# Patient Record
Sex: MALE | Race: WHITE | NOT HISPANIC OR LATINO | Employment: OTHER | URBAN - METROPOLITAN AREA
[De-identification: names, ages, dates, MRNs, and addresses within clinical notes are randomized per-mention and may not be internally consistent; named-entity substitution may affect disease eponyms.]

---

## 2017-01-04 ENCOUNTER — OFFICE VISIT (OUTPATIENT)
Dept: FAMILY MEDICINE | Facility: CLINIC | Age: 82
End: 2017-01-04
Payer: COMMERCIAL

## 2017-01-04 VITALS
TEMPERATURE: 97.8 F | WEIGHT: 184 LBS | HEART RATE: 76 BPM | DIASTOLIC BLOOD PRESSURE: 70 MMHG | RESPIRATION RATE: 20 BRPM | OXYGEN SATURATION: 97 % | SYSTOLIC BLOOD PRESSURE: 130 MMHG | HEIGHT: 73 IN | BODY MASS INDEX: 24.39 KG/M2

## 2017-01-04 DIAGNOSIS — R10.9 LEFT FLANK PAIN, CHRONIC: ICD-10-CM

## 2017-01-04 DIAGNOSIS — Z80.0 FAMILY HISTORY OF COLON CANCER: ICD-10-CM

## 2017-01-04 DIAGNOSIS — I71.40 ABDOMINAL AORTIC ANEURYSM (AAA) WITHOUT RUPTURE (H): ICD-10-CM

## 2017-01-04 DIAGNOSIS — C61 MALIGNANT NEOPLASM OF PROSTATE (H): ICD-10-CM

## 2017-01-04 DIAGNOSIS — G89.29 LEFT FLANK PAIN, CHRONIC: ICD-10-CM

## 2017-01-04 DIAGNOSIS — R19.4 CHANGE IN BOWEL HABITS: ICD-10-CM

## 2017-01-04 DIAGNOSIS — Z86.0100 HISTORY OF COLONIC POLYPS: Primary | ICD-10-CM

## 2017-01-04 DIAGNOSIS — I10 ESSENTIAL HYPERTENSION: ICD-10-CM

## 2017-01-04 PROBLEM — E78.5 HYPERLIPIDEMIA: Status: ACTIVE | Noted: 2017-01-04

## 2017-01-04 PROBLEM — I49.9 CARDIAC ARRHYTHMIA: Status: ACTIVE | Noted: 2017-01-04

## 2017-01-04 PROCEDURE — 99214 OFFICE O/P EST MOD 30 MIN: CPT | Performed by: INTERNAL MEDICINE

## 2017-01-04 RX ORDER — HYDROCHLOROTHIAZIDE 25 MG/1
12.5 TABLET ORAL
COMMUNITY
Start: 2016-11-09 | End: 2017-01-04

## 2017-01-04 RX ORDER — HYDROCHLOROTHIAZIDE 25 MG/1
12.5 TABLET ORAL DAILY
Qty: 45 TABLET | Refills: 3 | Status: SHIPPED | OUTPATIENT
Start: 2017-01-04 | End: 2018-02-26

## 2017-01-04 NOTE — NURSING NOTE
"Chief Complaint   Patient presents with     Gastrointestinal Problem     Musculoskeletal Problem       Initial /70 mmHg  Pulse 76  Temp(Src) 97.8  F (36.6  C)  Resp 20  Ht 6' 1\" (1.854 m)  Wt 184 lb (83.462 kg)  BMI 24.28 kg/m2  SpO2 97% Estimated body mass index is 24.28 kg/(m^2) as calculated from the following:    Height as of this encounter: 6' 1\" (1.854 m).    Weight as of this encounter: 184 lb (83.462 kg).  BP completed using cuff size: dwight Russo LPN  "

## 2017-01-04 NOTE — MR AVS SNAPSHOT
After Visit Summary   1/4/2017    Ayaan Herrera    MRN: [id number]           Patient Information     Date Of Birth          10/31/1932        Visit Information        Provider Department      1/4/2017 11:30 AM [facility]        Today's Diagnoses     History of colonic polyps    -  1     Change in bowel habits         Family history of colon cancer         Malignant neoplasm of prostate (H)         Essential hypertension         Abdominal aortic aneurysm (AAA) without rupture (H)           Care Instructions    We are planning a colonoscopy.                      Try cutting back on some high fiber foods, such as nuts.               Keep taking the hydrochlorothiazide.           Follow-ups after your visit        Additional Services     GASTROENTEROLOGY ADULT REFERRAL +/- PROCEDURE       Your provider has referred you to Gastroenterology Services.    English    Procedure/Referral: PROCEDURE ONLY - COLONOSCOPY - Reason for procedure: Family History of Colon Cancer, History of Adenomas; multiple,recurrent, and Lower GI Symptoms  N: [facility] (for [place] and [facility], please use the selections above) - [place] [phone]   [email or web address]      Please be aware that coverage of these services is subject to the terms and limitations of your health insurance plan.  Call member services at your health plan with any benefit or coverage questions.  Any procedures must be performed at a [facility] facility OR coordinated by your clinic's referral office.    Please bring the following with you to your appointment:    (1) Any X-Rays, CTs or MRIs which have been performed.  Contact the facility where they were done to arrange for  prior to your scheduled appointment.    (2) List of current medications   (3) This referral request   (4) Any documents/labs given to you for this referral                  Your next 10  "appointments already scheduled     Mar 10, 2017  9:15 AM   VISUAL FIELD with [facility] EYE VISUAL FIELD   Eye Clinic ([facility])    [address]  [address]  [address] [address]  [facility] [address]   [phone]            Mar 10, 2017  9:45 AM   RETURN GLAUCOMA with Lissa Berry MD   Eye Clinic ([facility])    [address]  [address]  [address] [address]  [facility] [facility] [address]   [phone]              Who to contact     If you have questions or need follow up information about today's clinic visit or your schedule please contact [facility] directly at [phone].  Normal or non-critical lab and imaging results will be communicated to you by MyChart, letter or phone within 4 business days after the clinic has received the results. If you do not hear from us within 7 days, please contact the clinic through [place] or phone. If you have a critical or abnormal lab result, we will notify you by phone as soon as possible.  Submit refill requests through [employer] or call your pharmacy and they will forward the refill request to us. Please allow 3 business days for your refill to be completed.          Additional Information About Your Visit        [employer] Information     [employer] gives you secure access to your electronic health record. If you see a primary care provider, you can also send messages to your care team and make appointments. If you have questions, please call your primary care clinic.  If you do not have a primary care provider, please call [phone] and they will assist you.        Care EveryWhere ID     This is your Care EveryWhere ID. This could be used by other organizations to access your [place] medical records  [id number]        Your Vitals Were     Pulse Temperature Respirations Height BMI (Body Mass Index) Pulse Oximetry    76 97.8  F (36.6  C) 20 6' 1\" (1.854 m) 24.28 kg/m2 97%       Blood Pressure from Last 3 " Encounters:   01/04/17 130/70   09/30/12 142/72   07/25/08 132/70    Weight from Last 3 Encounters:   01/04/17 184 lb (83.462 kg)   09/30/12 187 lb (84.823 kg)   07/25/08 187 lb (84.823 kg)              We Performed the Following     GASTROENTEROLOGY ADULT REFERRAL +/- PROCEDURE          Today's Medication Changes          These changes are accurate as of: 1/4/17 12:07 PM.  If you have any questions, ask your nurse or doctor.               These medicines have changed or have updated prescriptions.        Dose/Directions    hydrochlorothiazide 25 MG tablet   Commonly known as:  HYDRODIURIL   This may have changed:  when to take this   Used for:  Essential hypertension   Changed by:  Dean Rider MD        Dose:  12.5 mg   Take 0.5 tablets (12.5 mg) by mouth daily   Quantity:  45 tablet   Refills:  3            Where to get your medicines      These medications were sent to [facility] [address] [place] [address]     Phone:  [phone]    - hydrochlorothiazide 25 MG tablet             Primary Care Provider    Physician No Ref-Primary       No address on file        Thank you!     Thank you for choosing [facility]  for your care. Our goal is always to provide you with excellent care. Hearing back from our patients is one way we can continue to improve our services. Please take a few minutes to complete the written survey that you may receive in the mail after your visit with us. Thank you!             Your Updated Medication List - Protect others around you: Learn how to safely use, store and throw away your medicines at www.disposemymeds.org.          This list is accurate as of: 1/4/17 12:07 PM.  Always use your most recent med list.                   Brand Name Dispense Instructions for use    brimonidine 0.15 % ophthalmic solution    ALPHAGAN P    1 Bottle    Place 1 drop into both eyes 2 times daily        COSOPT 2-0.5 % ophthalmic solution   Generic drug:  dorzolamide-timolol      None Entered       hydrochlorothiazide 25 MG tablet    HYDRODIURIL    45 tablet    Take 0.5 tablets (12.5 mg) by mouth daily       latanoprost 0.005 % ophthalmic solution    XALATAN    3 Bottle    Place 1 drop into both eyes At Bedtime       ZADITOR 0.025 % Soln ophthalmic solution   Generic drug:  ketotifen      Place 1 drop into both eyes as needed

## 2017-01-04 NOTE — PATIENT INSTRUCTIONS
We are planning a colonoscopy.                      Try cutting back on some high fiber foods, such as nuts.               Keep taking the hydrochlorothiazide.

## 2017-01-04 NOTE — PROGRESS NOTES
SUBJECTIVE:                                                    Ayaan Herrera is a 84 year old male who presents to clinic today for the following health issues:      Discuss issues with Prostate Cancer Level 3- 12/2014. Left lower side pain intermittently since 2015. Colonoscopy where?-due 5/2017. Previous dates for Colonoscopies--6/13/2000, 9/29/2003, 10/20/2008, 1/27/2013, and 5/18/2014 all at [facility].       Information above obtained by the nurse.                     There is hardly any info in EPIC.                     Friend of Nidia Ellisondeisy; recently he has been going to an [facility] clinic.                                                                                 Hx of frequent colonoscopies; last one in 6/14; several adenomas.           fam hx of colon cancer.           Ongoing abd bloating; even had a CT abd which was negative.                            No hematochezia.          A follow-up colonoscopy in 3 years had been recommended. He does not want to wait until this summer, which would be 3 years.                                                 Also wanted to d/w me prostate cancer hx.              he's had hormonal therapy and external beam radiation therapy. He sees urology regularly. His PSA has been very low.                                                       Also, L flank pain since 2014.   CT abd 1/16 was neg for any etiology of this pain.                            This pain resolves with activity.       Physical therapy has been recommended, and he is due to start that soon.                               [facility]           He brings in lab results from his August 2016 exam.           Potassium 4.1, kidney and liver function normal, glucose 95, lipids were good, hemoglobin 12.8, CBC otherwise normal, PSA undetectable, TSH normal. These reports will be scanned.       Taking HCTZ, 12.5 mg per day since 2012 approx. blood pressure was elevated at that time. He wonders if  "he should continue this.    Problem list and histories reviewed & adjusted, as indicated.          ROS:  CONSTITUTIONAL:NEGATIVE for fever, chills, change in weight  RESP:NEGATIVE for significant cough or SOB  CV: NEGATIVE for chest pain, palpitations or peripheral edema    OBJECTIVE:                                                    /70 mmHg  Pulse 76  Temp(Src) 97.8  F (36.6  C)  Resp 20  Ht 6' 1\" (1.854 m)  Wt 184 lb (83.462 kg)  BMI 24.28 kg/m2  SpO2 97%  Body mass index is 24.28 kg/(m^2).  GENERAL APPEARANCE: healthy, alert and no distress  RESP: lungs clear to auscultation - no rales, rhonchi or wheezes  CV: regular rates and rhythm, normal S1 S2, no S3 or S4 and no murmur, click or rub  ABDOMEN: soft, nontender, without hepatosplenomegaly or masses and mild tenderness to palpation along the posterolateral lower costal margin, and this reproduces his pain.    Diagnostic test results:  none      ASSESSMENT/PLAN:                                                        ICD-10-CM    1. History of colonic polyps Z86.010 GASTROENTEROLOGY ADULT REFERRAL +/- PROCEDURE   2. Change in bowel habits R19.4 GASTROENTEROLOGY ADULT REFERRAL +/- PROCEDURE   3. Essential hypertension I10 hydrochlorothiazide (HYDRODIURIL) 25 MG tablet   4. Left flank pain, chronic R10.9     G89.29    5. Abdominal aortic aneurysm (AAA) without rupture (H) I71.4     3.2 cm on CT in 1/16; stable   6. Malignant neoplasm of prostate (H) C61    7. Family history of colon cancer Z80.0 GASTROENTEROLOGY ADULT REFERRAL +/- PROCEDURE       We reviewed multiple issues today, and obtain lots of history from him.       He has left flank pain seems to be a chronic musculoskeletal issues, and more of a nuisance. He will go and with the physical therapy which was already planned.                 Follow-up as needed   Patient Instructions   We are planning a colonoscopy.                      Try cutting back on some high fiber foods, such as nuts.    "            Keep taking the hydrochlorothiazide.           Dean Rider MD  [facility]

## 2017-01-06 ENCOUNTER — TELEPHONE (OUTPATIENT)
Dept: FAMILY MEDICINE | Facility: CLINIC | Age: 82
End: 2017-01-06

## 2017-01-06 DIAGNOSIS — R10.9 LEFT FLANK PAIN, CHRONIC: Primary | ICD-10-CM

## 2017-01-06 DIAGNOSIS — G89.29 LEFT FLANK PAIN, CHRONIC: Primary | ICD-10-CM

## 2017-01-06 NOTE — TELEPHONE ENCOUNTER
Is he covered there [facility] health insurance?      If so, I have generated a referral. Please take care of this.

## 2017-01-06 NOTE — TELEPHONE ENCOUNTER
Reason for Call: Request for an order or referral:    Order or referral being requested: Referral/order for Arben Cooper for   Physical therapy for flank pain.    Date needed: as soon as possible    Has the patient been seen by the PCP for this problem? YES    Additional comments: please fax referral/order to [phone]    Phone number Patient can be reached at:  Home number on file [phone] (home)    Best Time:  any    Can we leave a detailed message on this number?  YES    Call taken on 1/6/2017 at 10:10 AM by PRISCILLA RAMOS

## 2017-01-07 ENCOUNTER — MYC MEDICAL ADVICE (OUTPATIENT)
Dept: FAMILY MEDICINE | Facility: CLINIC | Age: 82
End: 2017-01-07

## 2017-01-07 DIAGNOSIS — R10.9 FLANK PAIN: Primary | ICD-10-CM

## 2017-01-09 NOTE — TELEPHONE ENCOUNTER
[facility]. Faxed referral to number listed below and called the patient to let him know.

## 2017-01-09 NOTE — TELEPHONE ENCOUNTER
Please advice on order request below for  Arben Murali  Phone #[phone] fax  [phone].  Order pended.

## 2017-01-20 ENCOUNTER — TRANSFERRED RECORDS (OUTPATIENT)
Dept: HEALTH INFORMATION MANAGEMENT | Facility: CLINIC | Age: 82
End: 2017-01-20

## 2017-02-01 ENCOUNTER — OFFICE VISIT (OUTPATIENT)
Dept: FAMILY MEDICINE | Facility: CLINIC | Age: 82
End: 2017-02-01
Payer: COMMERCIAL

## 2017-02-01 VITALS
HEIGHT: 73 IN | SYSTOLIC BLOOD PRESSURE: 130 MMHG | TEMPERATURE: 97.5 F | WEIGHT: 186 LBS | HEART RATE: 68 BPM | BODY MASS INDEX: 24.65 KG/M2 | DIASTOLIC BLOOD PRESSURE: 80 MMHG | OXYGEN SATURATION: 97 % | RESPIRATION RATE: 20 BRPM

## 2017-02-01 DIAGNOSIS — R19.4 CHANGE IN BOWEL HABITS: Primary | ICD-10-CM

## 2017-02-01 PROBLEM — Z29.9 PREVENTIVE MEASURE: Status: ACTIVE | Noted: 2017-02-01

## 2017-02-01 PROCEDURE — 99213 OFFICE O/P EST LOW 20 MIN: CPT | Performed by: INTERNAL MEDICINE

## 2017-02-01 NOTE — PROGRESS NOTES
"  SUBJECTIVE:                                                    Ayaan Herrera is a 84 year old male who presents to clinic today for the following health issues:      Discuss GI issues, with gassiness and bloating. Been going on since about 11/2014. Also discuss recent Colonoscopy had done 1/23/2017.    Still some bloating, and discomfort.   Never a problem at night, or when he is active.                        Feels better after a bowel movement.               Problem list and histories reviewed & adjusted, as indicated.      Current Outpatient Prescriptions   Medication Sig Dispense Refill     hydrochlorothiazide (HYDRODIURIL) 25 MG tablet Take 0.5 tablets (12.5 mg) by mouth daily 45 tablet 3     latanoprost (XALATAN) 0.005 % ophthalmic solution Place 1 drop into both eyes At Bedtime 3 Bottle 4     brimonidine (ALPHAGAN P) 0.15 % ophthalmic solution Place 1 drop into both eyes 2 times daily 1 Bottle 11     ketotifen (ZADITOR) 0.025 % SOLN Place 1 drop into both eyes as needed       COSOPT 2-0.5 % OP SOLN None Entered       Allergies   Allergen Reactions     Augmentin GI Disturbance     BP Readings from Last 3 Encounters:   02/01/17 130/80   01/04/17 130/70   09/30/12 142/72    Wt Readings from Last 3 Encounters:   02/01/17 186 lb (84.369 kg)   01/04/17 184 lb (83.462 kg)   09/30/12 187 lb (84.823 kg)                    ROS:  CONSTITUTIONAL:NEGATIVE for fever, chills, change in weight  GI: NEGATIVE for hematochezia and melena    OBJECTIVE:                                                    /80 mmHg  Pulse 68  Temp(Src) 97.5  F (36.4  C)  Resp 20  Ht 6' 1\" (1.854 m)  Wt 186 lb (84.369 kg)  BMI 24.55 kg/m2  SpO2 97%  Body mass index is 24.55 kg/(m^2).  GENERAL APPEARANCE: healthy, alert and no distress  ABDOMEN: soft, nontender, without hepatosplenomegaly or masses    Diagnostic test results:  See colonoscopy report       ASSESSMENT/PLAN:                                                        " ICD-10-CM    1. Change in bowel habits R19.4        We discussed that some of his sx could be due to age related slowing of peristalsis, and less thorough evacuation with bowel movements.    Patient Instructions   You could try some miralax and/or senna on a regular basis.                    Pay close attention to which foods seem to be causing more bloating,etc.         Dean Rider MD  [facility]

## 2017-02-01 NOTE — MR AVS SNAPSHOT
After Visit Summary   2/1/2017    Ayaan Herrera    MRN: [id number]           Patient Information     Date Of Birth          10/31/1932        Visit Information        Provider Department      2/1/2017 11:30 AM Dean Rider MD [facility]        Today's Diagnoses     Change in bowel habits    -  1       Care Instructions    You could try some miralax and/or senna on a regular basis.                    Pay close attention to which foods seem to be causing more bloating,etc.         Follow-ups after your visit        Your next 10 appointments already scheduled     Mar 10, 2017  9:15 AM   VISUAL FIELD with [facility] EYE VISUAL FIELD   Eye Clinic ([facility])    [address]  [address]  [address] [address]  [place] [facility] [address] [phone]            Mar 10, 2017  9:45 AM   RETURN GLAUCOMA with Lissa Berry MD   Eye Clinic ([facility])    [address]  [address]   [address] [address] [place] [address]   [phone]              Who to contact     If you have questions or need follow up information about today's clinic visit or your schedule please contact [facility] directly at [phone].  Normal or non-critical lab and imaging results will be communicated to you by [employer], letter or phone within 4 business days after the clinic has received the results. If you do not hear from us within 7 days, please contact the clinic through [employer] or phone. If you have a critical or abnormal lab result, we will notify you by phone as soon as possible.  Submit refill requests through [employer] or call your pharmacy and they will forward the refill request to us. Please allow 3 business days for your refill to be completed.          Additional Information About Your Visit        [employer] Information     [employer] gives you secure access to your electronic health record. If you see a primary care  "provider, you can also send messages to your care team and make appointments. If you have questions, please call your primary care clinic.  If you do not have a primary care provider, please call [phone] and they will assist you.        Care EveryWhere ID     This is your Care EveryWhere ID. This could be used by other organizations to access your [place] medical records  [id number]        Your Vitals Were     Pulse Temperature Respirations Height BMI (Body Mass Index) Pulse Oximetry    68 97.5  F (36.4  C) 20 6' 1\" (1.854 m) 24.55 kg/m2 97%       Blood Pressure from Last 3 Encounters:   02/01/17 130/80   01/04/17 130/70   09/30/12 142/72    Weight from Last 3 Encounters:   02/01/17 186 lb (84.369 kg)   01/04/17 184 lb (83.462 kg)   09/30/12 187 lb (84.823 kg)              Today, you had the following     No orders found for display       Primary Care Provider Office Phone # Fax #    Dean Rider [phone]       [facility] [address] [facility] [address]        Thank you!     Thank you for choosing [facility]  for your care. Our goal is always to provide you with excellent care. Hearing back from our patients is one way we can continue to improve our services. Please take a few minutes to complete the written survey that you may receive in the mail after your visit with us. Thank you!             Your Updated Medication List - Protect others around you: Learn how to safely use, store and throw away your medicines at www.disposemymeds.org.          This list is accurate as of: 2/1/17 12:04 PM.  Always use your most recent med list.                   Brand Name Dispense Instructions for use    brimonidine 0.15 % ophthalmic solution    ALPHAGAN P    1 Bottle    Place 1 drop into both eyes 2 times daily       COSOPT 2-0.5 % ophthalmic solution   Generic drug:  dorzolamide-timolol      None Entered       hydrochlorothiazide 25 MG " tablet    HYDRODIURIL    45 tablet    Take 0.5 tablets (12.5 mg) by mouth daily       latanoprost 0.005 % ophthalmic solution    XALATAN    3 Bottle    Place 1 drop into both eyes At Bedtime       ZADITOR 0.025 % Soln ophthalmic solution   Generic drug:  ketotifen      Place 1 drop into both eyes as needed

## 2017-02-01 NOTE — PATIENT INSTRUCTIONS
You could try some miralax and/or senna on a regular basis.                    Pay close attention to which foods seem to be causing more bloating,etc.

## 2017-02-01 NOTE — NURSING NOTE
"Chief Complaint   Patient presents with     Gastrointestinal Problem       Initial /80 mmHg  Pulse 68  Temp(Src) 97.5  F (36.4  C)  Resp 20  Ht 6' 1\" (1.854 m)  Wt 186 lb (84.369 kg)  BMI 24.55 kg/m2  SpO2 97% Estimated body mass index is 24.55 kg/(m^2) as calculated from the following:    Height as of this encounter: 6' 1\" (1.854 m).    Weight as of this encounter: 186 lb (84.369 kg).  BP completed using cuff size: dwight Russo LPN  "

## 2017-03-10 ENCOUNTER — APPOINTMENT (OUTPATIENT)
Dept: OPHTHALMOLOGY | Facility: CLINIC | Age: 82
End: 2017-03-10
Attending: OPHTHALMOLOGY
Payer: COMMERCIAL

## 2017-03-10 DIAGNOSIS — H40.1113 PRIMARY OPEN ANGLE GLAUCOMA OF RIGHT EYE, SEVERE STAGE: Primary | ICD-10-CM

## 2017-03-10 DIAGNOSIS — H40.1122 PRIMARY OPEN ANGLE GLAUCOMA OF LEFT EYE, MODERATE STAGE: ICD-10-CM

## 2017-03-10 PROCEDURE — 92083 EXTENDED VISUAL FIELD XM: CPT | Mod: ZF | Performed by: OPHTHALMOLOGY

## 2017-03-10 PROCEDURE — 92133 CPTRZD OPH DX IMG PST SGM ON: CPT | Mod: ZF | Performed by: OPHTHALMOLOGY

## 2017-03-10 PROCEDURE — 99213 OFFICE O/P EST LOW 20 MIN: CPT | Mod: ZF

## 2017-03-10 ASSESSMENT — VISUAL ACUITY
OS_CC: 20/25
METHOD: SNELLEN - LINEAR
OD_CC: 20/20

## 2017-03-10 ASSESSMENT — SLIT LAMP EXAM - LIDS
COMMENTS: NORMAL
COMMENTS: NORMAL

## 2017-03-10 ASSESSMENT — TONOMETRY
OD_IOP_MMHG: 13
OS_IOP_MMHG: 15
IOP_METHOD: APPLANATION

## 2017-03-10 ASSESSMENT — CONF VISUAL FIELD
OS_INFERIOR_TEMPORAL_RESTRICTION: 3
OD_NORMAL: 1

## 2017-03-10 ASSESSMENT — CUP TO DISC RATIO
OS_RATIO: 0.8
OD_RATIO: 0.7

## 2017-03-10 NOTE — PROGRESS NOTES
CC: Followup for Primary open angle glaucoma (POAG)-moderate. Previous problems with itching left eye     HPI:   - Prostate cancer, currently undergoing treatment  - Feels that vision has been stable over the past year, no issues with drops.  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    Ophthalmic procedural/surgical history:  - trabeculectomy LE (1996) and revised (1997)  - SLT RE (10/14/04 & 2/17/05)  - cataract IOL RE (1985) LE (1996)  - punctal closure, plugs BE (2000)    Current ophthalmic medications-gets all medications from [facility]:  - brimonidine 1 drop both eyes BID  - Latanoprost 1 drop QHS both eyes   - dorzolamide 1 drop Both eyes BID  - artificial tears at bedtime both eyes     Results of studies/procedures today:  24-2 Octopus with poor reliability both eyes (also a problem in the past) so will do LVC in future  OCT stable both eyes     Assessment and Plan:  Primary open angle glaucoma (POAG) adv right eye and mod left eye   May need further surgery but OCT stable  Change to LVC for reliability     RTC 6 months with LVC both eyes and continue present management     Attending Physician Attestation:  Complete documentation of historical and exam elements from today's encounter can be found in the full encounter summary report (not reduplicated in this progress note). I personally obtained the chief complaint(s) and history of present illness. I confirmed and edited asnecessary the review of systems, past medical/surgical history, family history, social history, and examination findings as documented by others; and I examined the patient myself. I personally reviewed the relevant tests, images, and reports as documented above. I formulated and edited as necessary the assessment and plan and discussed the findings and management plan with the patient and family.  - Lissa Berry MD 11:04 AM 3/10/2017

## 2017-03-10 NOTE — NURSING NOTE
Chief Complaints and History of Present Illnesses   Patient presents with     Follow Up For     Glaucoma     HPI    Affected eye(s):  Both   Symptoms:     No blurred vision   No decreased vision   Itching         Do you have eye pain now?:  No      Comments:  Pt complains of the LE being sore or irritated for the last three weeks. Today is a bit better.  Sridevi CONN 9:20 AM March 10, 2017

## 2017-03-10 NOTE — MR AVS SNAPSHOT
After Visit Summary   3/10/2017    Ayaan Herrera    MRN: [id number]           Patient Information     Date Of Birth          10/31/1932        Visit Information        Provider Department      3/10/2017 9:45 AM Lissa Berry MD Eye Clinic        Today's Diagnoses     Primary open angle glaucoma of right eye, severe stage    -  1    Primary open angle glaucoma of left eye, moderate stage           Follow-ups after your visit        Follow-up notes from your care team     Return in about 6 months (around 9/10/2017) for visual field LVC.      Your next 10 appointments already scheduled     Sep 11, 2017  1:15 PM CDT   VISUAL FIELD with [facility] EYE VISUAL FIELD   Eye Clinic ([facility])    [address]  [address]   [address] [address]  [facility] [address] [phone]            Sep 11, 2017  1:45 PM CDT   RETURN GLAUCOMA with Lissa Berry MD   Eye Clinic ([facility])    [address]  [address]   [address] [address]  [facility] [address] [phone]              Who to contact     Please call your clinic at [phone] to:    Ask questions about your health    Make or cancel appointments    Discuss your medicines    Learn about your test results    Speak to your doctor   If you have compliments or concerns about an experience at your clinic, or if you wish to file a complaint, please contact [facility] Patient Relations at [phone] or email us at [email or web address]         Additional Information About Your Visit        MyChart Information     [employer] gives you secure access to your electronic health record. If you see a primary care provider, you can also send messages to your care team and make appointments. If you have questions, please call your primary care clinic.  If you do not have a primary care provider, please call [phone] and they will assist you.      [employer] is an electronic gateway  that provides easy, online access to your medical records. With [employer], you can request a clinic appointment, read your test results, renew a prescription or communicate with your care team.     To access your existing account, please contact your [facility] or call [phone] for assistance.        Care EveryWhere ID     This is your Care EveryWhere ID. This could be used by other organizations to access your [place] medical records  [id number]         Blood Pressure from Last 3 Encounters:   02/01/17 130/80   01/04/17 130/70   09/30/12 142/72    Weight from Last 3 Encounters:   02/01/17 84.4 kg (186 lb)   01/04/17 83.5 kg (184 lb)   09/30/12 84.8 kg (187 lb)              We Performed the Following     OCT Optic Nerve RNFL Spectralis OU (both eyes)     OVF 24-2 Dynamic OU        Primary Care Provider Office Phone # Fax #    Dean Rider [phone]       [facility] [address] [facility] [address]        Thank you!     Thank you for choosing EYE CLINIC  for your care. Our goal is always to provide you with excellent care. Hearing back from our patients is one way we can continue to improve our services. Please take a few minutes to complete the written survey that you may receive in the mail after your visit with us. Thank you!             Your Updated Medication List - Protect others around you: Learn how to safely use, store and throw away your medicines at www.disposemymeds.org.          This list is accurate as of: 3/10/17 11:19 AM.  Always use your most recent med list.                   Brand Name Dispense Instructions for use    brimonidine 0.15 % ophthalmic solution    ALPHAGAN P    1 Bottle    Place 1 drop into both eyes 2 times daily       COSOPT 2-0.5 % ophthalmic solution   Generic drug:  dorzolamide-timolol      None Entered       hydrochlorothiazide 25 MG tablet    HYDRODIURIL    45 tablet    Take 0.5 tablets (12.5 mg)  by mouth daily       latanoprost 0.005 % ophthalmic solution    XALATAN    3 Bottle    Place 1 drop into both eyes At Bedtime       ZADITOR 0.025 % Soln ophthalmic solution   Generic drug:  ketotifen      Place 1 drop into both eyes as needed

## 2017-05-09 ENCOUNTER — TRANSFERRED RECORDS (OUTPATIENT)
Dept: HEALTH INFORMATION MANAGEMENT | Facility: CLINIC | Age: 82
End: 2017-05-09

## 2017-08-07 ENCOUNTER — TRANSFERRED RECORDS (OUTPATIENT)
Dept: HEALTH INFORMATION MANAGEMENT | Facility: CLINIC | Age: 82
End: 2017-08-07

## 2017-08-07 LAB
ALT SERPL-CCNC: 24 U/L (ref 13–61)
AST SERPL-CCNC: 26 U/L (ref 15–37)
CHOLEST SERPL-MCNC: 145 MG/DL (ref 0–200)
CREAT SERPL-MCNC: 0.8 MG/DL (ref 0.7–1.2)
GLUCOSE SERPL-MCNC: 93 MG/DL (ref 74–106)
POTASSIUM SERPL-SCNC: 4.2 MMOL/L (ref 3.5–5)
TRIGL SERPL-MCNC: 72 MG/DL (ref 0–150)
TSH SERPL-ACNC: 1.3 UIU/ML (ref 0.3–5)

## 2017-09-11 ENCOUNTER — OFFICE VISIT (OUTPATIENT)
Dept: OPHTHALMOLOGY | Facility: CLINIC | Age: 82
End: 2017-09-11
Attending: OPHTHALMOLOGY
Payer: COMMERCIAL

## 2017-09-11 DIAGNOSIS — H40.1133 PRIMARY OPEN ANGLE GLAUCOMA OF BOTH EYES, SEVERE STAGE: Primary | ICD-10-CM

## 2017-09-11 PROCEDURE — 99213 OFFICE O/P EST LOW 20 MIN: CPT | Mod: ZF

## 2017-09-11 PROCEDURE — 92083 EXTENDED VISUAL FIELD XM: CPT | Mod: ZF | Performed by: OPHTHALMOLOGY

## 2017-09-11 ASSESSMENT — TONOMETRY
OD_IOP_MMHG: 16
IOP_METHOD: APPLANATION
OS_IOP_MMHG: 17

## 2017-09-11 ASSESSMENT — VISUAL ACUITY
OS_PH_CC: 20/20-2
OD_SC: 20/20
METHOD: SNELLEN - LINEAR
OS_SC: 20/30

## 2017-09-11 ASSESSMENT — SLIT LAMP EXAM - LIDS
COMMENTS: NORMAL
COMMENTS: NORMAL

## 2017-09-11 ASSESSMENT — REFRACTION_WEARINGRX
OD_SPHERE: -4.50
SPECS_TYPE: PAL
OD_CYLINDER: +1.50
OD_AXIS: 026
OS_ADD: +2.75
OS_SPHERE: -1.50
OS_CYLINDER: +0.25
OS_AXIS: 147
OD_ADD: +2.75

## 2017-09-11 ASSESSMENT — CONF VISUAL FIELD
OS_SUPERIOR_TEMPORAL_RESTRICTION: 3
OS_INFERIOR_TEMPORAL_RESTRICTION: 3
OD_INFERIOR_TEMPORAL_RESTRICTION: 3

## 2017-09-11 ASSESSMENT — CUP TO DISC RATIO
OD_RATIO: 0.7
OS_RATIO: 0.8

## 2017-09-11 NOTE — NURSING NOTE
Chief Complaints and History of Present Illnesses   Patient presents with     Follow Up For     Primary open angle glaucoma of right eye, severe stage     HPI    Last Eye Exam:  3/10/17   Affected eye(s):  Right   Symptoms:        Unknown duration    Frequency:  Constant       Do you have eye pain now?:  No      Comments:  He is here today for a follow up of Primary open angle glaucoma of right eye, severe stage  He says no change to his vision since last visit.  He denies flashes or floaters.    Herman Vasquez COT 1:51 PM September 11, 2017

## 2017-09-11 NOTE — MR AVS SNAPSHOT
After Visit Summary   9/11/2017    Ayaan Herrera    MRN: [id number]           Patient Information     Date Of Birth          10/31/1932        Visit Information        Provider Department      9/11/2017 1:45 PM Lissa Berry MD Eye Clinic        Today's Diagnoses     Primary open angle glaucoma of both eyes, severe stage    -  1       Follow-ups after your visit        Follow-up notes from your care team     Return in about 6 months (around 3/11/2018) for OCT rnfl.      Your next 10 appointments already scheduled     Mar 14, 2018 12:45 PM CDT   RETURN GLAUCOMA with Lissa Berry MD   Eye Clinic ([facility])    [address]  [address]  [facility] [id number] [phone]              Who to contact     Please call your clinic at [phone] to:    Ask questions about your health    Make or cancel appointments    Discuss your medicines    Learn about your test results    Speak to your doctor   If you have compliments or concerns about an experience at your clinic, or if you wish to file a complaint, please contact [facility] Patient Relations at [phone] or email us at [email or web address]         Additional Information About Your Visit        MyChart Information     Lum[employer] gives you secure access to your electronic health record. If you see a primary care provider, you can also send messages to your care team and make appointments. If you have questions, please call your primary care clinic.  If you do not have a primary care provider, please call [phone] and they will assist you.      Heetch is an electronic gateway that provides easy, online access to your medical records. With [employer], you can request a clinic appointment, read your test results, renew a prescription or communicate with your care team.     To access your existing account, please contact your [facility]  Clinic or call [phone] for assistance.        Care EveryWhere ID     This is your Care EveryWhere ID. This could be used by other organizations to access your [place] medical records  [id number]         Blood Pressure from Last 3 Encounters:   02/01/17 130/80   01/04/17 130/70   09/30/12 142/72    Weight from Last 3 Encounters:   02/01/17 84.4 kg (186 lb)   01/04/17 83.5 kg (184 lb)   09/30/12 84.8 kg (187 lb)              We Performed the Following     Low Vision Central OU        Primary Care Provider Office Phone # Fax #    Dean Rider [phone] [address]  [facility] [address]        Equal Access to Services     VA REED : Hadii ronal rosales hadasho Soomaali, waaxda luqadaha, qaybta kaalmada adeegyada, waxcyn patino hayherminia rodrigues . So [facility] [phone].    ATENCIÓN: Si habla español, tiene a kwok disposición servicios gratuitos de asistencia lingüística. Llame al [phone].    We comply with applicable federal civil rights laws and [place] laws. We do not discriminate on the basis of race, color, national origin, age, disability sex, sexual orientation or gender identity.            Thank you!     Thank you for choosing EYE CLINIC  for your care. Our goal is always to provide you with excellent care. Hearing back from our patients is one way we can continue to improve our services. Please take a few minutes to complete the written survey that you may receive in the mail after your visit with us. Thank you!             Your Updated Medication List - Protect others around you: Learn how to safely use, store and throw away your medicines at www.disposemymeds.org.          This list is accurate as of: 9/11/17  2:59 PM.  Always use your most recent med list.                   Brand Name Dispense Instructions for use Diagnosis    brimonidine 0.15 % ophthalmic solution    ALPHAGAN P    1 Bottle    Place 1 drop into both eyes 2 times daily    Acute maxillary  sinusitis       COSOPT 2-0.5 % ophthalmic solution   Generic drug:  dorzolamide-timolol      Apply 1 Dose to eye 2 times daily Both eyes.    Acute maxillary sinusitis       hydrochlorothiazide 25 MG tablet    HYDRODIURIL    45 tablet    Take 0.5 tablets (12.5 mg) by mouth daily    Essential hypertension       latanoprost 0.005 % ophthalmic solution    XALATAN    3 Bottle    Place 1 drop into both eyes At Bedtime    Prim open angle glaucoma       ZADITOR 0.025 % Soln ophthalmic solution   Generic drug:  ketotifen      Place 1 drop into both eyes as needed

## 2017-09-20 ENCOUNTER — OFFICE VISIT (OUTPATIENT)
Dept: FAMILY MEDICINE | Facility: CLINIC | Age: 82
End: 2017-09-20
Payer: COMMERCIAL

## 2017-09-20 VITALS
TEMPERATURE: 97.5 F | HEART RATE: 76 BPM | BODY MASS INDEX: 23.19 KG/M2 | DIASTOLIC BLOOD PRESSURE: 72 MMHG | WEIGHT: 175 LBS | SYSTOLIC BLOOD PRESSURE: 130 MMHG | OXYGEN SATURATION: 98 % | HEIGHT: 73 IN | RESPIRATION RATE: 20 BRPM

## 2017-09-20 DIAGNOSIS — Z23 NEED FOR PROPHYLACTIC VACCINATION AND INOCULATION AGAINST INFLUENZA: ICD-10-CM

## 2017-09-20 DIAGNOSIS — I48.20 CHRONIC ATRIAL FIBRILLATION (H): Primary | ICD-10-CM

## 2017-09-20 PROCEDURE — 90662 IIV NO PRSV INCREASED AG IM: CPT | Performed by: INTERNAL MEDICINE

## 2017-09-20 PROCEDURE — G0008 ADMIN INFLUENZA VIRUS VAC: HCPCS | Performed by: INTERNAL MEDICINE

## 2017-09-20 PROCEDURE — 99214 OFFICE O/P EST MOD 30 MIN: CPT | Mod: 25 | Performed by: INTERNAL MEDICINE

## 2017-09-20 NOTE — MR AVS SNAPSHOT
After Visit Summary   9/20/2017    Ayaan Herrera    MRN: [id number]           Patient Information     Date Of Birth          10/31/1932        Visit Information        Provider Department      9/20/2017 3:30 PM Dean Rider MD [facility]        Today's Diagnoses     Chronic atrial fibrillation (H)    -  1    Need for prophylactic vaccination and inoculation against influenza          Care Instructions              Here is a copy of your recent pacemaker analysis.                        To decrease your stroke, I recommend that you get started on anticoagulation.                   You are planning to discuss this with Dr Smith at the [facility], and you are going to [facility] out the [facility],with your [facility] doctor.    Reason For Evaluation: Routine- following atrial threshold.    MEASUREMENTS  Atrial Sensing - P wave: 0.7-1.0 mV  Atrial Capture: Maintained: 1.75 V @ 0.4 Ms, previous: 2.0 V @ 1.0 ms Loss: 1.75 V @ 1 ms (unipolar) - stable (thresholds elevated and relatively stable since 2009)  Atrial Lead Impedance: 373 ohms (unipolar) - Stable    Ventricular Sensing - R wave: None measured today @ VVI 40. Auto measurements reports 16.0-22.4 mV  Ventricular Capture: RV Maintained: 0.75 V @ 0.4 ms Loss: 0.5 V @ 0.4 ms   Ventricular Lead Impedance: 548 ohms    Underlying Rhythm: Atrial fibrillation with no R waves > 40 bpm today, occasional PVC    DIAGNOSTIC DATA  Atrial paced: 91.2% Ventricular paced: 32.3 % (100 % when in A-fib)   Mode switch: Atrial fibrillation noted continuously since 7/15. Ventricular rate well controlled with very rare beats > 100 bpm. Patient not anticoagulated. Denies any symptoms.    Other: No ventricular high rates noted (detects >150 bpm x 10 beats)     Sensor response: Appropriate for patient's lifestyle, primarily 60-80 bpm. Patient denies limitations.  Battery status: Satisfactory Voltage: 2.78 V Estimated  Battery Longevity: 3.5 years     FINAL PARAMETERS  Mode: AAIR<=>DDDR Lower rate: 60 bpm Upper rate: 130/130 bpm  AV Delay: 150/120 ms Mode Switch: 150 bpm Rate Response: Med/low 3/3  Atrial - Amplitude: 3.5 V Pulse width: 1 ms Sensitivity: 0.25 mV Refractory: auto 250 ms Polarity: Bipolar - Sensed / Unipolar - Paced  Ventricular - Amplitude: Adaptive 2 V Pulse width: 0.4 ms Sensitivity: 5.6 mV Refractory: 230 ms Polarity: Bipolar    Changes made: Device temporarily reprogrammed, iterative adjustments made during interrogation/testing. No permanent changes made.  Conclusions: Normal pacemaker function. New/persistent atrial fibrillation noted since 7/15. Patient not anticoagulated and without symptoms. He will call his PCP, Dr. Rider, office today to arrange for an appointment to discuss anticoagulation. Appointment made with Dr. Smith on 9/25/2017 to discuss rhythm and options for management.  Follow up: Dr. Smith on 9/25 to discuss new atrial arrhythmia. PCP, Dr. Rider, ASAP to discuss options for anticoagulation. This report faxed to PCP, Dean Rider MD.  Patient has declined remote monitoring.    Sridevi Walsh RN, CCDS  [facility]  Pacemaker/ICD Clinic  [phone]            Follow-ups after your visit        Your next 10 appointments already scheduled     Mar 14, 2018 12:45 PM CDT   RETURN GLAUCOMA with Lissa Berry MD   Eye Clinic ([facility])    [address]  [address]  [address] [address]  [facility] [address] [phone]              Who to contact     If you have questions or need follow up information about today's clinic visit or your schedule please contact [facility] directly at [phone].  Normal or non-critical lab and imaging results will be communicated to you by MyChart, letter or phone within 4 business days after the clinic has received the results. If you do not hear from us within 7 days, please  "contact the clinic through [employer] or phone. If you have a critical or abnormal lab result, we will notify you by phone as soon as possible.  Submit refill requests through [employer] or call your pharmacy and they will forward the refill request to us. Please allow 3 business days for your refill to be completed.          Additional Information About Your Visit        [email or web address] Information     [employer] gives you secure access to your electronic health record. If you see a primary care provider, you can also send messages to your care team and make appointments. If you have questions, please call your primary care clinic.  If you do not have a primary care provider, please call [phone] and they will assist you.        Care EveryWhere ID     This is your Care EveryWhere ID. This could be used by other organizations to access your [place] medical records  [id number]        Your Vitals Were     Pulse Temperature Respirations Height Pulse Oximetry BMI (Body Mass Index)    76 97.5  F (36.4  C) 20 6' 1\" (1.854 m) 98% 23.09 kg/m2       Blood Pressure from Last 3 Encounters:   09/20/17 130/72   02/01/17 130/80   01/04/17 130/70    Weight from Last 3 Encounters:   09/20/17 175 lb (79.4 kg)   02/01/17 186 lb (84.4 kg)   01/04/17 184 lb (83.5 kg)              We Performed the Following     ADMIN INFLUENZA (For MEDICARE Patients ONLY) []     FLU VACCINE, INCREASED ANTIGEN, PRESV FREE, AGE 65+ [94635]        Primary Care Provider Office Phone # Fax #    Dean Rider [phone] [address] [facility] [address]        Equal Access to Services     [facility] : Hadii ronal Rodríguez, marbella brown, anil jara . So [facility] [phone].    ATENCIÓN: Si habla español, tiene a kwok disposición servicios gratuitos de asistencia lingüística. Llame al [phone].    We comply with applicable federal civil rights laws and " [place] laws. We do not discriminate on the basis of race, color, national origin, age, disability sex, sexual orientation or gender identity.            Thank you!     Thank you for choosing [facility]  for your care. Our goal is always to provide you with excellent care. Hearing back from our patients is one way we can continue to improve our services. Please take a few minutes to complete the written survey that you may receive in the mail after your visit with us. Thank you!             Your Updated Medication List - Protect others around you: Learn how to safely use, store and throw away your medicines at www.disposemymeds.org.          This list is accurate as of: 9/20/17  4:24 PM.  Always use your most recent med list.                   Brand Name Dispense Instructions for use Diagnosis    brimonidine 0.15 % ophthalmic solution    ALPHAGAN P    1 Bottle    Place 1 drop into both eyes 2 times daily    Acute maxillary sinusitis       COSOPT 2-0.5 % ophthalmic solution   Generic drug:  dorzolamide-timolol      Apply 1 Dose to eye 2 times daily Both eyes.    Acute maxillary sinusitis       hydrochlorothiazide 25 MG tablet    HYDRODIURIL    45 tablet    Take 0.5 tablets (12.5 mg) by mouth daily    Essential hypertension       latanoprost 0.005 % ophthalmic solution    XALATAN    3 Bottle    Place 1 drop into both eyes At Bedtime    Prim open angle glaucoma       ZADITOR 0.025 % Soln ophthalmic solution   Generic drug:  ketotifen      Place 1 drop into both eyes as needed

## 2017-09-20 NOTE — PROGRESS NOTES
SUBJECTIVE:   Ayaan Herrera is a 84 year old male who presents to clinic today for the following health issues:      Discuss new diagnosis of A-fib and also has Pacemaker for 10 years.                   Pacemaker analysis showed that he has been in atrial fibrillation since 7/17.    He was advised to come here to discuss anticoagulation. He has an appointment with electrophysiologist next week.    [facility]                In fact, he had a recent [facility] there, and he brings in the lab results which are excellent including lipids, glucose, kidney function, liver function, thyroid function, CBC, and the PSA was 0.05.                   He doesn't believe that he's had any unusual risk of bleeding or falls  Problem list and histories reviewed & adjusted, as indicated.      Patient Active Problem List    Diagnosis Date Noted     Primary open angle glaucoma of right eye, severe stage 03/10/2017     Priority: Medium     History of colonic polyps 01/04/2017     Priority: Medium     Hyperlipidemia 01/04/2017     Priority: Medium     Cardiac arrhythmia 01/04/2017     Priority: Medium     Pacemaker 2007; sinus node dysfxn,bradycardia       Family history of colon cancer 01/04/2017     Priority: Medium     Change in bowel habits 01/04/2017     Priority: Medium     Essential hypertension 01/04/2017     Priority: Medium     Abdominal aortic aneurysm (AAA) without rupture (H) 01/04/2017     Priority: Medium     3.2 cm on CT in 1/16; stable       Left flank pain, chronic 01/04/2017     Priority: Medium     Malignant neoplasm of prostate (H) 04/14/2016     Priority: Medium     12/14 bx; Lupron Rx; XRT;      Urology = Dr Russell;  PSA = 0 recently;        Primary open angle glaucoma 03/04/2015     Priority: Medium     Problem list name updated by automated process. Provider to review       Malignant basal cell neoplasm of skin 06/13/2013     Priority: Medium     Preventive measure 02/01/2017      Priority: Low     Colonoscopy 1/17; a 4 mm adenoma         Past Surgical History:   Procedure Laterality Date     CATARACT IOL, RT/LT  1985/1996    RE/LE     GLAUCOMA SURGERY  1996    LE     GLAUCOMA SURGERY  1997    Bleb revision LE     LASER SURGERY OF EYE  10/14/04 & 2/17/05    SLT RE     PUNCTAL CLOSURE, PLUGS  2000    BE     TURP  2003     Social History     Social History     Marital status: Single     Spouse name: N/A     Number of children: N/A     Years of education: N/A     Occupational History     Not on file.     Social History Main Topics     Smoking status: Former Smoker     Quit date: 1/1/1980     Smokeless tobacco: Never Used     Alcohol use No     Drug use: No     Sexual activity: No     Other Topics Concern     Not on file     Social History Narrative    [employer] ; [employer].  Retired        Current Outpatient Prescriptions   Medication Sig Dispense Refill     hydrochlorothiazide (HYDRODIURIL) 25 MG tablet Take 0.5 tablets (12.5 mg) by mouth daily 45 tablet 3     latanoprost (XALATAN) 0.005 % ophthalmic solution Place 1 drop into both eyes At Bedtime 3 Bottle 4     brimonidine (ALPHAGAN P) 0.15 % ophthalmic solution Place 1 drop into both eyes 2 times daily 1 Bottle 11     ketotifen (ZADITOR) 0.025 % SOLN Place 1 drop into both eyes as needed       COSOPT 2-0.5 % OP SOLN Apply 1 Dose to eye 2 times daily Both eyes.       Allergies   Allergen Reactions     Augmentin GI Disturbance     BP Readings from Last 3 Encounters:   09/20/17 130/72   02/01/17 130/80   01/04/17 130/70    Wt Readings from Last 3 Encounters:   09/20/17 175 lb (79.4 kg)   02/01/17 186 lb (84.4 kg)   01/04/17 184 lb (83.5 kg)                      Reviewed and updated as needed this visit by clinical staff       Reviewed and updated as needed this visit by Provider         ROS:  CONSTITUTIONAL:NEGATIVE for fever, chills, change in weight  RESP:NEGATIVE for significant cough or SOB  CV: NEGATIVE for chest pain,  "palpitations or peripheral edema    OBJECTIVE:                                                    /72 (BP Location: Right arm, Patient Position: Chair, Cuff Size: Adult Regular)  Pulse 76  Temp 97.5  F (36.4  C)  Resp 20  Ht 6' 1\" (1.854 m)  Wt 175 lb (79.4 kg)  SpO2 98%  BMI 23.09 kg/m2  Body mass index is 23.09 kg/(m^2).  GENERAL APPEARANCE: alert and no distress  RESP: no rales or rhonchi  CV: normal S1 S2, no S3 or S4, no murmur, click or rub and irregular rhythm    Diagnostic test results:  none        ASSESSMENT/PLAN:                                                        ICD-10-CM    1. Chronic atrial fibrillation (H) I48.2      According to the pacemaker analysis from [facility], atrial fibrillation since July 2017.  Appointment with arrhythmia specialist on 9/25   2. Need for prophylactic vaccination and inoculation against influenza Z23 FLU VACCINE, INCREASED ANTIGEN, PRESV FREE, AGE 65+ [54354]     ADMIN INFLUENZA (For MEDICARE Patients ONLY) []       I gave him a copy of his recent pacemaker analysis.      [facility]   Patient Instructions             Here is a copy of your recent pacemaker analysis.                        To decrease your stroke, I recommend that you get started on anticoagulation.                   You are planning to discuss this with Dr Smith at the [facility], and you are going to find out the formulary options at the [facility],with your [facility] doctor.          Dean Rider MD  [facility]  Injectable Influenza Immunization Documentation    1.  Is the person to be vaccinated sick today?   No    2. Does the person to be vaccinated have an allergy to a component   of the vaccine?   No    3. Has the person to be vaccinated ever had a serious reaction   to influenza vaccine in the past?   No    4. Has the person to be vaccinated ever had Guillain-Barré syndrome?   No    Form " completed by Precious Russo LPN

## 2017-09-20 NOTE — NURSING NOTE
"Chief Complaint   Patient presents with     Consult       Initial /72 (BP Location: Right arm, Patient Position: Chair, Cuff Size: Adult Regular)  Pulse 76  Temp 97.5  F (36.4  C)  Resp 20  Ht 6' 1\" (1.854 m)  Wt 175 lb (79.4 kg)  SpO2 98%  BMI 23.09 kg/m2 Estimated body mass index is 23.09 kg/(m^2) as calculated from the following:    Height as of this encounter: 6' 1\" (1.854 m).    Weight as of this encounter: 175 lb (79.4 kg).  Medication Reconciliation: complete   Precious Russo LPN  "

## 2017-09-20 NOTE — PATIENT INSTRUCTIONS
Here is a copy of your recent pacemaker analysis.                        To decrease your stroke risk, I recommend that you get started on anticoagulation.                   You are planning to discuss this with Dr Smith at the [facility], and you are going to find out the formulary options at the [facility],with your [facility] doctor.

## 2017-09-25 ENCOUNTER — TELEPHONE (OUTPATIENT)
Dept: FAMILY MEDICINE | Facility: CLINIC | Age: 82
End: 2017-09-25

## 2017-09-25 ENCOUNTER — TRANSFERRED RECORDS (OUTPATIENT)
Dept: HEALTH INFORMATION MANAGEMENT | Facility: CLINIC | Age: 82
End: 2017-09-25

## 2017-09-25 NOTE — TELEPHONE ENCOUNTER
Nurse from an [facility] clinic called requesting labs from the last 6 months. Informed the only [facility].   [facility].

## 2017-11-17 ENCOUNTER — OFFICE VISIT (OUTPATIENT)
Dept: FAMILY MEDICINE | Facility: CLINIC | Age: 82
End: 2017-11-17
Payer: COMMERCIAL

## 2017-11-17 VITALS
TEMPERATURE: 98.1 F | SYSTOLIC BLOOD PRESSURE: 132 MMHG | HEART RATE: 65 BPM | DIASTOLIC BLOOD PRESSURE: 72 MMHG | OXYGEN SATURATION: 97 % | BODY MASS INDEX: 24.01 KG/M2 | RESPIRATION RATE: 16 BRPM | WEIGHT: 182 LBS

## 2017-11-17 DIAGNOSIS — I48.20 CHRONIC ATRIAL FIBRILLATION (H): ICD-10-CM

## 2017-11-17 DIAGNOSIS — J20.9 ACUTE BRONCHITIS, UNSPECIFIED ORGANISM: Primary | ICD-10-CM

## 2017-11-17 PROCEDURE — 99213 OFFICE O/P EST LOW 20 MIN: CPT | Performed by: FAMILY MEDICINE

## 2017-11-17 RX ORDER — WARFARIN SODIUM 5 MG/1
5 TABLET ORAL
COMMUNITY
Start: 2017-10-02 | End: 2019-10-03

## 2017-11-17 RX ORDER — AZITHROMYCIN 250 MG/1
TABLET, FILM COATED ORAL
Qty: 6 TABLET | Refills: 0 | Status: SHIPPED | OUTPATIENT
Start: 2017-11-17 | End: 2017-11-20 | Stop reason: SINTOL

## 2017-11-17 RX ORDER — TAMSULOSIN HYDROCHLORIDE 0.4 MG/1
0.4 CAPSULE ORAL DAILY
Refills: 3 | COMMUNITY
Start: 2017-11-09 | End: 2022-07-25

## 2017-11-17 NOTE — PROGRESS NOTES
SUBJECTIVE:   Ayaan Herrera is a 85 year old male who presents to clinic today for the following health issues:      RESPIRATORY SYMPTOMS      Duration: x 2 weeks    Description  sore throat, cough, wheezing and stomach ache    Severity: severe    Accompanying signs and symptoms: Phlegm (yellow)    History (predisposing factors):  none    Precipitating or alleviating factors: None    Therapies tried and outcome:  Cough drops        Abdominal Pain      Duration: ongoing    Description (location/character/radiation): Frequent gas.       Associated flank pain: None    Intensity:  moderate, severe    Accompanying signs and symptoms:        Fever/Chills: no        Gas/Bloating: YES       Nausea/vomitting: no        Diarrhea: no        Dysuria or Hematuria: no     History (previous similar pain/trauma/previous testing): YES    Precipitating or alleviating factors:       Pain worse with eating/BM/urination: No       Pain relieved by BM: YES    Therapies tried and outcome: None    LMP:  not applicable        Problem list and histories reviewed & adjusted, as indicated.  Additional history: as documented    Labs reviewed in EPIC    Reviewed and updated as needed this visit by clinical staffTobacco  Allergies  Meds  Med Hx  Surg Hx  Fam Hx  Soc Hx      Reviewed and updated as needed this visit by Provider         ROS:  CONSTITUTIONAL:NEGATIVE for fever, chills, change in weight  ENT/MOUTH: POSITIVE for nasal congestion, postnasal drainage and rhinorrhea-clear  RESP:POSITIVE for cough-productive    OBJECTIVE:                                                    /72 (BP Location: Right arm, Patient Position: Sitting, Cuff Size: Adult Large)  Pulse 65  Temp 98.1  F (36.7  C) (Tympanic)  Resp 16  Wt 182 lb (82.6 kg)  SpO2 97%  BMI 24.01 kg/m2  Body mass index is 24.01 kg/(m^2).  GENERAL APPEARANCE: healthy, alert and no distress  HENT: ear canals and TM's normal, nose and mouth without ulcers or lesions,  nasal mucosa edematous without rhinorrhea, oral mucous membranes moist and oropharynx clear  NECK: no adenopathy, no asymmetry, masses, or scars and thyroid normal to palpation  RESP: rhonchi R mid posterior and L mid posterior  CV: normal S1 S2, no S3 or S4, no murmur, click or rub and irregularly irregular rhythm    Diagnostic test results:  none        ASSESSMENT/PLAN:                                                        ICD-10-CM    1. Acute bronchitis, unspecified organism J20.9 azithromycin (ZITHROMAX) 250 MG tablet   2. Chronic atrial fibrillation (H) I48.2        Follow up with Provider - as needed if not improving   Short term course of Azithromycin should not significantly affect INR   Patient Instructions   Symptomatic treatment.  Will use saline gargles, tylenol and/or advil. Suck on  lozenges as needed. Push fluids. Salt water nasal spray as needed.  Use expectorant such as Mucinex or Robitussin for cough    Avoid dairy products for 2-3 weeks      Sandor Lange MD  [facility]

## 2017-11-17 NOTE — MR AVS SNAPSHOT
After Visit Summary   11/17/2017    Ayaan Herrera    MRN: [id number]           Patient Information     Date Of Birth          10/31/1932        Visit Information        Provider Department      11/17/2017 3:15 PM Sandor Lange MD [facility]        Today's Diagnoses     Acute bronchitis, unspecified organism    -  1      Care Instructions    Symptomatic treatment.  Will use saline gargles, tylenol and/or advil. Suck on  lozenges as needed. Push fluids. Salt water nasal spray as needed.  Use expectorant such as Mucinex or Robitussin for cough    Avoid dairy products for 2-3 weeks          Follow-ups after your visit        Follow-up notes from your care team     Return if symptoms worsen or fail to improve.      Your next 10 appointments already scheduled     Mar 14, 2018 12:45 PM CDT   RETURN GLAUCOMA with Lissa Berry MD   Eye Clinic ([facility])    [facility]  [address]  [address]  [place] [facility] [address] [phone]              Who to contact     If you have questions or need follow up information about today's clinic visit or your schedule please contact [facility] directly at [phone].  Normal or non-critical lab and imaging results will be communicated to you by MyChart, letter or phone within 4 business days after the clinic has received the results. If you do not hear from us within 7 days, please contact the clinic through MyChart or phone. If you have a critical or abnormal lab result, we will notify you by phone as soon as possible.  Submit refill requests through [employer] or call your pharmacy and they will forward the refill request to us. Please allow 3 business days for your refill to be completed.          Additional Information About Your Visit        MyChart Information     [employer] gives you secure access to your electronic health record. If you see a primary care provider,  you can also send messages to your care team and make appointments. If you have questions, please call your primary care clinic.  If you do not have a primary care provider, please call [phone] and they will assist you.        Care EveryWhere ID     This is your Care EveryWhere ID. This could be used by other organizations to access your [place] medical records  [id number]        Your Vitals Were     Pulse Temperature Respirations Pulse Oximetry BMI (Body Mass Index)       65 98.1  F (36.7  C) (Tympanic) 16 97% 24.01 kg/m2        Blood Pressure from Last 3 Encounters:   11/17/17 132/72   09/20/17 130/72   02/01/17 130/80    Weight from Last 3 Encounters:   11/17/17 182 lb (82.6 kg)   09/20/17 175 lb (79.4 kg)   02/01/17 186 lb (84.4 kg)              Today, you had the following     No orders found for display         Today's Medication Changes          These changes are accurate as of: 11/17/17  3:32 PM.  If you have any questions, ask your nurse or doctor.               Start taking these medicines.        Dose/Directions    azithromycin 250 MG tablet   Commonly known as:  ZITHROMAX   Used for:  Acute bronchitis, unspecified organism   Started by:  Sandor Lange MD        Two tablets first day, then one tablet daily for four days.   Quantity:  6 tablet   Refills:  0            Where to get your medicines      These medications were sent to [facility] [address] [place] [address]  [address]  [address] [facility] [address]     Phone:  [phone]     azithromycin 250 MG tablet                Primary Care Provider Office Phone # Fax #    Dean Rider [phone] [address] [facility] [address]        Equal Access to Services     [facility]: Martin talavera Sojules, waaxda luqadaha, qaybta kaalmada adeegyada, anil gaitan. So [facility] [phone].    ATENCIÓN: Si habla español, tiene a kwok disposición servicios gratuitos de  mjdionna lingüística. Dahiana al [phone].    We comply with applicable federal civil rights laws and [place] laws. We do not discriminate on the basis of race, color, national origin, age, disability, sex, sexual orientation, or gender identity.            Thank you!     Thank you for choosing [facility]  for your care. Our goal is always to provide you with excellent care. Hearing back from our patients is one way we can continue to improve our services. Please take a few minutes to complete the written survey that you may receive in the mail after your visit with us. Thank you!             Your Updated Medication List - Protect others around you: Learn how to safely use, store and throw away your medicines at www.disposemymeds.org.          This list is accurate as of: 11/17/17  3:32 PM.  Always use your most recent med list.                   Brand Name Dispense Instructions for use Diagnosis    azithromycin 250 MG tablet    ZITHROMAX    6 tablet    Two tablets first day, then one tablet daily for four days.    Acute bronchitis, unspecified organism       brimonidine 0.15 % ophthalmic solution    ALPHAGAN P    1 Bottle    Place 1 drop into both eyes 2 times daily    Acute maxillary sinusitis       COSOPT 2-0.5 % ophthalmic solution   Generic drug:  dorzolamide-timolol      Apply 1 Dose to eye 2 times daily Both eyes.    Acute maxillary sinusitis       hydrochlorothiazide 25 MG tablet    HYDRODIURIL    45 tablet    Take 0.5 tablets (12.5 mg) by mouth daily    Essential hypertension       latanoprost 0.005 % ophthalmic solution    XALATAN    3 Bottle    Place 1 drop into both eyes At Bedtime    Primary open-angle glaucoma(365.11)       tamsulosin 0.4 MG capsule    FLOMAX     TAKE ONE CAPSULE BY MOUTH ONCE A DAY        warfarin 5 MG tablet    COUMADIN     Take 5 mg by mouth        ZADITOR 0.025 % Soln ophthalmic solution   Generic drug:  ketotifen      Place 1 drop into both eyes  as needed

## 2017-11-17 NOTE — NURSING NOTE
"Chief Complaint   Patient presents with     URI     Abdominal Pain       Initial /72 (BP Location: Right arm, Patient Position: Sitting, Cuff Size: Adult Regular)  Pulse 65  Temp 98.1  F (36.7  C) (Tympanic)  Resp 16  Wt 182 lb (82.6 kg)  SpO2 97%  BMI 24.01 kg/m2 Estimated body mass index is 24.01 kg/(m^2) as calculated from the following:    Height as of 9/20/17: 6' 1\" (1.854 m).    Weight as of this encounter: 182 lb (82.6 kg).  Medication Reconciliation: complete     Princess DEAN Ortega CMA      "

## 2017-11-17 NOTE — PATIENT INSTRUCTIONS
Symptomatic treatment.  Will use saline gargles, tylenol and/or advil. Suck on  lozenges as needed. Push fluids. Salt water nasal spray as needed.  Use expectorant such as Mucinex or Robitussin for cough    Avoid dairy products for 2-3 weeks

## 2017-11-20 ENCOUNTER — RADIANT APPOINTMENT (OUTPATIENT)
Dept: GENERAL RADIOLOGY | Facility: CLINIC | Age: 82
End: 2017-11-20
Attending: INTERNAL MEDICINE
Payer: COMMERCIAL

## 2017-11-20 ENCOUNTER — OFFICE VISIT (OUTPATIENT)
Dept: FAMILY MEDICINE | Facility: CLINIC | Age: 82
End: 2017-11-20
Payer: COMMERCIAL

## 2017-11-20 VITALS
SYSTOLIC BLOOD PRESSURE: 126 MMHG | OXYGEN SATURATION: 97 % | HEIGHT: 73 IN | RESPIRATION RATE: 20 BRPM | BODY MASS INDEX: 24.12 KG/M2 | DIASTOLIC BLOOD PRESSURE: 70 MMHG | TEMPERATURE: 98.8 F | HEART RATE: 83 BPM | WEIGHT: 182 LBS

## 2017-11-20 DIAGNOSIS — J06.9 UPPER RESPIRATORY TRACT INFECTION, UNSPECIFIED TYPE: ICD-10-CM

## 2017-11-20 DIAGNOSIS — R14.1 FLATULENCE, ERUCTATION AND GAS PAIN: ICD-10-CM

## 2017-11-20 DIAGNOSIS — R14.3 FLATULENCE, ERUCTATION AND GAS PAIN: ICD-10-CM

## 2017-11-20 DIAGNOSIS — R14.2 FLATULENCE, ERUCTATION AND GAS PAIN: ICD-10-CM

## 2017-11-20 DIAGNOSIS — J22 LRTI (LOWER RESPIRATORY TRACT INFECTION): ICD-10-CM

## 2017-11-20 DIAGNOSIS — J22 LRTI (LOWER RESPIRATORY TRACT INFECTION): Primary | ICD-10-CM

## 2017-11-20 PROCEDURE — 71020 XR CHEST 2 VW: CPT

## 2017-11-20 PROCEDURE — 99213 OFFICE O/P EST LOW 20 MIN: CPT | Performed by: INTERNAL MEDICINE

## 2017-11-20 RX ORDER — DOXYCYCLINE HYCLATE 100 MG
100 TABLET ORAL 2 TIMES DAILY
Qty: 14 TABLET | Refills: 0 | Status: SHIPPED | OUTPATIENT
Start: 2017-11-20 | End: 2017-11-28

## 2017-11-20 NOTE — NURSING NOTE
"Chief Complaint   Patient presents with     URI       Initial /70 (BP Location: Left arm, Patient Position: Chair, Cuff Size: Adult Large)  Pulse 83  Temp 98.8  F (37.1  C)  Resp 20  Ht 6' 1\" (1.854 m)  Wt 182 lb (82.6 kg)  SpO2 97%  BMI 24.01 kg/m2 Estimated body mass index is 24.01 kg/(m^2) as calculated from the following:    Height as of this encounter: 6' 1\" (1.854 m).    Weight as of this encounter: 182 lb (82.6 kg).  Medication Reconciliation: complete   Precious Russo LPN  "

## 2017-11-20 NOTE — MR AVS SNAPSHOT
After Visit Summary   11/20/2017    Ayaan Herrera    MRN: [id number]           Patient Information     Date Of Birth          10/31/1932        Visit Information        Provider Department      11/20/2017 11:15 AM Dean Rider MD [facility]        Today's Diagnoses     Upper respiratory tract infection, unspecified type    -  1    LRTI (lower respiratory tract infection)        Flatulence, eructation and gas pain          Care Instructions    Take the doxycyline twice per day for 7 days.                  Follow up next week.                        Add some Robitussin DM.                   Let the INR nurse know that you are taking antibiotics.                                              Also, plan a consult with [facility].           Follow-ups after your visit        Additional Services     GASTROENTEROLOGY ADULT REF CONSULT ONLY       Preferred Location: [facility] [phone]      PLEASE SEE THIS PATIENT FOR A CONSULT.         HE HAS ONGOING LOOSE BOWELS.           THIS HAS BEEN WORSE SINCE XRT FOR PROSTATE CANCER.              S/P COLONOSCOPY EARLIER THIS YEAR.                    Please be aware that coverage of these services is subject to the terms and limitations of your health insurance plan.  Call member services at your health plan with any benefit or coverage questions.  Any procedures must be performed at a [facility] facility OR coordinated by your clinic's referral office.    Please bring the following with you to your appointment:    (1) Any X-Rays, CTs or MRIs which have been performed.  Contact the facility where they were done to arrange for  prior to your scheduled appointment.    (2) List of current medications   (3) This referral request   (4) Any documents/labs given to you for this referral                  Your next 10 appointments already scheduled     Mar 14, 2018 12:45 PM CDT   RETURN GLAUCOMA with Lissa Berry MD   Eye Clinic ([facility]  "[facility])    [address]  [address]  [facility] [id number] [phone]              Who to contact     If you have questions or need follow up information about today's clinic visit or your schedule please contact [facility] directly at [phone].  Normal or non-critical lab and imaging results will be communicated to you by MyChart, letter or phone within 4 business days after the clinic has received the results. If you do not hear from us within 7 days, please contact the clinic through [employer] or phone. If you have a critical or abnormal lab result, we will notify you by phone as soon as possible.  Submit refill requests through [employer] or call your pharmacy and they will forward the refill request to us. Please allow 3 business days for your refill to be completed.          Additional Information About Your Visit        [employer] Information     [employer] gives you secure access to your electronic health record. If you see a primary care provider, you can also send messages to your care team and make appointments. If you have questions, please call your primary care clinic.  If you do not have a primary care provider, please call [phone] and they will assist you.        Care EveryWhere ID     This is your Care EveryWhere ID. This could be used by other organizations to access your [place] medical records  [id number]        Your Vitals Were     Pulse Temperature Respirations Height Pulse Oximetry BMI (Body Mass Index)    83 98.8  F (37.1  C) 20 6' 1\" (1.854 m) 97% 24.01 kg/m2       Blood Pressure from Last 3 Encounters:   11/20/17 126/70   11/17/17 132/72   09/20/17 130/72    Weight from Last 3 Encounters:   11/20/17 182 lb (82.6 kg)   11/17/17 182 lb (82.6 kg)   09/20/17 175 lb (79.4 kg)              We Performed the Following     GASTROENTEROLOGY ADULT REF CONSULT ONLY          Today's Medication Changes          These " changes are accurate as of: 11/20/17 11:53 AM.  If you have any questions, ask your nurse or doctor.               Start taking these medicines.        Dose/Directions    doxycycline 100 MG tablet   Commonly known as:  VIBRA-TABS   Used for:  Upper respiratory tract infection, unspecified type, LRTI (lower respiratory tract infection)   Started by:  Dean Rider MD        Dose:  100 mg   Take 1 tablet (100 mg) by mouth 2 times daily   Quantity:  14 tablet   Refills:  0         Stop taking these medicines if you haven't already. Please contact your care team if you have questions.     azithromycin 250 MG tablet   Commonly known as:  ZITHROMAX   Stopped by:  Dean Rider MD                Where to get your medicines      These medications were sent to [place] [address] [place] [address]   [address] [facility] [address]     Phone:  [phone]     doxycycline 100 MG tablet                Primary Care Provider Office Phone # Fax #    Dean Rider [phone] [address] [facility] [facility] [address]        Equal Access to Services     [facility]: Hadii aad ku hadasho Soomaali, waaxda luqadaha, qaybta kaalmada adeegyada, anil rodrigues . So [facility] [phone].    ATENCIÓN: Si habla español, tiene a kwok disposición servicios gratuitos de asistencia lingüística. Dahiana al [phone].    We comply with applicable federal civil rights laws and [place] laws. We do not discriminate on the basis of race, color, national origin, age, disability, sex, sexual orientation, or gender identity.            Thank you!     Thank you for choosing [facility]  for your care. Our goal is always to provide you with excellent care. Hearing back from our patients is one way we can continue to improve our services. Please take a few minutes to complete the written survey that you may receive in the mail after your visit  with us. Thank you!             Your Updated Medication List - Protect others around you: Learn how to safely use, store and throw away your medicines at www.disposemymeds.org.          This list is accurate as of: 11/20/17 11:53 AM.  Always use your most recent med list.                   Brand Name Dispense Instructions for use Diagnosis    brimonidine 0.15 % ophthalmic solution    ALPHAGAN P    1 Bottle    Place 1 drop into both eyes 2 times daily    Acute maxillary sinusitis       COSOPT 2-0.5 % ophthalmic solution   Generic drug:  dorzolamide-timolol      Apply 1 Dose to eye 2 times daily Both eyes.    Acute maxillary sinusitis       doxycycline 100 MG tablet    VIBRA-TABS    14 tablet    Take 1 tablet (100 mg) by mouth 2 times daily    Upper respiratory tract infection, unspecified type, LRTI (lower respiratory tract infection)       hydrochlorothiazide 25 MG tablet    HYDRODIURIL    45 tablet    Take 0.5 tablets (12.5 mg) by mouth daily    Essential hypertension       latanoprost 0.005 % ophthalmic solution    XALATAN    3 Bottle    Place 1 drop into both eyes At Bedtime    Primary open-angle glaucoma(365.11)       tamsulosin 0.4 MG capsule    FLOMAX     TAKE ONE CAPSULE BY MOUTH ONCE A DAY        warfarin 5 MG tablet    COUMADIN     Take 5 mg by mouth        ZADITOR 0.025 % Soln ophthalmic solution   Generic drug:  ketotifen      Place 1 drop into both eyes as needed

## 2017-11-20 NOTE — PATIENT INSTRUCTIONS
Take the doxycyline twice per day for 7 days.                  Follow up next week.                        Add some Robitussin DM.                   Let the INR nurse know that you are taking antibiotics.                                              Also, plan a consult with [facility].

## 2017-11-20 NOTE — PROGRESS NOTES
SUBJECTIVE:   Ayaan Herrera is a 85 year old male who presents to clinic today for the following health issues:      Medication Followup of URI-Was last seen 11/17/2017 with BARRERA.    Taking Medication as prescribed: no, cannot, severe diarrhea with Zithromax given 11/17/2017.    Side Effects:  yes    Medication Helping Symptoms:no         Ongoing cough; 2-3 wks.           Took the first 500  Mg dose of zithromax and had diarrhea so he stopped taking it.                                                                                                                                                                                                   Has ongoing loose bowels; duration several yrs.               This likely predated XRT for prostate cancer.                    Neg colonoscopy earlier this year.                            Problem list and histories reviewed & adjusted, as indicated.        Current Outpatient Prescriptions   Medication Sig Dispense Refill     warfarin (COUMADIN) 5 MG tablet Take 5 mg by mouth       tamsulosin (FLOMAX) 0.4 MG capsule TAKE ONE CAPSULE BY MOUTH ONCE A DAY  3     hydrochlorothiazide (HYDRODIURIL) 25 MG tablet Take 0.5 tablets (12.5 mg) by mouth daily 45 tablet 3     latanoprost (XALATAN) 0.005 % ophthalmic solution Place 1 drop into both eyes At Bedtime 3 Bottle 4     brimonidine (ALPHAGAN P) 0.15 % ophthalmic solution Place 1 drop into both eyes 2 times daily 1 Bottle 11     ketotifen (ZADITOR) 0.025 % SOLN Place 1 drop into both eyes as needed       COSOPT 2-0.5 % OP SOLN Apply 1 Dose to eye 2 times daily Both eyes.       Allergies   Allergen Reactions     Zithromax [Azithromycin] Diarrhea            Amoxicillin Diarrhea     Augmentin GI Disturbance     BP Readings from Last 3 Encounters:   11/20/17 126/70   11/17/17 132/72   09/20/17 130/72    Wt Readings from Last 3 Encounters:   11/20/17 182 lb (82.6 kg)   11/17/17 182 lb (82.6 kg)   09/20/17 175 lb (79.4 kg)           "            Reviewed and updated as needed this visit by clinical staff     Reviewed and updated as needed this visit by Provider         ROS:  CONSTITUTIONAL:NEGATIVE for fever, chills, change in weight  RESP:NEGATIVE for hemoptysis and wheezing  GI: NEGATIVE for hematochezia and melena    OBJECTIVE:                                                    /70 (BP Location: Left arm, Patient Position: Chair, Cuff Size: Adult Large)  Pulse 83  Temp 98.8  F (37.1  C)  Resp 20  Ht 6' 1\" (1.854 m)  Wt 182 lb (82.6 kg)  SpO2 97%  BMI 24.01 kg/m2  Body mass index is 24.01 kg/(m^2).  GENERAL APPEARANCE: alert and occas cough  RESP: rhonchi bibasilar  ABDOMEN: soft, nontender, without hepatosplenomegaly or masses    Diagnostic test results:  CXR -? Basilar infiltrate; no old CXR for comparison. Await Radiology report.                       ASSESSMENT/PLAN:                                                      ICD-10-CM    1. LRTI (lower respiratory tract infection) J22 XR Chest 2 Views     doxycycline (VIBRA-TABS) 100 MG tablet   2. Upper respiratory tract infection, unspecified type J06.9 XR Chest 2 Views     doxycycline (VIBRA-TABS) 100 MG tablet   3. Flatulence, eructation and gas pain R14.3 GASTROENTEROLOGY ADULT REF CONSULT ONLY    R14.1     R14.2        Rx as listed.   Patient Instructions   Take the doxycyline twice per day for 7 days.                  Follow up next week.                        Add some Robitussin DM.                   Let the INR nurse know that you are taking antibiotics.                                              Also, plan a consult with [facility].       Dean Rider MD      Recent Results (from the past 744 hour(s))   XR Chest 2 Views    Narrative    CHEST TWO VIEWS  11/20/2017 11:44 AM    HISTORY:  Upper respiratory tract infection, unspecified type. LRTI  (lower respiratory tract infection).    COMPARISON:  None.      Impression    IMPRESSION:  Left subclavian cardiac device. " Hyperinflation suggesting  emphysematous changes. Nonspecific increased interstitial markings  bilaterally, most consistent with mild chronic fibrosis. No focal  infiltrates. Tortuous calcified aorta. Osteopenia with mild vertebral  body height loss at several levels.     AMANDA HINES MD       [facility]

## 2017-11-26 PROBLEM — M85.88 OSTEOPENIA OF SPINE: Status: ACTIVE | Noted: 2017-11-26

## 2017-11-28 ENCOUNTER — OFFICE VISIT (OUTPATIENT)
Dept: FAMILY MEDICINE | Facility: CLINIC | Age: 82
End: 2017-11-28
Payer: COMMERCIAL

## 2017-11-28 VITALS
TEMPERATURE: 98.1 F | SYSTOLIC BLOOD PRESSURE: 124 MMHG | HEIGHT: 73 IN | DIASTOLIC BLOOD PRESSURE: 80 MMHG | HEART RATE: 86 BPM | WEIGHT: 182 LBS | BODY MASS INDEX: 24.12 KG/M2 | OXYGEN SATURATION: 97 % | RESPIRATION RATE: 20 BRPM

## 2017-11-28 DIAGNOSIS — J22 LRTI (LOWER RESPIRATORY TRACT INFECTION): ICD-10-CM

## 2017-11-28 DIAGNOSIS — J06.9 UPPER RESPIRATORY TRACT INFECTION, UNSPECIFIED TYPE: ICD-10-CM

## 2017-11-28 DIAGNOSIS — M81.0 SENILE OSTEOPOROSIS: Primary | ICD-10-CM

## 2017-11-28 DIAGNOSIS — M85.80 AGE-RELATED BONE LOSS: ICD-10-CM

## 2017-11-28 PROCEDURE — 99214 OFFICE O/P EST MOD 30 MIN: CPT | Performed by: INTERNAL MEDICINE

## 2017-11-28 RX ORDER — MULTIVIT-MIN/IRON/FOLIC ACID/K 18-600-40
1 CAPSULE ORAL DAILY
COMMUNITY
Start: 2017-11-28

## 2017-11-28 RX ORDER — DOXYCYCLINE HYCLATE 100 MG
100 TABLET ORAL 2 TIMES DAILY
Qty: 6 TABLET | Refills: 0 | Status: SHIPPED | OUTPATIENT
Start: 2017-11-28 | End: 2017-12-01

## 2017-11-28 NOTE — PROGRESS NOTES
SUBJECTIVE:   Ayaan Herrera is a 85 year old male who presents to clinic today for the following health issues:      Follow up from last office visit 11/20/2017-not much improvement    Onset of cough early November.                 S/p 7 days doxycycline; some slow improvement. Still a productive cough, with yellow phlegm.    INR did increase; now on hold.             No increase in diarrhea with antibiotics.                                     Radiology did mention bone loss ; d/w pt.         [facility] T[facility],vit D =29; he does take a vit D supplement.             His teeth are in good repair; no swallowing issues.                     Problem list and histories reviewed & adjusted, as indicated.      Current Outpatient Prescriptions   Medication Sig Dispense Refill     doxycycline (VIBRA-TABS) 100 MG tablet Take 1 tablet (100 mg) by mouth 2 times daily for 3 days 6 tablet 0     Vitamin D, Cholecalciferol, 1000 UNITS TABS        warfarin (COUMADIN) 5 MG tablet Take 5 mg by mouth       tamsulosin (FLOMAX) 0.4 MG capsule TAKE ONE CAPSULE BY MOUTH ONCE A DAY  3     hydrochlorothiazide (HYDRODIURIL) 25 MG tablet Take 0.5 tablets (12.5 mg) by mouth daily 45 tablet 3     latanoprost (XALATAN) 0.005 % ophthalmic solution Place 1 drop into both eyes At Bedtime 3 Bottle 4     brimonidine (ALPHAGAN P) 0.15 % ophthalmic solution Place 1 drop into both eyes 2 times daily 1 Bottle 11     ketotifen (ZADITOR) 0.025 % SOLN Place 1 drop into both eyes as needed       COSOPT 2-0.5 % OP SOLN Apply 1 Dose to eye 2 times daily Both eyes.       Allergies   Allergen Reactions     Zithromax [Azithromycin] Diarrhea            Amoxicillin Diarrhea     Augmentin GI Disturbance     BP Readings from Last 3 Encounters:   11/28/17 124/80   11/20/17 126/70   11/17/17 132/72    Wt Readings from Last 3 Encounters:   11/28/17 182 lb (82.6 kg)   11/20/17 182 lb (82.6 kg)   11/17/17 182 lb (82.6 kg)            Social History  "    Social History     Marital status: Single     Spouse name: N/A     Number of children: N/A     Years of education: N/A     Occupational History     Not on file.     Social History Main Topics     Smoking status: Former Smoker     Packs/day: 0.20     Years: 25.00     Start date: 11/28/1955     Quit date: 1/1/1980     Smokeless tobacco: Never Used     Alcohol use No     Drug use: No     Sexual activity: No     Other Topics Concern     Not on file     Social History Narrative    [employer] ; [employer].  Retired                Reviewed and updated as needed this visit by clinical staff       Reviewed and updated as needed this visit by Provider         ROS:  CONSTITUTIONAL:NEGATIVE for fever, chills, change in weight  RESP:NEGATIVE for hemoptysis and wheezing    OBJECTIVE:                                                    /80 (BP Location: Left arm, Patient Position: Chair, Cuff Size: Adult Large)  Pulse 86  Temp 98.1  F (36.7  C)  Resp 20  Ht 6' 1\" (1.854 m)  Wt 182 lb (82.6 kg)  SpO2 97%  BMI 24.01 kg/m2  Body mass index is 24.01 kg/(m^2).  GENERAL APPEARANCE: alert and no distress  RESP: no rales or rhonchi  CV: regular rates and rhythm  MS: mild kyphosis    Diagnostic test results:  CXR -   Recent Results (from the past 744 hour(s))   XR Chest 2 Views    Narrative    CHEST TWO VIEWS  11/20/2017 11:44 AM    HISTORY:  Upper respiratory tract infection, unspecified type. LRTI  (lower respiratory tract infection).    COMPARISON:  None.      Impression    IMPRESSION:  Left subclavian cardiac device. Hyperinflation suggesting  emphysematous changes. Nonspecific increased interstitial markings  bilaterally, most consistent with mild chronic fibrosis. No focal  infiltrates. Tortuous calcified aorta. Osteopenia with mild vertebral  body height loss at several levels.     AMANDA HINES MD            ASSESSMENT/PLAN:                                                        ICD-10-CM    1. Senile " osteoporosis M81.0 DX Hip/Pelvis/Spine        [facility] T[facility],vit D =29; he does take a vit D supplement.             His teeth are in good repair; no swallowing issues.         2. LRTI (lower respiratory tract infection) J22 doxycycline (VIBRA-TABS) 100 MG tablet   3. Age-related bone loss M85.80 DX Hip/Pelvis/Spine   4. Upper respiratory tract infection, unspecified type J06.9 doxycycline (VIBRA-TABS) 100 MG tablet                Slow improvement in resp status.                   3 more days of doxycycline.                       RTC if sx worsen.              DXA.       Consider bisphosphonate.         Follow up with Provider - as above.      Dean Rider MD  [facility]

## 2017-11-28 NOTE — NURSING NOTE
"Chief Complaint   Patient presents with     RECHECK       Initial /80 (BP Location: Left arm, Patient Position: Chair, Cuff Size: Adult Large)  Pulse 86  Temp 98.1  F (36.7  C)  Resp 20  Ht 6' 1\" (1.854 m)  Wt 182 lb (82.6 kg)  SpO2 97%  BMI 24.01 kg/m2 Estimated body mass index is 24.01 kg/(m^2) as calculated from the following:    Height as of this encounter: 6' 1\" (1.854 m).    Weight as of this encounter: 182 lb (82.6 kg).  Medication Reconciliation: complete   Precious Russo LPN  "

## 2017-11-28 NOTE — MR AVS SNAPSHOT
After Visit Summary   11/28/2017    Ayaan Herrera    MRN: [id number]           Patient Information     Date Of Birth          10/31/1932        Visit Information        Provider Department      11/28/2017 10:30 AM Dean Rider MD [facility]        Today's Diagnoses     Senile osteoporosis    -  1    LRTI (lower respiratory tract infection)        Age-related bone loss        Upper respiratory tract infection, unspecified type           Follow-ups after your visit        Your next 10 appointments already scheduled     Nov 29, 2017  9:00 AM CST   DX HIP/PELVIS/SPINE with BXDX1   [facility] ([facility]    [address] [address] [phone]           Please do not take any of the following 24 hours prior to the day of your exam: vitamins, calcium tablets, antacids.  If possible, please wear clothes without metal (snaps, zippers). A sweatsuit works well.            Mar 14, 2018 12:45 PM CDT   RETURN GLAUCOMA with Lissa Berry MD   Eye Clinic ([facility])    [facility]  [address]  [facility] [address] [phone]              Future tests that were ordered for you today     Open Future Orders        Priority Expected Expires Ordered    DX Hip/Pelvis/Spine Routine  11/28/2018 11/28/2017            Who to contact     If you have questions or need follow up information about today's clinic visit or your schedule please contact [facility] directly at [phone].  Normal or non-critical lab and imaging results will be communicated to you by MyChart, letter or phone within 4 business days after the clinic has received the results. If you do not hear from us within 7 days, please contact the clinic through MyChart or phone. If you have a critical or abnormal lab result, we will notify you by  "phone as soon as possible.  Submit refill requests through BringShare or call your pharmacy and they will forward the refill request to us. Please allow 3 business days for your refill to be completed.          Additional Information About Your Visit        Cognitive [employer] Information     BringShare gives you secure access to your electronic health record. If you see a primary care provider, you can also send messages to your care team and make appointments. If you have questions, please call your primary care clinic.  If you do not have a primary care provider, please call [phone] and they will assist you.        Care EveryWhere ID     This is your Care EveryWhere ID. This could be used by other organizations to access your [facility] medical records  [id number]        Your Vitals Were     Pulse Temperature Respirations Height Pulse Oximetry BMI (Body Mass Index)    86 98.1  F (36.7  C) 20 6' 1\" (1.854 m) 97% 24.01 kg/m2       Blood Pressure from Last 3 Encounters:   11/28/17 124/80   11/20/17 126/70   11/17/17 132/72    Weight from Last 3 Encounters:   11/28/17 182 lb (82.6 kg)   11/20/17 182 lb (82.6 kg)   11/17/17 182 lb (82.6 kg)                 Where to get your medicines      These medications were sent to [facility] [address] [address]  [address] [address]  [address] [address]     Phone:  [phone]     doxycycline 100 MG tablet          Primary Care Provider Office Phone # Fax #    Dean Rider [phone]       [address] [facility] [facility] [address]        Equal Access to Services     [facility] RE[facility] AH: Hadii ronal talavera Sojules, waaxda luqadaha, qaybta graemealanil patel. So [facility] [phone].    ATENCIÓN: Si habla español, tiene a kwok disposición servicios gratuitos de asistencia lingüística. Dahiana al [phone].    We comply with applicable federal civil rights laws and [place] laws. We do not discriminate on the basis " of race, color, national origin, age, disability, sex, sexual orientation, or gender identity.            Thank you!     Thank you for choosing [facility]  for your care. Our goal is always to provide you with excellent care. Hearing back from our patients is one way we can continue to improve our services. Please take a few minutes to complete the written survey that you may receive in the mail after your visit with us. Thank you!             Your Updated Medication List - Protect others around you: Learn how to safely use, store and throw away your medicines at www.disposemymeds.org.          This list is accurate as of: 11/28/17 11:06 AM.  Always use your most recent med list.                   Brand Name Dispense Instructions for use Diagnosis    brimonidine 0.15 % ophthalmic solution    ALPHAGAN P    1 Bottle    Place 1 drop into both eyes 2 times daily    Acute maxillary sinusitis       COSOPT 2-0.5 % ophthalmic solution   Generic drug:  dorzolamide-timolol      Apply 1 Dose to eye 2 times daily Both eyes.    Acute maxillary sinusitis       doxycycline 100 MG tablet    VIBRA-TABS    6 tablet    Take 1 tablet (100 mg) by mouth 2 times daily for 3 days    Upper respiratory tract infection, unspecified type, LRTI (lower respiratory tract infection)       hydrochlorothiazide 25 MG tablet    HYDRODIURIL    45 tablet    Take 0.5 tablets (12.5 mg) by mouth daily    Essential hypertension       latanoprost 0.005 % ophthalmic solution    XALATAN    3 Bottle    Place 1 drop into both eyes At Bedtime    Primary open-angle glaucoma(365.11)       tamsulosin 0.4 MG capsule    FLOMAX     TAKE ONE CAPSULE BY MOUTH ONCE A DAY        Vitamin D (Cholecalciferol) 1000 UNITS Tabs           warfarin 5 MG tablet    COUMADIN     Take 5 mg by mouth        ZADITOR 0.025 % Soln ophthalmic solution   Generic drug:  ketotifen      Place 1 drop into both eyes as needed

## 2017-11-29 ENCOUNTER — RADIANT APPOINTMENT (OUTPATIENT)
Dept: BONE DENSITY | Facility: CLINIC | Age: 82
End: 2017-11-29
Attending: INTERNAL MEDICINE
Payer: COMMERCIAL

## 2017-11-29 DIAGNOSIS — M85.80 AGE-RELATED BONE LOSS: ICD-10-CM

## 2017-11-29 DIAGNOSIS — M81.0 SENILE OSTEOPOROSIS: ICD-10-CM

## 2017-11-29 PROCEDURE — 77080 DXA BONE DENSITY AXIAL: CPT | Performed by: FAMILY MEDICINE

## 2017-12-12 ENCOUNTER — TRANSFERRED RECORDS (OUTPATIENT)
Dept: HEALTH INFORMATION MANAGEMENT | Facility: CLINIC | Age: 82
End: 2017-12-12

## 2017-12-19 ENCOUNTER — OFFICE VISIT (OUTPATIENT)
Dept: FAMILY MEDICINE | Facility: CLINIC | Age: 82
End: 2017-12-19
Payer: COMMERCIAL

## 2017-12-19 VITALS
WEIGHT: 186.5 LBS | DIASTOLIC BLOOD PRESSURE: 80 MMHG | OXYGEN SATURATION: 95 % | TEMPERATURE: 97 F | HEART RATE: 84 BPM | BODY MASS INDEX: 24.61 KG/M2 | SYSTOLIC BLOOD PRESSURE: 130 MMHG

## 2017-12-19 DIAGNOSIS — M81.0 SENILE OSTEOPOROSIS: Primary | ICD-10-CM

## 2017-12-19 PROCEDURE — 99213 OFFICE O/P EST LOW 20 MIN: CPT | Performed by: INTERNAL MEDICINE

## 2017-12-19 RX ORDER — ALENDRONATE SODIUM 70 MG/1
TABLET ORAL
Qty: 12 TABLET | Refills: 3 | Status: SHIPPED | OUTPATIENT
Start: 2017-12-19 | End: 2018-11-13

## 2017-12-19 NOTE — PROGRESS NOTES
SUBJECTIVE:   Ayaan Herrera is a 85 year old male who presents to clinic today for the following health issues:      Pt is here for lab results from last visit.   Ca++ and vit D intakes are adequate.        No esophageal or dental issues.          Renal fxn ok.    Dog passed away; grieving.                           Went to [facility] for a consult.               Metamucil was advised at first; consider adding Miralax.                     Respiratory sx are much better.                                                            Problem list and histories reviewed & adjusted, as indicated.  Additional history: as documented    Current Outpatient Prescriptions   Medication Sig Dispense Refill     Vitamin D, Cholecalciferol, 1000 UNITS TABS        warfarin (COUMADIN) 5 MG tablet Take 5 mg by mouth       tamsulosin (FLOMAX) 0.4 MG capsule TAKE ONE CAPSULE BY MOUTH ONCE A DAY  3     hydrochlorothiazide (HYDRODIURIL) 25 MG tablet Take 0.5 tablets (12.5 mg) by mouth daily 45 tablet 3     latanoprost (XALATAN) 0.005 % ophthalmic solution Place 1 drop into both eyes At Bedtime 3 Bottle 4     brimonidine (ALPHAGAN P) 0.15 % ophthalmic solution Place 1 drop into both eyes 2 times daily 1 Bottle 11     ketotifen (ZADITOR) 0.025 % SOLN Place 1 drop into both eyes as needed       COSOPT 2-0.5 % OP SOLN Apply 1 Dose to eye 2 times daily Both eyes.       Allergies   Allergen Reactions     Zithromax [Azithromycin] Diarrhea            Amoxicillin Diarrhea     Augmentin GI Disturbance     BP Readings from Last 3 Encounters:   12/19/17 130/80   11/28/17 124/80   11/20/17 126/70    Wt Readings from Last 3 Encounters:   12/19/17 186 lb 8 oz (84.6 kg)   11/28/17 182 lb (82.6 kg)   11/20/17 182 lb (82.6 kg)                      Reviewed and updated as needed this visit by clinical staff       Reviewed and updated as needed this visit by Provider         ROS:  CONSTITUTIONAL:NEGATIVE for fever, chills, change in weight  MUSCULOSKELETAL:  NEGATIVE for Hx fracture     OBJECTIVE:                                                    /80  Pulse 84  Temp 97  F (36.1  C) (Tympanic)  Wt 186 lb 8 oz (84.6 kg)  SpO2 95%  BMI 24.61 kg/m2  Body mass index is 24.61 kg/(m^2).  GENERAL APPEARANCE: alert and no distress  MS: mild kyphosis    Diagnostic test results:  Recent Results (from the past 744 hour(s))   XR Chest 2 Views    Narrative    CHEST TWO VIEWS  2017 11:44 AM    HISTORY:  Upper respiratory tract infection, unspecified type. LRTI  (lower respiratory tract infection).    COMPARISON:  None.      Impression    IMPRESSION:  Left subclavian cardiac device. Hyperinflation suggesting  emphysematous changes. Nonspecific increased interstitial markings  bilaterally, most consistent with mild chronic fibrosis. No focal  infiltrates. Tortuous calcified aorta. Osteopenia with mild vertebral  body height loss at several levels.     AMANDA HINES MD   DX Hip/Pelvis/Spine    Narrative    BONE DENSITOMETRY  [facility] [address]  [address] [place] [address]  2017      PATIENT: Ayaan Herrera  CHART: [id number]   :  10/31/1932  AGE:  85 year old  SEX:  male   REFERRING PHYSICIAN:  Dr Dean Rider        PROCEDURE:  Bone density scanning was performed using DXA technology of   the lumbar spine and hip.  Scanning was performed on a [employer] scanner.    Reporting is completed in the form of a T-score.  The T-score represents   the standard deviation from peak bone mass based on a young healthy adult.     REFERENCE T-SCORES:       Normal                -1.0 and greater                                 Osteopenia         Between -1.0 and -2.5                                             Osteoporosis     -2.5 and less                                       RISK FACTORS: Male > 70 years old    CURRENT TREATMENT:  Vitamin D     FINDINGS:                        Lumbar Spine (L1-L4) T-score:  0.4                         Left Femoral Neck T-score:  -2.6                        Right Femoral Neck T-score:  -2.6                               Lumbar (L1-L4) BMD: 1.131             Previous:   baseline                        Total Hip Mean BMD: 0.843             Previous:   baseline      IMPRESSION  Osteoporosis  Degenerative changes of the spine  Recommendations include ensuring adequate daily Calcium and Vitamin D   intake  Follow up scan can be considered in three years.      Current NOF guidelines recommend treatment for patients with the   following:  - Prior hip or vertebral fracture  - T-score -2.5 or below  - A 10 year risk of any major osteoporotic fracture >20% or 10 year risk   of hip fracture >3%, as calculated using the FRAX calculator   (www.shef.ac.uk/FRAX).      Based on these guidelines, treatment (in addition to calcium and vitamin   D) is recommended for this patient, after ruling out other causes of   osteoporosis/low bone density.  While this is meant as an aid to clinical   decision-making, clinical judgment must still be used.       AMANDA MCNALLY M.D.                ASSESSMENT/PLAN:                                                      ICD-10-CM    1. Senile osteoporosis M81.0        No apparent contradictions to bisphosphonate use.           Proper use of this med d/w pt at length.    Plan a 5 yr course.                   Continue Ca++ and vit D.      Follow up with Provider - prn     Dean Rider MD  [facility]

## 2017-12-19 NOTE — MR AVS SNAPSHOT
After Visit Summary   12/19/2017    Ayaan Herrera    MRN: [id number]           Patient Information     Date Of Birth          10/31/1932        Visit Information        Provider Department      12/19/2017 11:30 AM Dean Rider MD [facility]        Today's Diagnoses     Senile osteoporosis    -  1       Follow-ups after your visit        Your next 10 appointments already scheduled     Mar 14, 2018 12:45 PM CDT   RETURN GLAUCOMA with Lissa Berry MD   Eye Clinic ([facility])    [facility]  [address] [facility]  9th Fl Clin [address]  [facility] [id number] [phone]              Who to contact     If you have questions or need follow up information about today's clinic visit or your schedule please contact [facility] directly at [phone].  Normal or non-critical lab and imaging results will be communicated to you by MyChart, letter or phone within 4 business days after the clinic has received the results. If you do not hear from us within 7 days, please contact the clinic through MyChart or phone. If you have a critical or abnormal lab result, we will notify you by phone as soon as possible.  Submit refill requests through WebSideStory or call your pharmacy and they will forward the refill request to us. Please allow 3 business days for your refill to be completed.          Additional Information About Your Visit        MyChart Information     WebSideStory gives you secure access to your electronic health record. If you see a primary care provider, you can also send messages to your care team and make appointments. If you have questions, please call your primary care clinic.  If you do not have a primary care provider, please call [phone] and they will assist you.        Care EveryWhere ID     This is your Care EveryWhere ID. This could be used by other organizations to access your [facility]  records  [id number]        Your Vitals Were     Pulse Temperature Pulse Oximetry BMI (Body Mass Index)          84 97  F (36.1  C) (Tympanic) 95% 24.61 kg/m2         Blood Pressure from Last 3 Encounters:   12/19/17 130/80   11/28/17 124/80   11/20/17 126/70    Weight from Last 3 Encounters:   12/19/17 186 lb 8 oz (84.6 kg)   11/28/17 182 lb (82.6 kg)   11/20/17 182 lb (82.6 kg)              Today, you had the following     No orders found for display         Today's Medication Changes          These changes are accurate as of: 12/19/17 12:18 PM.  If you have any questions, ask your nurse or doctor.               Start taking these medicines.        Dose/Directions    alendronate 70 MG tablet   Commonly known as:  FOSAMAX   Used for:  Senile osteoporosis   Started by:  Dean Rider MD        Take 1 tablet (70 mg) by mouth with 8oz water every 7 days 30 minutes before breakfast and remain upright during this time.   Quantity:  12 tablet   Refills:  3            Where to get your medicines      These medications were sent to [facility] [address]   [address] [facility] [address]     Phone:  [phone]     alendronate 70 MG tablet                Primary Care Provider Office Phone # Fax #    Dean Rider [phone] [address] [facility] [address]        Equal Access to Services     [facility] HospitalYES[facility]A : Hadii ronal ku hadasho Soomaali, waaxda luqadaha, qaybta kaalmada adeegyada, waxay sukumar gaitan. So [facility] [phone].    ATENCIÓN: Si habla español, tiene a kwok disposición servicios gratuitos de asistencia lingüística. Llame al [phone].    We comply with applicable federal civil rights laws and [place] laws. We do not discriminate on the basis of race, color, national origin, age, disability, sex, sexual orientation, or gender identity.            Thank you!     Thank you for choosing [facility]  for your care. Our goal is always to provide you with excellent care. Hearing back from our patients is one way we can continue to improve our services. Please take a few minutes to complete the written survey that you may receive in the mail after your visit with us. Thank you!             Your Updated Medication List - Protect others around you: Learn how to safely use, store and throw away your medicines at www.disposemymeds.org.          This list is accurate as of: 12/19/17 12:18 PM.  Always use your most recent med list.                   Brand Name Dispense Instructions for use Diagnosis    alendronate 70 MG tablet    FOSAMAX    12 tablet    Take 1 tablet (70 mg) by mouth with 8oz water every 7 days 30 minutes before breakfast and remain upright during this time.    Senile osteoporosis       brimonidine 0.15 % ophthalmic solution    ALPHAGAN P    1 Bottle    Place 1 drop into both eyes 2 times daily    Acute maxillary sinusitis       COSOPT 2-0.5 % ophthalmic solution   Generic drug:  dorzolamide-timolol      Apply 1 Dose to eye 2 times daily Both eyes.    Acute maxillary sinusitis       hydrochlorothiazide 25 MG tablet    HYDRODIURIL    45 tablet    Take 0.5 tablets (12.5 mg) by mouth daily    Essential hypertension       latanoprost 0.005 % ophthalmic solution    XALATAN    3 Bottle    Place 1 drop into both eyes At Bedtime    Primary open-angle glaucoma(365.11)       tamsulosin 0.4 MG capsule    FLOMAX     TAKE ONE CAPSULE BY MOUTH ONCE A DAY        Vitamin D (Cholecalciferol) 1000 UNITS Tabs           warfarin 5 MG tablet    COUMADIN     Take 5 mg by mouth        ZADITOR 0.025 % Soln ophthalmic solution   Generic drug:  ketotifen      Place 1 drop into both eyes as needed

## 2018-02-09 ENCOUNTER — TRANSFERRED RECORDS (OUTPATIENT)
Dept: HEALTH INFORMATION MANAGEMENT | Facility: CLINIC | Age: 83
End: 2018-02-09

## 2018-02-26 DIAGNOSIS — I10 ESSENTIAL HYPERTENSION: ICD-10-CM

## 2018-02-27 RX ORDER — HYDROCHLOROTHIAZIDE 25 MG/1
TABLET ORAL
Qty: 45 TABLET | Refills: 2 | Status: SHIPPED | OUTPATIENT
Start: 2018-02-27 | End: 2018-11-19

## 2018-02-27 NOTE — TELEPHONE ENCOUNTER
"Requested Prescriptions   Pending Prescriptions Disp Refills     hydrochlorothiazide (HYDRODIURIL) 25 MG tablet [Pharmacy Med Name: HYDROCHLOROT 25MG   TAB]  Last Written Prescription Date:  1/4/2017  Last Fill Quantity: 45,  # refills: 3   Last office visit: 12/19/2017 with prescribing provider:  Dr. Rider   Future Office Visit:     45 tablet 3     Sig: TAKE ONE-HALF TABLET BY MOUTH ONCE DAILY    Diuretics (Including Combos) Protocol Failed    2/26/2018 10:08 AM       Failed - Normal serum sodium on file in past 12 months    No lab results found.          Passed - Blood pressure under 140/90 in past 12 months    BP Readings from Last 3 Encounters:   12/19/17 130/80   11/28/17 124/80   11/20/17 126/70                Passed - Recent or future visit with authorizing provider's specialty    Patient had office visit in the last year or has a visit in the next 30 days with authorizing provider.  See \"Patient Info\" tab in inbasket, or \"Choose Columns\" in Meds & Orders section of the refill encounter.            Passed - Patient is age 18 or older       Passed - Normal serum creatinine on file in past 12 months    Recent Labs   Lab Test 08/07/17   CR  0.8             Passed - Normal serum potassium on file in past 12 months    Recent Labs   Lab Test 08/07/17   POTASSIUM  4.2                      "

## 2018-03-14 ENCOUNTER — OFFICE VISIT (OUTPATIENT)
Dept: OPHTHALMOLOGY | Facility: CLINIC | Age: 83
End: 2018-03-14
Attending: OPHTHALMOLOGY
Payer: COMMERCIAL

## 2018-03-14 DIAGNOSIS — H40.1133 PRIMARY OPEN ANGLE GLAUCOMA OF BOTH EYES, SEVERE STAGE: Primary | ICD-10-CM

## 2018-03-14 PROCEDURE — 92133 CPTRZD OPH DX IMG PST SGM ON: CPT | Mod: ZF | Performed by: OPHTHALMOLOGY

## 2018-03-14 PROCEDURE — G0463 HOSPITAL OUTPT CLINIC VISIT: HCPCS | Mod: ZF

## 2018-03-14 ASSESSMENT — VISUAL ACUITY
OD_CC: 20/20
OS_CC: 20/20
OD_CC+: -1
CORRECTION_TYPE: GLASSES
OS_CC+: -2
METHOD: SNELLEN - LINEAR

## 2018-03-14 ASSESSMENT — SLIT LAMP EXAM - LIDS
COMMENTS: NORMAL
COMMENTS: NORMAL

## 2018-03-14 ASSESSMENT — TONOMETRY
OS_IOP_MMHG: 16
OD_IOP_MMHG: 16
IOP_METHOD: APPLANATION

## 2018-03-14 ASSESSMENT — CONF VISUAL FIELD
OD_INFERIOR_TEMPORAL_RESTRICTION: 3
OS_SUPERIOR_TEMPORAL_RESTRICTION: 3

## 2018-03-14 ASSESSMENT — REFRACTION_WEARINGRX
OD_ADD: +2.75
OD_CYLINDER: +1.50
OD_SPHERE: -4.50
OS_ADD: +2.75
OS_SPHERE: -1.50
OS_CYLINDER: +0.25
OS_AXIS: 147
OD_AXIS: 026
SPECS_TYPE: PAL

## 2018-03-14 ASSESSMENT — CUP TO DISC RATIO
OD_RATIO: 0.7
OS_RATIO: 0.8

## 2018-03-14 NOTE — PROGRESS NOTES
CC: Followup for Primary open angle glaucoma (POAG)-adv right eye and mod left eye. Some tearing    HPI:   - Prostate cancer, currently undergoing treatment  - Feels that vision has been stable over the past year, no issues with drops.  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    Ophthalmic procedural/surgical history:  - trabeculectomy LE (1996) and revised (1997)  - SLT RE (10/14/04 & 2/17/05)  - cataract IOL RE (1985) LE (1996)  - punctal closure, plugs BE (2000)    Current ophthalmic medications-gets all medications from [facility]:  - brimonidine 1 drop both eyes BID  - Latanoprost 1 drop QHS both eyes   - dorzolamide 1 drop Both eyes BID  - artificial tears at bedtime both eyes     Results of studies/procedures today:  OCT retinal nerve fiber layer 03/14/18-stable both eyes   right eye : moderate thinning inf/sup  left eye : severe thinning globally    Octopus visual field LVC both eyes 9/2017  Right eye with superior and inferior nasal step and central involvement  Left eye inferior arcuate    Assessment and Plan:  Primary open angle glaucoma (POAG) adv right eye and mod left eye   May need further surgery but OCT stable  Changed to [facility] for reliability     RTC 6 months with LVC and continue present management     Benigno Rojo MD  PGY3      Attending Physician Attestation:  Complete documentation of historical and exam elements from today's encounter can be found in the full encounter summary report (not reduplicated in this progress note). I personally obtained the chief complaint(s) and history of present illness. I confirmed and edited asnecessary the review of systems, past medical/surgical history, family history, social history, and examination findings as documented by others; and I examined the patient myself. I personally  reviewed the relevant tests, images, and reports as documented above. I formulated and edited as necessary the assessment and plan and discussed the findings and management plan with the patient and family.  - Lissa Berry MD 1:53 PM 3/14/2018

## 2018-03-14 NOTE — MR AVS SNAPSHOT
After Visit Summary   3/14/2018    Ayaan Herrera    MRN: [id number]           Patient Information     Date Of Birth          10/31/1932        Visit Information        Provider Department      3/14/2018 12:45 PM [facility]        Today's Diagnoses     Primary open angle glaucoma of both eyes, severe stage    -  1       Follow-ups after your visit        Follow-up notes from your care team     Return in about 6 months (around 9/14/2018) for Dynamic VF 24-2, visual field LVC.      Your next 10 appointments already scheduled     Sep 19, 2018 12:15 PM CDT   RETURN GLAUCOMA with Lissa Berry MD   Eye Clinic ([facility])    [facility] [address]  [address] Clin [address] [place] [id number] [phone]              Who to contact     Please call your clinic at [phone] to:    Ask questions about your health    Make or cancel appointments    Discuss your medicines    Learn about your test results    Speak to your doctor            Additional Information About Your Visit        MyChart Information     [employer]loop gives you secure access to your electronic health record. If you see a primary care provider, you can also send messages to your care team and make appointments. If you have questions, please call your primary care clinic.  If you do not have a primary care provider, please call [phone] and they will assist you.      [employer] is an electronic gateway that provides easy, online access to your medical records. With [employer]loop, you can request a clinic appointment, read your test results, renew a prescription or communicate with your care team.     To access your existing account, please contact your [facility] or call [phone] for assistance.        Care EveryWhere ID     This is your Care EveryWhere ID. This could be used by other organizations to access your [facility] medical records  [id number]          Blood Pressure from Last 3 Encounters:   12/19/17 130/80   11/28/17 124/80   11/20/17 126/70    Weight from Last 3 Encounters:   12/19/17 84.6 kg (186 lb 8 oz)   11/28/17 82.6 kg (182 lb)   11/20/17 82.6 kg (182 lb)              We Performed the Following     OCT Optic Nerve RNFL Spectralis OU (both eyes)        Primary Care Provider Office Phone # Fax #    Dean Rider [phone] [address] [facility] [address]        Equal Access to Services     [facility]: Hadii aad ku hadasho Soomaali, waaxda luqadaha, qaybta kaalmada adeegyada, anil rodrigues . So [facility] [phone].    ATENCIÓN: Si habla español, tiene a kwok disposición servicios gratuitos de asistencia lingüística. [facility] [phone].    We comply with applicable federal civil rights laws and [place] laws. We do not discriminate on the basis of race, color, national origin, age, disability, sex, sexual orientation, or gender identity.            Thank you!     Thank you for choosing [facility]  for your care. Our goal is always to provide you with excellent care. Hearing back from our patients is one way we can continue to improve our services. Please take a few minutes to complete the written survey that you may receive in the mail after your visit with us. Thank you!             Your Updated Medication List - Protect others around you: Learn how to safely use, store and throw away your medicines at www.disposemymeds.org.          This list is accurate as of 3/14/18  2:00 PM.  Always use your most recent med list.                   Brand Name Dispense Instructions for use Diagnosis    alendronate 70 MG tablet    FOSAMAX    12 tablet    Take 1 tablet (70 mg) by mouth with 8oz water every 7 days 30 minutes before breakfast and remain upright during this time.    Senile osteoporosis       brimonidine 0.15 % ophthalmic solution    ALPHAGAN P    1 Bottle    Place 1 drop into both eyes 2 times  daily    Acute maxillary sinusitis       COSOPT 2-0.5 % ophthalmic solution   Generic drug:  dorzolamide-timolol      Apply 1 Dose to eye 2 times daily Both eyes.    Acute maxillary sinusitis       hydrochlorothiazide 25 MG tablet    HYDRODIURIL    45 tablet    TAKE ONE-HALF TABLET BY MOUTH ONCE DAILY    Essential hypertension       latanoprost 0.005 % ophthalmic solution    XALATAN    3 Bottle    Place 1 drop into both eyes At Bedtime    Primary open-angle glaucoma(365.11)       tamsulosin 0.4 MG capsule    FLOMAX     TAKE ONE CAPSULE BY MOUTH ONCE A DAY        Vitamin D (Cholecalciferol) 1000 UNITS Tabs           warfarin 5 MG tablet    COUMADIN     Take 5 mg by mouth        ZADITOR 0.025 % Soln ophthalmic solution   Generic drug:  ketotifen      Place 1 drop into both eyes as needed

## 2018-03-14 NOTE — NURSING NOTE
Chief Complaints and History of Present Illnesses   Patient presents with     Follow Up For     Primary open angle glaucoma of both eyes     HPI    Affected eye(s):  Both   Symptoms:        Duration:  1 year   Frequency:  Constant       Do you have eye pain now?:  No      Comments:  Pt. States that he is doing well.  No change in VA BE.  No c/o comfort BE.  Sarah Whitley COT 12:39 PM March 14, 2018

## 2018-04-18 ENCOUNTER — TRANSFERRED RECORDS (OUTPATIENT)
Dept: HEALTH INFORMATION MANAGEMENT | Facility: CLINIC | Age: 83
End: 2018-04-18

## 2018-04-19 ENCOUNTER — TRANSFERRED RECORDS (OUTPATIENT)
Dept: HEALTH INFORMATION MANAGEMENT | Facility: CLINIC | Age: 83
End: 2018-04-19

## 2018-07-01 PROBLEM — M81.0 SENILE OSTEOPOROSIS: Status: ACTIVE | Noted: 2017-11-28

## 2018-07-03 ENCOUNTER — TRANSFERRED RECORDS (OUTPATIENT)
Dept: HEALTH INFORMATION MANAGEMENT | Facility: CLINIC | Age: 83
End: 2018-07-03

## 2018-07-03 ENCOUNTER — OFFICE VISIT (OUTPATIENT)
Dept: FAMILY MEDICINE | Facility: CLINIC | Age: 83
End: 2018-07-03
Payer: COMMERCIAL

## 2018-07-03 ENCOUNTER — RADIANT APPOINTMENT (OUTPATIENT)
Dept: GENERAL RADIOLOGY | Facility: CLINIC | Age: 83
End: 2018-07-03
Attending: INTERNAL MEDICINE
Payer: COMMERCIAL

## 2018-07-03 VITALS
WEIGHT: 179 LBS | HEART RATE: 83 BPM | TEMPERATURE: 97.8 F | DIASTOLIC BLOOD PRESSURE: 78 MMHG | HEIGHT: 73 IN | OXYGEN SATURATION: 98 % | RESPIRATION RATE: 20 BRPM | BODY MASS INDEX: 23.72 KG/M2 | SYSTOLIC BLOOD PRESSURE: 130 MMHG

## 2018-07-03 DIAGNOSIS — I48.20 CHRONIC ATRIAL FIBRILLATION (H): ICD-10-CM

## 2018-07-03 DIAGNOSIS — Z00.00 ROUTINE GENERAL MEDICAL EXAMINATION AT A HEALTH CARE FACILITY: Primary | ICD-10-CM

## 2018-07-03 DIAGNOSIS — Z79.01 LONG TERM CURRENT USE OF ANTICOAGULANT THERAPY: ICD-10-CM

## 2018-07-03 DIAGNOSIS — C61 MALIGNANT NEOPLASM OF PROSTATE (H): ICD-10-CM

## 2018-07-03 DIAGNOSIS — R05.9 COUGH: ICD-10-CM

## 2018-07-03 DIAGNOSIS — I10 ESSENTIAL HYPERTENSION: ICD-10-CM

## 2018-07-03 DIAGNOSIS — M81.0 SENILE OSTEOPOROSIS: ICD-10-CM

## 2018-07-03 DIAGNOSIS — Z87.891 PERSONAL HISTORY OF TOBACCO USE, PRESENTING HAZARDS TO HEALTH: ICD-10-CM

## 2018-07-03 DIAGNOSIS — I71.40 ABDOMINAL AORTIC ANEURYSM (AAA) WITHOUT RUPTURE (H): ICD-10-CM

## 2018-07-03 DIAGNOSIS — I34.0 MITRAL VALVE INSUFFICIENCY, UNSPECIFIED ETIOLOGY: ICD-10-CM

## 2018-07-03 LAB
ALBUMIN SERPL-MCNC: 4 G/DL (ref 3.4–5)
ALBUMIN UR-MCNC: NEGATIVE MG/DL
ALP SERPL-CCNC: 75 U/L (ref 40–150)
ALT SERPL W P-5'-P-CCNC: 33 U/L (ref 0–70)
ANION GAP SERPL CALCULATED.3IONS-SCNC: 11 MMOL/L (ref 3–14)
APPEARANCE UR: CLEAR
AST SERPL W P-5'-P-CCNC: 31 U/L (ref 0–45)
BILIRUB SERPL-MCNC: 0.7 MG/DL (ref 0.2–1.3)
BILIRUB UR QL STRIP: NEGATIVE
BUN SERPL-MCNC: 22 MG/DL (ref 7–30)
CALCIUM SERPL-MCNC: 9.3 MG/DL (ref 8.5–10.1)
CHLORIDE SERPL-SCNC: 105 MMOL/L (ref 94–109)
CO2 SERPL-SCNC: 22 MMOL/L (ref 20–32)
COLOR UR AUTO: YELLOW
CREAT SERPL-MCNC: 0.87 MG/DL (ref 0.66–1.25)
GFR SERPL CREATININE-BSD FRML MDRD: 83 ML/MIN/1.7M2
GLUCOSE SERPL-MCNC: 96 MG/DL (ref 70–99)
GLUCOSE UR STRIP-MCNC: NEGATIVE MG/DL
HGB UR QL STRIP: NEGATIVE
KETONES UR STRIP-MCNC: NEGATIVE MG/DL
LEUKOCYTE ESTERASE UR QL STRIP: NEGATIVE
NITRATE UR QL: NEGATIVE
PH UR STRIP: 6 PH (ref 5–7)
POTASSIUM SERPL-SCNC: 4 MMOL/L (ref 3.4–5.3)
PROT SERPL-MCNC: 7.5 G/DL (ref 6.8–8.8)
RBC #/AREA URNS AUTO: NORMAL /HPF
SODIUM SERPL-SCNC: 138 MMOL/L (ref 133–144)
SOURCE: NORMAL
SP GR UR STRIP: 1.01 (ref 1–1.03)
UROBILINOGEN UR STRIP-ACNC: 0.2 EU/DL (ref 0.2–1)
WBC #/AREA URNS AUTO: NORMAL /HPF

## 2018-07-03 PROCEDURE — 99397 PER PM REEVAL EST PAT 65+ YR: CPT | Performed by: INTERNAL MEDICINE

## 2018-07-03 PROCEDURE — 85025 COMPLETE CBC W/AUTO DIFF WBC: CPT | Performed by: INTERNAL MEDICINE

## 2018-07-03 PROCEDURE — 81001 URINALYSIS AUTO W/SCOPE: CPT | Performed by: INTERNAL MEDICINE

## 2018-07-03 PROCEDURE — 71046 X-RAY EXAM CHEST 2 VIEWS: CPT | Mod: FY

## 2018-07-03 PROCEDURE — 80053 COMPREHEN METABOLIC PANEL: CPT | Performed by: INTERNAL MEDICINE

## 2018-07-03 PROCEDURE — 36415 COLL VENOUS BLD VENIPUNCTURE: CPT | Performed by: INTERNAL MEDICINE

## 2018-07-03 ASSESSMENT — ACTIVITIES OF DAILY LIVING (ADL)
I_NEED_ASSISTANCE_FOR_THE_FOLLOWING_DAILY_ACTIVITIES:: NO ASSISTANCE IS NEEDED
CURRENT_FUNCTION: NO ASSISTANCE NEEDED

## 2018-07-03 NOTE — PROGRESS NOTES
"SUBJECTIVE:   Ayaan Herrera is a 85 year old male who presents for Preventive Visit.      Are you in the first 12 months of your Medicare coverage?  No    Physical   Annual:     Getting at least 3 servings of Calcium per day:  Yes    Bi-annual eye exam:  Yes    Dental care twice a year:  Yes    Sleep apnea or symptoms of sleep apnea:  None    Diet:  Regular (no restrictions)    Frequency of exercise:  6-7 days/week    Duration of exercise:  45-60 minutes    Taking medications regularly:  Yes    Medication side effects:  None    Additional concerns today:  No    Ability to successfully perform activities of daily living: no assistance needed    Home Safety:  Lack of grab bars in the bathroom    Hearing Impairment: difficult to understand a speaker at a public meeting or [employer] service        Fall risk:       COGNITIVE SCREEN  1) Repeat 3 items (Leader, Season, Table)    2) Clock draw: NORMAL  3) 3 item recall: Recalls 3 objects  Results: 3 items recalled: COGNITIVE IMPAIRMENT LESS LIKELY    Mini-CogTM Copyright S Anshu. Licensed by the author for use in [facility]; reprinted with permission (maria luisa@[facility]). All rights reserved.        Reviewed and updated as needed this visit by clinical staff  Tobacco  Allergies  Meds  Med Hx  Surg Hx  Fam Hx  Soc Hx        Reviewed and updated as needed this visit by Provider            Alcohol Use 7/3/2018   If you drink alcohol do you typically have greater than 3 drinks per day OR greater than 7 drinks per week? No            appt prompted by a persistent mild cough for 2 months; now has resolved for the past month.           The cough is \"90% better\".                                                                                        Wants to stop calcium supplements. Drinks milk TID.               Taking alendronate; tolerates.                    Wants chemistries, and urine checked.               Celiac labs and TSH nml in 1/18 with [facility].         " Bowels are better; takes a small amount of metamucil QD.                    Has actinic keratoses face; sees derm.                Re: prostate cancer; sees urology yearly.                   Has a PPM; sees Cardiology yearly.    Has mild to moderate MR on echo 4/18.                                                                                         Today's PHQ-2 Score:   PHQ-2 ( 1999 [employer]) 7/3/2018   Q1: Little interest or pleasure in doing things 0   Q2: Feeling down, depressed or hopeless 0   PHQ-2 Score 0   Q1: Little interest or pleasure in doing things Not at all   Q2: Feeling down, depressed or hopeless Not at all   PHQ-2 Score 0       Do you feel safe in your environment - Yes    Do you have a Health Care Directive?: Yes: Patient states has Advance Directive and will bring in a copy to clinic.    Current providers sharing in care for this patient include:   Patient Care Team:  Dean Rider MD as PCP - General (Internal Medicine)  Lissa Berry MD as MD (Ophthalmology)    The following health maintenance items are reviewed in Epic and correct as of today:  Health Maintenance   Topic Date Due     SPENCER QUESTIONNAIRE 1 YEAR  10/31/1950     PHQ-9 Q1YR  10/31/1950     ADVANCE DIRECTIVE PLANNING Q5 YRS  10/31/1987     FALL RISK ASSESSMENT  01/04/2018     INFLUENZA VACCINE (1) 09/01/2018     TETANUS IMMUNIZATION (SYSTEM ASSIGNED)  11/17/2021     PNEUMOCOCCAL  Completed     Patient Active Problem List    Diagnosis Date Noted     Senile osteoporosis 11/28/2017     Priority: High         [facility] T[facility],vit D =29; he does take a vit D supplement.             His teeth are in good repair; no swallowing issues.           In 12/17, T+0.4 spine (but DJD), -2.6 both femoral necks.     START ALENDRONATE       Chronic atrial fibrillation (H) 09/20/2017     Priority: High      According to the pacemaker analysis from [place] [facility] [place], atrial fibrillation since July 2017.   Appointment with arrhythmia specialist on 9/25.            See his consult; warfarin started.   INR monitored at [facility]       Essential hypertension 01/04/2017     Priority: High     Abdominal aortic aneurysm (AAA) without rupture (H) 01/04/2017     Priority: High     3.2 cm on CT in 1/16; stable       Age-related bone loss 11/28/2017     Priority: Medium     Osteopenia of spine 11/26/2017     Priority: Medium     Described on CXR 11/17       Primary open angle glaucoma of right eye, severe stage 03/10/2017     Priority: Medium     History of colonic polyps 01/04/2017     Priority: Medium     Hyperlipidemia 01/04/2017     Priority: Medium     145/61/70/72 in 8/17       Cardiac arrhythmia 01/04/2017     Priority: Medium     Pacemaker 2007; sinus node dysfxn,bradycardia       Family history of colon cancer 01/04/2017     Priority: Medium     Change in bowel habits 01/04/2017     Priority: Medium     Left flank pain, chronic 01/04/2017     Priority: Medium     Malignant neoplasm of prostate (H) 04/14/2016     Priority: Medium     12/14 bx; Lupron Rx; XRT;      Urology = Dr Russell;  PSA = 0 recently; 0.05 in 8/17 ; 0.07 in 4/18       Primary open angle glaucoma 03/04/2015     Priority: Medium     Problem list name updated by automated process. Provider to review       Malignant basal cell neoplasm of skin 06/13/2013     Priority: Medium     Preventive measure 02/01/2017     Priority: Low     Colonoscopy 1/17; a 4 mm adenoma  Past Medical History: From the cardiology note at [facility] on 9/25/17:  1. History of sinus node dysfunction -   A. Status post dual-chamber permanent pacemaker implantation on 2/26/2007.   B. Status post dual-chamber permanent pacemaker generator change on 8/12/2013 because of battery depletion of device generator in #1A above.   2. Persistent atrial fibrillation with controlled ventricular rate -   A. Atrial fibrillation beginning on 7/15/2017 based on routine surveillance pacemaker interrogation  9/13/2017.   B. Underlying rhythm atrial fibrillation with 100% of ventricular pacing at a rate of 69 beats per minute on 12-lead electrocardiogram on 9/25/2017.   C. Started on Coumadin following clinic visit on 9/25/2017.   3. Hypertension.  4. History of non-toxic multi-nodular goiter.   5. Remote history of benign prostatic hyperplasia -   A. Status post transurethral resection of the prostate in 12/2002, by report.   B. Followed by resolution of urinary obstruction symptoms.   6. History of prostate carcinoma -   A. Status post initiation of radiation therapy on 6/3/2015.   B. Completion of radiation therapy in 4/2016, by report.   7. Erectile dysfunction  8. History of traumatic subdural hematoma -   A. Blunt head injury during a fall on 2/28/2015, by report.   B. Mild small hypo-dense subdural hematoma at the left cerebral convexity with attenuation characteristics of late subacute to early chronic hemorrhage on CT scan of the head on 3/19/2015.   9. History of glaucoma -   A. Status post left eye surgery x2 in 11/1996, by report.   10. History of left tear duct repair.   11. History of bilateral inguinal hernia repair.   12. History of umbilical hernia repair.   13. History of bilateral cataract extraction.   14. History of nephrolithiasis in 2012.  15. History of basal cell carcinoma of the left neck -   A. Status post surgical excision in 7/2007, by report.   16. History of squamous cell carcinoma of the skin.   17. History of idiopathic facial flushing.   18. History of colon polyp.  19. History of Clostridium difficilis infection.   20. Remote history of tonsillectomy and adenoidectomy.          Past Surgical History:   Procedure Laterality Date     CATARACT IOL, RT/LT  1985/1996    RE/LE     GLAUCOMA SURGERY  1996    LE     GLAUCOMA SURGERY  1997    Bleb revision LE     LASER SURGERY OF EYE  10/14/04 & 2/17/05    SLT RE     PUNCTAL CLOSURE, PLUGS  2000    BE     TURP  2003     Social History     Social  History     Marital status: Single     Spouse name: N/A     Number of children: N/A     Years of education: N/A     Occupational History     Not on file.     Social History Main Topics     Smoking status: Former Smoker     Packs/day: 0.20     Years: 25.00     Start date: 11/28/1955     Quit date: 1/1/1980     Smokeless tobacco: Never Used     Alcohol use No     Drug use: No     Sexual activity: No     Other Topics Concern     Not on file     Social History Narrative    [employer] ; [place] [employer].  Retired      Immunization History   Administered Date(s) Administered     Influenza (High Dose) 3 valent vaccine 10/19/2016, 09/20/2017     Pneumo Conj 13-V (2010&after) 07/14/2015     Pneumococcal 23 valent 11/14/2014     TDAP Vaccine (Adacel) 11/17/2011       BP Readings from Last 3 Encounters:   07/03/18 130/78   12/19/17 130/80   11/28/17 124/80    Wt Readings from Last 3 Encounters:   07/03/18 179 lb (81.2 kg)   12/19/17 186 lb 8 oz (84.6 kg)   11/28/17 182 lb (82.6 kg)                  Current Outpatient Prescriptions   Medication Sig Dispense Refill     alendronate (FOSAMAX) 70 MG tablet Take 1 tablet (70 mg) by mouth with 8oz water every 7 days 30 minutes before breakfast and remain upright during this time. 12 tablet 3     brimonidine (ALPHAGAN P) 0.15 % ophthalmic solution Place 1 drop into both eyes 2 times daily 1 Bottle 11     COSOPT 2-0.5 % OP SOLN Apply 1 Dose to eye 2 times daily Both eyes.       hydrochlorothiazide (HYDRODIURIL) 25 MG tablet TAKE ONE-HALF TABLET BY MOUTH ONCE DAILY 45 tablet 2     ketotifen (ZADITOR) 0.025 % SOLN Place 1 drop into both eyes as needed       latanoprost (XALATAN) 0.005 % ophthalmic solution Place 1 drop into both eyes At Bedtime 3 Bottle 4     tamsulosin (FLOMAX) 0.4 MG capsule TAKE ONE CAPSULE BY MOUTH ONCE A DAY  3     Vitamin D, Cholecalciferol, 1000 UNITS TABS        warfarin (COUMADIN) 5 MG tablet Take 5 mg by mouth       Allergies   Allergen Reactions      "Zithromax [Azithromycin] Diarrhea            Amoxicillin Diarrhea     Augmentin GI Disturbance           Review of Systems see above plus:  CONSTITUTIONAL: NEGATIVE for fever, chills, change in weight  EYES: NEGATIVE for vision changes or irritation  ENT/MOUTH: NEGATIVE for ear, mouth and throat problems  RESP: NEGATIVE for wheezing or productive cough  CV: NEGATIVE for chest pain, palpitations or peripheral edema  GI: NEGATIVE for melena or hematochezia  : NEGATIVE for frequency, dysuria, or hematuria  MUSCULOSKELETAL: NEGATIVE for significant arthralgias or myalgia  NEURO: NEGATIVE for weakness, dizziness or paresthesias  ENDOCRINE: NEGATIVE for temperature intolerance, skin/hair changes  HEME: NEGATIVE for bleeding problems  PSYCHIATRIC: NEGATIVE for changes in mood or affect    OBJECTIVE:   /78 (BP Location: Right arm, Patient Position: Chair, Cuff Size: Adult Regular)  Pulse 83  Temp 97.8  F (36.6  C)  Resp 20  Ht 6' 1\" (1.854 m)  Wt 179 lb (81.2 kg)  SpO2 98%  BMI 23.62 kg/m2 Estimated body mass index is 24.61 kg/(m^2) as calculated from the following:    Height as of 11/28/17: 6' 1\" (1.854 m).    Weight as of 12/19/17: 186 lb 8 oz (84.6 kg).  Physical Exam  GENERAL: alert and no distress  EYES: Eyes grossly normal to inspection  HENT: nose and mouth without ulcers or lesions, oropharynx clear and oral mucous membranes moist  NECK: no adenopathy, no asymmetry, masses, or scars and thyroid normal to palpation  RESP: lungs clear to auscultation - no rales, rhonchi or wheezes  CV: regular rates and rhythm, peripheral pulses strong, no peripheral edema and MR murmur  ABDOMEN: soft, nontender, without hepatosplenomegaly or masses   (male): normal male genitalia without lesions or urethral discharge, no hernia  MS: spine exam shows mild kyphosis  SKIN: keratoses - actinic,face  NEURO: Normal strength and tone, mentation intact and speech normal  PSYCH: mentation appears normal, affect " normal/bright  LYMPH: no cervical, supraclavicular, axillary, or inguinal adenopathy    pending    ASSESSMENT / PLAN:   Ayaan was seen today for physical.    Diagnoses and all orders for this visit:    Routine general medical examination at a health care facility    Senile osteoporosis  -     Comprehensive metabolic panel (BMP + Alb, Alk Phos, ALT, AST, Total. Bili, TP)  -     CBC with platelets and differential    Chronic atrial fibrillation (H)  -     RADIOLOGY REFERRAL  -     Comprehensive metabolic panel (BMP + Alb, Alk Phos, ALT, AST, Total. Bili, TP)  -     CBC with platelets and differential    Abdominal aortic aneurysm (AAA) without rupture (H)    Personal history of tobacco use, presenting hazards to health  -     XR Chest 2 Views; Future    Malignant neoplasm of prostate (H)  -     UA with Microscopic reflex to Culture    Cough  -     XR Chest 2 Views; Future    Essential hypertension    Mitral valve insufficiency, unspecified etiology    Long term current use of anticoagulant therapy    Other orders  -     PAF COMPLETED       Summary and implications:  Multiple issues reviewed.       BP controlled.    Has             Needs f/u AAA.                      Check CXR and labs.                    Patient Instructions          I will let you know your lab results.          Call to set up the ultrasound of your abdominal aorta.                               Consider getting the shingles vaccine (Shingrix) at your [facility]       He will follow up with Dermatology,Cardiology,and Urology       Continue alendronate.                 End of Life Planning:  Patient currently has an advanced directive: Yes.  Practitioner is supportive of decision.    COUNSELING:  Reviewed preventive health counseling, as reflected in patient instructions       Regular exercise       Healthy diet/nutrition    BP Readings from Last 1 Encounters:   12/19/17 130/80     Estimated body mass index is 24.61 kg/(m^2) as  "calculated from the following:    Height as of 11/28/17: 6' 1\" (1.854 m).    Weight as of 12/19/17: 186 lb 8 oz (84.6 kg).           reports that he quit smoking about 38 years ago. He started smoking about 62 years ago. He has a 5.00 pack-year smoking history. He has never used smokeless tobacco.      Appropriate preventive services were discussed with this patient, including applicable screening as appropriate for cardiovascular disease, diabetes, osteopenia/osteoporosis, and glaucoma.  As appropriate for age/gender, discussed screening for colorectal cancer, prostate cancer, breast cancer, and cervical cancer. Checklist reviewing preventive services available has been given to the patient.    Reviewed patients plan of care and provided an AVS. The Basic Care Plan (routine screening as documented in Health Maintenance) for Ayaan meets the Care Plan requirement. This Care Plan has been established and reviewed with the Patient.    Counseling Resources:  ATP IV Guidelines  Pooled Cohorts Equation Calculator  Breast Cancer Risk Calculator  FRAX Risk Assessment  ICSI Preventive Guidelines  Dietary Guidelines for Americans, 2010  [employer]'s MyPlate  ASA Prophylaxis  Lung CA Screening    Dean Rider MD  [facility]  Answers for HPI/ROS submitted by the patient on 7/3/2018   PHQ-2 Score: 0      Results for orders placed or performed in visit on 07/03/18   UA with Microscopic reflex to Culture   Result Value Ref Range    Color Urine Yellow     Appearance Urine Clear     Glucose Urine Negative NEG^Negative mg/dL    Bilirubin Urine Negative NEG^Negative    Ketones Urine Negative NEG^Negative mg/dL    Specific Gravity Urine 1.015 1.003 - 1.035    pH Urine 6.0 5.0 - 7.0 pH    Protein Albumin Urine Negative NEG^Negative mg/dL    Urobilinogen Urine 0.2 0.2 - 1.0 EU/dL    Nitrite Urine Negative NEG^Negative    Blood Urine Negative NEG^Negative    Leukocyte Esterase Urine Negative NEG^Negative    " Source Midstream Urine     WBC Urine 0 - 5 OTO5^0 - 5 /HPF    RBC Urine O - 2 OTO2^O - 2 /HPF   Comprehensive metabolic panel (BMP + Alb, Alk Phos, ALT, AST, Total. Bili, TP)   Result Value Ref Range    Sodium 138 133 - 144 mmol/L    Potassium 4.0 3.4 - 5.3 mmol/L    Chloride 105 94 - 109 mmol/L    Carbon Dioxide 22 20 - 32 mmol/L    Anion Gap 11 3 - 14 mmol/L    Glucose 96 70 - 99 mg/dL    Urea Nitrogen 22 7 - 30 mg/dL    Creatinine 0.87 0.66 - 1.25 mg/dL    GFR Estimate 83 >60 mL/min/1.7m2    GFR Estimate If Black >90 >60 mL/min/1.7m2    Calcium 9.3 8.5 - 10.1 mg/dL    Bilirubin Total 0.7 0.2 - 1.3 mg/dL    Albumin 4.0 3.4 - 5.0 g/dL    Protein Total 7.5 6.8 - 8.8 g/dL    Alkaline Phosphatase 75 40 - 150 U/L    ALT 33 0 - 70 U/L    AST 31 0 - 45 U/L   CBC with platelets and differential   Result Value Ref Range    WBC 5.2 4.0 - 11.0 10e9/L    RBC Count 4.69 4.4 - 5.9 10e12/L    Hemoglobin 13.9 13.3 - 17.7 g/dL    Hematocrit 40.5 40.0 - 53.0 %    MCV 86 78 - 100 fl    MCH 29.6 26.5 - 33.0 pg    MCHC 34.3 31.5 - 36.5 g/dL    RDW 14.3 10.0 - 15.0 %    Platelet Count 147 (L) 150 - 450 10e9/L    Diff Method Automated Method     % Neutrophils 62.1 %    % Lymphocytes 17.1 %    % Monocytes 15.4 %    % Eosinophils 4.8 %    % Basophils 0.6 %    Absolute Neutrophil 3.2 1.6 - 8.3 10e9/L    Absolute Lymphocytes 0.9 0.8 - 5.3 10e9/L    Absolute Monocytes 0.8 0.0 - 1.3 10e9/L    Absolute Eosinophils 0.3 0.0 - 0.7 10e9/L    Absolute Basophils 0.0 0.0 - 0.2 10e9/L     Letter sent.                    Your lab results are normal,including the liver,kidney,glucose,bone marrow,and the urinalysis.      Keep taking the same medications.        Addendum: Stable aneurysm at 3.2 cm.  Letter sent.

## 2018-07-03 NOTE — PATIENT INSTRUCTIONS
I will let you know your lab results.          Call to set up the ultrasound of your abdominal aorta.                               Consider getting the shingles vaccine (Shingrix) at your [facility].

## 2018-07-03 NOTE — LETTER
July 5, 2018      Ayaan Herrera  [address] [facility] [address]        Dear ,    We are writing to inform you of your test results.        Your lab results are normal,including the liver,kidney,glucose,bone marrow,and the urinalysis.      Keep taking the same medications.     Resulted Orders   UA with Microscopic reflex to Culture   Result Value Ref Range    Color Urine Yellow     Appearance Urine Clear     Glucose Urine Negative NEG^Negative mg/dL    Bilirubin Urine Negative NEG^Negative    Ketones Urine Negative NEG^Negative mg/dL    Specific Gravity Urine 1.015 1.003 - 1.035    pH Urine 6.0 5.0 - 7.0 pH    Protein Albumin Urine Negative NEG^Negative mg/dL    Urobilinogen Urine 0.2 0.2 - 1.0 EU/dL    Nitrite Urine Negative NEG^Negative    Blood Urine Negative NEG^Negative    Leukocyte Esterase Urine Negative NEG^Negative    Source Midstream Urine     WBC Urine 0 - 5 OTO5^0 - 5 /HPF    RBC Urine O - 2 OTO2^O - 2 /HPF   Comprehensive metabolic panel (BMP + Alb, Alk Phos, ALT, AST, Total. Bili, TP)   Result Value Ref Range    Sodium 138 133 - 144 mmol/L    Potassium 4.0 3.4 - 5.3 mmol/L    Chloride 105 94 - 109 mmol/L    Carbon Dioxide 22 20 - 32 mmol/L    Anion Gap 11 3 - 14 mmol/L    Glucose 96 70 - 99 mg/dL      Comment:      Fasting specimen    Urea Nitrogen 22 7 - 30 mg/dL    Creatinine 0.87 0.66 - 1.25 mg/dL    GFR Estimate 83 >60 mL/min/1.7m2      Comment:      Non  GFR Calc    GFR Estimate If Black >90 >60 mL/min/1.7m2      Comment:       GFR Calc    Calcium 9.3 8.5 - 10.1 mg/dL    Bilirubin Total 0.7 0.2 - 1.3 mg/dL    Albumin 4.0 3.4 - 5.0 g/dL    Protein Total 7.5 6.8 - 8.8 g/dL    Alkaline Phosphatase 75 40 - 150 U/L    ALT 33 0 - 70 U/L    AST 31 0 - 45 U/L   CBC with platelets and differential   Result Value Ref Range    WBC 5.2 4.0 - 11.0 10e9/L    RBC Count 4.69 4.4 - 5.9 10e12/L    Hemoglobin 13.9 13.3 - 17.7 g/dL    Hematocrit 40.5 40.0 - 53.0 %     MCV 86 78 - 100 fl    MCH 29.6 26.5 - 33.0 pg    MCHC 34.3 31.5 - 36.5 g/dL    RDW 14.3 10.0 - 15.0 %    Platelet Count 147 (L) 150 - 450 10e9/L    Diff Method Automated Method     % Neutrophils 62.1 %    % Lymphocytes 17.1 %    % Monocytes 15.4 %    % Eosinophils 4.8 %    % Basophils 0.6 %    Absolute Neutrophil 3.2 1.6 - 8.3 10e9/L    Absolute Lymphocytes 0.9 0.8 - 5.3 10e9/L    Absolute Monocytes 0.8 0.0 - 1.3 10e9/L    Absolute Eosinophils 0.3 0.0 - 0.7 10e9/L    Absolute Basophils 0.0 0.0 - 0.2 10e9/L       If you have any questions or concerns, please call the clinic at the number listed above.       Sincerely,        Dean Rider MD

## 2018-07-03 NOTE — LETTER
July 10, 2018      Ayaan Herrera  [address] [place] [address]        Dear ,    We are writing to inform you of your test results.             The ultrasound of your abdominal aorta showed a small aneurysm, which has not changed since the previous exam in 2016.            This measures at 3.2 cm in diameter.      We should do another ultrasound in 1 year.        If you have any questions or concerns, please call the clinic at the number listed above.       Sincerely,        Dean Rider MD

## 2018-07-05 LAB
BASOPHILS # BLD AUTO: 0 10E9/L (ref 0–0.2)
BASOPHILS NFR BLD AUTO: 0.6 %
DIFFERENTIAL METHOD BLD: ABNORMAL
EOSINOPHIL # BLD AUTO: 0.3 10E9/L (ref 0–0.7)
EOSINOPHIL NFR BLD AUTO: 4.8 %
ERYTHROCYTE [DISTWIDTH] IN BLOOD BY AUTOMATED COUNT: 14.3 % (ref 10–15)
HCT VFR BLD AUTO: 40.5 % (ref 40–53)
HGB BLD-MCNC: 13.9 G/DL (ref 13.3–17.7)
LYMPHOCYTES # BLD AUTO: 0.9 10E9/L (ref 0.8–5.3)
LYMPHOCYTES NFR BLD AUTO: 17.1 %
MCH RBC QN AUTO: 29.6 PG (ref 26.5–33)
MCHC RBC AUTO-ENTMCNC: 34.3 G/DL (ref 31.5–36.5)
MCV RBC AUTO: 86 FL (ref 78–100)
MONOCYTES # BLD AUTO: 0.8 10E9/L (ref 0–1.3)
MONOCYTES NFR BLD AUTO: 15.4 %
NEUTROPHILS # BLD AUTO: 3.2 10E9/L (ref 1.6–8.3)
NEUTROPHILS NFR BLD AUTO: 62.1 %
PLATELET # BLD AUTO: 147 10E9/L (ref 150–450)
RBC # BLD AUTO: 4.69 10E12/L (ref 4.4–5.9)
WBC # BLD AUTO: 5.2 10E9/L (ref 4–11)

## 2018-07-06 ENCOUNTER — TELEPHONE (OUTPATIENT)
Dept: FAMILY MEDICINE | Facility: CLINIC | Age: 83
End: 2018-07-06

## 2018-07-06 NOTE — TELEPHONE ENCOUNTER
Reason for Call:  Request for results:    Name of test or procedure: labs    Date of test of procedure: last week    Location of the test or procedure: [facility]x    OK to leave the result message on voice mail or with a family member? YES    Phone number Patient can be reached at:  Home number on file [phone] (home)    Additional comments: states got lb letter by no cholesterol listed call pt    Call taken on 7/6/2018 at 3:09 PM by JOHNNIE KIDD

## 2018-07-10 NOTE — TELEPHONE ENCOUNTER
I have already answered this.                  See the my chart message dated 7/6/18

## 2018-07-10 NOTE — TELEPHONE ENCOUNTER
Does pt need cholesterol checked? It states that this was done last year from outside provider and he is not taking a statin. Please advise.

## 2018-07-31 ENCOUNTER — TRANSFERRED RECORDS (OUTPATIENT)
Dept: HEALTH INFORMATION MANAGEMENT | Facility: CLINIC | Age: 83
End: 2018-07-31

## 2018-09-05 ENCOUNTER — OFFICE VISIT (OUTPATIENT)
Dept: FAMILY MEDICINE | Facility: CLINIC | Age: 83
End: 2018-09-05
Payer: COMMERCIAL

## 2018-09-05 VITALS
HEART RATE: 73 BPM | WEIGHT: 180.5 LBS | OXYGEN SATURATION: 97 % | SYSTOLIC BLOOD PRESSURE: 132 MMHG | DIASTOLIC BLOOD PRESSURE: 84 MMHG | HEIGHT: 73 IN | RESPIRATION RATE: 14 BRPM | TEMPERATURE: 97.3 F | BODY MASS INDEX: 23.92 KG/M2

## 2018-09-05 DIAGNOSIS — I73.9 INTERMITTENT CLAUDICATION (H): Primary | ICD-10-CM

## 2018-09-05 PROCEDURE — 99213 OFFICE O/P EST LOW 20 MIN: CPT | Performed by: INTERNAL MEDICINE

## 2018-09-05 NOTE — MR AVS SNAPSHOT
After Visit Summary   9/5/2018    Ayaan Herrera    MRN: [id number]           Patient Information     Date Of Birth          10/31/1932        Visit Information        Provider Department      9/5/2018 4:00 PM Dean Rider MD [facility]        Today's Diagnoses     Intermittent claudication (H)    -  1      Care Instructions    We are planning a consult with the vascular center.            Follow-ups after your visit        Additional Services     VASCULAR REFERRAL       Your provider has referred you to the [facility].    Reason for Referral: Vascular Medicine    Urgency of Referral: Next available    A referral has been sent to our Vascular  Services. If you do not receive a call from them within 24-48 hours you may reach them at [phone].                           THIS PATIENT HAS SX OF WORSENING CLAUDICATION RIGHT LEG.                  Your next 10 appointments already scheduled     Sep 19, 2018 12:15 PM CDT   RETURN GLAUCOMA with Lissa Berry MD   Eye Clinic ([facility])    [address] [address] [phone]              Who to contact     If you have questions or need follow up information about today's clinic visit or your schedule please contact [facility] directly at [phone].  Normal or non-critical lab and imaging results will be communicated to you by MyChart, letter or phone within 4 business days after the clinic has received the results. If you do not hear from us within 7 days, please contact the clinic through MyChart or phone. If you have a critical or abnormal lab result, we will notify you by phone as soon as possible.  Submit refill requests through [employer] or call your pharmacy and they will forward the refill request to us. Please allow 3 business days for your refill to be completed.        "   Additional Information About Your Visit        MyChart Information     [employer] gives you secure access to your electronic health record. If you see a primary care provider, you can also send messages to your care team and make appointments. If you have questions, please call your primary care clinic.  If you do not have a primary care provider, please call [phone] and they will assist you.        Care EveryWhere ID     This is your Care EveryWhere ID. This could be used by other organizations to access your [facility] medical records  [id number]        Your Vitals Were     Pulse Temperature Respirations Height Pulse Oximetry BMI (Body Mass Index)    73 97.3  F (36.3  C) (Tympanic) 14 6' 1\" (1.854 m) 97% 23.81 kg/m2       Blood Pressure from Last 3 Encounters:   09/05/18 132/84   07/03/18 130/78   12/19/17 130/80    Weight from Last 3 Encounters:   09/05/18 180 lb 8 oz (81.9 kg)   07/03/18 179 lb (81.2 kg)   12/19/17 186 lb 8 oz (84.6 kg)              We Performed the Following     VASCULAR REFERRAL        Primary Care Provider Office Phone # Fax #    Dean Rider [phone]       [address] [facility] [address]        Equal Access to Services     [facility] BRIANNA : Hadii aad ku hadasho Soomaali, waaxda luqadaha, qaybta kaalmada adeegyada, waxay idiin hayaan adegeoffrey kharasherry la'aan . So [facility] [phone].    ATENCIÓN: Si habla español, tiene a kwok disposición servicios gratuitos de asistencia lingüística. Llame al [phone].    We comply with applicable federal civil rights laws and [place] laws. We do not discriminate on the basis of race, color, national origin, age, disability, sex, sexual orientation, or gender identity.            Thank you!     Thank you for choosing [facility]  for your care. Our goal is always to provide you with excellent care. Hearing back from our patients is one way we can continue to improve our services. Please take a " few minutes to complete the written survey that you may receive in the mail after your visit with us. Thank you!             Your Updated Medication List - Protect others around you: Learn how to safely use, store and throw away your medicines at www.disposemymeds.org.          This list is accurate as of 9/5/18  4:15 PM.  Always use your most recent med list.                   Brand Name Dispense Instructions for use Diagnosis    alendronate 70 MG tablet    FOSAMAX    12 tablet    Take 1 tablet (70 mg) by mouth with 8oz water every 7 days 30 minutes before breakfast and remain upright during this time.    Senile osteoporosis       brimonidine 0.15 % ophthalmic solution    ALPHAGAN P    1 Bottle    Place 1 drop into both eyes 2 times daily    Acute maxillary sinusitis       COSOPT 2-0.5 % ophthalmic solution   Generic drug:  dorzolamide-timolol      Apply 1 Dose to eye 2 times daily Both eyes.    Acute maxillary sinusitis       hydrochlorothiazide 25 MG tablet    HYDRODIURIL    45 tablet    TAKE ONE-HALF TABLET BY MOUTH ONCE DAILY    Essential hypertension       latanoprost 0.005 % ophthalmic solution    XALATAN    3 Bottle    Place 1 drop into both eyes At Bedtime    Primary open-angle glaucoma(365.11)       tamsulosin 0.4 MG capsule    FLOMAX     TAKE ONE CAPSULE BY MOUTH ONCE A DAY        Vitamin D (Cholecalciferol) 1000 units Tabs           warfarin 5 MG tablet    COUMADIN     Take 5 mg by mouth        ZADITOR 0.025 % Soln ophthalmic solution   Generic drug:  ketotifen      Place 1 drop into both eyes as needed

## 2018-09-05 NOTE — PROGRESS NOTES
SUBJECTIVE:   Ayaan Herrera is a 85 year old male who presents to clinic today for the following health issues:        Musculoskeletal problem/pain      Duration: X4 weeks or more    Description  Location: Right lower leg    Intensity:  8/10    Accompanying signs and symptoms: numbness    History  Previous similar problem: no   Previous evaluation:  none    Precipitating or alleviating factors:  Trauma or overuse: no   Aggravating factors include: walking    Therapies tried and outcome: acetaminophen                     For the past month or so, this patient has had very predictable and consistent pain in his right lower leg when he walks his dog, which she tries to do 3 times per day.       His pain is mainly in the calf but can involve the entire right lower leg but not the knee.              This pain promptly resolves when he stops to rest.                               He has no pain otherwise, not during the day while sitting or standing or any pain at night.                   In the past he has had occasional similar symptoms, but for the past month this has been daily and predictable.           Problem list and histories reviewed & adjusted, as indicated.  Additional history: as documented    Current Outpatient Prescriptions   Medication Sig Dispense Refill     alendronate (FOSAMAX) 70 MG tablet Take 1 tablet (70 mg) by mouth with 8oz water every 7 days 30 minutes before breakfast and remain upright during this time. 12 tablet 3     brimonidine (ALPHAGAN P) 0.15 % ophthalmic solution Place 1 drop into both eyes 2 times daily 1 Bottle 11     COSOPT 2-0.5 % OP SOLN Apply 1 Dose to eye 2 times daily Both eyes.       hydrochlorothiazide (HYDRODIURIL) 25 MG tablet TAKE ONE-HALF TABLET BY MOUTH ONCE DAILY 45 tablet 2     ketotifen (ZADITOR) 0.025 % SOLN Place 1 drop into both eyes as needed       latanoprost (XALATAN) 0.005 % ophthalmic solution Place 1 drop into both eyes At Bedtime 3 Bottle 4     tamsulosin  "(FLOMAX) 0.4 MG capsule TAKE ONE CAPSULE BY MOUTH ONCE A DAY  3     Vitamin D, Cholecalciferol, 1000 UNITS TABS        warfarin (COUMADIN) 5 MG tablet Take 5 mg by mouth       BP Readings from Last 3 Encounters:   09/05/18 132/84   07/03/18 130/78   12/19/17 130/80    Wt Readings from Last 3 Encounters:   09/05/18 180 lb 8 oz (81.9 kg)   07/03/18 179 lb (81.2 kg)   12/19/17 186 lb 8 oz (84.6 kg)                    Reviewed and updated as needed this visit by clinical staff  Tobacco  Allergies  Meds  Med Hx  Surg Hx  Fam Hx  Soc Hx      Reviewed and updated as needed this visit by Provider         ROS:  CONSTITUTIONAL:NEGATIVE for fever, chills, change in weight  RESP:NEGATIVE for significant cough or SOB  CV: NEGATIVE for chest pain/chest pressure    OBJECTIVE:                                                    /84 (BP Location: Left arm, Patient Position: Sitting, Cuff Size: Adult Regular)  Pulse 73  Temp 97.3  F (36.3  C) (Tympanic)  Resp 14  Ht 6' 1\" (1.854 m)  Wt 180 lb 8 oz (81.9 kg)  SpO2 97%  BMI 23.81 kg/m2  Body mass index is 23.81 kg/(m^2).  GENERAL APPEARANCE: alert and no distress  CV: Diminished pulses both feet, right diminished more than left foot.          His feet are of normal temperature.  No foot ulcers.  MS: arthritic changes of the feet, with overlapping toes right foot    Diagnostic test results:  none      ASSESSMENT/PLAN:                                                        ICD-10-CM    1. Intermittent claudication (H) I73.9 VASCULAR REFERRAL       His symptoms strongly suggest intermittent claudication.  His peripheral pulses are diminished.            Patient Instructions   We are planning a consult with the vascular center.        Dean Rider MD  [facility]  "

## 2018-09-06 ENCOUNTER — TELEPHONE (OUTPATIENT)
Dept: OTHER | Facility: CLINIC | Age: 83
End: 2018-09-06

## 2018-09-06 DIAGNOSIS — I73.9 CLAUDICATION (H): Primary | ICD-10-CM

## 2018-09-06 ASSESSMENT — PATIENT HEALTH QUESTIONNAIRE - PHQ9: 5. POOR APPETITE OR OVEREATING: NOT AT ALL

## 2018-09-06 ASSESSMENT — ANXIETY QUESTIONNAIRES
GAD7 TOTAL SCORE: 0
3. WORRYING TOO MUCH ABOUT DIFFERENT THINGS: NOT AT ALL
IF YOU CHECKED OFF ANY PROBLEMS ON THIS QUESTIONNAIRE, HOW DIFFICULT HAVE THESE PROBLEMS MADE IT FOR YOU TO DO YOUR WORK, TAKE CARE OF THINGS AT HOME, OR GET ALONG WITH OTHER PEOPLE: NOT DIFFICULT AT ALL
5. BEING SO RESTLESS THAT IT IS HARD TO SIT STILL: NOT AT ALL
7. FEELING AFRAID AS IF SOMETHING AWFUL MIGHT HAPPEN: NOT AT ALL
1. FEELING NERVOUS, ANXIOUS, OR ON EDGE: NOT AT ALL
6. BECOMING EASILY ANNOYED OR IRRITABLE: NOT AT ALL
2. NOT BEING ABLE TO STOP OR CONTROL WORRYING: NOT AT ALL

## 2018-09-06 NOTE — TELEPHONE ENCOUNTER
Pt referred to [facility] by Dean Rider MD for right leg intermittent claudication.    Pt needs to be scheduled for VICENTE with exercise and consult with vascular medicine.  Will route to scheduling to coordinate an appointment as soon as possible.    SCOT LearyN, RN

## 2018-09-07 ASSESSMENT — PATIENT HEALTH QUESTIONNAIRE - PHQ9: SUM OF ALL RESPONSES TO PHQ QUESTIONS 1-9: 2

## 2018-09-07 ASSESSMENT — ANXIETY QUESTIONNAIRES: GAD7 TOTAL SCORE: 0

## 2018-09-14 ENCOUNTER — OFFICE VISIT (OUTPATIENT)
Dept: OTHER | Facility: CLINIC | Age: 83
End: 2018-09-14
Attending: SURGERY
Payer: COMMERCIAL

## 2018-09-14 ENCOUNTER — HOSPITAL ENCOUNTER (OUTPATIENT)
Dept: ULTRASOUND IMAGING | Facility: CLINIC | Age: 83
Discharge: HOME | End: 2018-09-14
Attending: SURGERY | Admitting: SURGERY
Payer: COMMERCIAL

## 2018-09-14 VITALS — DIASTOLIC BLOOD PRESSURE: 89 MMHG | HEART RATE: 91 BPM | SYSTOLIC BLOOD PRESSURE: 143 MMHG

## 2018-09-14 DIAGNOSIS — E78.5 HYPERLIPIDEMIA, UNSPECIFIED HYPERLIPIDEMIA TYPE: ICD-10-CM

## 2018-09-14 DIAGNOSIS — I73.9 INTERMITTENT CLAUDICATION (H): Primary | ICD-10-CM

## 2018-09-14 DIAGNOSIS — I73.9 CLAUDICATION (H): ICD-10-CM

## 2018-09-14 DIAGNOSIS — I73.9 CLAUDICATION OF RIGHT LOWER EXTREMITY (H): Primary | ICD-10-CM

## 2018-09-14 DIAGNOSIS — I71.40 ABDOMINAL AORTIC ANEURYSM (AAA) WITHOUT RUPTURE (H): ICD-10-CM

## 2018-09-14 PROCEDURE — G0463 HOSPITAL OUTPT CLINIC VISIT: HCPCS

## 2018-09-14 PROCEDURE — 99203 OFFICE O/P NEW LOW 30 MIN: CPT | Mod: ZP | Performed by: SURGERY

## 2018-09-14 PROCEDURE — 93924 LWR XTR VASC STDY BILAT: CPT

## 2018-09-14 NOTE — LETTER
[facility] [address] [place]  [place] [address]  Phone: [phone]  Fax: [phone]    2018    Re: Ayaan Herrera, : 10/31/1932    VASCULAR SURGERY CLINIC CONSULTATION   VASCULAR SURGEON: Kiel Willis MD     LOCATION:  [facility]     Ayaan Herrera  Medical Record #:  [id number]  YOB: 1932  Age:  85 year old      Date of Service: 2018     PRIMARY CARE PROVIDER: Dean Rider     Reason for visit:  Right calf pain for 4 weeks when walking.     IMPRESSION:  86 yo male with normal VICENTE's and concern for some claudication in his right lower leg.     RECOMMENDATION:   I've reviewed the VICENTE's with exercise with Ayaan today in clinic and told him that they are normal today.  His right leg symptoms have resolved.   I've ordered bilateral lower extremity arterial duplex given his history of aorta aneurysm and fullness in the right popliteal space.   I will schedule him for a 3 year follow up for AAA surveillance given it was most recently 3.2 cm in size and for aneurysms less than 4.0 cm longer surveillance interval is appropriate.      HPI:  Ayaan Herrera is a 85 year old male who was seen today in consultation by Dr. Rider regarding right leg pain from the heel to the knee that has been present for 4 weeks.  He walks his dog a lot throughout the week and has noticed increased pain in the right lower leg especially shortly after starting to walk.  This has resolved within the last several days finally and he has no more pain.  He denies rest pain and no evidence of tissue loss at this time.      EXAM:  GENERAL: This is a well-developed 85 year old male who appears his stated age  EYES: Grossly normal.  MOUTH: Buccal mucosa normal   CARDIAC:  NS1 S2, No Murmur  CHEST/LUNG:  Clear lung fields bilaterally   GASTROINTESINAL (ABDOMEN): Soft, non-tender, B/S present, no pulsatile  mass  MUSCULOSKELETAL: Grossly normal and both lower extremities are intact.  HEME/LYMPH: No lymphedema  NEUROLOGIC: Focally intact, Alert and oriented x 3.   PSYCH: appropriate affect  INTEGUMENT: No open lesions or ulcers.  Pulse Exam:     Radial: Left 2                        Right  2     Femoral: Left 2                        Right  2     Popliteal: Left 2                        Right  2 (right prominent popliteal pulse with fullness in the popliteal fossa.     DP:                Left 2                        Right  1      PT:                 Left 2                        Right  1     DIAGNOSTIC STUDIES:      Images:      VICENTE's are 1.07 bilaterally at baseline and with exercise they improve to 1.17 and 1.20. These are normal VICENTE's with normal exercise response.     Kiel Willis MD  VASCULAR SURGERY

## 2018-09-14 NOTE — NURSING NOTE
"Ayaan Herrera is a 85 year old male who presents for:  Chief Complaint   Patient presents with     Consult     VICENTE w/ ex (9:15 [facility]; 10:00 [facility]) New pt to establish with vascular. History of claudication         Vitals:    Vitals:    09/14/18 0949 09/14/18 0950   BP: 142/88 143/89   BP Location: Left arm Right arm   Patient Position: Chair Chair   Cuff Size: Adult Regular Adult Regular   Pulse: 78 91       BMI:  Estimated body mass index is 23.81 kg/(m^2) as calculated from the following:    Height as of 9/5/18: 6' 1\" (1.854 m).    Weight as of 9/5/18: 180 lb 8 oz (81.9 kg).    Pain Score:  Data Unavailable        Sharlene Shen MA      "

## 2018-09-14 NOTE — PROGRESS NOTES
VASCULAR SURGERY CLINIC CONSULTATION    VASCULAR SURGEON: Kiel Willis MD    LOCATION:  [facility]    Ayaan Herrera   Medical Record #:  [id number]  YOB: 1932  Age:  85 year old     Date of Service: 9/14/2018    PRIMARY CARE PROVIDER: Dean Rider      Reason for visit:  Right calf pain for 4 weeks when walking.    IMPRESSION:  86 yo male with normal VICENTE's and concern for some claudication in his right lower leg.    RECOMMENDATION:   I've reviewed the VICENTE's with exercise with Ayaan today in clinic and told him that they are normal today.  His right leg symptoms have resolved.   I've ordered bilateral lower extremity arterial duplex given his history of aorta aneurysm and fullness in the right popliteal space.   I will schedule him for a 3 year follow up for AAA surveillance given it was most recently 3.2 cm in size and for aneurysms less than 4.0 cm longer surveillance interval is appropriate.     HPI:  Ayaan Herrera is a 85 year old male who was seen today in consultation by Dr. Rider regarding right leg pain from the heel to the knee that has been present for 4 weeks.  He walks his dog a lot throughout the week and has noticed increased pain in the right lower leg especially shortly after starting to walk.  This has resolved within the last several days finally and he has no more pain.  He denies rest pain and no evidence of tissue loss at this time.     PHH:    Past Medical History:   Diagnosis Date     POAG (primary open-angle glaucoma)     Advanced         Past Surgical History:   Procedure Laterality Date     CATARACT IOL, RT/LT  1985/1996    RE/LE     GLAUCOMA SURGERY  1996    LE     GLAUCOMA SURGERY  1997    Bleb revision LE     LASER SURGERY OF EYE  10/14/04 & 2/17/05    SLT RE     PUNCTAL CLOSURE, PLUGS  2000    BE     TURP  2003       ALLERGIES:  Zithromax [azithromycin]; Amoxicillin; and Augmentin    MEDS:    Current Outpatient  Prescriptions:      alendronate (FOSAMAX) 70 MG tablet, Take 1 tablet (70 mg) by mouth with 8oz water every 7 days 30 minutes before breakfast and remain upright during this time., Disp: 12 tablet, Rfl: 3     brimonidine (ALPHAGAN P) 0.15 % ophthalmic solution, Place 1 drop into both eyes 2 times daily, Disp: 1 Bottle, Rfl: 11     COSOPT 2-0.5 % OP SOLN, Apply 1 Dose to eye 2 times daily Both eyes., Disp: , Rfl:      hydrochlorothiazide (HYDRODIURIL) 25 MG tablet, TAKE ONE-HALF TABLET BY MOUTH ONCE DAILY, Disp: 45 tablet, Rfl: 2     ketotifen (ZADITOR) 0.025 % SOLN, Place 1 drop into both eyes as needed, Disp: , Rfl:      latanoprost (XALATAN) 0.005 % ophthalmic solution, Place 1 drop into both eyes At Bedtime, Disp: 3 Bottle, Rfl: 4     tamsulosin (FLOMAX) 0.4 MG capsule, TAKE ONE CAPSULE BY MOUTH ONCE A DAY, Disp: , Rfl: 3     Vitamin D, Cholecalciferol, 1000 UNITS TABS, , Disp: , Rfl:      warfarin (COUMADIN) 5 MG tablet, Take 5 mg by mouth, Disp: , Rfl:     SOCIAL HABITS:    History   Smoking Status     Former Smoker     Packs/day: 0.20     Years: 25.00     Start date: 11/28/1955     Quit date: 1/1/1980   Smokeless Tobacco     Never Used       Alcohol use No     History   Drug Use No       FAMILY HISTORY:    Family History   Problem Relation Age of Onset     Glaucoma Father      Hypertension Other      C.A.D. Other      Macular Degeneration Mother        REVIEW OF SYSTEMS:    A 12 point ROS was reviewed and except for what is listed in the HPI above, all others are negative    PE:  /89 (BP Location: Right arm, Patient Position: Chair, Cuff Size: Adult Regular)  Pulse 91  Wt Readings from Last 1 Encounters:   09/05/18 180 lb 8 oz (81.9 kg)     There is no height or weight on file to calculate BMI.    EXAM:  GENERAL: This is a well-developed 85 year old male who appears his stated age  EYES: Grossly normal.  MOUTH: Buccal mucosa normal   CARDIAC:  NS1 S2, No Murmur  CHEST/LUNG:  Clear lung fields  bilaterally   GASTROINTESINAL (ABDOMEN): Soft, non-tender, B/S present, no pulsatile mass  MUSCULOSKELETAL: Grossly normal and both lower extremities are intact.  HEME/LYMPH: No lymphedema  NEUROLOGIC: Focally intact, Alert and oriented x 3.   PSYCH: appropriate affect  INTEGUMENT: No open lesions or ulcers.  Pulse Exam:    Radial: Left 2   Right  2    Femoral: Left 2   Right  2    Popliteal: Left 2   Right  2 (right prominent popliteal pulse with fullness in the popliteal fossa.    DP: Left 2   Right  1     PT:   Left 2   Right  1          DIAGNOSTIC STUDIES:     Images:     VICENTE's are 1.07 bilaterally at baseline and with exercise they improve to 1.17 and 1.20.  These are normal VICENTE's with normal exercise response.     LABS:      Sodium   Date Value Ref Range Status   07/03/2018 138 133 - 144 mmol/L Final     Urea Nitrogen   Date Value Ref Range Status   07/03/2018 22 7 - 30 mg/dL Final     Hemoglobin   Date Value Ref Range Status   07/03/2018 13.9 13.3 - 17.7 g/dL Final     Platelet Count   Date Value Ref Range Status   07/03/2018 147 (L) 150 - 450 10e9/L Final       Kiel Willis MD  VASCULAR SURGERY

## 2018-09-14 NOTE — MR AVS SNAPSHOT
After Visit Summary   9/14/2018    Ayaan Herrera    MRN: [id number]           Patient Information     Date Of Birth          10/31/1932        Visit Information        Provider Department      9/14/2018 10:00 AM Kiel Willis MD [facility]  Vascular Center      Today's Diagnoses     Claudication of right lower extremity (H)    -  1    Abdominal aortic aneurysm (AAA) without rupture (H)        Hyperlipidemia, unspecified hyperlipidemia type           Follow-ups after your visit        Follow-up notes from your care team     Return in about 3 years (around 9/14/2021).      Your next 10 appointments already scheduled     Sep 18, 2018  2:30 PM CDT   US LOWER EXTREMITY ARTERIAL DUPLEX BILATERAL with VUS2   [facility] Ul[facility] ([facility])    [address]  [place] [address]   [phone]           How do I prepare for my exam? (Food and drink instructions) No Food and Drink Restrictions.  How do I prepare for my exam? (Other instructions) You do not need to do anything special to prepare for your exam.  What should I wear: Wear comfortable clothes.  How long does the exam take: Most ultrasounds take 30 to 60 minutes.  What should I bring: Bring a list of your medicines, including vitamins, minerals and over-the-counter drugs. It is safest to leave personal items at home.  Do I need a :  No  is needed.  What do I need to tell my doctor: Tell your doctor about any allergies you may have.  What should I do after the exam: No restrictions, You may resume normal activities.  What is this test: An ultrasound uses sound waves to make pictures of the body. Sound waves do not cause pain. The only discomfort may be the pressure of the wand against your skin or full bladder.  Who should I call with questions: If you have any questions, please call the Imaging Department where  you will have your exam. Directions, parking instructions, and other information is available on our website, [email or web address].            Sep 19, 2018 12:15 PM CDT   RETURN GLAUCOMA with Lissa Berry MD   Eye Clinic ([facility])    [place] [address]  9th [address] Clin 9a  [facility] [address]   [phone]              Future tests that were ordered for you today     Open Future Orders        Priority Expected Expires Ordered    US Lower Extremity Arterial Duplex Bilateral Routine 9/14/2018 9/14/2019 9/14/2018            Who to contact     If you have questions or need follow up information about today's clinic visit or your schedule please contact [facility] [place] directly at [phone].  Normal or non-critical lab and imaging results will be communicated to you by MyChart, letter or phone within 4 business days after the clinic has received the results. If you do not hear from us within 7 days, please contact the clinic through MyChart or phone. If you have a critical or abnormal lab result, we will notify you by phone as soon as possible.  Submit refill requests through [employer] or call your pharmacy and they will forward the refill request to us. Please allow 3 business days for your refill to be completed.          Additional Information About Your Visit        [employer] Information     [employer] gives you secure access to your electronic health record. If you see a primary care provider, you can also send messages to your care team and make appointments. If you have questions, please call your primary care clinic.  If you do not have a primary care provider, please call [phone] and they will assist you.        Care EveryWhere ID     This is your Care EveryWhere ID. This could be used by other organizations to access your [place] medical records  [id number]        Your Vitals Were     Pulse                   91            Blood Pressure from  Last 3 Encounters:   09/14/18 143/89   09/05/18 132/84   07/03/18 130/78    Weight from Last 3 Encounters:   09/05/18 180 lb 8 oz (81.9 kg)   07/03/18 179 lb (81.2 kg)   12/19/17 186 lb 8 oz (84.6 kg)              Today, you had the following     No orders found for display       Primary Care Provider Office Phone # Fax #    Dean Rider [phone] [address] [facility] [address]        Equal Access to Services     [facility]: Hadii aad ku hadasho Soomaali, waaxda luqadaha, qaybta kaalmada adeegyada, anil rodrigues . So [facility] [phone].    ATENCIÓN: Si habla español, tiene a kwok disposición servicios gratuitos de asistencia lingüística. [facility] [phone].    We comply with applicable federal civil rights laws and [place] laws. We do not discriminate on the basis of race, color, national origin, age, disability, sex, sexual orientation, or gender identity.            Thank you!     Thank you for choosing [facility] [place]  for your care. Our goal is always to provide you with excellent care. Hearing back from our patients is one way we can continue to improve our services. Please take a few minutes to complete the written survey that you may receive in the mail after your visit with us. Thank you!             Your Updated Medication List - Protect others around you: Learn how to safely use, store and throw away your medicines at www.disposemymeds.org.          This list is accurate as of 9/14/18 10:24 AM.  Always use your most recent med list.                   Brand Name Dispense Instructions for use Diagnosis    alendronate 70 MG tablet    FOSAMAX    12 tablet    Take 1 tablet (70 mg) by mouth with 8oz water every 7 days 30 minutes before breakfast and remain upright during this time.    Senile osteoporosis       brimonidine 0.15 % ophthalmic solution    ALPHAGAN P    1 Bottle    Place 1 drop into both eyes 2 times daily     Acute maxillary sinusitis       COSOPT 2-0.5 % ophthalmic solution   Generic drug:  dorzolamide-timolol      Apply 1 Dose to eye 2 times daily Both eyes.    Acute maxillary sinusitis       hydrochlorothiazide 25 MG tablet    HYDRODIURIL    45 tablet    TAKE ONE-HALF TABLET BY MOUTH ONCE DAILY    Essential hypertension       latanoprost 0.005 % ophthalmic solution    XALATAN    3 Bottle    Place 1 drop into both eyes At Bedtime    Primary open-angle glaucoma(365.11)       tamsulosin 0.4 MG capsule    FLOMAX     TAKE ONE CAPSULE BY MOUTH ONCE A DAY        Vitamin D (Cholecalciferol) 1000 units Tabs           warfarin 5 MG tablet    COUMADIN     Take 5 mg by mouth        ZADITOR 0.025 % Soln ophthalmic solution   Generic drug:  ketotifen      Place 1 drop into both eyes as needed

## 2018-09-18 ENCOUNTER — HOSPITAL ENCOUNTER (OUTPATIENT)
Dept: ULTRASOUND IMAGING | Facility: CLINIC | Age: 83
Discharge: HOME | End: 2018-09-18
Attending: SURGERY | Admitting: SURGERY
Payer: COMMERCIAL

## 2018-09-18 DIAGNOSIS — I73.9 INTERMITTENT CLAUDICATION (H): ICD-10-CM

## 2018-09-18 PROCEDURE — 93925 LOWER EXTREMITY STUDY: CPT

## 2018-09-19 ENCOUNTER — OFFICE VISIT (OUTPATIENT)
Dept: OPHTHALMOLOGY | Facility: CLINIC | Age: 83
End: 2018-09-19
Attending: OPHTHALMOLOGY
Payer: COMMERCIAL

## 2018-09-19 DIAGNOSIS — H40.1133 PRIMARY OPEN ANGLE GLAUCOMA OF BOTH EYES, SEVERE STAGE: Primary | ICD-10-CM

## 2018-09-19 PROCEDURE — 92083 EXTENDED VISUAL FIELD XM: CPT | Mod: ZF | Performed by: OPHTHALMOLOGY

## 2018-09-19 PROCEDURE — G0463 HOSPITAL OUTPT CLINIC VISIT: HCPCS | Mod: ZF

## 2018-09-19 ASSESSMENT — SLIT LAMP EXAM - LIDS
COMMENTS: NORMAL
COMMENTS: NORMAL

## 2018-09-19 ASSESSMENT — TONOMETRY
OD_IOP_MMHG: 15
IOP_METHOD: APPLANATION
OS_IOP_MMHG: 17

## 2018-09-19 ASSESSMENT — CONF VISUAL FIELD
OS_INFERIOR_TEMPORAL_RESTRICTION: 3
OD_NORMAL: 1
METHOD: COUNTING FINGERS

## 2018-09-19 ASSESSMENT — REFRACTION_WEARINGRX
OS_AXIS: 145
SPECS_TYPE: PAL
OS_ADD: +2.50
OD_CYLINDER: +1.75
OD_ADD: +2.50
OS_SPHERE: -2.00
OD_SPHERE: -4.50
OS_CYLINDER: +1.50
OD_AXIS: 025

## 2018-09-19 ASSESSMENT — CUP TO DISC RATIO
OD_RATIO: 0.7
OS_RATIO: 0.8

## 2018-09-19 ASSESSMENT — VISUAL ACUITY
OD_CC+: +2
METHOD: SNELLEN - LINEAR
OD_CC: 20/25
OS_CC+: -2
OS_CC: 20/30
CORRECTION_TYPE: GLASSES

## 2018-09-19 NOTE — MR AVS SNAPSHOT
After Visit Summary   9/19/2018    Ayaan Herrera    MRN: [id number]           Patient Information     Date Of Birth          10/31/1932        Visit Information        Provider Department      9/19/2018 12:15 PM Lissa Berry MD Eye Clinic        Today's Diagnoses     Primary open angle glaucoma of both eyes, severe stage    -  1       Follow-ups after your visit        Follow-up notes from your care team     Return in about 6 months (around 3/19/2019) for OCT rnfl.      Your next 10 appointments already scheduled     Mar 20, 2019  1:00 PM CDT   RETURN GLAUCOMA with Lissa Berry MD   Eye Clinic ([facility])    [place] [address]  9th Fl Clin [address] [place] [id number] [phone]              Who to contact     Please call your clinic at [phone] to:    Ask questions about your health    Make or cancel appointments    Discuss your medicines    Learn about your test results    Speak to your doctor            Additional Information About Your Visit        MyChart Information     [employer] gives you secure access to your electronic health record. If you see a primary care provider, you can also send messages to your care team and make appointments. If you have questions, please call your primary care clinic.  If you do not have a primary care provider, please call [phone] and they will assist you.      [employer] is an electronic gateway that provides easy, online access to your medical records. With [employer], you can request a clinic appointment, read your test results, renew a prescription or communicate with your care team.     To access your existing account, please contact your [facility] or call [phone] for assistance.        Care EveryWhere ID     This is your Care EveryWhere ID. This could be used by other organizations to access your [place] medical records  [id number]         Blood Pressure from Last 3  Encounters:   09/14/18 143/89   09/05/18 132/84   07/03/18 130/78    Weight from Last 3 Encounters:   09/05/18 81.9 kg (180 lb 8 oz)   07/03/18 81.2 kg (179 lb)   12/19/17 84.6 kg (186 lb 8 oz)              We Performed the Following     Low Vision Central OU        Primary Care Provider Office Phone # Fax #    Dean Rider [phone] [address]  [facility] [address]        Equal Access to Services     [facility]: Hadii aad ku hadasho Soomaali, waaxda luqadaha, qaybta kaalmada adeegyada, anil rodrigues . So [facility] [place] [phone].    ATENCIÓN: Si habla español, tiene a kwok disposición servicios gratuitos de asistencia lingüística. [facility] [phone].    We comply with applicable federal civil rights laws and [place] laws. We do not discriminate on the basis of race, color, national origin, age, disability, sex, sexual orientation, or gender identity.            Thank you!     Thank you for choosing EYE CLINIC  for your care. Our goal is always to provide you with excellent care. Hearing back from our patients is one way we can continue to improve our services. Please take a few minutes to complete the written survey that you may receive in the mail after your visit with us. Thank you!             Your Updated Medication List - Protect others around you: Learn how to safely use, store and throw away your medicines at www.disposemymeds.org.          This list is accurate as of 9/19/18  1:57 PM.  Always use your most recent med list.                   Brand Name Dispense Instructions for use Diagnosis    alendronate 70 MG tablet    FOSAMAX    12 tablet    Take 1 tablet (70 mg) by mouth with 8oz water every 7 days 30 minutes before breakfast and remain upright during this time.    Senile osteoporosis       brimonidine 0.15 % ophthalmic solution    ALPHAGAN P    1 Bottle    Place 1 drop into both eyes 2 times daily    Acute maxillary sinusitis        COSOPT 2-0.5 % ophthalmic solution   Generic drug:  dorzolamide-timolol      Apply 1 Dose to eye 2 times daily Both eyes.    Acute maxillary sinusitis       hydrochlorothiazide 25 MG tablet    HYDRODIURIL    45 tablet    TAKE ONE-HALF TABLET BY MOUTH ONCE DAILY    Essential hypertension       latanoprost 0.005 % ophthalmic solution    XALATAN    3 Bottle    Place 1 drop into both eyes At Bedtime    Primary open-angle glaucoma(365.11)       tamsulosin 0.4 MG capsule    FLOMAX     TAKE ONE CAPSULE BY MOUTH ONCE A DAY        Vitamin D (Cholecalciferol) 1000 units Tabs           warfarin 5 MG tablet    COUMADIN     Take 5 mg by mouth        ZADITOR 0.025 % Soln ophthalmic solution   Generic drug:  ketotifen      Place 1 drop into both eyes as needed

## 2018-09-19 NOTE — PROGRESS NOTES
CC: Followup for Primary open angle glaucoma (POAG)-adv right eye and mod left eye.     HPI:   - Prostate cancer, currently undergoing treatment  - Feels that vision has been stable over the past year, no issues with drops.  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    Ophthalmic procedural/surgical history:  - trabeculectomy LE (1996) and revised (1997)  - SLT RE (10/14/04 & 2/17/05)  - cataract IOL RE (1985) LE (1996)  - punctal closure, plugs BE (2000)    Current ophthalmic medications-gets all medications from [facility]:  - brimonidine twice a day both eyes  - Latanoprost at bedtime  both eyes   - dorzolamide twice a day Both eyes   - artificial tears at bedtime both eyes     Results of studies/procedures:  OCT retinal nerve fiber layer 03/14/18-stable both eyes   right eye : moderate thinning inf/sup  left eye : severe thinning globally    Octopus visual field LVC both eyes 9/19/18 (Only 1 previous LVC, rest are unreliable 24-2)  Right eye with superior and inferior nasal step and central involvement  Left eye inferior arcuate    Assessment and Plan:  Primary open angle glaucoma (POAG) adv right eye and mod left eye   May need further surgery but OCT stable  Changed to [facility] for reliability     RTC 6 months with OCT retinal nerve fiber layer         Attending Physician Attestation:  Complete documentation of historical and exam elements from today's encounter can be found in the full encounter summary report (not reduplicated in this progress note). I personally obtained the chief complaint(s) and history of present illness. I confirmed and edited asnecessary the review of systems, past medical/surgical history, family history, social history, and examination findings as documented by others; and I examined the patient myself. I  personally reviewed the relevant tests, images, and reports as documented above. I formulated and edited as necessary the assessment and plan and discussed the findings and management plan with the patient and family.  - Lissa Berry MD 1:40 PM 9/19/2018

## 2018-09-19 NOTE — NURSING NOTE
Chief Complaints and History of Present Illnesses   Patient presents with     Follow Up For     6 month f/u for Primary open angle glaucoma of both eyes     HPI    Affected eye(s):  Both   Symptoms:     No floaters   No flashes         Do you have eye pain now?:  No      Comments:  Pt here for a 6 month f/u for Primary open angle glaucoma of both eyes. Pt states no changes in vision BE x 6 months. Pt notes BE get itchy RE>LE intermittently x years.    Fanny Riojas@ [facility] 12:15 PM September 19, 2018

## 2018-09-19 NOTE — PROGRESS NOTES
CC: Followup for Primary open angle glaucoma (POAG)-adv right eye and mod left eye. Some tearing    HPI:   - Prostate cancer, currently undergoing treatment  - Feels that vision has been stable over the past year, no issues with drops.  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    Ophthalmic procedural/surgical history:  - trabeculectomy LE (1996) and revised (1997)  - SLT RE (10/14/04 & 2/17/05)  - cataract IOL RE (1985) LE (1996)  - punctal closure, plugs BE (2000)    Current ophthalmic medications-gets all medications from [facility]:  - brimonidine 1 drop both eyes BID  - Latanoprost 1 drop QHS both eyes   - dorzolamide 1 drop Both eyes BID  - artificial tears at bedtime both eyes      Results of studies/procedures today:  OCT retinal nerve fiber layer 03/14/18-stable both eyes   right eye : moderate thinning inf/sup  left eye : severe thinning globally    Octopus visual field LVC both eyes 9/2017  Right eye with superior and inferior nasal step and central involvement  Left eye inferior arcuate    Assessment and Plan:  Primary open angle glaucoma (POAG) adv right eye and mod left eye   May need further surgery but OCT stable  Changed to [facility] for reliability     RTC 6 months with C and continue present management     Ray Garay M.D.  PGY-3, Ophthalmology

## 2018-09-28 ENCOUNTER — TELEPHONE (OUTPATIENT)
Dept: OTHER | Facility: CLINIC | Age: 83
End: 2018-09-28

## 2018-09-28 NOTE — TELEPHONE ENCOUNTER
Per Dr. Willis he reviewed the 9-18-18 US LE arterial bilateral duplex, there is no popliteal artery aneurysm and he recommends pt f/u in 1 year with US AAA (surveillance).     I called pt to explain this, left VM.     Vanessa Xiao, SCOTN, RN

## 2018-10-02 ENCOUNTER — TRANSFERRED RECORDS (OUTPATIENT)
Dept: HEALTH INFORMATION MANAGEMENT | Facility: CLINIC | Age: 83
End: 2018-10-02

## 2018-10-08 ENCOUNTER — TELEPHONE (OUTPATIENT)
Dept: FAMILY MEDICINE | Facility: CLINIC | Age: 83
End: 2018-10-08

## 2018-10-10 ENCOUNTER — TELEPHONE (OUTPATIENT)
Dept: NURSING | Facility: CLINIC | Age: 83
End: 2018-10-10

## 2018-10-10 DIAGNOSIS — I48.20 CHRONIC ATRIAL FIBRILLATION (H): Primary | ICD-10-CM

## 2018-10-10 NOTE — TELEPHONE ENCOUNTER
Patient calling to say that he is going to need to come to our INR clinic for his dosing.He has been going to the [facility] clinic but they are stopping their program.Pt states that he is going to his last appointment on the 30th of October but will need to follow up on INRs with his primary care provider. Will need referral from provider.

## 2018-10-24 ENCOUNTER — ANTICOAGULATION THERAPY VISIT (OUTPATIENT)
Dept: NURSING | Facility: CLINIC | Age: 83
End: 2018-10-24
Payer: COMMERCIAL

## 2018-10-24 ENCOUNTER — OFFICE VISIT (OUTPATIENT)
Dept: FAMILY MEDICINE | Facility: CLINIC | Age: 83
End: 2018-10-24
Payer: COMMERCIAL

## 2018-10-24 ENCOUNTER — TRANSFERRED RECORDS (OUTPATIENT)
Dept: HEALTH INFORMATION MANAGEMENT | Facility: CLINIC | Age: 83
End: 2018-10-24

## 2018-10-24 ENCOUNTER — TELEPHONE (OUTPATIENT)
Dept: FAMILY MEDICINE | Facility: CLINIC | Age: 83
End: 2018-10-24

## 2018-10-24 VITALS
HEART RATE: 52 BPM | TEMPERATURE: 97.1 F | RESPIRATION RATE: 14 BRPM | SYSTOLIC BLOOD PRESSURE: 118 MMHG | HEIGHT: 73 IN | OXYGEN SATURATION: 98 % | WEIGHT: 185 LBS | DIASTOLIC BLOOD PRESSURE: 76 MMHG | BODY MASS INDEX: 24.52 KG/M2

## 2018-10-24 DIAGNOSIS — S80.12XA CONTUSION OF LEFT LOWER LEG, INITIAL ENCOUNTER: ICD-10-CM

## 2018-10-24 DIAGNOSIS — M79.662 PAIN OF LEFT LOWER LEG: Primary | ICD-10-CM

## 2018-10-24 PROBLEM — R19.4 CHANGE IN BOWEL HABITS: Status: RESOLVED | Noted: 2017-01-04 | Resolved: 2018-10-24

## 2018-10-24 PROBLEM — R10.9 LEFT FLANK PAIN, CHRONIC: Status: RESOLVED | Noted: 2017-01-04 | Resolved: 2018-10-24

## 2018-10-24 PROBLEM — G89.29 LEFT FLANK PAIN, CHRONIC: Status: RESOLVED | Noted: 2017-01-04 | Resolved: 2018-10-24

## 2018-10-24 PROBLEM — H40.9 GLAUCOMA: Status: ACTIVE | Noted: 2018-10-24

## 2018-10-24 LAB — INR POINT OF CARE: 4.2 (ref 0.86–1.14)

## 2018-10-24 PROCEDURE — 36416 COLLJ CAPILLARY BLOOD SPEC: CPT

## 2018-10-24 PROCEDURE — 99207 ZZC NO CHARGE NURSE ONLY: CPT

## 2018-10-24 PROCEDURE — 99214 OFFICE O/P EST MOD 30 MIN: CPT | Performed by: FAMILY MEDICINE

## 2018-10-24 PROCEDURE — 85610 PROTHROMBIN TIME: CPT | Mod: QW

## 2018-10-24 RX ORDER — INFLUENZA A VIRUS A/VICTORIA/4897/2022 IVR-238 (H1N1) ANTIGEN (FORMALDEHYDE INACTIVATED), INFLUENZA A VIRUS A/CALIFORNIA/122/2022 SAN-022 (H3N2) ANTIGEN (FORMALDEHYDE INACTIVATED), AND INFLUENZA B VIRUS B/MICHIGAN/01/2021 ANTIGEN (FORMALDEHYDE INACTIVATED) 60; 60; 60 UG/.5ML; UG/.5ML; UG/.5ML
INJECTION, SUSPENSION INTRAMUSCULAR
Refills: 0 | COMMUNITY
Start: 2018-09-06 | End: 2019-07-27

## 2018-10-24 NOTE — PROGRESS NOTES
ANTICOAGULATION FOLLOW-UP CLINIC VISIT    Patient Name:  Ayaan Herrera  Date:  10/24/2018  Contact Type:  Face to Face    SUBJECTIVE:     Patient Findings     Positives Other complaints (left leg, trailer hit, bump and bruising, no bleeding at this time, saw Dr. Yates and getting ultrasound today)           OBJECTIVE    INR Protime   Date Value Ref Range Status   10/24/2018 4.2 (A) 0.86 - 1.14 Final       ASSESSMENT / PLAN  INR assessment SUPRA    Recheck INR In: 2 WEEKS    INR Location Clinic      Anticoagulation Summary as of 10/24/2018     INR goal 2.0-3.0    Today's INR 4.2!    Warfarin maintenance plan 5 mg (5 mg x 1) every day    Full warfarin instructions 10/24: Hold; Otherwise 5 mg every day    Weekly warfarin total 35 mg    Weekly max warfarin dose 35 mg    Plan last modified Devika Vo RN (10/24/2018)    Next INR check 11/7/2018    Target end date Indefinite      Anticoagulation Episode Summary     INR check location Clinic Lab    Preferred lab     Send INR reminders to [facility] INR/PROTIME    Comments             See the Encounter Report to view Anticoagulation Flowsheet and Dosing Calendar (Go to Encounters tab in chart review, and find the Anticoagulation Therapy Visit)    Dosage adjustment made based on physician directed care plan. Pt is transferring from [facility]. Guide to Warfarin Therapy and Card for wallet given to take with. Patient did not have any questions.     Devika Vo RN

## 2018-10-24 NOTE — TELEPHONE ENCOUNTER
Patient called and advised that he does not have a blood clot in his leg or a baker's cyst and he is to continue icing and elevating leg and to take tylenol as needed.

## 2018-10-24 NOTE — MR AVS SNAPSHOT
After Visit Summary   10/24/2018    Ayaan Herrera    MRN: [id number]           Patient Information     Date Of Birth          10/31/1932        Visit Information        Provider Department      10/24/2018 10:10 AM Zulma Yates, DO [facility]        Today's Diagnoses     Pain of left lower leg    -  1      Care Instructions      R.I.C.E.    R.I.C.E. stands for Rest, Ice, Compression, and Elevation. Doing these things helps limit pain and swelling after an injury. R.I.C.E. also helps injuries heal faster. Use R.I.C.E. for sprains, strains, and severe bruises or bumps. Follow the tips on this handout and begin R.I.C.E. as soon as possible after an injury.  ? Rest  Pain is your body s way of telling you to rest an injured area. Whether you have hurt an elbow, hand, foot, or knee, limiting its use will prevent further injury and help you heal.  ? Ice  Applying ice right after an injury helps prevent swelling and reduce pain. Don t place ice directly on your skin.    Wrap a cold pack or bag of ice in a thin cloth. Place it over the injured area.    Ice for 10 minutes every 3 hours. Don t ice for more than 20 minutes at a time.  ? Compression  Putting pressure (compression) on an injury helps prevent swelling and provides support.    Wrap the injured area firmly with an elastic bandage. If your hand or foot tingles, becomes discolored, or feels cold to the touch, the bandage may be too tight. Rewrap it more loosely.    If your bandage becomes too loose, rewrap it.    Do not wear an elastic bandage overnight.  ? Elevation  Keeping an injury elevated helps reduce swelling, pain, and throbbing. Elevation is most effective when the injury is kept elevated higher than the heart.     Call your healthcare provider if you notice any of the following:    Fingers or toes feel numb, are cold to the touch, or change color    Skin looks shiny or tight    Pain, swelling, or  bruising worsens and is not improved with elevation   Date Last Reviewed: 9/3/2015    7014-0638 [employer]. [address] [place] [address], [place] [address]. All rights reserved. This information is not intended as a substitute for professional medical care. Always follow your healthcare professional's instructions.                Follow-ups after your visit        Follow-up notes from your care team     Return in about 1 week (around 10/31/2018) for If Not Getting Any Better.      Your next 10 appointments already scheduled     Oct 29, 2018 11:30 AM CDT   Anticoagulation Visit with BX ANTICOAGULATION CLINIC   [facility]    [address] [place] [id number] [phone]            Mar 25, 2019 12:15 PM CDT   RETURN GLAUCOMA with Lissa Berry MD   Eye Clinic ([facility])    [address]  [facility] [id number] [phone]              Future tests that were ordered for you today     Open Future Orders        Priority Expected Expires Ordered    US Lower Extremity Venous Duplex Left Routine  10/24/2019 10/24/2018            Who to contact     If you have questions or need follow up information about today's clinic visit or your schedule please contact [facility] directly at [phone].  Normal or non-critical lab and imaging results will be communicated to you by MyChart, letter or phone within 4 business days after the clinic has received the results. If you do not hear from us within 7 days, please contact the clinic through MyChart or phone. If you have a critical or abnormal lab result, we will notify you by phone as soon as possible.  Submit refill requests through [employer] or call your pharmacy and they will forward the refill request to us. Please allow 3 business days for your refill to be completed.     "      Additional Information About Your Visit        MyChart Information     [employer] gives you secure access to your electronic health record. If you see a primary care provider, you can also send messages to your care team and make appointments. If you have questions, please call your primary care clinic.  If you do not have a primary care provider, please call [phone] and they will assist you.        Care EveryWhere ID     This is your Care EveryWhere ID. This could be used by other organizations to access your [facility] medical records  [id number]        Your Vitals Were     Pulse Temperature Respirations Height Pulse Oximetry BMI (Body Mass Index)    52 97.1  F (36.2  C) (Tympanic) 14 6' 1\" (1.854 m) 98% 24.41 kg/m2       Blood Pressure from Last 3 Encounters:   10/24/18 118/76   09/14/18 143/89   09/05/18 132/84    Weight from Last 3 Encounters:   10/24/18 185 lb (83.9 kg)   09/05/18 180 lb 8 oz (81.9 kg)   07/03/18 179 lb (81.2 kg)               Primary Care Provider Office Phone # Fax #    Dean Rider [phone]       [address] [facility] [address]        Equal Access to Services     [facility]: Hadii aad ku hadasho Soomaali, waaxda luqadaha, qaybta kaalmada adeegyada, waxay idiin hayaan adeeg kharash la'aan ah. So [facility] [phone].    ATENCIÓN: Si habla español, tiene a kwok disposición servicios gratuitos de asistencia lingüística. Llame al [phone].    We comply with applicable federal civil rights laws and [place] laws. We do not discriminate on the basis of race, color, national origin, age, disability, sex, sexual orientation, or gender identity.            Thank you!     Thank you for choosing [facility]  for your care. Our goal is always to provide you with excellent care. Hearing back from our patients is one way we can continue to improve our services. Please take a few minutes to complete the written survey that you may " receive in the mail after your visit with us. Thank you!             Your Updated Medication List - Protect others around you: Learn how to safely use, store and throw away your medicines at www.disposemymeds.org.          This list is accurate as of 10/24/18 10:33 AM.  Always use your most recent med list.                   Brand Name Dispense Instructions for use Diagnosis    alendronate 70 MG tablet    FOSAMAX    12 tablet    Take 1 tablet (70 mg) by mouth with 8oz water every 7 days 30 minutes before breakfast and remain upright during this time.    Senile osteoporosis       brimonidine 0.15 % ophthalmic solution    ALPHAGAN P    1 Bottle    Place 1 drop into both eyes 2 times daily    Acute maxillary sinusitis       COSOPT 2-0.5 % ophthalmic solution   Generic drug:  dorzolamide-timolol      Apply 1 Dose to eye 2 times daily Both eyes.    Acute maxillary sinusitis       FLUZONE HIGH-DOSE 0.5 ML injection   Generic drug:  influenza Vac Split High-Dose      TO BE ADMINISTERED BY PHARMACIST FOR IMMUNIZATION        hydrochlorothiazide 25 MG tablet    HYDRODIURIL    45 tablet    TAKE ONE-HALF TABLET BY MOUTH ONCE DAILY    Essential hypertension       latanoprost 0.005 % ophthalmic solution    XALATAN    3 Bottle    Place 1 drop into both eyes At Bedtime    Primary open-angle glaucoma(365.11)       tamsulosin 0.4 MG capsule    FLOMAX     TAKE ONE CAPSULE BY MOUTH ONCE A DAY        Vitamin D (Cholecalciferol) 1000 units Tabs           warfarin 5 MG tablet    COUMADIN     Take 5 mg by mouth        ZADITOR 0.025 % Soln ophthalmic solution   Generic drug:  ketotifen      Place 1 drop into both eyes as needed

## 2018-10-24 NOTE — TELEPHONE ENCOUNTER
CRYS Stallworth from [facility] imaging called to report results for patients US results. States pt is negative for DVT or bakers cyst. Pt has fluid collection due to hematoma measuring 4 x 2.1 x 1 cm. Reports with US results will be set for provider review.

## 2018-10-24 NOTE — PROGRESS NOTES
"  SUBJECTIVE:   Ayaan Herrera is a 85 year old male who presents to clinic today for the following health issues:        Musculoskeletal problem/pain      Duration: injury 8 days ago    Description  Location: left leg and foot    Intensity:  7/10    Accompanying signs and symptoms: swelling and redness    History  Previous similar problem: no   Previous evaluation:  none    Precipitating or alleviating factors:  Trauma or overuse: YES  Aggravating factors include: standing and walking    Therapies tried and outcome: acetaminophen      Hit his LLE about 8 days ago on a trailer hitch in a [employer] parking lot.  Pain worsens when he is resting.  Better when he is more active.       Problem list and histories reviewed & adjusted, as indicated.  Additional history: as documented    Labs reviewed in EPIC    Reviewed and updated as needed this visit by clinical staff  Tobacco  Allergies  Meds  Problems  Med Hx  Surg Hx  Fam Hx  Soc Hx        Reviewed and updated as needed this visit by Provider  Allergies  Meds  Problems         ROS:  CONSTITUTIONAL: NEGATIVE for fever, chills, change in weight  EYES: NEGATIVE for vision changes or irritation  ENT/MOUTH: NEGATIVE for ear, mouth and throat problems  RESP: NEGATIVE for significant cough or SOB  CV: NEGATIVE for chest pain, palpitations or peripheral edema  GI: NEGATIVE for nausea, abdominal pain, heartburn, or change in bowel habits  : NEGATIVE for frequency, dysuria, or hematuria  HEME: NEGATIVE for bleeding problems    OBJECTIVE:     /76 (Cuff Size: Adult Regular)  Pulse 52  Temp 97.1  F (36.2  C) (Tympanic)  Resp 14  Ht 6' 1\" (1.854 m)  Wt 185 lb (83.9 kg)  SpO2 98%  BMI 24.41 kg/m2  Body mass index is 24.41 kg/(m^2).   GENERAL: Alert and no distress  EYES: Eyes grossly normal to inspection, PERRL and conjunctivae and sclerae normal  HENT: ear canals and TM's normal, nose and mouth without ulcers or lesions  NECK: no adenopathy, no asymmetry, " masses, or scars and thyroid normal to palpation  RESP: lungs clear to auscultation - no rales, rhonchi or wheezes  CV: regular rate and rhythm, normal S1 S2, no S3 or S4, no murmur, click or rub, no peripheral edema and peripheral pulses strong  ABDOMEN: soft, nontender, no hepatosplenomegaly, no masses and bowel sounds normal  MS: LLE with large swollen mass (about 5-6 cm in diameter) that is very tender with palpation.  +calf pain with squeezing.  Negative Cole's sign in LLE.  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  Had b/l LE U/S done recently last month which was negative for a DVT.     INR being checked today.  INR 4.2 which shows that he is supra-therapeutic.    Pain more likely due to the pain/swelling from the traumatic injury and should get better with time.  Needs to ice leg frequently and elevate leg to help with pain/swelling.  Can continue to take Tylenol as needed. Avoid NSAIDs due to Warfarin use.   Will RTC as needed.     Zulma Yates, DO  [facility]

## 2018-10-24 NOTE — NURSING NOTE
Patient was scheduled at  for US lower extremity venous duplex. Marked as Read and Call. Appt. Today @ 11:45. Directions given to patient.    Janey Hair LPN

## 2018-10-24 NOTE — MR AVS SNAPSHOT
Ayaan Herrera   10/24/2018 11:30 AM   Anticoagulation Therapy Visit    Description:  85 year old male   Provider:   ANTICOAGULATION CLINIC   Department:  Bx Nurse           INR as of 10/24/2018     Today's INR 4.2!      Anticoagulation Summary as of 10/24/2018     INR goal 2.0-3.0    Today's INR 4.2!    Full warfarin instructions 10/24: Hold; Otherwise 5 mg every day    Next INR check 11/7/2018      Your next Anticoagulation Clinic appointment(s)     Oct 24, 2018 11:30 AM CDT   Anticoagulation Visit with  ANTICOAGULATION CLINIC   [facility] ([facility]    [address] [place] [address] [address]  [facility] [phone]            Nov 05, 2018 10:30 AM CST   Anticoagulation Visit with  ANTICOAGULATION CLINIC   [facility] ([facility])    [address] [place] [address] [address]  [facility] [id number] [phone]              Contact Numbers     [facility]  Please call  [phone] to cancel and/or reschedule your appointment   The direct line to the anticoagulant nurse is [phone] on Monday, Wednesday, and Friday. On Thursday, the anticoagulant nurse can be reached directly at [phone].         October 2018 Details    Sun Mon Tue Wed Thu Fri Sat      1               2               3               4               5               6                 7               8               9               10               11               12               13                 14               15               16               17               18               19               20                 21               22               23               24      Hold   See details      25      5 mg         26      5 mg         27      5 mg           28      5 mg         29      5 mg         30      5 mg         31      5 mg             Date Details   10/24 This INR check                How to take your warfarin dose     To take:  5 mg Take 1 of the 5 mg tablets.    Hold Do not take your warfarin dose. See the Details table to the right for additional instructions.                November 2018 Details    Sun Mon Tue Wed Thu Fri Sat         1      5 mg         2      5 mg         3      5 mg           4      5 mg         5      5 mg         6      5 mg         7            8               9               10                 11               12               13               14               15               16               17                 18               19               20               21               22               23               24                 25               26               27               28               29               30                 Date Details   No additional details    Date of next INR:  11/7/2018         How to take your warfarin dose     To take:  5 mg Take 1 of the 5 mg tablets.

## 2018-10-24 NOTE — PATIENT INSTRUCTIONS
R.I.C.E.    R.I.C.E. stands for Rest, Ice, Compression, and Elevation. Doing these things helps limit pain and swelling after an injury. R.I.C.E. also helps injuries heal faster. Use R.I.C.E. for sprains, strains, and severe bruises or bumps. Follow the tips on this handout and begin R.I.C.E. as soon as possible after an injury.  ? Rest  Pain is your body s way of telling you to rest an injured area. Whether you have hurt an elbow, hand, foot, or knee, limiting its use will prevent further injury and help you heal.  ? Ice  Applying ice right after an injury helps prevent swelling and reduce pain. Don t place ice directly on your skin.    Wrap a cold pack or bag of ice in a thin cloth. Place it over the injured area.    Ice for 10 minutes every 3 hours. Don t ice for more than 20 minutes at a time.  ? Compression  Putting pressure (compression) on an injury helps prevent swelling and provides support.    Wrap the injured area firmly with an elastic bandage. If your hand or foot tingles, becomes discolored, or feels cold to the touch, the bandage may be too tight. Rewrap it more loosely.    If your bandage becomes too loose, rewrap it.    Do not wear an elastic bandage overnight.  ? Elevation  Keeping an injury elevated helps reduce swelling, pain, and throbbing. Elevation is most effective when the injury is kept elevated higher than the heart.     Call your healthcare provider if you notice any of the following:    Fingers or toes feel numb, are cold to the touch, or change color    Skin looks shiny or tight    Pain, swelling, or bruising worsens and is not improved with elevation   Date Last Reviewed: 9/3/2015    2793-9040 [employer]. [address] [place] [address], [place] [address]. All rights reserved. This information is not intended as a substitute for professional medical care. Always follow your healthcare professional's instructions.

## 2018-10-27 ENCOUNTER — OFFICE VISIT (OUTPATIENT)
Dept: FAMILY MEDICINE | Facility: CLINIC | Age: 83
End: 2018-10-27
Payer: COMMERCIAL

## 2018-10-27 VITALS
BODY MASS INDEX: 24.14 KG/M2 | WEIGHT: 183 LBS | DIASTOLIC BLOOD PRESSURE: 72 MMHG | TEMPERATURE: 97.6 F | SYSTOLIC BLOOD PRESSURE: 112 MMHG | RESPIRATION RATE: 16 BRPM | HEART RATE: 61 BPM | OXYGEN SATURATION: 97 %

## 2018-10-27 DIAGNOSIS — S80.12XD HEMATOMA OF LOWER EXTREMITY, LEFT, SUBSEQUENT ENCOUNTER: Primary | ICD-10-CM

## 2018-10-27 DIAGNOSIS — R79.1 ELEVATED INR: ICD-10-CM

## 2018-10-27 PROCEDURE — 99213 OFFICE O/P EST LOW 20 MIN: CPT | Performed by: INTERNAL MEDICINE

## 2018-10-27 NOTE — MR AVS SNAPSHOT
After Visit Summary   10/27/2018    Ayaan Herrera    MRN: [id number]           Patient Information     Date Of Birth          10/31/1932        Visit Information        Provider Department      10/27/2018 11:15 AM Dean Rider MD [facility]        Today's Diagnoses     Hematoma of lower extremity, left, subsequent encounter    -  1    Elevated INR           Follow-ups after your visit        Follow-up notes from your care team     Return if symptoms worsen or fail to improve.      Your next 10 appointments already scheduled     Nov 05, 2018 10:30 AM CST   Anticoagulation Visit with BX ANT[facility]AGULATION CLINIC   [facility]    [address] [id number] [phone]            Mar 25, 2019 12:15 PM CDT   RETURN GLAUCOMA with Lissa Berry MD   Eye Clinic ([facility])    [address] [address] [phone]              Who to contact     If you have questions or need follow up information about today's clinic visit or your schedule please contact [facility] directly at [phone].  Normal or non-critical lab and imaging results will be communicated to you by MyChart, letter or phone within 4 business days after the clinic has received the results. If you do not hear from us within 7 days, please contact the clinic through MyChart or phone. If you have a critical or abnormal lab result, we will notify you by phone as soon as possible.  Submit refill requests through [employer] or call your pharmacy and they will forward the refill request to us. Please allow 3 business days for your refill to be completed.          Additional Information About Your Visit        MyChart Information     [employer] gives you secure access to your electronic health record. If you  see a primary care provider, you can also send messages to your care team and make appointments. If you have questions, please call your primary care clinic.  If you do not have a primary care provider, please call [phone] and they will assist you.        Care EveryWhere ID     This is your Care EveryWhere ID. This could be used by other organizations to access your [facility] medical records  [id number]        Your Vitals Were     Pulse Temperature Respirations Pulse Oximetry BMI (Body Mass Index)       61 97.6  F (36.4  C) (Tympanic) 16 97% 24.14 kg/m2        Blood Pressure from Last 3 Encounters:   10/27/18 112/72   10/24/18 118/76   09/14/18 143/89    Weight from Last 3 Encounters:   10/27/18 183 lb (83 kg)   10/24/18 185 lb (83.9 kg)   09/05/18 180 lb 8 oz (81.9 kg)              Today, you had the following     No orders found for display       Primary Care Provider Office Phone # Fax #    Dean Rider [phone]       [address] [facility] [address]        Equal Access to Services     [facility]: Hadii aad ku hadasho Soomaali, waaxda luqadaha, qaybta kaalmada adeegyada, waxay idiin hayaan adegeoffrey stephenarasherry lalettyn ah. So [facility] [phone].    ATENCIÓN: Si habla español, tiene a kwok disposición servicios gratuitos de asistencia lingüística. Llame al [phone].    We comply with applicable federal civil rights laws and [place] laws. We do not discriminate on the basis of race, color, national origin, age, disability, sex, sexual orientation, or gender identity.            Thank you!     Thank you for choosing [facility]  for your care. Our goal is always to provide you with excellent care. Hearing back from our patients is one way we can continue to improve our services. Please take a few minutes to complete the written survey that you may receive in the mail after your visit with us. Thank you!             Your Updated Medication List -  Protect others around you: Learn how to safely use, store and throw away your medicines at www.disposemymeds.org.          This list is accurate as of 10/27/18 11:33 AM.  Always use your most recent med list.                   Brand Name Dispense Instructions for use Diagnosis    alendronate 70 MG tablet    FOSAMAX    12 tablet    Take 1 tablet (70 mg) by mouth with 8oz water every 7 days 30 minutes before breakfast and remain upright during this time.    Senile osteoporosis       brimonidine 0.15 % ophthalmic solution    ALPHAGAN P    1 Bottle    Place 1 drop into both eyes 2 times daily    Acute maxillary sinusitis       COSOPT 2-0.5 % ophthalmic solution   Generic drug:  dorzolamide-timolol      Apply 1 Dose to eye 2 times daily Both eyes.    Acute maxillary sinusitis       FLUZONE HIGH-DOSE 0.5 ML injection   Generic drug:  influenza Vac Split High-Dose      TO BE ADMINISTERED BY PHARMACIST FOR IMMUNIZATION        hydrochlorothiazide 25 MG tablet    HYDRODIURIL    45 tablet    TAKE ONE-HALF TABLET BY MOUTH ONCE DAILY    Essential hypertension       latanoprost 0.005 % ophthalmic solution    XALATAN    3 Bottle    Place 1 drop into both eyes At Bedtime    Primary open-angle glaucoma(365.11)       tamsulosin 0.4 MG capsule    FLOMAX     TAKE ONE CAPSULE BY MOUTH ONCE A DAY        Vitamin D (Cholecalciferol) 1000 units Tabs           warfarin 5 MG tablet    COUMADIN     Take 5 mg by mouth        ZADITOR 0.025 % Soln ophthalmic solution   Generic drug:  ketotifen      Place 1 drop into both eyes as needed

## 2018-10-27 NOTE — PROGRESS NOTES
SUBJECTIVE:   Ayaan Herrera is a 85 year old male who presents to clinic today for the following health issues:      Musculoskeletal problem/pain      Duration: 11 days    Description  Location: left shin    Intensity:  9-10/10    Accompanying signs and symptoms: swelling, redness and discoloration of shin    History  Previous similar problem: no   Previous evaluation:  US for clots    Precipitating or alleviating factors:  Trauma or overuse: YES- ran into trailer hitch  Aggravating factors include: walking, pain is worse in the morning    Therapies tried and outcome: ice       injury happened 10/16.                    He developed a hematoma over the left upper medial tibial area, and swelling of the left lower leg.                   An ultrasound 3 days ago showed no DVT, but it did show the hematoma.                  He has lots of pain first thing in the morning when he stops on this leg.  He has no pain overnight.  Eventually, when he is been up and around, he can walk without much trouble.                                He has been applying ice.  His INR was elevated at 4.2, 3 days ago.             One dose of warfarin was held, and he is due to have this rechecked on November 5.              Problem list and histories reviewed & adjusted, as indicated.  Additional history: as documented    Current Outpatient Prescriptions   Medication Sig Dispense Refill     alendronate (FOSAMAX) 70 MG tablet Take 1 tablet (70 mg) by mouth with 8oz water every 7 days 30 minutes before breakfast and remain upright during this time. 12 tablet 3     brimonidine (ALPHAGAN P) 0.15 % ophthalmic solution Place 1 drop into both eyes 2 times daily 1 Bottle 11     COSOPT 2-0.5 % OP SOLN Apply 1 Dose to eye 2 times daily Both eyes.       FLUZONE HIGH-DOSE 0.5 ML injection TO BE ADMINISTERED BY PHARMACIST FOR IMMUNIZATION  0     hydrochlorothiazide (HYDRODIURIL) 25 MG tablet TAKE ONE-HALF TABLET BY MOUTH ONCE DAILY 45 tablet 2      ketotifen (ZADITOR) 0.025 % SOLN Place 1 drop into both eyes as needed       latanoprost (XALATAN) 0.005 % ophthalmic solution Place 1 drop into both eyes At Bedtime 3 Bottle 4     tamsulosin (FLOMAX) 0.4 MG capsule TAKE ONE CAPSULE BY MOUTH ONCE A DAY  3     Vitamin D, Cholecalciferol, 1000 UNITS TABS        warfarin (COUMADIN) 5 MG tablet Take 5 mg by mouth       Allergies   Allergen Reactions     Zithromax [Azithromycin] Diarrhea            Amoxicillin Diarrhea     Augmentin GI Disturbance     BP Readings from Last 3 Encounters:   10/24/18 118/76   09/14/18 143/89   09/05/18 132/84    Wt Readings from Last 3 Encounters:   10/24/18 185 lb (83.9 kg)   09/05/18 180 lb 8 oz (81.9 kg)   07/03/18 179 lb (81.2 kg)                    Reviewed and updated as needed this visit by clinical staff       Reviewed and updated as needed this visit by Provider         ROS:  CONSTITUTIONAL:NEGATIVE for fever, chills, change in weight    OBJECTIVE:                                                    /72  Pulse 61  Temp 97.6  F (36.4  C) (Tympanic)  Resp 16  Wt 183 lb (83 kg)  SpO2 97%  BMI 24.14 kg/m2  There is no height or weight on file to calculate BMI.  GENERAL APPEARANCE: alert and no distress  MS: There is a hematoma left upper medial tibial area, which is tender to palpation.  There is some surrounding ecchymosis, and swelling of the left lower leg.               There is a tiny eschar over the hematoma, no fluctuance or other signs of infection.    Diagnostic test results:  Ultrasound report reviewed, and sent to be scanned     ASSESSMENT/PLAN:                                                        ICD-10-CM    1. Hematoma of lower extremity, left, subsequent encounter S80.12XD    2. Elevated INR R79.1             I believe his problem is primarily due to a hematoma.  I do not believe there is infection present.                          Ace wrap was applied by me, and he reported this was comfortable.  I  suggested he apply the Ace wrap in the morning, and take it off at night.  He can continue to use ice.  I suggested he skip one more dose of warfarin today.  Follow up with Provider -as needed    Dean Rider MD  [facility]

## 2018-10-28 ENCOUNTER — MYC MEDICAL ADVICE (OUTPATIENT)
Dept: FAMILY MEDICINE | Facility: CLINIC | Age: 83
End: 2018-10-28

## 2018-10-29 NOTE — TELEPHONE ENCOUNTER
Patient was called, he only skipped coumadin 10/24/18 and took 5 mg all the other days including Saturday as originally advised by INR clinic. He will continue taking 5 mg daily and recheck INR this Friday 11/2/18.

## 2018-10-29 NOTE — TELEPHONE ENCOUNTER
I saw this patient in the office on Saturday.            See the assessment.  I suggested he skip one more dose of warfarin on that day.                            Also, I already had the ultrasound report on Saturday.

## 2018-11-02 ENCOUNTER — ANTICOAGULATION THERAPY VISIT (OUTPATIENT)
Dept: NURSING | Facility: CLINIC | Age: 83
End: 2018-11-02
Payer: COMMERCIAL

## 2018-11-02 DIAGNOSIS — I48.20 CHRONIC ATRIAL FIBRILLATION (H): ICD-10-CM

## 2018-11-02 LAB — INR POINT OF CARE: 3.2 (ref 0.86–1.14)

## 2018-11-02 PROCEDURE — 85610 PROTHROMBIN TIME: CPT | Mod: QW

## 2018-11-02 PROCEDURE — 99207 ZZC NO CHARGE NURSE ONLY: CPT

## 2018-11-02 PROCEDURE — 36416 COLLJ CAPILLARY BLOOD SPEC: CPT

## 2018-11-02 NOTE — MR AVS SNAPSHOT
Ayaan Herrera   11/2/2018 11:15 AM   Anticoagulation Therapy Visit    Description:  86 year old male   Provider:   ANTICOAGULATION CLINIC   Department:  Bx Nurse           INR as of 11/2/2018     Today's INR 3.2!      Anticoagulation Summary as of 11/2/2018     INR goal 2.0-3.0   Today's INR 3.2!   Full warfarin instructions 11/2: 2.5 mg; 11/9: 2.5 mg; Otherwise 5 mg every day   Next INR check 11/16/2018    Indications   Chronic atrial fibrillation (H) [I48.2]         Your next Anticoagulation Clinic appointment(s)     Nov 02, 2018 11:15 AM CDT   [facility] Visit with [facility]   [facility]    [address] [place] [id number] [phone]            Nov 16, 2018 10:30 AM CST   Anticoagulation Visit with  ANTICOAGULATION CLINIC   [facility] ([facility])    [address] [place] [id number] [phone]              Contact Numbers     [facility]  Please call  [phone] to cancel and/or reschedule your appointment   The direct line to the anticoagulant nurse is [phone] on Monday, Wednesday, and Friday. On Thursday, the anticoagulant nurse can be reached directly at [phone].         November 2018 Details    Sun Mon Tue Wed Thu Fri Sat         1               2      2.5 mg   See details      3      5 mg           4      5 mg         5      5 mg         6      5 mg         7      5 mg         8      5 mg         9      2.5 mg         10      5 mg           11      5 mg         12      5 mg         13      5 mg         14      5 mg         15      5 mg         16            17                 18               19               20               21               22               23               24                 25               26               27               28                29               30                 Date Details   11/02 This INR check       Date of next INR:  11/16/2018         How to take your warfarin dose     To take:  2.5 mg Take 0.5 of a 5 mg tablet.    To take:  5 mg Take 1 of the 5 mg tablets.

## 2018-11-02 NOTE — PROGRESS NOTES
ANTICOAGULATION FOLLOW-UP CLINIC VISIT    Patient Name:  Ayaan Herrera  Date:  11/2/2018  Contact Type:  Face to Face    SUBJECTIVE:        OBJECTIVE    INR Protime   Date Value Ref Range Status   11/02/2018 3.2 (A) 0.86 - 1.14 Final       ASSESSMENT / PLAN  INR assessment SUPRA    Recheck INR In: 2 WEEKS    INR Location Clinic      Anticoagulation Summary as of 11/2/2018     INR goal 2.0-3.0   Today's INR 3.2!   Warfarin maintenance plan 5 mg (5 mg x 1) every day   Full warfarin instructions 11/2: 2.5 mg; 11/9: 2.5 mg; Otherwise 5 mg every day   Weekly warfarin total 35 mg   Weekly max warfarin dose 35 mg   Plan last modified Devika Vo RN (10/24/2018)   Next INR check 11/16/2018   Target end date Indefinite    Indications   Chronic atrial fibrillation (H) [I48.2]         Anticoagulation Episode Summary     INR check location Clinic Lab    Preferred lab     Send INR reminders to [facility] INR/PROTIME    Comments             See the Encounter Report to view Anticoagulation Flowsheet and Dosing Calendar (Go to Encounters tab in chart review, and find the Anticoagulation Therapy Visit)        Janene Smith, RN

## 2018-11-16 ENCOUNTER — ANTICOAGULATION THERAPY VISIT (OUTPATIENT)
Dept: NURSING | Facility: CLINIC | Age: 83
End: 2018-11-16
Payer: COMMERCIAL

## 2018-11-16 DIAGNOSIS — I48.20 CHRONIC ATRIAL FIBRILLATION (H): ICD-10-CM

## 2018-11-16 LAB — INR POINT OF CARE: 2.8 (ref 0.86–1.14)

## 2018-11-16 PROCEDURE — 36416 COLLJ CAPILLARY BLOOD SPEC: CPT

## 2018-11-16 PROCEDURE — 99207 ZZC NO CHARGE NURSE ONLY: CPT

## 2018-11-16 PROCEDURE — 85610 PROTHROMBIN TIME: CPT | Mod: QW

## 2018-11-16 NOTE — PROGRESS NOTES
ANTICOAGULATION FOLLOW-UP CLINIC VISIT    Patient Name:  Ayaan Herrera  Date:  11/16/2018  Contact Type:  Face to Face    SUBJECTIVE:     Patient Findings     Positives No Problem Findings           OBJECTIVE    INR Protime   Date Value Ref Range Status   11/16/2018 2.8 (A) 0.86 - 1.14 Final       ASSESSMENT / PLAN  INR assessment THER    Recheck INR In: 3 WEEKS    INR Location Clinic      Anticoagulation Summary as of 11/16/2018     INR goal 2.0-3.0   Today's INR 2.8   Warfarin maintenance plan 2.5 mg (5 mg x 0.5) on Fri; 5 mg (5 mg x 1) all other days   Full warfarin instructions 2.5 mg on Fri; 5 mg all other days   Weekly warfarin total 32.5 mg   Weekly max warfarin dose 35 mg   Plan last modified Janene Smith RN (11/16/2018)   Next INR check 12/7/2018   Target end date Indefinite    Indications   Chronic atrial fibrillation (H) [I48.2]         Anticoagulation Episode Summary     INR check location Clinic Lab    Preferred lab     Send INR reminders to [facility] INR/PROTIME    Comments             See the Encounter Report to view Anticoagulation Flowsheet and Dosing Calendar (Go to Encounters tab in chart review, and find the Anticoagulation Therapy Visit)        Janene Smith, RN

## 2018-11-16 NOTE — MR AVS SNAPSHOT
Ayaan Herrera   11/16/2018 9:30 AM   Anticoagulation Therapy Visit    Description:  86 year old male   Provider:   ANTICOAGULATION CLINIC   Department:  Bx Nurse           INR as of 11/16/2018     Today's INR 2.8      Anticoagulation Summary as of 11/16/2018     INR goal 2.0-3.0   Today's INR 2.8   Full warfarin instructions 2.5 mg on Fri; 5 mg all other days   Next INR check 12/7/2018    Indications   Chronic atrial fibrillation (H) [I48.2]         Your next Anticoagulation Clinic appointment(s)     Dec 07, 2018 10:30 AM CST   Anticoagulation Visit with  ANTICOAGULATION CLINIC   [facility] ([facility]    [address] [place] [id number] [phone]              Contact Numbers     [facility]  Please call  [phone] to cancel and/or reschedule your appointment   The direct line to the anticoagulant nurse is [phone] on Monday, Wednesday, and Friday. On Thursday, the anticoagulant nurse can be reached directly at [phone].         November 2018 Details    Sun Mon Tue Wed Thu Fri Sat         1               2               3                 4               5               6               7               8               9               10                 11               12               13               14               15               16      2.5 mg   See details      17      5 mg           18      5 mg         19      5 mg         20      5 mg         21      5 mg         22      5 mg         23      2.5 mg         24      5 mg           25      5 mg         26      5 mg         27      5 mg         28      5 mg         29      5 mg         30      2.5 mg           Date Details   11/16 This INR check               How to take your warfarin dose     To take:  2.5 mg Take 0.5 of a 5 mg tablet.    To take:  5 mg Take 1 of the 5 mg tablets.           December 2018 Details    Sun Mon Tue Wed Thu  Fri Sat           1      5 mg           2      5 mg         3      5 mg         4      5 mg         5      5 mg         6      5 mg         7            8                 9               10               11               12               13               14               15                 16               17               18               19               20               21               22                 23               24               25               26               27               28               29                 30               31                     Date Details   No additional details    Date of next INR:  12/7/2018         How to take your warfarin dose     To take:  2.5 mg Take 0.5 of a 5 mg tablet.    To take:  5 mg Take 1 of the 5 mg tablets.

## 2018-11-19 DIAGNOSIS — I10 ESSENTIAL HYPERTENSION: ICD-10-CM

## 2018-11-19 NOTE — TELEPHONE ENCOUNTER
"Requested Prescriptions   Pending Prescriptions Disp Refills     hydrochlorothiazide (HYDRODIURIL) 25 MG tablet [Pharmacy Med Name: HYDROCHLOROT 25MG   TAB]  Last Written Prescription Date: 02/27/2018  Last Fill Quantity: 45,  # refills: 2   Last office visit: 10/27/2018 with prescribing provider:  TAMMY   Future Office Visit:     45 tablet 2     Sig: TAKE 1/2 (ONE-HALF) TABLET BY MOUTH ONCE DAILY    Diuretics (Including Combos) Protocol Passed    11/19/2018  2:44 PM       Passed - Blood pressure under 140/90 in past 12 months    BP Readings from Last 3 Encounters:   10/27/18 112/72   10/24/18 118/76   09/14/18 143/89                Passed - Recent (12 mo) or future (30 days) visit within the authorizing provider's specialty    Patient had office visit in the last 12 months or has a visit in the next 30 days with authorizing provider or within the authorizing provider's specialty.  See \"Patient Info\" tab in inbasket, or \"Choose Columns\" in Meds & Orders section of the refill encounter.             Passed - Patient is age 18 or older       Passed - Normal serum creatinine on file in past 12 months    Recent Labs   Lab Test  07/03/18   1005   CR  0.87             Passed - Normal serum potassium on file in past 12 months    Recent Labs   Lab Test  07/03/18   1005   POTASSIUM  4.0                   Passed - Normal serum sodium on file in past 12 months    Recent Labs   Lab Test  07/03/18   1005   NA  138                "

## 2018-11-20 RX ORDER — HYDROCHLOROTHIAZIDE 25 MG/1
TABLET ORAL
Qty: 45 TABLET | Refills: 2 | Status: SHIPPED | OUTPATIENT
Start: 2018-11-20 | End: 2019-08-22

## 2018-12-07 ENCOUNTER — ANTICOAGULATION THERAPY VISIT (OUTPATIENT)
Dept: NURSING | Facility: CLINIC | Age: 83
End: 2018-12-07
Payer: COMMERCIAL

## 2018-12-07 DIAGNOSIS — I48.20 CHRONIC ATRIAL FIBRILLATION (H): ICD-10-CM

## 2018-12-07 LAB — INR POINT OF CARE: 3 (ref 0.86–1.14)

## 2018-12-07 PROCEDURE — 99207 ZZC NO CHARGE NURSE ONLY: CPT

## 2018-12-07 PROCEDURE — 85610 PROTHROMBIN TIME: CPT | Mod: QW

## 2018-12-07 PROCEDURE — 36416 COLLJ CAPILLARY BLOOD SPEC: CPT

## 2018-12-07 NOTE — PROGRESS NOTES
ANTICOAGULATION FOLLOW-UP CLINIC VISIT    Patient Name:  Ayaan Herrera  Date:  12/7/2018  Contact Type:  Face to Face    SUBJECTIVE:     Patient Findings     Positives No Problem Findings           OBJECTIVE    INR Protime   Date Value Ref Range Status   12/07/2018 3.0 (A) 0.86 - 1.14 Final       ASSESSMENT / PLAN  INR assessment THER    Recheck INR In: 4 WEEKS    INR Location Clinic      Anticoagulation Summary as of 12/7/2018     INR goal 2.0-3.0   Today's INR 3.0   Warfarin maintenance plan 2.5 mg (5 mg x 0.5) on Fri; 5 mg (5 mg x 1) all other days   Full warfarin instructions 2.5 mg on Fri; 5 mg all other days   Weekly warfarin total 32.5 mg   Weekly max warfarin dose 35 mg   No change documented Janene Smith RN   Plan last modified Janene Smith RN (11/16/2018)   Next INR check 1/4/2019   Target end date Indefinite    Indications   Chronic atrial fibrillation (H) [I48.2]         Anticoagulation Episode Summary     INR check location Clinic Lab    Preferred lab     Send INR reminders to [facility] INR/PROTIME    Comments             See the Encounter Report to view Anticoagulation Flowsheet and Dosing Calendar (Go to Encounters tab in chart review, and find the Anticoagulation Therapy Visit)        Janene Smith, RN

## 2018-12-07 NOTE — MR AVS SNAPSHOT
Ayaan Herrera   12/7/2018 10:30 AM   Anticoagulation Therapy Visit    Description:  86 year old male   Provider:  [facility]   Department:  Bx Nurse           INR as of 12/7/2018     Today's INR 3.0      Anticoagulation Summary as of 12/7/2018     INR goal 2.0-3.0   Today's INR 3.0   Full warfarin instructions 2.5 mg on Fri; 5 mg all other days   Next INR check 1/4/2019    Indications   Chronic atrial fibrillation (H) [I48.2]         Your next Anticoagulation Clinic appointment(s)     Jan 04, 2019 10:30 AM CST   Anticoagulation Visit with  ANTICOAGULATION CLINIC   [facility]    [address] [id number] [phone]              Contact Numbers     [facility]  Please call  [phone] to cancel and/or reschedule your appointment   The direct line to the anticoagulant nurse is [phone] on Monday, Wednesday, and Friday. On Thursday, the anticoagulant nurse can be reached directly at [phone].         December 2018 Details    Sun Mon Tue Wed Thu Fri Sat           1                 2               3               4               5               6               7      2.5 mg   See details      8      5 mg           9      5 mg         10      5 mg         11      5 mg         12      5 mg         13      5 mg         14      2.5 mg         15      5 mg           16      5 mg         17      5 mg         18      5 mg         19      5 mg         20      5 mg         21      2.5 mg         22      5 mg           23      5 mg         24      5 mg         25      5 mg         26      5 mg         27      5 mg         28      2.5 mg         29      5 mg           30      5 mg         31      5 mg               Date Details   12/07 This INR check               How to take your warfarin dose     To take:  2.5 mg Take 0.5 of a 5 mg tablet.    To take:  5 mg Take 1 of the 5 mg  tablets.           January 2019 Details    Sun Mon Tue Wed Thu Fri Sat       1      5 mg         2      5 mg         3      5 mg         4            5                 6               7               8               9               10               11               12                 13               14               15               16               17               18               19                 20               21               22               23               24               25               26                 27               28               29               30               31                  Date Details   No additional details    Date of next INR:  1/4/2019         How to take your warfarin dose     To take:  2.5 mg Take 0.5 of a 5 mg tablet.    To take:  5 mg Take 1 of the 5 mg tablets.

## 2019-01-04 ENCOUNTER — ANTICOAGULATION THERAPY VISIT (OUTPATIENT)
Dept: NURSING | Facility: CLINIC | Age: 84
End: 2019-01-04
Payer: COMMERCIAL

## 2019-01-04 DIAGNOSIS — I48.20 CHRONIC ATRIAL FIBRILLATION (H): ICD-10-CM

## 2019-01-04 LAB — INR POINT OF CARE: 2.8 (ref 0.86–1.14)

## 2019-01-04 PROCEDURE — 85610 PROTHROMBIN TIME: CPT | Mod: QW

## 2019-01-04 PROCEDURE — 36416 COLLJ CAPILLARY BLOOD SPEC: CPT

## 2019-01-04 PROCEDURE — 99207 ZZC NO CHARGE NURSE ONLY: CPT

## 2019-01-04 NOTE — PROGRESS NOTES
ANTICOAGULATION FOLLOW-UP CLINIC VISIT    Patient Name:  Ayaan Herrera  Date:  2019  Contact Type:  Face to Face    SUBJECTIVE:     Patient Findings     Positives:   No Problem Findings           OBJECTIVE    INR Protime   Date Value Ref Range Status   2019 2.8 (A) 0.86 - 1.14 Final       ASSESSMENT / PLAN  INR assessment THER    Recheck INR In: 4 WEEKS    INR Location Clinic      Anticoagulation Summary  As of 2019    INR goal:   2.0-3.0   TTR:   90.3 % (2.1 mo)   INR used for dosin.8 (2019)   Warfarin maintenance plan:   2.5 mg (5 mg x 0.5) every Fri; 5 mg (5 mg x 1) all other days   Full warfarin instructions:   2.5 mg every Fri; 5 mg all other days   Weekly warfarin total:   32.5 mg   Weekly max warfarin dose:   35 mg   Plan last modified:   Janene Smith RN (2018)   Next INR check:   2019   Target end date:   Indefinite    Indications    Chronic atrial fibrillation (H) [I48.2]             Anticoagulation Episode Summary     INR check location:   Clinic Lab    Preferred lab:       Send INR reminders to:   [facility] INR/PROTIME    Comments:               See the Encounter Report to view Anticoagulation Flowsheet and Dosing Calendar (Go to Encounters tab in chart review, and find the Anticoagulation Therapy Visit)        Janene Smith RN

## 2019-02-01 ENCOUNTER — ANTICOAGULATION THERAPY VISIT (OUTPATIENT)
Dept: NURSING | Facility: CLINIC | Age: 84
End: 2019-02-01
Payer: COMMERCIAL

## 2019-02-01 DIAGNOSIS — I48.20 CHRONIC ATRIAL FIBRILLATION (H): ICD-10-CM

## 2019-02-01 LAB — INR POINT OF CARE: 1.8 (ref 0.86–1.14)

## 2019-02-01 PROCEDURE — 85610 PROTHROMBIN TIME: CPT | Mod: QW

## 2019-02-01 PROCEDURE — 99207 ZZC NO CHARGE NURSE ONLY: CPT

## 2019-02-01 PROCEDURE — 36416 COLLJ CAPILLARY BLOOD SPEC: CPT

## 2019-02-01 NOTE — PROGRESS NOTES
ANTICOAGULATION FOLLOW-UP CLINIC VISIT    Patient Name:  Ayaan Herrera  Date:  2019  Contact Type:  Face to Face    SUBJECTIVE:     Patient Findings     Positives:   Missed doses (missed 1 dose)           OBJECTIVE    INR Protime   Date Value Ref Range Status   2019 1.8 (A) 0.86 - 1.14 Final       ASSESSMENT / PLAN  INR assessment SUB    Recheck INR In: 3 WEEKS    INR Location Clinic      Anticoagulation Summary  As of 2019    INR goal:   2.0-3.0   TTR:   87.1 % (3 mo)   INR used for dosin.8! (2019)   Warfarin maintenance plan:   2.5 mg (5 mg x 0.5) every Fri; 5 mg (5 mg x 1) all other days   Full warfarin instructions:   : 5 mg; Otherwise 2.5 mg every Fri; 5 mg all other days   Weekly warfarin total:   32.5 mg   Weekly max warfarin dose:   35 mg   Plan last modified:   Janene Smith RN (2018)   Next INR check:   2019   Target end date:   Indefinite    Indications    Chronic atrial fibrillation (H) [I48.2]             Anticoagulation Episode Summary     INR check location:   Clinic Lab    Preferred lab:       Send INR reminders to:   [facility] INR/PROTIME    Comments:               See the Encounter Report to view Anticoagulation Flowsheet and Dosing Calendar (Go to Encounters tab in chart review, and find the Anticoagulation Therapy Visit)        Janene Smith RN

## 2019-02-22 ENCOUNTER — ANTICOAGULATION THERAPY VISIT (OUTPATIENT)
Dept: NURSING | Facility: CLINIC | Age: 84
End: 2019-02-22
Payer: COMMERCIAL

## 2019-02-22 DIAGNOSIS — I48.20 CHRONIC ATRIAL FIBRILLATION (H): ICD-10-CM

## 2019-02-22 LAB — INR POINT OF CARE: 2.7 (ref 0.86–1.14)

## 2019-02-22 PROCEDURE — 99207 ZZC NO CHARGE NURSE ONLY: CPT

## 2019-02-22 PROCEDURE — 85610 PROTHROMBIN TIME: CPT | Mod: QW

## 2019-02-22 PROCEDURE — 36416 COLLJ CAPILLARY BLOOD SPEC: CPT

## 2019-02-22 NOTE — PROGRESS NOTES
ANTICOAGULATION FOLLOW-UP CLINIC VISIT    Patient Name:  Ayaan Herrera  Date:  2019  Contact Type:  Face to Face    SUBJECTIVE:     Patient Findings     Positives:   No Problem Findings           OBJECTIVE    INR Protime   Date Value Ref Range Status   2019 2.7 (A) 0.86 - 1.14 Final       ASSESSMENT / PLAN  INR assessment THER    Recheck INR In: 4 WEEKS    INR Location Clinic      Anticoagulation Summary  As of 2019    INR goal:   2.0-3.0   TTR:   85.3 % (3.7 mo)   INR used for dosin.7 (2019)   Warfarin maintenance plan:   2.5 mg (5 mg x 0.5) every Fri; 5 mg (5 mg x 1) all other days   Full warfarin instructions:   2.5 mg every Fri; 5 mg all other days   Weekly warfarin total:   32.5 mg   Weekly max warfarin dose:   35 mg   Plan last modified:   Janene Smith RN (2018)   Next INR check:   3/22/2019   Target end date:   Indefinite    Indications    Chronic atrial fibrillation (H) [I48.2]             Anticoagulation Episode Summary     INR check location:   Clinic Lab    Preferred lab:       Send INR reminders to:   [facility] INR/PROTIME    Comments:               See the Encounter Report to view Anticoagulation Flowsheet and Dosing Calendar (Go to Encounters tab in chart review, and find the Anticoagulation Therapy Visit)        Janene Smith RN

## 2019-03-08 ENCOUNTER — TELEPHONE (OUTPATIENT)
Dept: FAMILY MEDICINE | Facility: CLINIC | Age: 84
End: 2019-03-08

## 2019-03-08 NOTE — TELEPHONE ENCOUNTER
Ayaan Herrera is a 86 year old male who calls with chest pain.     PRESENTING PROBLEM:   Patient states that he has been shoveling a lot of snow and may have just overdone it. The pain is on the right upper part of his chest, it is a stabbing feeling that is very short lasting (<10 seconds), and it happens with both movement and rest. It does not radiate, and there are no associated symptoms like SOB, diaphoresis, palpitations, dizziness, or n/v. This pain has not occurred in a few days, but patient wanted to follow up with PCP.     NURSING ASSESSMENT:  Patient complains of pain on right side of chest.  Onset:  About a month ago  Pain is characterized as stabbing   Severity moderate-severe but only for <10 seconds  Located right chest   Radiates to none.  Duration intermittent.  Associated symptoms none.  Exacerbated by no known provoking events.  Relieved by short lived- no interventions taken.  Cardiac risk factors: essential hypertension, AAA, mitral valve insufficiency  Associated Medical History: Afib, pacemaker  Allergies:   Allergies   Allergen Reactions     Zithromax [Azithromycin] Diarrhea            Amoxicillin Diarrhea     Augmentin GI Disturbance       MEDICATIONS:  Taking medication(s) as prescribed? Yes  Taking over the counter medication(s) ? No  Any medication side effects? No significant side effects    Any barriers to taking medication(s) as prescribed?  No  Medication(s) improving/managing symptoms?  No  Medication reconciliation completed: Yes    Last exam/Treatment:  10/27/18    NURSING PLAN: Nursing advice to patient follow up with PCP next week with OV     RECOMMENDED DISPOSITION:    Will comply with recommendation: Yes  If further questions/concerns or if symptoms do not improve, worsen or new symptoms develop, call your PCP or [facility] as soon as possible.    Guideline used:  Telephone Triage Protocols for Nurses, Fifth Edition, Meredith CORRAL, SCOTN, RN    Routing  to provider as FYI

## 2019-03-22 ENCOUNTER — ANTICOAGULATION THERAPY VISIT (OUTPATIENT)
Dept: NURSING | Facility: CLINIC | Age: 84
End: 2019-03-22
Payer: COMMERCIAL

## 2019-03-22 DIAGNOSIS — I48.20 CHRONIC ATRIAL FIBRILLATION (H): ICD-10-CM

## 2019-03-22 LAB — INR POINT OF CARE: 2.9 (ref 0.86–1.14)

## 2019-03-22 PROCEDURE — 99207 ZZC NO CHARGE NURSE ONLY: CPT

## 2019-03-22 PROCEDURE — 85610 PROTHROMBIN TIME: CPT | Mod: QW

## 2019-03-22 PROCEDURE — 36416 COLLJ CAPILLARY BLOOD SPEC: CPT

## 2019-03-22 NOTE — PROGRESS NOTES
ANTICOAGULATION FOLLOW-UP CLINIC VISIT    Patient Name:  Ayaan Herrera  Date:  3/22/2019  Contact Type:  Face to Face    SUBJECTIVE:     Patient Findings     Comments:   The patient was assessed for diet, medication, and activity level changes, missed or extra doses, bruising or bleeding, with no problem findings.             OBJECTIVE    INR Protime   Date Value Ref Range Status   2019 2.9 (A) 0.86 - 1.14 Final       ASSESSMENT / PLAN  INR assessment THER    Recheck INR In: 4 WEEKS    INR Location Clinic      Anticoagulation Summary  As of 3/22/2019    INR goal:   2.0-3.0   TTR:   88.3 % (4.6 mo)   INR used for dosin.9 (3/22/2019)   Warfarin maintenance plan:   2.5 mg (5 mg x 0.5) every Fri; 5 mg (5 mg x 1) all other days   Full warfarin instructions:   2.5 mg every Fri; 5 mg all other days   Weekly warfarin total:   32.5 mg   Weekly max warfarin dose:   35 mg   Plan last modified:   Janene Smith RN (2018)   Next INR check:   2019   Target end date:   Indefinite    Indications    Chronic atrial fibrillation (H) [I48.2]             Anticoagulation Episode Summary     INR check location:   Clinic Lab    Preferred lab:       Send INR reminders to:   [facility] INR/PROTIME    Comments:               See the Encounter Report to view Anticoagulation Flowsheet and Dosing Calendar (Go to Encounters tab in chart review, and find the Anticoagulation Therapy Visit).        Janene Smith RN

## 2019-03-25 ENCOUNTER — OFFICE VISIT (OUTPATIENT)
Dept: OPHTHALMOLOGY | Facility: CLINIC | Age: 84
End: 2019-03-25
Attending: OPHTHALMOLOGY
Payer: COMMERCIAL

## 2019-03-25 DIAGNOSIS — H40.1133 PRIMARY OPEN ANGLE GLAUCOMA OF BOTH EYES, SEVERE STAGE: ICD-10-CM

## 2019-03-25 DIAGNOSIS — H40.1190 PRIMARY OPEN ANGLE GLAUCOMA: Primary | ICD-10-CM

## 2019-03-25 PROCEDURE — G0463 HOSPITAL OUTPT CLINIC VISIT: HCPCS | Mod: ZF

## 2019-03-25 PROCEDURE — 92133 CPTRZD OPH DX IMG PST SGM ON: CPT | Mod: ZF | Performed by: OPHTHALMOLOGY

## 2019-03-25 ASSESSMENT — VISUAL ACUITY
METHOD: SNELLEN - LINEAR
CORRECTION_TYPE: GLASSES
OD_CC: 20/25
OS_CC+: -2
OS_CC: 20/30
OD_CC+: +2

## 2019-03-25 ASSESSMENT — REFRACTION_WEARINGRX
OS_SPHERE: -2.00
SPECS_TYPE: PAL
OS_ADD: +2.50
OD_AXIS: 025
OS_CYLINDER: +1.50
OD_ADD: +2.50
OD_SPHERE: -4.50
OD_CYLINDER: +1.75
OS_AXIS: 145

## 2019-03-25 ASSESSMENT — TONOMETRY
OD_IOP_MMHG: 22
IOP_METHOD: APPLANATION
OS_IOP_MMHG: 18

## 2019-03-25 ASSESSMENT — CUP TO DISC RATIO
OS_RATIO: 0.8
OD_RATIO: 0.7

## 2019-03-25 ASSESSMENT — PACHYMETRY
OD_CT(UM): 529
OS_CT(UM): 536

## 2019-03-25 ASSESSMENT — SLIT LAMP EXAM - LIDS
COMMENTS: NORMAL
COMMENTS: NORMAL

## 2019-03-25 NOTE — PROGRESS NOTES
CC: Followup for Primary open angle glaucoma (POAG)-adv right eye and mod left eye.     Interval history:  Vision stable, no changes.     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    Ophthalmic procedural/surgical history:  - trabeculectomy LE (1996) and revised (1997)  - SLT RE (10/14/04 & 2/17/05)  - cataract IOL RE (1985) LE (1996)  - punctal closure, plugs BE (2000)    Current ophthalmic medications- gets all medications from [facility]:  - brimonidine twice a day both eyes   - Latanoprost at bedtime  both eyes   - dorzolamide twice a day Both eyes   - artificial tears at bedtime both eyes     Results of studies/procedures:  OCT retinal nerve fiber layer 3/25/19   right eye : moderate thinning inf/sup  left eye : severe thinning globally - floor effect    Octopus visual field LVC both eyes 9/19/18 (Only 1 previous LVC, rest are unreliable 24-2)  Right eye with superior and inferior nasal step and central involvement  Left eye inferior arcuate    Assessmen:  Primary open angle glaucoma (POAG) adv right eye and mod left eye   IOP elevated today 22/18 - too high right eye   visual field from 9/2018 shows possible increase in nasal step from 2017, though only one interval with LVC  OCT stable - though may be floor effect  No clear evidence of progression    Patient on MMT (cant use timolol)     Plan:   Continue brimonidine twice a day both eyes   Continue Latanoprost at bedtime  both eyes   Continue dorzolamide twice a day Both eyes     Neither Rhopressa nor Vyzulta are on his formulary    RTC 2-3 months with IOP check.  Patient prefers repeat intraocular pressure check over scheduling Baerveldt right eye today       Abdelrahman Brown MD  Ophthalmology Resident, PGY-3  [facility]      Attending Physician Attestation:  Complete documentation of historical and  exam elements from today's encounter can be found in the full encounter summary report (not reduplicated in this progress note). I personally obtained the chief complaint(s) and history of present illness. I confirmed and edited asnecessary the review of systems, past medical/surgical history, family history, social history, and examination findings as documented by others; and I examined the patient myself. I personally reviewed the relevant tests, images, and reports as documented above. I formulated and edited as necessary the assessment and plan and discussed the findings and management plan with the patient and family.  - Lissa Berry MD 1:23 PM 3/25/2019

## 2019-03-25 NOTE — NURSING NOTE
Chief Complaints and History of Present Illnesses   Patient presents with     Glaucoma Follow-Up     6 month follow up POAG     Chief Complaint(s) and History of Present Illness(es)     Glaucoma Follow-Up     Associated symptoms: Negative for flashes, floaters, redness and eye pain    Comments: 6 month follow up POAG              Comments     Pt states vision is about the same as last visit. Dryness in both eyes, relief with drops.  Pt notes that he got new glasses from the [facility] and they do not  Work as well as his old glasses.    Jordyn GAFFNEY March 25, 2019 12:17 PM

## 2019-04-01 ENCOUNTER — MEDICAL CORRESPONDENCE (OUTPATIENT)
Dept: HEALTH INFORMATION MANAGEMENT | Facility: CLINIC | Age: 84
End: 2019-04-01

## 2019-04-19 ENCOUNTER — ANTICOAGULATION THERAPY VISIT (OUTPATIENT)
Dept: NURSING | Facility: CLINIC | Age: 84
End: 2019-04-19
Payer: COMMERCIAL

## 2019-04-19 LAB — INR POINT OF CARE: 1.8 (ref 0.86–1.14)

## 2019-04-19 PROCEDURE — 99207 ZZC NO CHARGE NURSE ONLY: CPT

## 2019-04-19 PROCEDURE — 36416 COLLJ CAPILLARY BLOOD SPEC: CPT

## 2019-04-19 PROCEDURE — 85610 PROTHROMBIN TIME: CPT | Mod: QW

## 2019-04-19 NOTE — PROGRESS NOTES
ANTICOAGULATION FOLLOW-UP CLINIC VISIT    Patient Name:  Ayaan Herrera  Date:  2019  Contact Type:  Face to Face    SUBJECTIVE:        OBJECTIVE    INR Protime   Date Value Ref Range Status   2019 1.8 (A) 0.86 - 1.14 Final       ASSESSMENT / PLAN  INR assessment SUB    Recheck INR In: 2 WEEKS    INR Location Clinic      Anticoagulation Summary  As of 2019    INR goal:   2.0-3.0   TTR:   87.2 % (5.6 mo)   INR used for dosin.8! (2019)   Warfarin maintenance plan:   2.5 mg (5 mg x 0.5) every Fri; 5 mg (5 mg x 1) all other days   Full warfarin instructions:   : 5 mg; Otherwise 2.5 mg every Fri; 5 mg all other days   Weekly warfarin total:   32.5 mg   Weekly max warfarin dose:   35 mg   Plan last modified:   Janene Smith RN (2018)   Next INR check:   5/3/2019   Target end date:   Indefinite    Indications    Chronic atrial fibrillation (H) [I48.2]             Anticoagulation Episode Summary     INR check location:   Clinic Lab    Preferred lab:       Send INR reminders to:   [facility] INR/PROTIME    Comments:               See the Encounter Report to view Anticoagulation Flowsheet and Dosing Calendar (Go to Encounters tab in chart review, and find the Anticoagulation Therapy Visit)        Janene Smith RN

## 2019-05-03 ENCOUNTER — ANTICOAGULATION THERAPY VISIT (OUTPATIENT)
Dept: NURSING | Facility: CLINIC | Age: 84
End: 2019-05-03
Payer: COMMERCIAL

## 2019-05-03 DIAGNOSIS — C61 MALIGNANT NEOPLASM OF PROSTATE (H): ICD-10-CM

## 2019-05-03 DIAGNOSIS — I48.20 CHRONIC ATRIAL FIBRILLATION (H): ICD-10-CM

## 2019-05-03 LAB
CREAT SERPL-MCNC: 0.8 MG/DL (ref 0.66–1.25)
GFR SERPL CREATININE-BSD FRML MDRD: 81 ML/MIN/{1.73_M2}
INR POINT OF CARE: 2 (ref 0.86–1.14)

## 2019-05-03 PROCEDURE — 99207 ZZC NO CHARGE NURSE ONLY: CPT

## 2019-05-03 PROCEDURE — 36416 COLLJ CAPILLARY BLOOD SPEC: CPT

## 2019-05-03 PROCEDURE — 85610 PROTHROMBIN TIME: CPT | Mod: QW

## 2019-05-03 PROCEDURE — 82565 ASSAY OF CREATININE: CPT | Performed by: FAMILY MEDICINE

## 2019-05-03 NOTE — PROGRESS NOTES
ANTICOAGULATION FOLLOW-UP CLINIC VISIT    Patient Name:  Ayaan Herrera  Date:  5/3/2019  Contact Type:  Face to Face    SUBJECTIVE:     Patient Findings     Comments:   The patient was assessed for diet, medication, and activity level changes, missed or extra doses, bruising or bleeding, with no problem findings.             OBJECTIVE    INR Protime   Date Value Ref Range Status   2019 2.0 (A) 0.86 - 1.14 Final       ASSESSMENT / PLAN  INR assessment THER    Recheck INR In: 4 WEEKS    INR Location Clinic      Anticoagulation Summary  As of 5/3/2019    INR goal:   2.0-3.0   TTR:   80.5 % (6 mo)   INR used for dosin.0 (5/3/2019)   Warfarin maintenance plan:   2.5 mg (5 mg x 0.5) every Fri; 5 mg (5 mg x 1) all other days   Full warfarin instructions:   2.5 mg every Fri; 5 mg all other days   Weekly warfarin total:   32.5 mg   Weekly max warfarin dose:   35 mg   Plan last modified:   Janene Smith RN (2018)   Next INR check:   2019   Target end date:   Indefinite    Indications    Chronic atrial fibrillation (H) [I48.2]             Anticoagulation Episode Summary     INR check location:   Clinic Lab    Preferred lab:       Send INR reminders to:   [facility] INR/PROTIME    Comments:               See the Encounter Report to view Anticoagulation Flowsheet and Dosing Calendar (Go to Encounters tab in chart review, and find the Anticoagulation Therapy Visit)        Janene Smith RN

## 2019-05-22 ENCOUNTER — TRANSFERRED RECORDS (OUTPATIENT)
Dept: HEALTH INFORMATION MANAGEMENT | Facility: CLINIC | Age: 84
End: 2019-05-22

## 2019-05-23 ENCOUNTER — TRANSFERRED RECORDS (OUTPATIENT)
Dept: HEALTH INFORMATION MANAGEMENT | Facility: CLINIC | Age: 84
End: 2019-05-23

## 2019-05-31 ENCOUNTER — ANTICOAGULATION THERAPY VISIT (OUTPATIENT)
Dept: NURSING | Facility: CLINIC | Age: 84
End: 2019-05-31
Payer: COMMERCIAL

## 2019-05-31 DIAGNOSIS — I48.20 CHRONIC ATRIAL FIBRILLATION (H): ICD-10-CM

## 2019-05-31 LAB
INR POINT OF CARE: 2.2 (ref 0.86–1.14)
INR POINT OF CARE: 2.4 (ref 0.86–1.14)

## 2019-05-31 PROCEDURE — 85610 PROTHROMBIN TIME: CPT | Mod: QW

## 2019-05-31 PROCEDURE — 36416 COLLJ CAPILLARY BLOOD SPEC: CPT

## 2019-05-31 PROCEDURE — 99207 ZZC NO CHARGE NURSE ONLY: CPT

## 2019-05-31 NOTE — PROGRESS NOTES
ANTICOAGULATION FOLLOW-UP CLINIC VISIT    Patient Name:  Ayaan Herrera  Date:  2019  Contact Type:  Face to Face    SUBJECTIVE:  Patient Findings     Comments:   The patient was assessed for diet, medication, and activity level changes, missed or extra doses, bruising or bleeding, with no problem findings.          Clinical Outcomes     Comments:   The patient was assessed for diet, medication, and activity level changes, missed or extra doses, bruising or bleeding, with no problem findings.             OBJECTIVE    INR Protime   Date Value Ref Range Status   2019 2.4 (A) 0.86 - 1.14 Final   2019 2.2 (A) 0.86 - 1.14 Final       ASSESSMENT / PLAN  INR assessment THER    Recheck INR In: 5 WEEKS    INR Location Clinic      Anticoagulation Summary  As of 2019    INR goal:   2.0-3.0   TTR:   83.1 % (7 mo)   INR used for dosin.2 (2019)   Warfarin maintenance plan:   2.5 mg (5 mg x 0.5) every Fri; 5 mg (5 mg x 1) all other days   Full warfarin instructions:   2.5 mg every Fri; 5 mg all other days   Weekly warfarin total:   32.5 mg   Weekly max warfarin dose:   35 mg   Plan last modified:   Janene Smith RN (2018)   Next INR check:   7/3/2019   Target end date:   Indefinite    Indications    Chronic atrial fibrillation (H) [I48.2]             Anticoagulation Episode Summary     INR check location:   Clinic Lab    Preferred lab:       Send INR reminders to:   [facility] INR/PROTIME    Comments:               See the Encounter Report to view Anticoagulation Flowsheet and Dosing Calendar (Go to Encounters tab in chart review, and find the Anticoagulation Therapy Visit)        Janene Smith RN

## 2019-06-03 ENCOUNTER — TELEPHONE (OUTPATIENT)
Dept: FAMILY MEDICINE | Facility: CLINIC | Age: 84
End: 2019-06-03

## 2019-06-03 NOTE — TELEPHONE ENCOUNTER
Reason for call:  Patient reporting a symptom    Symptom or request: arm bleeding and wont stop     Duration (how long have symptoms been present): today    Have you been treated for this before? No    Additional comments: PT ON COUMADIN    Phone Number patient can be reached at:  Home number on file [phone] (home)    Best Time:  ASAP    Can we leave a detailed message on this number:  YES    Call taken on 6/3/2019 at 4:10 PM by PRISCILLA RAMOS

## 2019-06-03 NOTE — TELEPHONE ENCOUNTER
"Contacted patient regarding arm bleeding. Patient states \"I probably called you too soon. I'm going to try putting a pressure dressing on first and see how it improves\". Encouraged patient to call back if bleeding does not stop and to seek urgent care if it continues over night. No further action needed.  "

## 2019-07-03 ENCOUNTER — ANTICOAGULATION THERAPY VISIT (OUTPATIENT)
Dept: NURSING | Facility: CLINIC | Age: 84
End: 2019-07-03
Payer: COMMERCIAL

## 2019-07-03 ENCOUNTER — OFFICE VISIT (OUTPATIENT)
Dept: OPHTHALMOLOGY | Facility: CLINIC | Age: 84
End: 2019-07-03
Attending: OPHTHALMOLOGY
Payer: COMMERCIAL

## 2019-07-03 DIAGNOSIS — H40.1133 PRIMARY OPEN ANGLE GLAUCOMA OF BOTH EYES, SEVERE STAGE: Primary | ICD-10-CM

## 2019-07-03 DIAGNOSIS — I48.20 CHRONIC ATRIAL FIBRILLATION (H): ICD-10-CM

## 2019-07-03 LAB — INR POINT OF CARE: 2.2 (ref 0.86–1.14)

## 2019-07-03 PROCEDURE — 85610 PROTHROMBIN TIME: CPT | Mod: QW

## 2019-07-03 PROCEDURE — 92083 EXTENDED VISUAL FIELD XM: CPT | Mod: ZF | Performed by: OPHTHALMOLOGY

## 2019-07-03 PROCEDURE — G0463 HOSPITAL OUTPT CLINIC VISIT: HCPCS | Mod: ZF

## 2019-07-03 PROCEDURE — 36416 COLLJ CAPILLARY BLOOD SPEC: CPT

## 2019-07-03 PROCEDURE — 99207 ZZC NO CHARGE NURSE ONLY: CPT

## 2019-07-03 ASSESSMENT — VISUAL ACUITY
OS_CC: 20/30
OD_CC: 20/25
OS_CC+: -1
OD_CC+: -2
CORRECTION_TYPE: GLASSES
METHOD: SNELLEN - LINEAR

## 2019-07-03 ASSESSMENT — TONOMETRY
OS_IOP_MMHG: 18
OD_IOP_MMHG: 15
IOP_METHOD: APPLANATION

## 2019-07-03 ASSESSMENT — SLIT LAMP EXAM - LIDS
COMMENTS: NORMAL
COMMENTS: NORMAL

## 2019-07-03 ASSESSMENT — CUP TO DISC RATIO
OS_RATIO: 0.8
OD_RATIO: 0.7

## 2019-07-03 ASSESSMENT — REFRACTION_WEARINGRX
OS_ADD: +2.50
OD_CYLINDER: +1.75
OS_CYLINDER: +1.50
OS_AXIS: 145
OD_ADD: +2.50
OS_SPHERE: -2.00
SPECS_TYPE: PAL
OD_AXIS: 025
OD_SPHERE: -4.50

## 2019-07-03 ASSESSMENT — CONF VISUAL FIELD
OS_INFERIOR_NASAL_RESTRICTION: 3
METHOD: COUNTING FINGERS
OS_INFERIOR_TEMPORAL_RESTRICTION: 3
OD_NORMAL: 1

## 2019-07-03 NOTE — PROGRESS NOTES
CC: Followup for Primary open angle glaucoma (POAG)-adv right eye and mod left eye.     Interval history:  Vision stable, no changes.     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    Ophthalmic procedural/surgical history:  - trabeculectomy LE (1996) and revised (1997)  - SLT RE (10/14/04 & 2/17/05)  - cataract IOL RE (1985) LE (1996)  - punctal closure, plugs BE (2000)    Current ophthalmic medications- gets all medications from [facility]:  - brimonidine twice a day both eyes   - Latanoprost at bedtime  both eyes   - dorzolamide twice a day Both eyes   - artificial tears at bedtime both eyes     Results of studies/procedures:  OCT retinal nerve fiber layer 3/25/19   right eye : moderate thinning inf/sup  left eye : severe thinning globally - floor effect    Octopus visual field LVC both eyes 9/19/18 (Only 1 previous LVC, rest are unreliable 24-2)  Right eye with superior and inferior nasal step and central involvement  Left eye inferior arcuate    Assessmen:  Primary open angle glaucoma (POAG) adv right eye and mod left eye   IOP better today right eye than last visit  Octopus visual field LVC may be worse right eye, has been variable and unreliable, better with LVC.  Left eye probably stable  OCT stable - though may be floor effect  No clear evidence of progression    Patient on MMT (cant use timolol)     Plan:   Continue brimonidine twice a day both eyes   Continue Latanoprost at bedtime  both eyes   Continue dorzolamide twice a day Both eyes     Neither Rhopressa nor Vyzulta are on his formulary    RTC 6 mos with OCT retinal nerve fiber layer .  Patient prefers repeat intraocular pressure check over scheduling Baerveldt right eye today     Attending Physician Attestation:  Complete documentation of historical and exam elements from today's encounter can be found in the  full encounter summary report (not reduplicated in this progress note). I personally obtained the chief complaint(s) and history of present illness. I confirmed and edited asnecessary the review of systems, past medical/surgical history, family history, social history, and examination findings as documented by others; and I examined the patient myself. I personally reviewed the relevant tests, images, and reports as documented above. I formulated and edited as necessary the assessment and plan and discussed the findings and management plan with the patient and family.  - Lissa Berry MD 2:23 PM 7/3/2019

## 2019-07-03 NOTE — PROGRESS NOTES
ANTICOAGULATION FOLLOW-UP CLINIC VISIT    Patient Name:  Ayaan Herrera  Date:  7/3/2019  Contact Type:  Face to Face    SUBJECTIVE:  Patient Findings     Comments:   The patient was assessed for diet, medication, and activity level changes, missed or extra doses, bruising or bleeding, with no problem findings.          Clinical Outcomes     Comments:   The patient was assessed for diet, medication, and activity level changes, missed or extra doses, bruising or bleeding, with no problem findings.             OBJECTIVE    INR Protime   Date Value Ref Range Status   2019 2.2 (A) 0.86 - 1.14 Final       ASSESSMENT / PLAN  INR assessment THER    Recheck INR In: 6 WEEKS    INR Location Clinic      Anticoagulation Summary  As of 7/3/2019    INR goal:   2.0-3.0   TTR:   85.4 % (8.1 mo)   INR used for dosin.2 (7/3/2019)   Warfarin maintenance plan:   2.5 mg (5 mg x 0.5) every Fri; 5 mg (5 mg x 1) all other days   Full warfarin instructions:   2.5 mg every Fri; 5 mg all other days   Weekly warfarin total:   32.5 mg   Weekly max warfarin dose:   35 mg   Plan last modified:   Janene Smith RN (2018)   Next INR check:   2019   Target end date:   Indefinite    Indications    Chronic atrial fibrillation (H) [I48.2]             Anticoagulation Episode Summary     INR check location:   Clinic Lab    Preferred lab:       Send INR reminders to:   [facility]    Comments:               See the Encounter Report to view Anticoagulation Flowsheet and Dosing Calendar (Go to Encounters tab in chart review, and find the Anticoagulation Therapy Visit)        Janene Smith RN

## 2019-07-03 NOTE — NURSING NOTE
Chief Complaints and History of Present Illnesses   Patient presents with     Glaucoma Follow-Up     3 month follow-up Primary open angle glaucoma (POAG)-adv right eye and mod left eye.      Chief Complaint(s) and History of Present Illness(es)     Glaucoma Follow-Up     Comments: 3 month follow-up Primary open angle glaucoma (POAG)-adv right eye and mod left eye.               Comments     Pt denies any significant vision changes in either eye since last visit.  Complains of intermittent irritation/itchiness LE>RE x years.  Denies any pain, pressure, discharge, and tearing.  Currently using Alphagan BID BE, Cosopt BID BE, Latanoprost at bedtime BE, and Zaditor at bedtime BE    Milly Frances OT 1:06 PM July 3, 2019

## 2019-07-24 ENCOUNTER — TRANSFERRED RECORDS (OUTPATIENT)
Dept: HEALTH INFORMATION MANAGEMENT | Facility: CLINIC | Age: 84
End: 2019-07-24

## 2019-07-27 ENCOUNTER — OFFICE VISIT (OUTPATIENT)
Dept: FAMILY MEDICINE | Facility: CLINIC | Age: 84
End: 2019-07-27
Payer: COMMERCIAL

## 2019-07-27 VITALS
OXYGEN SATURATION: 98 % | RESPIRATION RATE: 16 BRPM | HEIGHT: 73 IN | DIASTOLIC BLOOD PRESSURE: 80 MMHG | HEART RATE: 86 BPM | SYSTOLIC BLOOD PRESSURE: 130 MMHG | BODY MASS INDEX: 23.33 KG/M2 | WEIGHT: 176 LBS | TEMPERATURE: 95.7 F

## 2019-07-27 DIAGNOSIS — M81.0 SENILE OSTEOPOROSIS: ICD-10-CM

## 2019-07-27 DIAGNOSIS — M79.604 PAIN OF RIGHT LOWER EXTREMITY: Primary | ICD-10-CM

## 2019-07-27 PROCEDURE — 99213 OFFICE O/P EST LOW 20 MIN: CPT | Performed by: INTERNAL MEDICINE

## 2019-07-27 ASSESSMENT — MIFFLIN-ST. JEOR: SCORE: 1532.21

## 2019-07-27 NOTE — PROGRESS NOTES
Subjective     Ayaan Herrera is a 86 year old male who presents to clinic today for the following health issues:    HPI     Musculoskeletal problem/pain      Duration: Ongoing    Description  Location: Right Ankle/Leg    Intensity:  mild    Accompanying signs and symptoms: none    History  Previous similar problem: no   Previous evaluation:  none    Precipitating or alleviating factors:  Trauma or overuse: no   Aggravating factors include: none    Therapies tried and outcome: exercises and acetaminophen/relief         Still having intermittent R LE pain; calf and ankle.             This resolved quickly when he starts walking, which he does BID or TID;walks his dog.                          No LB pain.                       PSA down with AAT.              Still taking alendronate.     Current Outpatient Medications   Medication Sig Dispense Refill     alendronate (FOSAMAX) 70 MG tablet TAKE 1 TAB WITH 8OZ OF WATER EVERY 7 DAYS 30 MIN BEFORE BREAKFAST & REMAIN UPRIGHT DURING THIS TIME 12 tablet 3     brimonidine (ALPHAGAN P) 0.15 % ophthalmic solution Place 1 drop into both eyes 2 times daily 1 Bottle 11     COSOPT 2-0.5 % OP SOLN Apply 1 Dose to eye 2 times daily Both eyes.       hydrochlorothiazide (HYDRODIURIL) 25 MG tablet TAKE 1/2 (ONE-HALF) TABLET BY MOUTH ONCE DAILY 45 tablet 2     ketotifen (ZADITOR) 0.025 % SOLN Place 1 drop into both eyes as needed       latanoprost (XALATAN) 0.005 % ophthalmic solution Place 1 drop into both eyes At Bedtime 3 Bottle 4     tamsulosin (FLOMAX) 0.4 MG capsule TAKE ONE CAPSULE BY MOUTH ONCE A DAY  3     Vitamin D, Cholecalciferol, 1000 UNITS TABS        warfarin (COUMADIN) 5 MG tablet Take 5 mg by mouth 2.5 fri & 5 mg row       BP Readings from Last 3 Encounters:   07/27/19 130/80   10/27/18 112/72   10/24/18 118/76    Wt Readings from Last 3 Encounters:   07/27/19 79.8 kg (176 lb)   10/27/18 83 kg (183 lb)   10/24/18 83.9 kg (185 lb)                      Reviewed and  "updated as needed this visit by Provider         Review of Systems   ROS COMP: CONSTITUTIONAL: NEGATIVE for fever, chills, change in weight  ENT/MOUTH: NEGATIVE for ear, mouth and throat problems  RESP: NEGATIVE for significant cough or SOB  CV: NEGATIVE for chest pain, palpitations or peripheral edema      Objective    /80   Pulse 86   Temp 95.7  F (35.4  C) (Tympanic)   Resp 16   Ht 1.854 m (6' 1\")   Wt 79.8 kg (176 lb)   SpO2 98%   BMI 23.22 kg/m    There is no height or weight on file to calculate BMI.  Physical Exam   GENERAL APPEARANCE: healthy, alert and no distress  MS: extremities normal- no gross deformities noted and normal range of motion hips,knees,ankles    Diagnostic Test Results:  none         Assessment & Plan     Ayaan was seen today for musculoskeletal problem.    Diagnoses and all orders for this visit:    Pain of right lower extremity    Senile osteoporosis           Etiology of R leg pain uncertain. However,the pain is not severe, and not constant,and is relieved with walking. No further evaluation advised.             Patient Instructions   Keep taking the same medications.        Return in about 6 months (around 1/27/2020) for follow up of several issues.    Dean Rider MD  [facility]        "

## 2019-07-27 NOTE — NURSING NOTE
"Chief Complaint   Patient presents with     Musculoskeletal Problem     /80   Pulse 86   Temp 95.7  F (35.4  C) (Tympanic)   Resp 16   Ht 1.854 m (6' 1\")   Wt 79.8 kg (176 lb)   SpO2 98%   BMI 23.22 kg/m   Estimated body mass index is 23.22 kg/m  as calculated from the following:    Height as of this encounter: 1.854 m (6' 1\").    Weight as of this encounter: 79.8 kg (176 lb).  BP completed using cuff size: regular   Martha Espinal CMA    Health Maintenance Due   Topic Date Due     ADVANCE CARE PLANNING  10/31/1932     ZOSTER IMMUNIZATION (2 of 3) 03/28/2008     LIPID  08/07/2018     MEDICARE ANNUAL WELLNESS VISIT  07/03/2019     Health Maintenance reviewed at today's visit patient asked to schedule/complete:   Routine Health Visit: Patient agrees to schedule  Immunizations:  Patient agrees to schedule    "

## 2019-07-30 DIAGNOSIS — I71.40 ABDOMINAL AORTIC ANEURYSM (AAA) WITHOUT RUPTURE (H): Primary | ICD-10-CM

## 2019-08-14 ENCOUNTER — ANTICOAGULATION THERAPY VISIT (OUTPATIENT)
Dept: NURSING | Facility: CLINIC | Age: 84
End: 2019-08-14
Payer: COMMERCIAL

## 2019-08-14 DIAGNOSIS — I48.20 CHRONIC ATRIAL FIBRILLATION (H): ICD-10-CM

## 2019-08-14 LAB — INR POINT OF CARE: 2.2 (ref 0.86–1.14)

## 2019-08-14 PROCEDURE — 85610 PROTHROMBIN TIME: CPT | Mod: QW

## 2019-08-14 PROCEDURE — 99207 ZZC NO CHARGE NURSE ONLY: CPT

## 2019-08-14 PROCEDURE — 36416 COLLJ CAPILLARY BLOOD SPEC: CPT

## 2019-08-14 NOTE — PROGRESS NOTES
ANTICOAGULATION FOLLOW-UP CLINIC VISIT    Patient Name:  Ayaan Herrera  Date:  2019  Contact Type:  Face to Face    SUBJECTIVE:  Patient Findings     Comments:   The patient was assessed for diet, medication, and activity level changes, missed or extra doses, bruising or bleeding, with no problem findings.            Clinical Outcomes     Comments:   The patient was assessed for diet, medication, and activity level changes, missed or extra doses, bruising or bleeding, with no problem findings.               OBJECTIVE    INR Protime   Date Value Ref Range Status   2019 2.2 (A) 0.86 - 1.14 Final       ASSESSMENT / PLAN  INR assessment THER    Recheck INR In: 6 WEEKS    INR Location Clinic      Anticoagulation Summary  As of 2019    INR goal:   2.0-3.0   TTR:   87.6 % (9.5 mo)   INR used for dosin.2 (2019)   Warfarin maintenance plan:   2.5 mg (5 mg x 0.5) every Fri; 5 mg (5 mg x 1) all other days   Full warfarin instructions:   2.5 mg every Fri; 5 mg all other days   Weekly warfarin total:   32.5 mg   Weekly max warfarin dose:   35 mg   No change documented:   Janene Smith RN   Plan last modified:   Janene Smiht RN (2018)   Next INR check:   2019   Target end date:   Indefinite    Indications    Chronic atrial fibrillation (H) [I48.2]             Anticoagulation Episode Summary     INR check location:   Clinic Lab    Preferred lab:       Send INR reminders to:   OR[facility] WANG    Comments:               See the Encounter Report to view Anticoagulation Flowsheet and Dosing Calendar (Go to Encounters tab in chart review, and find the Anticoagulation Therapy Visit)        Janene Smith RN

## 2019-08-22 DIAGNOSIS — I10 ESSENTIAL HYPERTENSION: ICD-10-CM

## 2019-08-22 NOTE — TELEPHONE ENCOUNTER
"Requested Prescriptions   Pending Prescriptions Disp Refills     hydrochlorothiazide (HYDRODIURIL) 25 MG tablet [Pharmacy Med Name: HYDROCHLOROT 25MG   TAB] 45 tablet 2     Sig: TAKE 1/2 (ONE-HALF) TABLET BY MOUTH ONCE DAILY  Last Written Prescription Date:  11/20/2018  Last Fill Quantity: 45 tablet,  # refills: 2   Last office visit: 7/27/2019 with prescribing provider:  Adry   Future Office Visit:         Diuretics (Including Combos) Protocol Failed - 8/22/2019  7:08 AM        Failed - Normal serum potassium on file in past 12 months     Recent Labs   Lab Test 07/03/18  1005   POTASSIUM 4.0                    Failed - Normal serum sodium on file in past 12 months     Recent Labs   Lab Test 07/03/18  1005                 Passed - Blood pressure under 140/90 in past 12 months     BP Readings from Last 3 Encounters:   07/27/19 130/80   10/27/18 112/72   10/24/18 118/76                 Passed - Recent (12 mo) or future (30 days) visit within the authorizing provider's specialty     Patient had office visit in the last 12 months or has a visit in the next 30 days with authorizing provider or within the authorizing provider's specialty.  See \"Patient Info\" tab in inbasket, or \"Choose Columns\" in Meds & Orders section of the refill encounter.              Passed - Medication is active on med list        Passed - Patient is age 18 or older        Passed - Normal serum creatinine on file in past 12 months     Recent Labs   Lab Test 05/03/19  1137   CR 0.80                 "

## 2019-08-23 DIAGNOSIS — I10 ESSENTIAL HYPERTENSION: Primary | ICD-10-CM

## 2019-08-23 LAB — POTASSIUM SERPL-SCNC: 4.1 MMOL/L (ref 3.4–5.3)

## 2019-08-23 PROCEDURE — 36415 COLL VENOUS BLD VENIPUNCTURE: CPT | Performed by: INTERNAL MEDICINE

## 2019-08-23 PROCEDURE — 84132 ASSAY OF SERUM POTASSIUM: CPT | Performed by: INTERNAL MEDICINE

## 2019-08-24 RX ORDER — HYDROCHLOROTHIAZIDE 25 MG/1
TABLET ORAL
Qty: 45 TABLET | Refills: 2 | Status: SHIPPED | OUTPATIENT
Start: 2019-08-24 | End: 2020-05-11

## 2019-09-25 ENCOUNTER — ANTICOAGULATION THERAPY VISIT (OUTPATIENT)
Dept: NURSING | Facility: CLINIC | Age: 84
End: 2019-09-25
Payer: COMMERCIAL

## 2019-09-25 DIAGNOSIS — I48.20 CHRONIC ATRIAL FIBRILLATION (H): ICD-10-CM

## 2019-09-25 LAB — INR POINT OF CARE: 2.1 (ref 0.86–1.14)

## 2019-09-25 PROCEDURE — 36416 COLLJ CAPILLARY BLOOD SPEC: CPT

## 2019-09-25 PROCEDURE — 99207 ZZC NO CHARGE NURSE ONLY: CPT

## 2019-09-25 PROCEDURE — 85610 PROTHROMBIN TIME: CPT | Mod: QW

## 2019-09-25 NOTE — PROGRESS NOTES
ANTICOAGULATION FOLLOW-UP CLINIC VISIT    Patient Name:  Ayaan Herrera  Date:  2019  Contact Type:  Face to Face    SUBJECTIVE:  Patient Findings     Comments:   The patient was assessed for diet, medication, and activity level changes, missed or extra doses, bruising or bleeding, with no problem findings.            Clinical Outcomes     Comments:   The patient was assessed for diet, medication, and activity level changes, missed or extra doses, bruising or bleeding, with no problem findings.               OBJECTIVE    INR Protime   Date Value Ref Range Status   2019 2.1 (A) 0.86 - 1.14 Final       ASSESSMENT / PLAN  INR assessment THER    Recheck INR In: 6 WEEKS    INR Location Clinic      Anticoagulation Summary  As of 2019    INR goal:   2.0-3.0   TTR:   89.2 % (10.9 mo)   INR used for dosin.1 (2019)   Warfarin maintenance plan:   2.5 mg (5 mg x 0.5) every Fri; 5 mg (5 mg x 1) all other days   Full warfarin instructions:   2.5 mg every Fri; 5 mg all other days   Weekly warfarin total:   32.5 mg   Weekly max warfarin dose:   35 mg   Plan last modified:   Janene Smith RN (2018)   Next INR check:   2019   Target end date:   Indefinite    Indications    Chronic atrial fibrillation (H) [I48.2]             Anticoagulation Episode Summary     INR check location:   Clinic Lab    Preferred lab:       Send INR reminders to:   [facility]    Comments:               See the Encounter Report to view Anticoagulation Flowsheet and Dosing Calendar (Go to Encounters tab in chart review, and find the Anticoagulation Therapy Visit)        Janene Smith RN

## 2019-09-28 ENCOUNTER — HEALTH MAINTENANCE LETTER (OUTPATIENT)
Age: 84
End: 2019-09-28

## 2019-10-03 DIAGNOSIS — I48.20 CHRONIC ATRIAL FIBRILLATION (H): Primary | ICD-10-CM

## 2019-10-03 NOTE — TELEPHONE ENCOUNTER
"Requested Prescriptions   Pending Prescriptions Disp Refills     warfarin ANTICOAGULANT (COUMADIN) 5 MG tablet [Pharmacy Med Name: WARFARIN SODIUM 5 MG TABLET]    Last Written Prescription Date:  10/02/2017  Last Fill Quantity: 90 tablet     # refills: 0    Last office visit: 7/27/2019 with prescribing provider:  Adry   Future Office Visit:     90 tablet 0     Sig: TAKE 1 TABLET BY MOUTH EVERY DAY       Vitamin K Antagonists Failed - 10/3/2019 12:29 PM        Failed - INR is within goal in the past 6 weeks     Confirm INR is within goal in the past 6 weeks.     Recent Labs   Lab Test 09/25/19   INR 2.1*                       Failed - Medication is active on med list        Passed - Recent (12 mo) or future (30 days) visit within the authorizing provider's specialty     Patient has had an office visit with the authorizing provider or a provider within the authorizing providers department within the previous 12 mos or has a future within next 30 days. See \"Patient Info\" tab in inbasket, or \"Choose Columns\" in Meds & Orders section of the refill encounter.              Passed - Patient is 18 years of age or older           "

## 2019-10-04 RX ORDER — WARFARIN SODIUM 5 MG/1
TABLET ORAL
Qty: 90 TABLET | Refills: 1 | Status: SHIPPED | OUTPATIENT
Start: 2019-10-04 | End: 2020-03-23

## 2019-10-04 NOTE — TELEPHONE ENCOUNTER
Ayaan is out of the Warfarin.  Please refill yet today.  If this cannot be done, please contact him and advise him of what to do.

## 2019-10-04 NOTE — TELEPHONE ENCOUNTER
"Requested Prescriptions   Pending Prescriptions Disp Refills     warfarin ANTICOAGULANT (COUMADIN) 5 MG tablet [Pharmacy Med Name: WARFARIN SODIUM 5 MG TABLET] 90 tablet 0     Sig: TAKE 1 TABLET BY MOUTH EVERY DAY       Vitamin K Antagonists Failed - 10/4/2019  9:47 AM        Failed - INR is within goal in the past 6 weeks     Confirm INR is within goal in the past 6 weeks.     Recent Labs   Lab Test 09/25/19   INR 2.1*                       Failed - Medication is active on med list        Passed - Recent (12 mo) or future (30 days) visit within the authorizing provider's specialty     Patient has had an office visit with the authorizing provider or a provider within the authorizing providers department within the previous 12 mos or has a future within next 30 days. See \"Patient Info\" tab in inbasket, or \"Choose Columns\" in Meds & Orders section of the refill encounter.              Passed - Patient is 18 years of age or older          "

## 2019-10-13 DIAGNOSIS — M81.0 SENILE OSTEOPOROSIS: ICD-10-CM

## 2019-10-14 NOTE — TELEPHONE ENCOUNTER
"Requested Prescriptions   Pending Prescriptions Disp Refills     alendronate (FOSAMAX) 70 MG tablet [Pharmacy Med Name: ALENDRONATE SODIUM 70 MG TAB]    Last Written Prescription Date:  11/14/2018  Last Fill Quantity: 12 tablet,  # refills: 3   Last office visit: 7/27/2019 with prescribing provider:  Adry   Future Office Visit:     12 tablet 3     Sig: TAKE 1 TAB WITH 8OZ OF WATER EVERY 7 DAYS 30 MIN BEFORE BREAKFAST & REMAIN UPRIGHT DURING THIS TIME       Bisphosphonates Passed - 10/13/2019  9:03 AM        Passed - Recent (12 mo) or future (30 days) visit within the authorizing provider's specialty     Patient has had an office visit with the authorizing provider or a provider within the authorizing providers department within the previous 12 mos or has a future within next 30 days. See \"Patient Info\" tab in inbasket, or \"Choose Columns\" in Meds & Orders section of the refill encounter.              Passed - Dexa on file within past 2 years     Please review last Dexa result.   Dexa done on 11/29/2017        Passed - Medication is active on med list        Passed - Patient is age 18 or older        Passed - Normal serum creatinine on file within past 12 months     Recent Labs   Lab Test 05/03/19  1137   CR 0.80                "

## 2019-10-15 RX ORDER — ALENDRONATE SODIUM 70 MG/1
TABLET ORAL
Qty: 12 TABLET | Refills: 3 | Status: SHIPPED | OUTPATIENT
Start: 2019-10-15 | End: 2020-08-09

## 2019-10-15 NOTE — TELEPHONE ENCOUNTER
Prescription approved per [facility] Refill Protocol for 12 months of refills since last appointment, which was 7/27/2019.

## 2019-11-06 ENCOUNTER — ANTICOAGULATION THERAPY VISIT (OUTPATIENT)
Dept: NURSING | Facility: CLINIC | Age: 84
End: 2019-11-06
Payer: COMMERCIAL

## 2019-11-06 DIAGNOSIS — I48.20 CHRONIC ATRIAL FIBRILLATION (H): ICD-10-CM

## 2019-11-06 LAB — INR POINT OF CARE: 2.5 (ref 0.86–1.14)

## 2019-11-06 PROCEDURE — 36416 COLLJ CAPILLARY BLOOD SPEC: CPT

## 2019-11-06 PROCEDURE — 99207 ZZC NO CHARGE NURSE ONLY: CPT

## 2019-11-06 PROCEDURE — 85610 PROTHROMBIN TIME: CPT | Mod: QW

## 2019-11-06 NOTE — PROGRESS NOTES
ANTICOAGULATION FOLLOW-UP CLINIC VISIT    Patient Name:  Ayaan Herrera  Date:  2019  Contact Type:  Face to Face    SUBJECTIVE:         OBJECTIVE    INR Protime   Date Value Ref Range Status   2019 2.5 (A) 0.86 - 1.14 Final       ASSESSMENT / PLAN  INR assessment THER    Recheck INR In: 6 WEEKS    INR Location Clinic      Anticoagulation Summary  As of 2019    INR goal:   2.0-3.0   TTR:   90.4 % (1 y)   INR used for dosin.5 (2019)   Warfarin maintenance plan:   2.5 mg (5 mg x 0.5) every Fri; 5 mg (5 mg x 1) all other days   Full warfarin instructions:   2.5 mg every Fri; 5 mg all other days   Weekly warfarin total:   32.5 mg   Weekly max warfarin dose:   35 mg   Plan last modified:   Janene Smith RN (2018)   Next INR check:   2019   Target end date:   Indefinite    Indications    Chronic atrial fibrillation [I48.20]             Anticoagulation Episode Summary     INR check location:   Clinic Lab    Preferred lab:       Send INR reminders to:   ORLIN WANG    Comments:               See the Encounter Report to view Anticoagulation Flowsheet and Dosing Calendar (Go to Encounters tab in chart review, and find the Anticoagulation Therapy Visit)        Janene Smith, RN

## 2019-12-04 ENCOUNTER — TRANSFERRED RECORDS (OUTPATIENT)
Dept: HEALTH INFORMATION MANAGEMENT | Facility: CLINIC | Age: 84
End: 2019-12-04

## 2019-12-18 ENCOUNTER — ANTICOAGULATION THERAPY VISIT (OUTPATIENT)
Dept: NURSING | Facility: CLINIC | Age: 84
End: 2019-12-18
Payer: COMMERCIAL

## 2019-12-18 DIAGNOSIS — I48.20 CHRONIC ATRIAL FIBRILLATION (H): ICD-10-CM

## 2019-12-18 LAB — INR POINT OF CARE: 2.8 (ref 0.86–1.14)

## 2019-12-18 PROCEDURE — 99207 ZZC NO CHARGE NURSE ONLY: CPT

## 2019-12-18 PROCEDURE — 85610 PROTHROMBIN TIME: CPT | Mod: QW

## 2019-12-18 PROCEDURE — 36416 COLLJ CAPILLARY BLOOD SPEC: CPT

## 2019-12-18 NOTE — PROGRESS NOTES
ANTICOAGULATION FOLLOW-UP CLINIC VISIT    Patient Name:  Ayaan Herrera  Date:  2019  Contact Type:  Face to Face    SUBJECTIVE:  Patient Findings     Comments:   The patient was assessed for diet, medication, and activity level changes, missed or extra doses, bruising or bleeding, with no problem findings.        Clinical Outcomes     Comments:   The patient was assessed for diet, medication, and activity level changes, missed or extra doses, bruising or bleeding, with no problem findings.           OBJECTIVE    INR Protime   Date Value Ref Range Status   2019 2.8 (A) 0.86 - 1.14 Final       ASSESSMENT / PLAN  No question data found.  Anticoagulation Summary  As of 2019    INR goal:   2.0-3.0   TTR:   92.0 % (1 y)   INR used for dosin.8 (2019)   Warfarin maintenance plan:   2.5 mg (5 mg x 0.5) every Fri; 5 mg (5 mg x 1) all other days   Full warfarin instructions:   2.5 mg every Fri; 5 mg all other days   Weekly warfarin total:   32.5 mg   Weekly max warfarin dose:   35 mg   Plan last modified:   Janene Smith RN (2018)   Next INR check:   2020   Target end date:   Indefinite    Indications    Chronic atrial fibrillation [I48.20]             Anticoagulation Episode Summary     INR check location:   Clinic Lab    Preferred lab:       Send INR reminders to:   ORLIN WANG    Comments:               See the Encounter Report to view Anticoagulation Flowsheet and Dosing Calendar (Go to Encounters tab in chart review, and find the Anticoagulation Therapy Visit)        Janene Smith RN

## 2019-12-19 ENCOUNTER — TRANSFERRED RECORDS (OUTPATIENT)
Dept: HEALTH INFORMATION MANAGEMENT | Facility: CLINIC | Age: 84
End: 2019-12-19

## 2019-12-21 PROBLEM — I47.29 PAROXYSMAL VENTRICULAR TACHYCARDIA (H): Status: ACTIVE | Noted: 2019-12-21

## 2019-12-21 PROBLEM — I47.29 PAROXYSMAL VENTRICULAR TACHYCARDIA (H): Chronic | Status: ACTIVE | Noted: 2019-12-21

## 2020-01-07 ENCOUNTER — OFFICE VISIT (OUTPATIENT)
Dept: FAMILY MEDICINE | Facility: CLINIC | Age: 85
End: 2020-01-07
Payer: COMMERCIAL

## 2020-01-07 VITALS
RESPIRATION RATE: 20 BRPM | WEIGHT: 180 LBS | HEART RATE: 75 BPM | DIASTOLIC BLOOD PRESSURE: 80 MMHG | HEIGHT: 73 IN | OXYGEN SATURATION: 96 % | BODY MASS INDEX: 23.86 KG/M2 | SYSTOLIC BLOOD PRESSURE: 138 MMHG

## 2020-01-07 DIAGNOSIS — I10 ESSENTIAL HYPERTENSION: ICD-10-CM

## 2020-01-07 DIAGNOSIS — I48.20 CHRONIC ATRIAL FIBRILLATION (H): ICD-10-CM

## 2020-01-07 DIAGNOSIS — I47.29 PAROXYSMAL VENTRICULAR TACHYCARDIA (H): Primary | Chronic | ICD-10-CM

## 2020-01-07 PROCEDURE — 99213 OFFICE O/P EST LOW 20 MIN: CPT | Performed by: INTERNAL MEDICINE

## 2020-01-07 RX ORDER — SOTALOL HYDROCHLORIDE 80 MG/1
1 TABLET ORAL 2 TIMES DAILY
COMMUNITY
Start: 2019-12-20 | End: 2020-08-25

## 2020-01-07 ASSESSMENT — MIFFLIN-ST. JEOR: SCORE: 1545.35

## 2020-01-07 NOTE — PROGRESS NOTES
"Subjective     Ayaan Herrera is a 87 year old male who presents to clinic today for the following health issues:    HPI       Hospital Follow-up Visit:    Hospital/Nursing Home/IP Rehab Facility: [facility]  Date of Admission: 12/19/2019  Date of Discharge: 12/20/2019  Reason(s) for Admission: Generalized weakness- \"felt faint\", Cardio issue            Problems taking medications regularly:  None       Medication changes since discharge: see med list       Problems adhering to non-medication therapy:  None    Summary of hospitalization:  See outside records, reviewed and scanned  Diagnostic Tests/Treatments reviewed.  Follow up needed: 12/17/2019?- saw Cardio Dr.  Other Healthcare Providers Involved in Patient s Care:         None  Update since discharge: stable, improved    Post Discharge Medication Reconciliation: discharge medications reconciled and changed, per note/orders (see AVS).  Plan of care communicated with patient     Coding guidelines for this visit:  Type of Medical   Decision Making Face-to-Face Visit       within 7 Days of discharge Face-to-Face Visit        within 14 days of discharge   Moderate Complexity [id number]   High Complexity [id number]                This patient was hospitalized recently with nonsustained episodes of ventricular tachycardia, with symptoms of lightheadedness and near syncope.  He was sent right from the cardiology office for a direct admission.       Sotalol was added, at 80 mg twice daily.             Coronary angiogram just showed mild disease.           There is the echocardiogram report:  Final Impressions:   1. Normal left ventricular size, mildly increased wall thickness, low normal global systolic function, calculated EF of 50 %.   2. Abnormal septal motion consistent with left bundle branch block or ventricular pacing.   3. Moderately enlarged left atrium.   4. Right ventricular cavity size is normal, global systolic RV function is normal.   5. The aortic valve " is trileaflet and sclerotic, no stenosis and mild regurgitation.   6. The mitral valve is sclerotic, moderate mitral regurgitation.   7. Mild-moderate tricuspid regurgitation.   8. Mildly increased estimated pulmonary pressures by tricuspid regurgitation velocity and right atrial pressure (40 mmHg plus RAP).   9. The inferior vena cava is dilated, respiratory size variation less than 50%.  10. 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  11. 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                         Here is part of the discharge summary:                                                                                                        BRIEF HOSPITAL COURSE   Ayaan Herrera is a 87 y.o. male with a past medical history significant for Sinus node dysfunction s/p PPM, Atrial Fibrillation, AV node dysfunction, HLD, and Prostate cancer who was admitted on 12/19/19 for lightheadedness and Non-sustained Ventricular Tachycardia.     Patient noted progressive spells of lightheadedness, most notably one while walking his dog he almost passed out and fell. Patient was seen in cardiology device clinic on day of admission and found to be going into V tach 20-30 beats at a time. Patient was directly admitted from cardiology clinic evaluation of NSVT.     On admission Cardiology and EP consulted. Etiology for patient's Vtach is unclear. Mg++, K+ normal. Cor. Angiogram was without evidence of coronary artery disease. Echo with EF of 50 %, mild increased wall thickness, moderate mitral and tricuspid regurgitation. Patient was started on Sotalol 80 mg BID for prevention of Vtach by EP. Patient did not want to take a beta-blocker due to side effects and declined ICD placement. While in the hospital patient did not have any episodes of ventricular tachycardia     The patient symptomatically improved and was subsequently discharged to home in stable condition on 12/20/19. During  "this admission, the patient was started on Sotalol 80 mg BID. Patient will follow up with PCP in 3-5 days and with EP. He will have an EKG on 12/23/19 to be followed by EP to look for QT prolongation.                                         He did have the follow-up ECG.          Here is the phone message that was left on his voicemail:\"QT/QTc relatively stable.  Rhythm is paced.  I'd leave him at this dose for now if feeling ok.\"                                                                                                                                                                         States no longer lightheaded.              Tolerating sotalol.                         Pacemaker check again coming up.                                                                                                                                                                                     Current Outpatient Medications   Medication Sig Dispense Refill     alendronate (FOSAMAX) 70 MG tablet TAKE 1 TAB WITH 8OZ OF WATER EVERY 7 DAYS 30 MIN BEFORE BREAKFAST & REMAIN UPRIGHT DURING THIS TIME 12 tablet 3     brimonidine (ALPHAGAN P) 0.15 % ophthalmic solution Place 1 drop into both eyes 2 times daily 1 Bottle 11     COSOPT 2-0.5 % OP SOLN Apply 1 Dose to eye 2 times daily Both eyes.       hydrochlorothiazide (HYDRODIURIL) 25 MG tablet TAKE 1/2 (ONE-HALF) TABLET BY MOUTH ONCE DAILY 45 tablet 2     ketotifen (ZADITOR) 0.025 % SOLN Place 1 drop into both eyes as needed       latanoprost (XALATAN) 0.005 % ophthalmic solution Place 1 drop into both eyes At Bedtime 3 Bottle 4     sotalol (BETAPACE) 80 MG tablet Take 1 tablet by mouth 2 times daily       tamsulosin (FLOMAX) 0.4 MG capsule TAKE ONE CAPSULE BY MOUTH ONCE A DAY  3     Vitamin D, Cholecalciferol, 1000 UNITS TABS        warfarin ANTICOAGULANT (COUMADIN) 5 MG tablet Take 1/2 tablet on Friday and one tablet all other days as instructed by the anticoagulation clinic " "90 tablet 1     Allergies   Allergen Reactions     Zithromax [Azithromycin] Diarrhea            Amoxicillin Diarrhea     Augmentin GI Disturbance     BP Readings from Last 3 Encounters:   01/07/20 138/80   07/27/19 130/80   10/27/18 112/72    Wt Readings from Last 3 Encounters:   01/07/20 81.6 kg (180 lb)   07/27/19 79.8 kg (176 lb)   10/27/18 83 kg (183 lb)                      Reviewed and updated as needed this visit by Provider         Review of Systems   ROS COMP: CONSTITUTIONAL:NEGATIVE for fever, chills, change in weight  RESP:NEGATIVE for significant cough or SOB  CV: NEGATIVE for chest pain, palpitations or peripheral edema      Objective    /80 (BP Location: Left arm, Patient Position: Chair, Cuff Size: Adult Large)   Pulse 75   Resp 20   Ht 1.854 m (6' 1\")   Wt 81.6 kg (180 lb)   SpO2 96%   BMI 23.75 kg/m    Body mass index is 23.75 kg/m .  Physical Exam   GENERAL APPEARANCE: healthy, alert and no distress  CV: regular rates and rhythm and normal S1 S2, no S3 or S4    Diagnostic Test Results:  Labs reviewed in Care Everywhere        Assessment & Plan     Ayaan was seen today for consult.    Diagnoses and all orders for this visit:    Paroxysmal ventricular tachycardia (H)    Chronic atrial fibrillation    Essential hypertension            He is currently doing very well with sotalol.                        There are no Patient Instructions on file for this visit.    Return in about 6 months (around 7/7/2020) for follow up of several issues.    Dean Rider MD  [facility]      "

## 2020-01-07 NOTE — PROGRESS NOTES
"Subjective     Ayaan Herrera is a 87 year old male who presents to clinic today for the following health issues:    HPI   Discuss Heart issue?-PVT today.    {additonal problems for provider to add (Optional):998868}    {HIST REVIEW/ LINKS 2 (Optional):082498}    {Additional problems for the provider to add (optional):041971}  Reviewed and updated as needed this visit by Provider         Review of Systems   {ROS COMP (Optional):173799}      Objective    There were no vitals taken for this visit.  There is no height or weight on file to calculate BMI.  Physical Exam   {Exam List (Optional):280054}    {Diagnostic Test Results (Optional):848381::\"Diagnostic Test Results:\",\"Labs reviewed in Epic\"}        {PROVIDER CHARTING PREFERENCE:347737}      "

## 2020-01-21 ENCOUNTER — ANTICOAGULATION THERAPY VISIT (OUTPATIENT)
Dept: NURSING | Facility: CLINIC | Age: 85
End: 2020-01-21
Payer: COMMERCIAL

## 2020-01-21 DIAGNOSIS — I48.20 CHRONIC ATRIAL FIBRILLATION (H): ICD-10-CM

## 2020-01-21 LAB — INR POINT OF CARE: 3.8 (ref 0.86–1.14)

## 2020-01-21 PROCEDURE — 99207 ZZC NO CHARGE NURSE ONLY: CPT

## 2020-01-21 PROCEDURE — 85610 PROTHROMBIN TIME: CPT | Mod: QW

## 2020-01-21 PROCEDURE — 36416 COLLJ CAPILLARY BLOOD SPEC: CPT

## 2020-01-21 NOTE — PROGRESS NOTES
ANTICOAGULATION FOLLOW-UP CLINIC VISIT    Patient Name:  Ayaan Herrera  Date:  1/21/2020  Contact Type:  Face to Face    SUBJECTIVE:  Patient Findings     Positives:   Extra doses (Thinks he took extra doses sundat morning and monday morning. )    Comments:   The patient was assessed for diet, medication, and activity level changes, missed or extra doses, bruising or bleeding, with no problem findings.          Clinical Outcomes     Comments:   The patient was assessed for diet, medication, and activity level changes, missed or extra doses, bruising or bleeding, with no problem findings.             OBJECTIVE    INR Protime   Date Value Ref Range Status   01/21/2020 3.8 (A) 0.86 - 1.14 Final       ASSESSMENT / PLAN  INR assessment SUPRA    Recheck INR In: 3 DAYS    INR Location Clinic      Anticoagulation Summary  As of 1/21/2020    INR goal:   2.0-3.0   TTR:   84.5 % (1 y)   INR used for dosing:   3.8! (1/21/2020)   Warfarin maintenance plan:   2.5 mg (5 mg x 0.5) every Fri; 5 mg (5 mg x 1) all other days   Full warfarin instructions:   1/21: Hold; Otherwise 2.5 mg every Fri; 5 mg all other days   Weekly warfarin total:   32.5 mg   Weekly max warfarin dose:   35 mg   Plan last modified:   Janene Smith RN (11/16/2018)   Next INR check:   1/24/2020   Target end date:   Indefinite    Indications    Chronic atrial fibrillation [I48.20]             Anticoagulation Episode Summary     INR check location:   Clinic Lab    Preferred lab:       Send INR reminders to:   [facility]    Comments:               See the Encounter Report to view Anticoagulation Flowsheet and Dosing Calendar (Go to Encounters tab in chart review, and find the Anticoagulation Therapy Visit)        Janene Smith RN

## 2020-01-22 ENCOUNTER — TRANSFERRED RECORDS (OUTPATIENT)
Dept: HEALTH INFORMATION MANAGEMENT | Facility: CLINIC | Age: 85
End: 2020-01-22

## 2020-01-24 ENCOUNTER — TELEPHONE (OUTPATIENT)
Dept: FAMILY MEDICINE | Facility: CLINIC | Age: 85
End: 2020-01-24

## 2020-01-24 DIAGNOSIS — I48.20 CHRONIC ATRIAL FIBRILLATION (H): ICD-10-CM

## 2020-01-24 DIAGNOSIS — I48.20 CHRONIC ATRIAL FIBRILLATION (H): Primary | ICD-10-CM

## 2020-01-24 LAB — INR PPP: 2.8 (ref 0.86–1.14)

## 2020-01-24 PROCEDURE — 85610 PROTHROMBIN TIME: CPT | Performed by: INTERNAL MEDICINE

## 2020-01-24 PROCEDURE — 36416 COLLJ CAPILLARY BLOOD SPEC: CPT | Performed by: INTERNAL MEDICINE

## 2020-01-24 NOTE — TELEPHONE ENCOUNTER
ANTICOAGULATION MANAGEMENT     Patient Name:  Ayaan Herrera  Date:  2020    ASSESSMENT /SUBJECTIVE:      Today's INR result of 2.80 is therapeutic. Goal INR of 2.0-3.0      Previous INR: Supratherapeutic     Additional findings: None      PLAN:    Left detailed message for Ayaan regarding INR result and instructed:     Warfarin Dosing Instructions: Continue your current warfarin dose of 2.5mg Fri; 5mg all other days    Instructed patient to follow up no later than: 2 weeks  Left detailed message with recommended recheck date    Education provided: No    Instructed to call the Anticoagulation Clinic for any changes, questions or concerns. (#[phone])        OBJECTIVE:  INR   Date Value Ref Range Status   2020 2.80 (H) 0.86 - 1.14 Final             Anticoagulation Summary  As of 2020    INR goal:   2.0-3.0   TTR:   83.8 % (1 y)   INR used for dosin.80 (2020)   Warfarin maintenance plan:   2.5 mg (5 mg x 0.5) every Fri; 5 mg (5 mg x 1) all other days   Full warfarin instructions:   2.5 mg every Fri; 5 mg all other days   Weekly warfarin total:   32.5 mg   Weekly max warfarin dose:   35 mg   Plan last modified:   Janene Smith RN (2018)   Next INR check:   2020   Priority:   High   Target end date:   Indefinite    Indications    Chronic atrial fibrillation [I48.20]             Anticoagulation Episode Summary     INR check location:   Clinic Lab    Preferred lab:       Send INR reminders to:   [facility]    Comments:

## 2020-01-27 ENCOUNTER — OFFICE VISIT (OUTPATIENT)
Dept: OPHTHALMOLOGY | Facility: CLINIC | Age: 85
End: 2020-01-27
Attending: OPHTHALMOLOGY
Payer: COMMERCIAL

## 2020-01-27 DIAGNOSIS — H40.1133 PRIMARY OPEN ANGLE GLAUCOMA OF BOTH EYES, SEVERE STAGE: Primary | ICD-10-CM

## 2020-01-27 PROCEDURE — 92133 CPTRZD OPH DX IMG PST SGM ON: CPT | Mod: ZF | Performed by: OPHTHALMOLOGY

## 2020-01-27 PROCEDURE — G0463 HOSPITAL OUTPT CLINIC VISIT: HCPCS | Mod: ZF

## 2020-01-27 ASSESSMENT — VISUAL ACUITY
OD_CC: 20/25
METHOD: SNELLEN - LINEAR
OD_CC+: -2+2
CORRECTION_TYPE: GLASSES
OS_CC: 20/30
OS_CC+: -1

## 2020-01-27 ASSESSMENT — REFRACTION_WEARINGRX
OD_SPHERE: -4.50
OS_CYLINDER: +1.50
OD_ADD: +2.50
SPECS_TYPE: PAL
OD_CYLINDER: +1.75
OS_ADD: +2.50
OS_AXIS: 145
OS_SPHERE: -2.00
OD_AXIS: 025

## 2020-01-27 ASSESSMENT — SLIT LAMP EXAM - LIDS
COMMENTS: NORMAL
COMMENTS: NORMAL

## 2020-01-27 ASSESSMENT — CUP TO DISC RATIO
OD_RATIO: 0.7
OS_RATIO: 0.8

## 2020-01-27 ASSESSMENT — TONOMETRY
IOP_METHOD: APPLANATION
OS_IOP_MMHG: 20
OD_IOP_MMHG: 21

## 2020-01-27 ASSESSMENT — CONF VISUAL FIELD
OD_NORMAL: 1
OS_INFERIOR_TEMPORAL_RESTRICTION: 3
OS_INFERIOR_NASAL_RESTRICTION: 3
METHOD: COUNTING FINGERS

## 2020-01-27 NOTE — PROGRESS NOTES
CC: Followup for Primary open angle glaucoma (POAG)-adv right eye and mod left eye.     Interval history:  Vision stable, no changes.     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    Ophthalmic procedural/surgical history:  - trabeculectomy LE (1996) and revised (1997)  - SLT RE (10/14/04 & 2/17/05)  - cataract IOL RE (1985) LE (1996)  - punctal closure, plugs BE (2000)    Current ophthalmic medications- gets all medications from [facility]:  - brimonidine twice a day both eyes   - Latanoprost at bedtime  both eyes   - dorzolamide twice a day Both eyes   - artificial tears at bedtime both eyes     Results of studies/procedures:  OCT retinal nerve fiber layer 1/27/20 stable both eyes   right eye : moderate thinning inf/sup  left eye : severe thinning globally - floor effect    Octopus visual field LVC both eyes 9/19/18 (Only 1 previous LVC, rest are unreliable 24-2)  Right eye with superior and inferior nasal step and central involvement  Left eye inferior arcuate    Assessmen:  Primary open angle glaucoma (POAG) adv right eye and mod left eye   IOP better today right eye than last visit  Octopus visual field LVC may be worse right eye, has been variable and unreliable, better with LVC.  Left eye probably stable  OCT stable - though may be floor effect  No clear evidence of progression    Patient on MMT (can't use timolol)     Plan:   Continue brimonidine twice a day both eyes   Continue Latanoprost at bedtime  both eyes   Continue dorzolamide twice a day Both eyes     Neither Rhopressa nor Vyzulta are on his formulary    RTC 6 mos with LVC Octopus visual field both eyes     Attending Physician Attestation:  Complete documentation of historical and exam elements from today's encounter can be found in the full encounter summary report (not reduplicated in this progress note). I  personally obtained the chief complaint(s) and history of present illness. I confirmed and edited asnecessary the review of systems, past medical/surgical history, family history, social history, and examination findings as documented by others; and I examined the patient myself. I personally reviewed the relevant tests, images, and reports as documented above. I formulated and edited as necessary the assessment and plan and discussed the findings and management plan with the patient and family.  - Lissa Berry MD 10:29 AM 1/27/2020

## 2020-01-27 NOTE — NURSING NOTE
Chief Complaints and History of Present Illnesses   Patient presents with     Glaucoma Follow-Up     6 month follow-up POAG, both eyes     Chief Complaint(s) and History of Present Illness(es)     Glaucoma Follow-Up     Laterality: both eyes    Associated symptoms: Negative for eye pain, dryness, tearing and discharge    Treatment side effects: none    Compliance with Treatment: always    Pain scale: 0/10    Comments: 6 month follow-up POAG, both eyes              Comments     Pt denies any significant vision changes in either eye since last visit.  Complains of occasional irritation LE.  Denies any pain, pressure, discharge, and tearing.  Currently using Brimonidine BID BE, Latanoprost at bedtime BE, and Cosopt BID BE.    Milly Frances OT 9:43 AM January 27, 2020

## 2020-02-04 ENCOUNTER — TELEPHONE (OUTPATIENT)
Dept: VASCULAR SURGERY | Facility: CLINIC | Age: 85
End: 2020-02-04

## 2020-02-04 NOTE — TELEPHONE ENCOUNTER
2/4/2020 9:58am    Patient states no issues and is declining follow up. Will call if/when he has issues.   vg

## 2020-02-07 ENCOUNTER — TRANSFERRED RECORDS (OUTPATIENT)
Dept: HEALTH INFORMATION MANAGEMENT | Facility: CLINIC | Age: 85
End: 2020-02-07

## 2020-02-12 ENCOUNTER — ANTICOAGULATION THERAPY VISIT (OUTPATIENT)
Dept: NURSING | Facility: CLINIC | Age: 85
End: 2020-02-12
Payer: COMMERCIAL

## 2020-02-12 DIAGNOSIS — I48.20 CHRONIC ATRIAL FIBRILLATION (H): ICD-10-CM

## 2020-02-12 LAB — INR POINT OF CARE: 2 (ref 0.86–1.14)

## 2020-02-12 PROCEDURE — 99207 ZZC NO CHARGE NURSE ONLY: CPT

## 2020-02-12 PROCEDURE — 36416 COLLJ CAPILLARY BLOOD SPEC: CPT

## 2020-02-12 PROCEDURE — 85610 PROTHROMBIN TIME: CPT | Mod: QW

## 2020-02-12 NOTE — PROGRESS NOTES
ANTICOAGULATION FOLLOW-UP CLINIC VISIT    Patient Name:  Ayaan Herrera  Date:  2020  Contact Type:  Face to Face    SUBJECTIVE:  Patient Findings     Comments:   The patient was assessed for diet, medication, and activity level changes, missed or extra doses, bruising or bleeding, with no problem findings.  Having Mohs on face on 2020  Extra greens. Will let derm know INR and see if they are OK with it for the Mohs procedure.          Clinical Outcomes     Comments:   The patient was assessed for diet, medication, and activity level changes, missed or extra doses, bruising or bleeding, with no problem findings.  Having Mohs on face on 2020  Extra greens. Will let derm know INR and see if they are OK with it for the Mohs procedure.             OBJECTIVE    INR Protime   Date Value Ref Range Status   2020 2.0 (A) 0.86 - 1.14 Final       ASSESSMENT / PLAN  INR assessment THER    Recheck INR In: 3 WEEKS    INR Location Clinic      Anticoagulation Summary  As of 2020    INR goal:   2.0-3.0   TTR:   86.6 % (1 y)   INR used for dosin.0 (2020)   Warfarin maintenance plan:   2.5 mg (5 mg x 0.5) every Fri; 5 mg (5 mg x 1) all other days   Full warfarin instructions:   2.5 mg every Fri; 5 mg all other days   Weekly warfarin total:   32.5 mg   Weekly max warfarin dose:   35 mg   Plan last modified:   Janene Smith RN (2018)   Next INR check:   3/4/2020   Priority:   High   Target end date:   Indefinite    Indications    Chronic atrial fibrillation [I48.20]             Anticoagulation Episode Summary     INR check location:   Clinic Lab    Preferred lab:       Send INR reminders to:   [facility]    Comments:               See the Encounter Report to view Anticoagulation Flowsheet and Dosing Calendar (Go to Encounters tab in chart review, and find the Anticoagulation Therapy Visit)        Janene Smith, RN

## 2020-02-19 ENCOUNTER — ANTICOAGULATION THERAPY VISIT (OUTPATIENT)
Dept: NURSING | Facility: CLINIC | Age: 85
End: 2020-02-19
Payer: COMMERCIAL

## 2020-02-19 DIAGNOSIS — I48.20 CHRONIC ATRIAL FIBRILLATION (H): ICD-10-CM

## 2020-02-19 LAB
INR POINT OF CARE: 2 (ref 0.86–1.14)
INR POINT OF CARE: 2.5 (ref 0.86–1.14)

## 2020-02-19 PROCEDURE — 36416 COLLJ CAPILLARY BLOOD SPEC: CPT

## 2020-02-19 PROCEDURE — 85610 PROTHROMBIN TIME: CPT | Mod: QW

## 2020-02-19 PROCEDURE — 99207 ZZC NO CHARGE NURSE ONLY: CPT

## 2020-02-19 NOTE — PROGRESS NOTES
ANTICOAGULATION FOLLOW-UP CLINIC VISIT    Patient Name:  Ayaan Herrera  Date:  2020  Contact Type:  Face to Face    SUBJECTIVE:  Patient Findings     Comments:   The patient was assessed for diet, medication, and activity level changes, missed or extra doses, bruising or bleeding, with no problem findings.          Clinical Outcomes     Comments:   The patient was assessed for diet, medication, and activity level changes, missed or extra doses, bruising or bleeding, with no problem findings.             OBJECTIVE    INR Protime   Date Value Ref Range Status   2020 2.5 (A) 0.86 - 1.14 Final   2020 2.0 (A) 0.86 - 1.14 Final       ASSESSMENT / PLAN  INR assessment THER    Recheck INR In: 3 WEEKS    INR Location Clinic      Anticoagulation Summary  As of 2020    INR goal:   2.0-3.0   TTR:   86.6 % (1 y)   INR used for dosin.0 (2020)   Warfarin maintenance plan:   2.5 mg (5 mg x 0.5) every Fri; 5 mg (5 mg x 1) all other days   Full warfarin instructions:   : 5 mg; : 5 mg; 3/6: 5 mg; Otherwise 2.5 mg every Fri; 5 mg all other days   Weekly warfarin total:   32.5 mg   Weekly max warfarin dose:   35 mg   Plan last modified:   Janene Smith RN (2018)   Next INR check:   3/11/2020   Priority:   High   Target end date:   Indefinite    Indications    Chronic atrial fibrillation [I48.20]             Anticoagulation Episode Summary     INR check location:   Clinic Lab    Preferred lab:       Send INR reminders to:   [facility]    Comments:               See the Encounter Report to view Anticoagulation Flowsheet and Dosing Calendar (Go to Encounters tab in chart review, and find the Anticoagulation Therapy Visit)        Janene Smith, RN

## 2020-02-27 ENCOUNTER — OFFICE VISIT (OUTPATIENT)
Dept: FAMILY MEDICINE | Facility: CLINIC | Age: 85
End: 2020-02-27
Payer: COMMERCIAL

## 2020-02-27 VITALS
DIASTOLIC BLOOD PRESSURE: 82 MMHG | OXYGEN SATURATION: 97 % | HEART RATE: 67 BPM | SYSTOLIC BLOOD PRESSURE: 130 MMHG | TEMPERATURE: 98.2 F

## 2020-02-27 DIAGNOSIS — K59.01 SLOW TRANSIT CONSTIPATION: ICD-10-CM

## 2020-02-27 DIAGNOSIS — S70.01XA CONTUSION OF RIGHT HIP, INITIAL ENCOUNTER: ICD-10-CM

## 2020-02-27 DIAGNOSIS — S76.211A GROIN STRAIN, RIGHT, INITIAL ENCOUNTER: Primary | ICD-10-CM

## 2020-02-27 DIAGNOSIS — C61 MALIGNANT NEOPLASM OF PROSTATE (H): ICD-10-CM

## 2020-02-27 DIAGNOSIS — I73.9 INTERMITTENT CLAUDICATION (H): ICD-10-CM

## 2020-02-27 PROCEDURE — 99213 OFFICE O/P EST LOW 20 MIN: CPT | Performed by: FAMILY MEDICINE

## 2020-02-27 NOTE — PATIENT INSTRUCTIONS
Alternate ice & heat.  Use Ice massage on trigger point areas.  Do gentle Range of motion exercises    Use Dulcolax suppository  Add Miralax powder 1 capful to diet twice daily. Mix with water or juice

## 2020-02-27 NOTE — PROGRESS NOTES
Subjective     Ayaan Herrera is a 87 year old male who presents to clinic today for the following health issues:    Pt here with his ex-wife    HPI   ED/UC Followup:    Facility:  [facility] urgent [facility]  Date of visit: 2-23-20  Reason for visit: fell hurt groin  Current Status: groin pain, hard to walk   See report in Epic    Constipation      Duration: 1 week    Description:       Frequency of bowel movements: 1 week       Consistency of stool: hard    Intensity:  moderate    Accompanying signs and symptoms: pressure at times       Abdominal pain: no        Rectal pain: no        Blood in stool: no        Nausea/vomitting: no     History:        Similar problems in past: no     Precipitating or alleviating factors: none       Medications worsening symptoms: no     Therapies tried and outcome: None       Chronic laxative use: no     Groin strain/ bruise Rt hip      Duration: since 2/23/20    Description (location/character/radiation): slipped on ice, strained Rt leg, bruised outside of Rt hip    Intensity:  moderate    Accompanying signs and symptoms: pain with getting up to stand    History (similar episodes/previous evaluation): None    Precipitating or alleviating factors: None    Therapies tried and outcome: Ace wrap on thigh, not making any difference   Hard to get up from bed or sitting. Getting started walking    Allergies   Allergen Reactions     Zithromax [Azithromycin] Diarrhea            Amoxicillin Diarrhea     Augmentin GI Disturbance       Reviewed and updated as needed this visit by Provider         Review of Systems   ROS COMP: CONSTITUTIONAL: NEGATIVE for fever, chills, change in weight  INTEGUMENTARY/SKIN: POSITIVE for bruising Rt lateral hip  ENT/MOUTH: NEGATIVE for ear, mouth and throat problems  RESP: NEGATIVE for significant cough or SOB  CV: NEGATIVE for chest pain, palpitations or peripheral edema  GI: POSITIVE for constipation and NEGATIVE for abdominal pain,  nausea and  vomiting  MUSCULOSKELETAL: POSITIVE  for injury to Rt groin      Objective    /82 (Cuff Size: Adult Large)   Pulse 67   Temp 98.2  F (36.8  C) (Tympanic)   SpO2 97%   There is no height or weight on file to calculate BMI.  Physical Exam   GENERAL: healthy, alert and no distress  NECK: no adenopathy, no asymmetry, masses, or scars and thyroid normal to palpation  RESP: lungs clear to auscultation - no rales, rhonchi or wheezes  CV: regular rate and rhythm, normal S1 S2, no S3 or S4, no murmur, click or rub, no peripheral edema and peripheral pulses strong  ABDOMEN: soft, nontender, no hepatosplenomegaly, no masses and bowel sounds normal  MS: RLE exam shows normal strength and muscle mass, no evidence of joint effusion and tenderness upper medial thigh, no pain with ROM  SKIN: ecchymoses - Rt lateral upper thigh    Diagnostic Test Results:  Labs reviewed in Epic  none         Assessment & Plan     Ayaan was seen today for groin pain.    Diagnoses and all orders for this visit:    Groin strain, right, initial encounter  -     Cancel: Cane Order for DME - ONLY FOR DME  -     Cane Order for DME - ONLY FOR DME    Slow transit constipation    Intermittent claudication (H)    Malignant neoplasm of prostate (H)    Contusion of right hip, initial encounter           FUTURE APPOINTMENTS:       - Follow-up visit in 2 weeks   Patient Instructions   Alternate ice & heat.  Use Ice massage on trigger point areas.  Do gentle Range of motion exercises    Use Dulcolax suppository  Add Miralax powder 1 capful to diet twice daily. Mix with water or juice        Return in about 2 weeks (around 3/12/2020) for groin pain.    Sandor Lange MD  [facility]

## 2020-03-10 ENCOUNTER — OFFICE VISIT (OUTPATIENT)
Dept: FAMILY MEDICINE | Facility: CLINIC | Age: 85
End: 2020-03-10
Payer: COMMERCIAL

## 2020-03-10 VITALS
DIASTOLIC BLOOD PRESSURE: 80 MMHG | SYSTOLIC BLOOD PRESSURE: 122 MMHG | HEIGHT: 73 IN | BODY MASS INDEX: 24.12 KG/M2 | OXYGEN SATURATION: 97 % | WEIGHT: 182 LBS | RESPIRATION RATE: 20 BRPM | HEART RATE: 85 BPM

## 2020-03-10 DIAGNOSIS — S80.11XD CONTUSION OF RIGHT LEG, SUBSEQUENT ENCOUNTER: Primary | ICD-10-CM

## 2020-03-10 PROCEDURE — 99213 OFFICE O/P EST LOW 20 MIN: CPT | Performed by: INTERNAL MEDICINE

## 2020-03-10 RX ORDER — ACETAMINOPHEN 325 MG/1
325 TABLET ORAL PRN
Status: ON HOLD | COMMUNITY
End: 2022-03-01

## 2020-03-10 ASSESSMENT — MIFFLIN-ST. JEOR: SCORE: 1554.43

## 2020-03-10 NOTE — PROGRESS NOTES
"Subjective     Ayaan Herrera is a 87 year old male who presents to clinic today for the following health issues:    HPI   Joint Pain    Onset: ***    Description:   Location: {.:001120::\"***\"}  Character: {.:369968}    Intensity: {.:721588}    Progression of Symptoms: {.:530439:x}    Accompanying Signs & Symptoms:  Other symptoms: {.:388299::\"none\"}    History:   Previous similar pain: { :012986}      Precipitating factors:   Trauma or overuse: { :722087}    Alleviating factors:  Improved by: {.:124809::\"nothing\"}    Therapies Tried and outcome: ***      {additonal problems for provider to add (Optional):276904}    {HIST REVIEW/ LINKS 2 (Optional):072258}    {Additional problems for the provider to add (optional):416821}  Reviewed and updated as needed this visit by Provider         Review of Systems   {ROS COMP (Optional):879008}      Objective    There were no vitals taken for this visit.  There is no height or weight on file to calculate BMI.  Physical Exam   {Exam List (Optional):815047}    {Diagnostic Test Results (Optional):562589::\"Diagnostic Test Results:\",\"Labs reviewed in Epic\"}        {PROVIDER CHARTING PREFERENCE:697722}      "

## 2020-03-10 NOTE — PROGRESS NOTES
Subjective     Ayaan Herrera is a 87 year old male who presents to clinic today for the following health issues:    HPI   Follow up on Right leg/hip pain- intermittent pain level. Right now pain level-2-3.                    Here for follow-up of an injury due to a fall which happened over 2 weeks ago now.           He slipped on ice, fell forward and to the right, landing on his right leg and he developed extensive ecchymosis right lateral buttock, and the right lateral leg all the way down to the ankle now.                  He still has lots of pain with stair climbing for instance, and in general just raising up that right leg.  He has had less pain today than even yesterday.                                           He went to urgent care on February 23 with the following x-ray report:  Findings:               Normal alignment. No definite acute fractures seen. Stool overlying the medial aspect of the right superior pubic ramus slightly limits the exam.                                                                                                                                                                                   Current Outpatient Medications   Medication Sig Dispense Refill     acetaminophen (TYLENOL) 325 MG tablet Take 325 mg by mouth as needed for mild pain       alendronate (FOSAMAX) 70 MG tablet TAKE 1 TAB WITH 8OZ OF WATER EVERY 7 DAYS 30 MIN BEFORE BREAKFAST & REMAIN UPRIGHT DURING THIS TIME 12 tablet 3     brimonidine (ALPHAGAN P) 0.15 % ophthalmic solution Place 1 drop into both eyes 2 times daily 1 Bottle 11     COSOPT 2-0.5 % OP SOLN Apply 1 Dose to eye 2 times daily Both eyes.       hydrochlorothiazide (HYDRODIURIL) 25 MG tablet TAKE 1/2 (ONE-HALF) TABLET BY MOUTH ONCE DAILY 45 tablet 2     ketotifen (ZADITOR) 0.025 % SOLN Place 1 drop into both eyes as needed       latanoprost (XALATAN) 0.005 % ophthalmic solution Place 1 drop into both eyes At Bedtime 3 Bottle 4     sotalol  "(BETAPACE) 80 MG tablet Take 1 tablet by mouth 2 times daily       tamsulosin (FLOMAX) 0.4 MG capsule TAKE ONE CAPSULE BY MOUTH ONCE A DAY  3     Vitamin D, Cholecalciferol, 1000 UNITS TABS        warfarin ANTICOAGULANT (COUMADIN) 5 MG tablet Take 1/2 tablet on Friday and one tablet all other days as instructed by the anticoagulation clinic 90 tablet 1     Allergies   Allergen Reactions     Zithromax [Azithromycin] Diarrhea            Amoxicillin Diarrhea     Augmentin GI Disturbance     BP Readings from Last 3 Encounters:   03/10/20 122/80   02/27/20 130/82   01/07/20 138/80    Wt Readings from Last 3 Encounters:   03/10/20 82.6 kg (182 lb)   01/07/20 81.6 kg (180 lb)   07/27/19 79.8 kg (176 lb)                      Reviewed and updated as needed this visit by Provider         Review of Systems   ROS COMP: CONSTITUTIONAL:NEGATIVE for fever, chills, change in weight  MUSCULOSKELETAL: NEGATIVE for radicular pain   NEURO: NEGATIVE for dizziness/lightheadedness      Objective    /80   Pulse 85   Resp 20   Ht 1.854 m (6' 1\")   Wt 82.6 kg (182 lb)   SpO2 97%   BMI 24.01 kg/m    Body mass index is 24.01 kg/m .  Physical Exam   GENERAL APPEARANCE: alert and no distress  MS:  Extensive ecchymosis right lateral buttock and then the right lateral leg from the hip all the way down to the ankle.       Reasonably good range of motion right hip and knee  NEURO: gait abnormal ; using a cane    Diagnostic Test Results:  none         Assessment & Plan     Ayaan was seen today for recheck.    Diagnoses and all orders for this visit:    Contusion of right leg, subsequent encounter               He is gradually improving after a slip and fall on the ice.  No signs of fracture.   Patient Instructions   You can gradually increase your activity level.                     Return if symptoms worsen or fail to improve.    Dean Rider MD  [facility]      "

## 2020-03-15 ENCOUNTER — HEALTH MAINTENANCE LETTER (OUTPATIENT)
Age: 85
End: 2020-03-15

## 2020-03-18 ENCOUNTER — ANTICOAGULATION THERAPY VISIT (OUTPATIENT)
Dept: NURSING | Facility: CLINIC | Age: 85
End: 2020-03-18
Payer: COMMERCIAL

## 2020-03-18 DIAGNOSIS — I48.20 CHRONIC ATRIAL FIBRILLATION (H): ICD-10-CM

## 2020-03-18 LAB — INR POINT OF CARE: 1.8 (ref 0.86–1.14)

## 2020-03-18 PROCEDURE — 85610 PROTHROMBIN TIME: CPT | Mod: QW

## 2020-03-18 PROCEDURE — 99207 ZZC NO CHARGE NURSE ONLY: CPT

## 2020-03-18 PROCEDURE — 36416 COLLJ CAPILLARY BLOOD SPEC: CPT

## 2020-03-18 NOTE — PROGRESS NOTES
ANTICOAGULATION FOLLOW-UP CLINIC VISIT    Patient Name:  Ayaan Herrera  Date:  3/18/2020  Contact Type:  Face to Face    SUBJECTIVE:  Patient Findings     Comments:   The patient was assessed for diet, medication, and activity level changes, missed or extra doses, bruising or bleeding, with no problem findings. Bruising          Clinical Outcomes     Comments:   The patient was assessed for diet, medication, and activity level changes, missed or extra doses, bruising or bleeding, with no problem findings. Bruising             OBJECTIVE    INR Protime   Date Value Ref Range Status   2020 1.8 (A) 0.86 - 1.14 Final       ASSESSMENT / PLAN  INR assessment SUB    Recheck INR In: 2 WEEKS    INR Location Clinic      Anticoagulation Summary  As of 3/18/2020    INR goal:   2.0-3.0   TTR:   84.4 % (1 y)   INR used for dosin.8! (3/18/2020)   Warfarin maintenance plan:   5 mg (5 mg x 1) every day   Full warfarin instructions:   3/18: 7.5 mg; Otherwise 5 mg every day   Weekly warfarin total:   35 mg   Weekly max warfarin dose:   35 mg   Plan last modified:   Janene Smith RN (3/18/2020)   Next INR check:   2020   Priority:   High   Target end date:   Indefinite    Indications    Chronic atrial fibrillation [I48.20]             Anticoagulation Episode Summary     INR check location:   Clinic Lab    Preferred lab:       Send INR reminders to:   OR[facility] WANG    Comments:               See the Encounter Report to view Anticoagulation Flowsheet and Dosing Calendar (Go to Encounters tab in chart review, and find the Anticoagulation Therapy Visit)        Janene Smith RN

## 2020-03-20 DIAGNOSIS — I48.20 CHRONIC ATRIAL FIBRILLATION (H): Primary | ICD-10-CM

## 2020-03-20 DIAGNOSIS — Z79.01 LONG TERM CURRENT USE OF ANTICOAGULANT THERAPY: ICD-10-CM

## 2020-03-22 DIAGNOSIS — I48.20 CHRONIC ATRIAL FIBRILLATION (H): ICD-10-CM

## 2020-03-23 RX ORDER — WARFARIN SODIUM 5 MG/1
TABLET ORAL
Qty: 84 TABLET | Refills: 2 | Status: SHIPPED | OUTPATIENT
Start: 2020-03-23 | End: 2020-06-17

## 2020-04-01 ENCOUNTER — ANTICOAGULATION THERAPY VISIT (OUTPATIENT)
Dept: NURSING | Facility: CLINIC | Age: 85
End: 2020-04-01

## 2020-04-01 DIAGNOSIS — I48.20 CHRONIC ATRIAL FIBRILLATION (H): ICD-10-CM

## 2020-04-01 DIAGNOSIS — Z79.01 LONG TERM CURRENT USE OF ANTICOAGULANT THERAPY: ICD-10-CM

## 2020-04-01 LAB — INR PPP: 2.3 (ref 0.86–1.14)

## 2020-04-01 PROCEDURE — 99207 ZZC NO CHARGE NURSE ONLY: CPT

## 2020-04-01 PROCEDURE — 85610 PROTHROMBIN TIME: CPT | Performed by: INTERNAL MEDICINE

## 2020-04-01 PROCEDURE — 36416 COLLJ CAPILLARY BLOOD SPEC: CPT | Performed by: INTERNAL MEDICINE

## 2020-04-01 NOTE — PROGRESS NOTES
ANTICOAGULATION FOLLOW-UP CLINIC VISIT    Patient Name:  Ayaan Herrera  Date:  4/1/2020  Contact Type:  Telephone    SUBJECTIVE:  Patient Findings     Comments:   The patient was assessed for diet, medication, and activity level changes, missed or extra doses, bruising or bleeding, with no problem findings.          Clinical Outcomes     Comments:   The patient was assessed for diet, medication, and activity level changes, missed or extra doses, bruising or bleeding, with no problem findings.             OBJECTIVE    INR   Date Value Ref Range Status   04/01/2020 2.30 (H) 0.86 - 1.14 Final       ASSESSMENT / PLAN  INR assessment THER    Recheck INR In: 4 WEEKS    INR Location Clinic      Anticoagulation Summary  As of 4/1/2020    INR goal:   2.0-3.0   TTR:   82.8 % (1 y)   INR used for dosing:      Warfarin maintenance plan:   5 mg (5 mg x 1) every day   Full warfarin instructions:   5 mg every day   Weekly warfarin total:   35 mg   Weekly max warfarin dose:   35 mg   Plan last modified:   Janene Smith RN (3/18/2020)   Next INR check:   4/29/2020   Priority:   High   Target end date:   Indefinite    Indications    Chronic atrial fibrillation [I48.20]             Anticoagulation Episode Summary     INR check location:   Clinic Lab    Preferred lab:       Send INR reminders to:   [facility]    Comments:               See the Encounter Report to view Anticoagulation Flowsheet and Dosing Calendar (Go to Encounters tab in chart review, and find the Anticoagulation Therapy Visit)        Janene Smith RN

## 2020-04-29 ENCOUNTER — ANTICOAGULATION THERAPY VISIT (OUTPATIENT)
Dept: NURSING | Facility: CLINIC | Age: 85
End: 2020-04-29

## 2020-04-29 DIAGNOSIS — Z79.01 LONG TERM CURRENT USE OF ANTICOAGULANT THERAPY: ICD-10-CM

## 2020-04-29 DIAGNOSIS — I48.20 CHRONIC ATRIAL FIBRILLATION (H): ICD-10-CM

## 2020-04-29 LAB — INR PPP: 2.3 (ref 0.86–1.14)

## 2020-04-29 PROCEDURE — 85610 PROTHROMBIN TIME: CPT | Performed by: INTERNAL MEDICINE

## 2020-04-29 PROCEDURE — 36416 COLLJ CAPILLARY BLOOD SPEC: CPT | Performed by: INTERNAL MEDICINE

## 2020-04-29 NOTE — PROGRESS NOTES
Pt denies any changes in diet,health or activity. Ayaan is aware if signs of clotting (pain, tenderness, swelling, color change in leg or arm, SOB) and bleeding occur (blood in stool, urine, large bruising, bleeding gums, nosebleeds) to have INR check sooner. If sx severe report to ER or concerned for stroke call 911. If general questions or concerns arise, call clinic.    Adriana Oliveira RN  [facility]

## 2020-05-27 ENCOUNTER — ANTICOAGULATION THERAPY VISIT (OUTPATIENT)
Dept: NURSING | Facility: CLINIC | Age: 85
End: 2020-05-27

## 2020-05-27 DIAGNOSIS — I48.20 CHRONIC ATRIAL FIBRILLATION (H): ICD-10-CM

## 2020-05-27 DIAGNOSIS — Z79.01 LONG TERM CURRENT USE OF ANTICOAGULANT THERAPY: ICD-10-CM

## 2020-05-27 LAB
CAPILLARY BLOOD COLLECTION: NORMAL
INR PPP: 1.9 (ref 0.86–1.14)

## 2020-05-27 PROCEDURE — 36416 COLLJ CAPILLARY BLOOD SPEC: CPT | Performed by: INTERNAL MEDICINE

## 2020-05-27 PROCEDURE — 85610 PROTHROMBIN TIME: CPT | Performed by: INTERNAL MEDICINE

## 2020-05-27 PROCEDURE — 99207 ZZC NO CHARGE NURSE ONLY: CPT | Performed by: INTERNAL MEDICINE

## 2020-05-27 NOTE — PROGRESS NOTES
ANTICOAGULATION FOLLOW-UP CLINIC VISIT    Patient Name:  Ayaan Herrera  Date:  2020  Contact Type:  Telephone/ Left a detailed voicemail message    SUBJECTIVE:         OBJECTIVE    Recent labs: (last 7 days)     20  1053   INR 1.90*       ASSESSMENT / PLAN  INR assessment SUB    Recheck INR In: 3 WEEKS    INR Location Outside lab      Anticoagulation Summary  As of 2020    INR goal:   2.0-3.0   TTR:   86.1 % (1 y)   INR used for dosin.90! (2020)   Warfarin maintenance plan:   5 mg (5 mg x 1) every day   Full warfarin instructions:   : 7.5 mg; Otherwise 5 mg every day   Weekly warfarin total:   35 mg   Weekly max warfarin dose:   35 mg   Plan last modified:   Janene Smith RN (3/18/2020)   Next INR check:   2020   Priority:   Maintenance   Target end date:   Indefinite    Indications    Chronic atrial fibrillation [I48.20]             Anticoagulation Episode Summary     INR check location:   Clinic Lab    Preferred lab:       Send INR reminders to:   [facility]    Comments:               See the Encounter Report to view Anticoagulation Flowsheet and Dosing Calendar (Go to Encounters tab in chart review, and find the Anticoagulation Therapy Visit)    Pt INR is 1.9 today. Left a detailed voicemail message to take 7.5 mg today 20 then continue taking 5 mg daily. Recheck INR through lab on 20 at 11:30 am. Pt given the central INR number to call if questions, updates or concerns.    Luisa Matthews RN

## 2020-06-15 ENCOUNTER — TELEPHONE (OUTPATIENT)
Dept: FAMILY MEDICINE | Facility: CLINIC | Age: 85
End: 2020-06-15

## 2020-06-15 NOTE — TELEPHONE ENCOUNTER
Patient called stating there is a problem with his RX, the quantity doesn't always last until his INR because sometimes the instructions change. He verbally confirmed that he is taking 5mg daily right now. Last quantity prescribed was #84 which is not enough for a 90 day supply.    His next INR is scheduled on Wed and he will need a refill on that date. Writer offered to send a new RX to pharmacy now for #100 so he doesn't have to cut it so close. He declined, stating he will wait for the results on Wednesday and then ask the ACN to send a refill.     Offered again to send RX today, he declined. Reminded him to please ask the ACN on Wed for his refill and he states he will. No other questions at this time.

## 2020-06-17 ENCOUNTER — ANTICOAGULATION THERAPY VISIT (OUTPATIENT)
Dept: NURSING | Facility: CLINIC | Age: 85
End: 2020-06-17

## 2020-06-17 DIAGNOSIS — I48.20 CHRONIC ATRIAL FIBRILLATION (H): ICD-10-CM

## 2020-06-17 DIAGNOSIS — Z79.01 LONG TERM CURRENT USE OF ANTICOAGULANT THERAPY: ICD-10-CM

## 2020-06-17 LAB
CAPILLARY BLOOD COLLECTION: NORMAL
INR PPP: 2.5 (ref 0.86–1.14)

## 2020-06-17 PROCEDURE — 85610 PROTHROMBIN TIME: CPT | Performed by: INTERNAL MEDICINE

## 2020-06-17 PROCEDURE — 36415 COLL VENOUS BLD VENIPUNCTURE: CPT | Performed by: INTERNAL MEDICINE

## 2020-06-17 RX ORDER — WARFARIN SODIUM 5 MG/1
TABLET ORAL
Qty: 90 TABLET | Refills: 1 | Status: SHIPPED | OUTPATIENT
Start: 2020-06-17 | End: 2020-08-25

## 2020-06-17 NOTE — PROGRESS NOTES
Pt denies any changes in diet,health or activity. Patient to take 5 mg daily and recheck INR in 4 weeks . Ayaan is aware if signs of clotting (pain, tenderness, swelling, color change in leg or arm, SOB) and bleeding occur (blood in stool, urine, large bruising, bleeding gums, nosebleeds) to have INR check sooner. If sx severe report to ER or concerned for stroke call 911. If general questions or concerns arise, call clinic.  Adriana Oliveira RN  [facility]        RX also sent to the pharmacy.

## 2020-07-08 ENCOUNTER — TELEPHONE (OUTPATIENT)
Dept: OPHTHALMOLOGY | Facility: CLINIC | Age: 85
End: 2020-07-08

## 2020-07-08 NOTE — TELEPHONE ENCOUNTER
[facility] to [facility] for [facility] Berry  In meantime will keep July 28th appt with Dr. Kuhn and reviewed may follow with Dr Xiao or Dr. garg here in future    Dean Rizvi RN 11:05 AM 07/09/20    ---    Left message at 0829    Previous Dr. Berry patient    [facility] also [facility]    Review[facility] at [facility]   Reviewed Dr. Kuhn leaving end of month  Reviewed Dr. Garg and Dr. Xiao will be assisting in managing glaucoma patients in there absence and able to schedule f/u with either provider    Direct number provided for any further questions/assistance    Dean Rizvi RN 8:32 AM 07/09/20          [facility]    Phone Message    May a detailed message be left on voicemail: yes     Reason for Call: Other: Pt called to reschedule Appt with Dr. Kuhn for Glaucoma on 7/28/20. Pt said he has to reschedule as [facility] day that he cannot reschedule as well! Pt would like to scheduled in the beginning of August if possible. Please call Pt back to schedule Thank you!     Action Taken: Message routed to:  [facility] E[facility]    Travel Screening: Not Applicable

## 2020-07-15 ENCOUNTER — ANTICOAGULATION THERAPY VISIT (OUTPATIENT)
Dept: NURSING | Facility: CLINIC | Age: 85
End: 2020-07-15

## 2020-07-15 DIAGNOSIS — Z79.01 LONG TERM CURRENT USE OF ANTICOAGULANT THERAPY: ICD-10-CM

## 2020-07-15 DIAGNOSIS — I48.20 CHRONIC ATRIAL FIBRILLATION (H): ICD-10-CM

## 2020-07-15 LAB
CAPILLARY BLOOD COLLECTION: NORMAL
INR PPP: 2.3 (ref 0.86–1.14)

## 2020-07-15 PROCEDURE — 99207 ZZC NO CHARGE NURSE ONLY: CPT

## 2020-07-15 PROCEDURE — 36416 COLLJ CAPILLARY BLOOD SPEC: CPT | Performed by: INTERNAL MEDICINE

## 2020-07-15 PROCEDURE — 85610 PROTHROMBIN TIME: CPT | Performed by: INTERNAL MEDICINE

## 2020-07-15 NOTE — PROGRESS NOTES
ANTICOAGULATION FOLLOW-UP CLINIC VISIT    Patient Name:  Ayaan Herrera  Date:  7/15/2020  Contact Type:  Telephone/ Patient    SUBJECTIVE:  Patient Findings     Comments:   The patient was assessed for diet, medication, and activity level changes, missed or extra doses, bruising or bleeding, with no problem findings.        Clinical Outcomes     Negatives:   Major bleeding event, Thromboembolic event, Anticoagulation-related hospital admission, Anticoagulation-related ED visit, Anticoagulation-related fatality    Comments:   The patient was assessed for diet, medication, and activity level changes, missed or extra doses, bruising or bleeding, with no problem findings.           OBJECTIVE    Recent labs: (last 7 days)     07/15/20  1004   INR 2.30*       ASSESSMENT / PLAN  INR assessment THER    Recheck INR In: 6 WEEKS    INR Location Outside lab      Anticoagulation Summary  As of 7/15/2020    INR goal:   2.0-3.0   TTR:   85.2 % (1 y)   INR used for dosin.30 (7/15/2020)   Warfarin maintenance plan:   5 mg (5 mg x 1) every day   Full warfarin instructions:   5 mg every day   Weekly warfarin total:   35 mg   Weekly max warfarin dose:   35 mg   No change documented:   Luisa Matthews RN   Plan last modified:   Janene Smith RN (3/18/2020)   Next INR check:   2020   Priority:   Maintenance   Target end date:   Indefinite    Indications    Chronic atrial fibrillation (H) [I48.20]             Anticoagulation Episode Summary     INR check location:   Clinic Lab    Preferred lab:       Send INR reminders to:   OR[facility] WANG    Comments:               See the Encounter Report to view Anticoagulation Flowsheet and Dosing Calendar (Go to Encounters tab in chart review, and find the Anticoagulation Therapy Visit)    Pt INR is 2.3 today. Pt advised to continue taking 5 mg daily. Recheck INR in 6 weeks unless he has a change in health. Pt scheduled to recheck through [facility] on 20 at  10:15 am. Ayaan aware if signs of clotting (pain, tenderness, swelling, color change in leg or arm, SOB) and bleeding occur (blood in stool, urine, large bruising, bleeding gums, nosebleeds) to have INR check sooner. If sx severe report to ER or concerned for stroke call 911. If general questions or concerns arise, call clinic.         Luisa Matthews RN

## 2020-07-22 ENCOUNTER — OFFICE VISIT (OUTPATIENT)
Dept: FAMILY MEDICINE | Facility: CLINIC | Age: 85
End: 2020-07-22
Payer: COMMERCIAL

## 2020-07-22 VITALS
SYSTOLIC BLOOD PRESSURE: 114 MMHG | TEMPERATURE: 95.4 F | WEIGHT: 176.5 LBS | HEART RATE: 85 BPM | OXYGEN SATURATION: 98 % | BODY MASS INDEX: 23.29 KG/M2 | DIASTOLIC BLOOD PRESSURE: 76 MMHG

## 2020-07-22 DIAGNOSIS — H61.23 BILATERAL IMPACTED CERUMEN: Primary | ICD-10-CM

## 2020-07-22 PROCEDURE — 69209 REMOVE IMPACTED EAR WAX UNI: CPT | Mod: 50 | Performed by: FAMILY MEDICINE

## 2020-07-22 NOTE — PROGRESS NOTES
Subjective     Ayaan Herrera is a 87 year old male who presents to clinic today for the following health issues:    HPI       Right ear      Duration: 4 days    Description (location/character/radiation): right ear    Intensity:  mild    Accompanying signs and symptoms: sharp pain on Sunday nothing now    History (similar episodes/previous evaluation): None    Precipitating or alleviating factors: None    Therapies tried and outcome: None     Pain is better in right ear; just wants to make sure nothing is wrong with it.    Reviewed and updated as needed this visit by Provider  Tobacco  Allergies  Meds  Problems  Med Hx  Surg Hx  Fam Hx         Review of Systems   CONSTITUTIONAL: NEGATIVE for fever, chills, change in weight  INTEGUMENTARY/SKIN: NEGATIVE for worrisome rashes, moles or lesions  EYES: NEGATIVE for vision changes or irritation  RESP: NEGATIVE for significant cough or SOB  CV: NEGATIVE for chest pain, palpitations or peripheral edema  GI: NEGATIVE for nausea, abdominal pain, heartburn, or change in bowel habits  MUSCULOSKELETAL: NEGATIVE for significant arthralgias or myalgia  NEURO: NEGATIVE for weakness, dizziness or paresthesias  HEME: NEGATIVE for bleeding problems      Objective    /76 (Cuff Size: Adult Large)   Pulse 85   Temp 95.4  F (35.2  C) (Tympanic)   Wt 80.1 kg (176 lb 8 oz)   SpO2 98%   BMI 23.29 kg/m    Body mass index is 23.29 kg/m .  Physical Exam   GENERAL: healthy, alert and no distress  EYES: Eyes grossly normal to inspection, PERRL and conjunctivae and sclerae normal  HENT: normal cephalic/atraumatic and both ears: occluded with wax  NECK: no adenopathy, no asymmetry, masses, or scars and thyroid normal to palpation  RESP: lungs clear to auscultation - no rales, rhonchi or wheezes  CV: regular rate and rhythm, normal S1 S2, no S3 or S4, no murmur, click or rub, no peripheral edema and peripheral pulses strong  MS: no gross musculoskeletal defects noted, no  edema  SKIN: no suspicious lesions or rashes  NEURO: Normal strength and tone, mentation intact and speech normal  PSYCH: mentation appears normal, affect normal/bright    Diagnostic Test Results:  Labs reviewed in Epic        Assessment & Plan   Problem List Items Addressed This Visit     None      Visit Diagnoses     Bilateral impacted cerumen    -  Primary         Both ear canals were found to be occluded with wax - my nurse and I were able to remove the ear wax successfully with irrigation (half warm water and half hydrogen peroxide) and manually with an ear pic.  Hearing and plugged-feeling (in both ears) improved significantly.  Will return to clinic if symptoms persist or worsen.  See Patient Instructions for details and follow-up instructions  Information on cerumen impaction/removal, home care discussed with patient who expressed understanding.      See Patient Instructions  Return in about 2 weeks (around 8/5/2020) for re-check / follow-up, If needed.    Zulma Yates,   [facility]

## 2020-07-28 ENCOUNTER — TELEPHONE (OUTPATIENT)
Dept: OTHER | Facility: CLINIC | Age: 85
End: 2020-07-28

## 2020-07-28 DIAGNOSIS — I71.40 ABDOMINAL AORTIC ANEURYSM (AAA) WITHOUT RUPTURE (H): Primary | ICD-10-CM

## 2020-07-29 NOTE — TELEPHONE ENCOUNTER
Pt should establish with Vascular Medicine.  Order entered per protocol for AAA surveillance.  Pt had normal resting/exercise VICENTE values 9/14/18.    Routing to scheduling to contact pt to coordinate in person consult with Vascular Medicine at next available.    Hazel Brooks, SCOTN, RN-[facility] [place]

## 2020-07-30 NOTE — TELEPHONE ENCOUNTER
Patient is scheduled for his Ultrasound on 08/10/20 and an In Clinic Consult Appointment with Dr Burnett on 08/14/20.

## 2020-08-07 ENCOUNTER — TELEPHONE (OUTPATIENT)
Dept: FAMILY MEDICINE | Facility: CLINIC | Age: 85
End: 2020-08-07

## 2020-08-07 NOTE — LETTER
August 7, 2020    Ayaan LARS Burd[place]nson  [address] [address]    Dear Federico Kuo cares about your health and your health plan.  I have reviewed your medical conditions, medication list and lab results, and am making recommendations based on this review to better manage your health.    You are in particular need of attention regarding:  -Wellness (Physical) Visit     I am recommending that you:     -schedule a WELLNESS (Physical) APPOINTMENT with me.   I will check fasting labs the same day - nothing to eat except water and meds for 8-10 hours prior.  We can also do this as a video visit.      Please call us at the [employer] location:  [phone] or use [employer] to address the above recommendations.     Thank you for trusting [facility].  We appreciate the opportunity to serve you and look forward to supporting your healthcare in the future.    If you have (or plan to have) any of these tests done at a facility other than a [facility] or a [facility], please have the results sent to the [facility] location noted above.      Best Regards,    Dean Rider MD

## 2020-08-09 DIAGNOSIS — M81.0 SENILE OSTEOPOROSIS: ICD-10-CM

## 2020-08-09 RX ORDER — ALENDRONATE SODIUM 70 MG/1
TABLET ORAL
Qty: 12 TABLET | Refills: 3 | Status: SHIPPED | OUTPATIENT
Start: 2020-08-09 | End: 2021-08-20

## 2020-08-10 ENCOUNTER — HOSPITAL ENCOUNTER (OUTPATIENT)
Dept: ULTRASOUND IMAGING | Facility: CLINIC | Age: 85
Discharge: HOME | End: 2020-08-10
Attending: INTERNAL MEDICINE | Admitting: INTERNAL MEDICINE
Payer: COMMERCIAL

## 2020-08-10 DIAGNOSIS — I71.40 ABDOMINAL AORTIC ANEURYSM (AAA) WITHOUT RUPTURE (H): ICD-10-CM

## 2020-08-10 PROCEDURE — 93978 VASCULAR STUDY: CPT

## 2020-08-19 ENCOUNTER — NURSE TRIAGE (OUTPATIENT)
Dept: NURSING | Facility: CLINIC | Age: 85
End: 2020-08-19

## 2020-08-20 NOTE — TELEPHONE ENCOUNTER
Pt calls in   Says instead of taking his 5 mg coumadin once this evening   He accidentally took a second dose  > thus took 10 mg of coumadin     Pt given >         pt will call NOW    Protocol and care advice reviewed  Caller states understanding of the recommended disposition  Advised to call back if further questions or concerns    Bradley Fonseca RN / [facility]                  Reason for Disposition    MORE THAN A DOUBLE DOSE of a prescription or over-the-counter (OTC) drug    Protocols used: MEDICATION QUESTION CALL-A-AH

## 2020-08-25 ENCOUNTER — OFFICE VISIT (OUTPATIENT)
Dept: OTHER | Facility: CLINIC | Age: 85
End: 2020-08-25
Attending: INTERNAL MEDICINE
Payer: COMMERCIAL

## 2020-08-25 VITALS
WEIGHT: 175.8 LBS | OXYGEN SATURATION: 99 % | SYSTOLIC BLOOD PRESSURE: 116 MMHG | HEART RATE: 73 BPM | DIASTOLIC BLOOD PRESSURE: 78 MMHG | HEIGHT: 73 IN | BODY MASS INDEX: 23.3 KG/M2

## 2020-08-25 DIAGNOSIS — I73.9 CLAUDICATION OF RIGHT LOWER EXTREMITY (H): ICD-10-CM

## 2020-08-25 DIAGNOSIS — I71.40 ABDOMINAL AORTIC ANEURYSM (AAA) WITHOUT RUPTURE (H): Primary | ICD-10-CM

## 2020-08-25 PROCEDURE — G0463 HOSPITAL OUTPT CLINIC VISIT: HCPCS

## 2020-08-25 PROCEDURE — 99213 OFFICE O/P EST LOW 20 MIN: CPT | Mod: ZP | Performed by: INTERNAL MEDICINE

## 2020-08-25 RX ORDER — ALENDRONATE SODIUM 70 MG/1
70 TABLET ORAL
COMMUNITY
End: 2020-08-25

## 2020-08-25 RX ORDER — WARFARIN SODIUM 5 MG/1
TABLET ORAL
COMMUNITY
End: 2021-12-08

## 2020-08-25 RX ORDER — LATANOPROST 50 UG/ML
1 SOLUTION/ DROPS OPHTHALMIC
COMMUNITY
End: 2020-08-25

## 2020-08-25 RX ORDER — BRIMONIDINE TARTRATE 2 MG/ML
1 SOLUTION/ DROPS OPHTHALMIC
COMMUNITY
End: 2020-08-25

## 2020-08-25 RX ORDER — SOTALOL HYDROCHLORIDE 80 MG/1
80 TABLET ORAL
COMMUNITY
Start: 2020-02-10 | End: 2020-08-31

## 2020-08-25 ASSESSMENT — ACTIVITIES OF DAILY LIVING (ADL)
RETIRED_EATING: 0-->INDEPENDENT
BATHING: 0-->INDEPENDENT
DRESS: 0-->INDEPENDENT
AMBULATION: 0-->INDEPENDENT
FALL_HISTORY_WITHIN_LAST_SIX_MONTHS: NO
RETIRED_COMMUNICATION: 0-->UNDERSTANDS/COMMUNICATES WITHOUT DIFFICULTY
TRANSFERRING: 0-->INDEPENDENT
SWALLOWING: 0-->SWALLOWS FOODS/LIQUIDS WITHOUT DIFFICULTY
TOILETING: 0-->INDEPENDENT
COGNITION: 0 - NO COGNITION ISSUES REPORTED

## 2020-08-25 ASSESSMENT — MIFFLIN-ST. JEOR: SCORE: 1526.3

## 2020-08-25 NOTE — PROGRESS NOTES
Vascular Medicine Progress Note     Ayaan Herrera is a 87 year old male who is here for follow-up on abdominal ultrasound    Interval History   Patient is here for follow-up on abdominal surveillance ultrasound which showed infrarenal AAA with almost the same size with no progression    Patient was complaining of dizziness is kind of on and off dizziness and feeling weak his blood pressure while lying down was 138/90 and when he is standing and sitting its 116/78 patient is on Flomax and HCTZ I went ahead and I held the HCTZ    Patient was supposed to have Doppler arterial ultrasound bilaterally to rule out any evidence of lower extremity ectasia/aneurysms but unfortunately he did not have the testing.  Patient denies any history of rest pain, nocturnal pain or claudication    Physical Exam       BP: 116/78 Pulse: 73     SpO2: 99 %      Vitals:    08/25/20 1459   Weight: 79.7 kg (175 lb 12.8 oz)     Vital Signs with Ranges  Pulse:  [73] 73  BP: (116)/(78) 116/78  SpO2:  [99 %] 99 %  [unfilled]    Constitutional: awake, alert, cooperative, no apparent distress, and appears stated age  Eyes: Lids and lashes normal, pupils equal, round and reactive to light, extra ocular muscles intact, sclera clear, conjunctiva normal  ENT: normocepalic, without obvious abnormality, oropharynx pink and moist  Hematologic / Lymphatic: no lymphadenopathy  Respiratory: No increased work of breathing, good air exchange, clear to auscultation bilaterally, no crackles or wheezing  Cardiovascular: regular rate and rhythm, normal S1 and S2 and no murmur noted  GI: Normal bowel sounds, soft, non-distended, non-tender  Skin: no redness, warmth, or swelling, no rashes and no lesions  Musculoskeletal: There is no redness, warmth, or swelling of the joints.  Full range of motion noted.  Motor strength is 5 out of 5 all extremities bilaterally.  Tone is normal.  Neurologic: Awake, alert, oriented to name, place and time.  Cranial nerves  II-XII are grossly intact.  Motor is 5 out of 5 bilaterally.    Neuropsychiatric:  Normal affect, memory, insight.  Pulses: Bilateral and equal  . No carotid bruits appreciated.     Medications         Data   No results found for this or any previous visit (from the past 24 hour(s)).    Assessment & Plan   No diagnosis found.      Summary: PAD, will obtain Doppler arterial ultrasound bilateral lower extremities to rule out any ectasia or aneurysms accompanying the AAA.    Hold HCTZ and remeasure patient's blood pressure in the next visit hopefully within 1 month to rule out postural hypotension    Follow-up with the patient in 1 month    Lico Burnett MD

## 2020-08-25 NOTE — PROGRESS NOTES
"Ayaan Herrera is a 87 year old male who presents for:  Chief Complaint   Patient presents with     RECHECK     [id number]  CONSULT - PREV Dr Willis Pt -  US Abd Aortic done 08/10/20 - LOV was on 9/14/18 for Claudication of RLE - Pt was undergoing surveillance for AAA w/o rupture. Pt had normal resting/exercise VICENTE values 9/14/18. *LMB 07/29/20 **R/S from 8/14/2020 NV        Vitals:    Vitals:    08/25/20 1459   BP: 116/78   BP Location: Left arm   Pulse: 73   SpO2: 99%   Weight: 175 lb 12.8 oz (79.7 kg)   Height: 6' 1\" (1.854 m)       BMI:  Estimated body mass index is 23.19 kg/m  as calculated from the following:    Height as of this encounter: 6' 1\" (1.854 m).    Weight as of this encounter: 175 lb 12.8 oz (79.7 kg).    Pain Score:  Data Unavailable        Saranya Baig RN      "

## 2020-08-26 ENCOUNTER — ANTICOAGULATION THERAPY VISIT (OUTPATIENT)
Dept: NURSING | Facility: CLINIC | Age: 85
End: 2020-08-26

## 2020-08-26 DIAGNOSIS — Z79.01 LONG TERM CURRENT USE OF ANTICOAGULANT THERAPY: ICD-10-CM

## 2020-08-26 DIAGNOSIS — I48.20 CHRONIC ATRIAL FIBRILLATION (H): ICD-10-CM

## 2020-08-26 LAB
CAPILLARY BLOOD COLLECTION: NORMAL
INR PPP: 2.6 (ref 0.86–1.14)

## 2020-08-26 PROCEDURE — 36416 COLLJ CAPILLARY BLOOD SPEC: CPT | Performed by: INTERNAL MEDICINE

## 2020-08-26 PROCEDURE — 85610 PROTHROMBIN TIME: CPT | Performed by: INTERNAL MEDICINE

## 2020-08-26 NOTE — PROGRESS NOTES
ANTICOAGULATION MANAGEMENT     Patient Name:  Ayaan Herrera  Date:  8/26/2020    ASSESSMENT /SUBJECTIVE:    Today's INR result of 2.6 is therapeutic. Goal INR of 2.0-3.0      Warfarin dose taken: Warfarin taken as previously instructed    Diet: No new diet changes affecting INR    Medication changes/ interactions: HCTZ was put on hold, sees PCP on 8/31    Previous INR: Therapeutic     S/S of bleeding or thromboembolism: No    New injury or illness: No    Upcoming surgery, procedure or cardioversion: No    Additional findings: c/o dizziness and feeling weak at times, was seen by Vascular yesterday and hydrochlorothiazide was held.  He sees his PCP on 8/31. Denies any numbness/tingling in extremities, no vision changes, no speech problems, no blurred vision, chest pain or headaches.  Advised to be seen in ER if has any of those symptoms.       PLAN:    Spoke with Ayaan regarding INR result and instructed:     Warfarin Dosing Instructions: Continue your current warfarin dose 5 mg daily     Instructed patient to follow up no later than: 6 weeks  Lab visit scheduled    Education provided: Monitoring for bleeding signs and symptoms, Monitoring for clotting signs and symptoms and When to seek medical attention/emergency care      Ayaan verbalizes understanding and agrees to warfarin dosing plan.    Instructed to call the Anticoagulation Clinic for any changes, questions or concerns. (#[phone])        Adriana Oliveira RN      OBJECTIVE:  Recent labs: (last 7 days)     08/26/20  1010   INR 2.60*         INR assessment THER    Recheck INR In: 6 WEEKS    INR Location Clinic      Anticoagulation Summary  As of 8/26/2020    INR goal:   2.0-3.0   TTR:   85.2 % (1 y)   INR used for dosing:   No new INR was available at the time of this encounter.   Warfarin maintenance plan:   5 mg (5 mg x 1) every day   Full warfarin instructions:   5 mg every day   Weekly warfarin total:   35 mg   Weekly max warfarin dose:   35 mg    No change documented:   Adriana Oliveira RN   Plan last modified:   Janene Smith RN (3/18/2020)   Next INR check:   10/7/2020   Priority:   Maintenance   Target end date:   Indefinite    Indications    Chronic atrial fibrillation (H) [I48.20]             Anticoagulation Episode Summary     INR check location:   Clinic Lab    Preferred lab:       Send INR reminders to:   ORLIN KENNY    Comments:

## 2020-08-29 PROBLEM — I10 ESSENTIAL HYPERTENSION: Chronic | Status: ACTIVE | Noted: 2017-01-04

## 2020-08-29 PROBLEM — M81.0 SENILE OSTEOPOROSIS: Chronic | Status: ACTIVE | Noted: 2017-11-28

## 2020-08-29 PROBLEM — I71.40 ABDOMINAL AORTIC ANEURYSM (AAA) WITHOUT RUPTURE (H): Chronic | Status: ACTIVE | Noted: 2017-01-04

## 2020-08-29 PROBLEM — Z79.01 LONG TERM CURRENT USE OF ANTICOAGULANT THERAPY: Chronic | Status: ACTIVE | Noted: 2018-07-03

## 2020-08-29 PROBLEM — I48.20 CHRONIC ATRIAL FIBRILLATION (H): Chronic | Status: ACTIVE | Noted: 2017-09-20

## 2020-08-31 ENCOUNTER — OFFICE VISIT (OUTPATIENT)
Dept: FAMILY MEDICINE | Facility: CLINIC | Age: 85
End: 2020-08-31
Payer: COMMERCIAL

## 2020-08-31 VITALS
SYSTOLIC BLOOD PRESSURE: 118 MMHG | OXYGEN SATURATION: 98 % | BODY MASS INDEX: 23.62 KG/M2 | TEMPERATURE: 94.7 F | HEART RATE: 70 BPM | WEIGHT: 179 LBS | DIASTOLIC BLOOD PRESSURE: 76 MMHG

## 2020-08-31 DIAGNOSIS — I47.29 PAROXYSMAL VENTRICULAR TACHYCARDIA (H): Chronic | ICD-10-CM

## 2020-08-31 DIAGNOSIS — I10 ESSENTIAL HYPERTENSION: ICD-10-CM

## 2020-08-31 DIAGNOSIS — I48.20 CHRONIC ATRIAL FIBRILLATION (H): ICD-10-CM

## 2020-08-31 DIAGNOSIS — Z79.01 LONG TERM CURRENT USE OF ANTICOAGULANT THERAPY: ICD-10-CM

## 2020-08-31 DIAGNOSIS — Z00.00 ENCOUNTER FOR MEDICARE ANNUAL WELLNESS EXAM: Primary | ICD-10-CM

## 2020-08-31 DIAGNOSIS — M81.0 SENILE OSTEOPOROSIS: ICD-10-CM

## 2020-08-31 LAB
ALBUMIN SERPL-MCNC: 3.8 G/DL (ref 3.4–5)
ALP SERPL-CCNC: 78 U/L (ref 40–150)
ALT SERPL W P-5'-P-CCNC: 41 U/L (ref 0–70)
ANION GAP SERPL CALCULATED.3IONS-SCNC: 5 MMOL/L (ref 3–14)
AST SERPL W P-5'-P-CCNC: 35 U/L (ref 0–45)
BASOPHILS # BLD AUTO: 0 10E9/L (ref 0–0.2)
BASOPHILS NFR BLD AUTO: 0.3 %
BILIRUB SERPL-MCNC: 0.9 MG/DL (ref 0.2–1.3)
BUN SERPL-MCNC: 22 MG/DL (ref 7–30)
CALCIUM SERPL-MCNC: 8.9 MG/DL (ref 8.5–10.1)
CHLORIDE SERPL-SCNC: 103 MMOL/L (ref 94–109)
CO2 SERPL-SCNC: 26 MMOL/L (ref 20–32)
CREAT SERPL-MCNC: 0.82 MG/DL (ref 0.66–1.25)
DIFFERENTIAL METHOD BLD: ABNORMAL
EOSINOPHIL # BLD AUTO: 0.2 10E9/L (ref 0–0.7)
EOSINOPHIL NFR BLD AUTO: 2.6 %
ERYTHROCYTE [DISTWIDTH] IN BLOOD BY AUTOMATED COUNT: 14.5 % (ref 10–15)
GFR SERPL CREATININE-BSD FRML MDRD: 79 ML/MIN/{1.73_M2}
GLUCOSE SERPL-MCNC: 101 MG/DL (ref 70–99)
HCT VFR BLD AUTO: 39.8 % (ref 40–53)
HGB BLD-MCNC: 13.3 G/DL (ref 13.3–17.7)
LYMPHOCYTES # BLD AUTO: 0.6 10E9/L (ref 0.8–5.3)
LYMPHOCYTES NFR BLD AUTO: 10.7 %
MCH RBC QN AUTO: 29.2 PG (ref 26.5–33)
MCHC RBC AUTO-ENTMCNC: 33.4 G/DL (ref 31.5–36.5)
MCV RBC AUTO: 87 FL (ref 78–100)
MONOCYTES # BLD AUTO: 0.8 10E9/L (ref 0–1.3)
MONOCYTES NFR BLD AUTO: 13.7 %
NEUTROPHILS # BLD AUTO: 4.2 10E9/L (ref 1.6–8.3)
NEUTROPHILS NFR BLD AUTO: 72.7 %
PLATELET # BLD AUTO: 147 10E9/L (ref 150–450)
POTASSIUM SERPL-SCNC: 4.5 MMOL/L (ref 3.4–5.3)
PROT SERPL-MCNC: 7 G/DL (ref 6.8–8.8)
RBC # BLD AUTO: 4.56 10E12/L (ref 4.4–5.9)
SODIUM SERPL-SCNC: 134 MMOL/L (ref 133–144)
WBC # BLD AUTO: 5.8 10E9/L (ref 4–11)

## 2020-08-31 PROCEDURE — 85025 COMPLETE CBC W/AUTO DIFF WBC: CPT | Performed by: INTERNAL MEDICINE

## 2020-08-31 PROCEDURE — 36415 COLL VENOUS BLD VENIPUNCTURE: CPT | Performed by: INTERNAL MEDICINE

## 2020-08-31 PROCEDURE — 80053 COMPREHEN METABOLIC PANEL: CPT | Performed by: INTERNAL MEDICINE

## 2020-08-31 PROCEDURE — 99213 OFFICE O/P EST LOW 20 MIN: CPT | Mod: 25 | Performed by: INTERNAL MEDICINE

## 2020-08-31 PROCEDURE — 99397 PER PM REEVAL EST PAT 65+ YR: CPT | Performed by: INTERNAL MEDICINE

## 2020-08-31 RX ORDER — SOTALOL HYDROCHLORIDE 80 MG/1
80 TABLET ORAL 2 TIMES DAILY
Start: 2020-08-31 | End: 2021-04-05

## 2020-08-31 ASSESSMENT — ACTIVITIES OF DAILY LIVING (ADL): CURRENT_FUNCTION: NO ASSISTANCE NEEDED

## 2020-08-31 NOTE — PROGRESS NOTES
"SUBJECTIVE:   Ayaan Herrera is a 87 year old male who presents for Preventive Visit.    Are you in the first 12 months of your Medicare coverage?  No    Healthy Habits:    In general, how would you rate your overall health?  Good    Frequency of exercise:  6-7 days/week    Duration of exercise:  45-60 minutes    Do you usually eat at least 4 servings of fruit and vegetables a day, include whole grains    & fiber and avoid regularly eating high fat or \"junk\" foods?  No    Taking medications regularly:  Yes    Barriers to taking medications:  None    Medication side effects:  None    Ability to successfully perform activities of daily living:  No assistance needed    Home Safety:  No safety concerns identified    Hearing Impairment:  No hearing concerns    In the past 6 months, have you been bothered by leaking of urine?  No    In general, how would you rate your overall mental or emotional health?  Good      PHQ-2 Total Score:    Additional concerns today:  Yes (pt was told to stop taking hydrochlorothiazide by vascular doctor?)    Do you feel safe in your environment? Yes    Have you ever done Advance Care Planning? (For example, a Health Directive, POLST, or a discussion with a medical provider or your loved ones about your wishes): No, advance care planning information given to patient to review.  Patient plans to discuss their wishes with loved ones or provider.        Fall risk  Fallen 2 or more times in the past year?: No  Any fall with injury in the past year?: No    Cognitive Screening   1) Repeat 3 items (Leader, Season, Table)    2) Clock draw: NORMAL  3) 3 item recall: Recalls 3 objects  Results: 3 items recalled: COGNITIVE IMPAIRMENT LESS LIKELY    Mini-CogTM Copyright AUGUST Brasher. Licensed by the author for use in [facility]; reprinted with permission ([email or web address]). All rights reserved.      Do you have sleep apnea, excessive snoring or daytime drowsiness?: no    Reviewed and updated as " needed this visit by clinical staff  Tobacco  Allergies  Meds  Med Hx  Surg Hx  Fam Hx  Soc Hx        Reviewed and updated as needed this visit by Provider        Social History     Tobacco Use     Smoking status: Former Smoker     Packs/day: 0.20     Years: 25.00     Pack years: 5.00     Start date: 1955     Last attempt to quit: 1980     Years since quittin.6     Smokeless tobacco: Former User   Substance Use Topics     Alcohol use: Yes     Comment: occasionally     If you drink alcohol do you typically have >3 drinks per day or >7 drinks per week? No    Alcohol Use 2020   Prescreen: >3 drinks/day or >7 drinks/week? -   Prescreen: >3 drinks/day or >7 drinks/week? No           Off hydrochlorothiazide per vascular.         He had c/o lightheadedness.        There was some orthostatic BP drop.                  Today, his BP is 128/78 sitting; 118/76 standing;no sx.                                                  Some leg aching ; ? Claudication.                      Balance is poor; he uses a cane sometimes.             He has an article about improving balance at home.                                   Sees urology again soon; has been getting Eligard.                          Current providers sharing in care for this patient include:   Patient Care Team:  Dean Rider MD as PCP - General (Internal Medicine)  Lissa Berry MD as MD (Ophthalmology)  Dean Rider MD as Assigned PCP  Benjy Aquino MD as MD (Ophthalmology)    The following health maintenance items are reviewed in Epic and correct as of today:  Health Maintenance   Topic Date Due     ADVANCE CARE PLANNING  10/31/1932     LIPID  2018     MEDICARE ANNUAL WELLNESS VISIT  2019     INFLUENZA VACCINE (1) 2020     FALL RISK ASSESSMENT  2021     DTAP/TDAP/TD IMMUNIZATION (5 - Td) 2021     PHQ-2  Completed     PNEUMOCOCCAL IMMUNIZATION 65+ LOW/MEDIUM RISK  Completed     ZOSTER  IMMUNIZATION  Completed     IPV IMMUNIZATION  Aged Out     MENINGITIS IMMUNIZATION  Aged Out     HEPATITIS B IMMUNIZATION  Aged Out     Patient Active Problem List    Diagnosis Date Noted     Paroxysmal ventricular tachycardia (H) 12/21/2019     Priority: High     BRIEF HOSPITAL COURSE   Ayaan Herrera is a 87 y.o. male with a past medical history significant for Sinus node dysfunction s/p PPM, Atrial Fibrillation, AV node dysfunction, HLD, and Prostate cancer who was admitted on 12/19/19 for lightheadedness and Non-sustained Ventricular Tachycardia.     Patient noted progressive spells of lightheadedness, most notably one while walking his dog he almost passed out and fell. Patient was seen in cardiology device clinic on day of admission and found to be going into V tach 20-30 beats at a time. Patient was directly admitted from cardiology clinic evaluation of NSVT.     On admission Cardiology and EP consulted. Etiology for patient's Vtach is unclear. Mg++, K+ normal. Cor. Angiogram was without evidence of coronary artery disease. Echo with EF of 50 %, mild increased wall thickness, moderate mitral and tricuspid regurgitation. Patient was started on Sotalol 80 mg BID for prevention of Vtach by EP. Patient did not want to take a beta-blocker due to side effects and declined ICD placement. While in the hospital patient did not have any episodes of ventricular tachycardia     The patient symptomatically improved and was subsequently discharged to home in stable condition on 12/20/19. During this admission, the patient was started on Sotalol 80 mg BID. Patient will follow up with PCP in 3-5 days and with EP. He will have an EKG on 12/23/19 to be followed by EP to look for QT prolongation.         Long term current use of anticoagulant therapy 07/03/2018     Priority: High     Senile osteoporosis 11/28/2017     Priority: High         [facility] T[facility],vit D =29; he does take a vit D supplement.              His teeth are in good repair; no swallowing issues.           In 12/17, T+0.4 spine (but DJD), -2.6 both femoral necks.     START ALENDRONATE       Chronic atrial fibrillation (H) 09/20/2017     Priority: High      According to the pacemaker analysis from [facility], atrial fibrillation since July 2017.  Appointment with arrhythmia specialist on 9/25.            See his consult; warfarin started.   INR monitored at [facility]       Essential hypertension 01/04/2017     Priority: High     Abdominal aortic aneurysm (AAA) without rupture (H) 01/04/2017     Priority: High     3.2 cm on CT in 1/16; stable on ultrasound in 7/18 and in 8/20        Malignant neoplasm of prostate (H) 04/14/2016     Priority: High     12/14 bx; Lupron Rx; XRT;      Urology = Dr Russell;  PSA = 0 recently; 0.05 in 8/17 ; 0.07 in 4/18; 4.3 in 3/19, 11 in 5/19;  Resume androgen suppression; 0.09 in 7/19; 0 in 12/19       Cardiac pacemaker in situ 10/24/2018     Priority: Medium     Overview:        -placed d/t SND 2/26/2007       -JIM reached on 7/16/2013       Glaucoma 10/24/2018     Priority: Medium     Claudication of right lower extremity (H) 09/14/2018     Priority: Medium     nml VICENTE's bilat; see vasc consult 9/18       Intermittent claudication (H) 09/05/2018     Priority: Medium     Personal history of tobacco use, presenting hazards to health 07/03/2018     Priority: Medium     CXR 11/17 ; IMPRESSION:  Left subclavian cardiac device. Hyperinflation suggesting  emphysematous changes. Nonspecific increased interstitial markings  bilaterally, most consistent with mild chronic fibrosis. No focal  infiltrates. Tortuous calcified aorta. Osteopenia with mild vertebral  body height loss at several levels.        Mitral valve insufficiency, unspecified etiology 07/03/2018     Priority: Medium     Echo per Cardiology 4/18         Age-related bone loss 11/28/2017     Priority: Medium     Osteopenia of spine 11/26/2017      Priority: Medium     Described on CXR 11/17       Primary open angle glaucoma of right eye, severe stage 03/10/2017     Priority: Medium     History of colonic polyps 01/04/2017     Priority: Medium     Hyperlipidemia 01/04/2017     Priority: Medium     145/61/70/72 in 8/17       Cardiac arrhythmia 01/04/2017     Priority: Medium     Pacemaker 2007; sinus node dysfxn,bradycardia       Family history of colon cancer 01/04/2017     Priority: Medium     Primary open angle glaucoma 03/04/2015     Priority: Medium     Problem list name updated by automated process. Provider to review       Malignant basal cell neoplasm of skin 06/13/2013     Priority: Medium     ED (erectile dysfunction) 01/09/2012     Priority: Medium     Hypertrophy of prostate with urinary obstruction and other lower urinary tract symptoms (LUTS) 02/23/2007     Priority: Medium     Preventive measure 02/01/2017     Priority: Low     Colonoscopy 1/17; a 4 mm adenoma  Past Medical History: From the cardiology note at [facility] on 9/25/17:  1. History of sinus node dysfunction -   A. Status post dual-chamber permanent pacemaker implantation on 2/26/2007.   B. Status post dual-chamber permanent pacemaker generator change on 8/12/2013 because of battery depletion of device generator in #1A above.   2. Persistent atrial fibrillation with controlled ventricular rate -   A. Atrial fibrillation beginning on 7/15/2017 based on routine surveillance pacemaker interrogation 9/13/2017.   B. Underlying rhythm atrial fibrillation with 100% of ventricular pacing at a rate of 69 beats per minute on 12-lead electrocardiogram on 9/25/2017.   C. Started on Coumadin following clinic visit on 9/25/2017.   3. Hypertension.  4. History of non-toxic multi-nodular goiter.   5. Remote history of benign prostatic hyperplasia -   A. Status post transurethral resection of the prostate in 12/2002, by report.   B. Followed by resolution of urinary obstruction symptoms.   6. History of  prostate carcinoma -   A. Status post initiation of radiation therapy on 6/3/2015.   B. Completion of radiation therapy in 4/2016, by report.   7. Erectile dysfunction  8. History of traumatic subdural hematoma -   A. Blunt head injury during a fall on 2/28/2015, by report.   B. Mild small hypo-dense subdural hematoma at the left cerebral convexity with attenuation characteristics of late subacute to early chronic hemorrhage on CT scan of the head on 3/19/2015.   9. History of glaucoma -   A. Status post left eye surgery x2 in 11/1996, by report.   10. History of left tear duct repair.   11. History of bilateral inguinal hernia repair.   12. History of umbilical hernia repair.   13. History of bilateral cataract extraction.   14. History of nephrolithiasis in 2012.  15. History of basal cell carcinoma of the left neck -   A. Status post surgical excision in 7/2007, by report.   16. History of squamous cell carcinoma of the skin.   17. History of idiopathic facial flushing.   18. History of colon polyp.  19. History of Clostridium difficilis infection.   20. Remote history of tonsillectomy and adenoidectomy.          Past Surgical History:   Procedure Laterality Date     CATARACT IOL, RT/LT  1985/1996    RE/LE     GLAUCOMA SURGERY  1996    LE     GLAUCOMA SURGERY  1997    Bleb revision LE     LASER SURGERY OF EYE  10/14/04 & 2/17/05    SLT RE     PUNCTAL CLOSURE, PLUGS  2000    BE     TURP  2003     Social History     Socioeconomic History     Marital status: Single     Spouse name: Not on file     Number of children: Not on file     Years of education: Not on file     Highest education level: Not on file   Occupational History     Not on file   Social Needs     Financial resource strain: Not on file     Food insecurity     Worry: Not on file     Inability: Not on file     Transportation needs     Medical: Not on file     Non-medical: Not on file   Tobacco Use     Smoking status: Former Smoker     Packs/day: 0.20      Years: 25.00     Pack years: 5.00     Start date: 1955     Last attempt to quit: 1980     Years since quittin.6     Smokeless tobacco: Former User   Substance and Sexual Activity     Alcohol use: Yes     Comment: occasionally     Drug use: No     Sexual activity: Not Currently   Lifestyle     Physical activity     Days per week: Not on file     Minutes per session: Not on file     Stress: Not on file   Relationships     Social connections     Talks on phone: Not on file     Gets together: Not on file     Attends Baptism service: Not on file     Active member of club or organization: Not on file     Attends meetings of clubs or organizations: Not on file     Relationship status: Not on file     Intimate partner violence     Fear of current or ex partner: Not on file     Emotionally abused: Not on file     Physically abused: Not on file     Forced sexual activity: Not on file   Other Topics Concern     Parent/sibling w/ CABG, MI or angioplasty before 65F 55M? Not Asked   Social History Narrative    [employer] ; [place] [employer].  Retired      Immunization History   Administered Date(s) Administered     FLU 6-35 months 10/15/2011     Influenza (High Dose) 3 valent vaccine 2012, 10/03/2014, 10/06/2015, 10/19/2016, 2017, 2018, 2019     Influenza (IIV3) PF 2002, 10/25/2007, 2009, 10/01/2010, 10/15/2011, 2012, 2013     Pneumo Conj 13-V (2010&after) 2015     Pneumococcal 23 valent 1999, 2004, 2014     TD (ADULT, 7+) 1995, 2003, 2003     TDAP Vaccine (Adacel) 2011, 2011     Td (Adult), Adsorbed 2003     Zoster vaccine recombinant adjuvanted (SHINGRIX) 2018, 2018     Zoster vaccine, live 2008       BP Readings from Last 3 Encounters:   20 118/76   20 116/78   20 114/76    Wt Readings from Last 3 Encounters:   20 81.2 kg (179 lb)   20 79.7 kg (175 lb  12.8 oz)   07/22/20 80.1 kg (176 lb 8 oz)                  Current Outpatient Medications   Medication Sig Dispense Refill     acetaminophen (TYLENOL) 325 MG tablet Take 325 mg by mouth as needed for mild pain       alendronate (FOSAMAX) 70 MG tablet TAKE 1 TAB WITH 8OZ OF WATER EVERY 7 DAYS 30 MIN BEFORE BREAKFAST & REMAIN UPRIGHT DURING THIS TIME 12 tablet 3     brimonidine (ALPHAGAN P) 0.15 % ophthalmic solution Place 1 drop into both eyes 2 times daily 1 Bottle 11     COSOPT 2-0.5 % OP SOLN Apply 1 Dose to eye 2 times daily Both eyes.       ketotifen (ZADITOR) 0.025 % SOLN Place 1 drop into both eyes as needed       latanoprost (XALATAN) 0.005 % ophthalmic solution Place 1 drop into both eyes At Bedtime 3 Bottle 4     sotalol (BETAPACE) 80 MG tablet Take 80 mg by mouth       tamsulosin (FLOMAX) 0.4 MG capsule TAKE ONE CAPSULE BY MOUTH ONCE A DAY  3     Vitamin D, Cholecalciferol, 1000 UNITS TABS        warfarin ANTICOAGULANT (COUMADIN) 5 MG tablet Take 2.5 mg on Fridays and 5 mg by mouth on all other days of the week       hydrochlorothiazide (HYDRODIURIL) 25 MG tablet Take 1/2 (one-half) tablet by mouth once daily (Patient not taking: Reported on 8/31/2020) 45 tablet 3     Allergies   Allergen Reactions     Zithromax [Azithromycin] Diarrhea            Amoxicillin Diarrhea     Augmentin GI Disturbance         Review of Systems  CONSTITUTIONAL:NEGATIVE for fever, chills, change in weight  RESP:NEGATIVE for significant cough or SOB  CV: NEGATIVE for chest pain/chest pressure  GI: NEGATIVE for nausea, abdominal pain, heartburn, or change in bowel habits  NEURO: NEGATIVE for dysarthria and memory problems  PSYCHIATRIC: NEGATIVE for changes in mood or affect    OBJECTIVE:   /76 (Cuff Size: Adult Large)   Pulse 70   Temp 94.7  F (34.8  C) (Tympanic)   Wt 81.2 kg (179 lb)   SpO2 98%   BMI 23.62 kg/m   Estimated body mass index is 23.62 kg/m  as calculated from the following:    Height as of 8/25/20: 1.854 m  "(6' 1\").    Weight as of this encounter: 81.2 kg (179 lb).  Physical Exam  GENERAL APPEARANCE: alert and no distress  NECK: no adenopathy, no asymmetry, masses, or scars and thyroid normal to palpation  RESP: lungs clear to auscultation - no rales, rhonchi or wheezes  CV: pitting B/L LE edema to knees and occasional premature beats  ABDOMEN: soft, nontender, without hepatosplenomegaly or masses  NEURO: Normal strength and tone, mentation intact and speech normal    Diagnostic Test Results:  pending    ASSESSMENT / PLAN:   Ayaan was seen today for physical.    Diagnoses and all orders for this visit:    Encounter for Medicare annual wellness exam    Senile osteoporosis  -     Comprehensive metabolic panel (BMP + Alb, Alk Phos, ALT, AST, Total. Bili, TP)  -     CBC with platelets and differential    Essential hypertension  -     Comprehensive metabolic panel (BMP + Alb, Alk Phos, ALT, AST, Total. Bili, TP)    Chronic atrial fibrillation (H)    Long term current use of anticoagulant therapy  -     CBC with platelets and differential    Paroxysmal ventricular tachycardia (H)  -     sotalol (BETAPACE) 80 MG tablet; Take 1 tablet (80 mg) by mouth 2 times daily               Summary and implications:  We reviewed multiple issues.           We reviewed all of the issues on the diagnoses list.                        ACE wraps applied to both lower legs.        Patient Instructions      I will let you know your lab results.             Start taking the hydrochlorothiazide again.                                   Try wearing the ACE wraps on most days.                   They can help so that your blood pressure does not drop when you stand up, and will help minimize your leg swelling.          Return in about 6 months (around 2/28/2021) for follow up of several issues.      COUNSELING:  Reviewed preventive health counseling, as reflected in patient instructions  Special attention given to:       Regular exercise       Healthy " "diet/nutrition    Estimated body mass index is 23.62 kg/m  as calculated from the following:    Height as of 8/25/20: 1.854 m (6' 1\").    Weight as of this encounter: 81.2 kg (179 lb).        He reports that he quit smoking about 40 years ago. He started smoking about 64 years ago. He has a 5.00 pack-year smoking history. He has quit using smokeless tobacco.      Appropriate preventive services were discussed with this patient, including applicable screening as appropriate for cardiovascular disease, diabetes, osteopenia/osteoporosis, and glaucoma.  As appropriate for age/gender, discussed screening for colorectal cancer, prostate cancer, breast cancer, and cervical cancer. Checklist reviewing preventive services available has been given to the patient.    Reviewed patients plan of care and provided an AVS. The Basic Care Plan (routine screening as documented in Health Maintenance) for Ayaan meets the Care Plan requirement. This Care Plan has been established and reviewed with the Patient.    Counseling Resources:  ATP IV Guidelines  Pooled Cohorts Equation Calculator  Breast Cancer Risk Calculator  Breast Cancer: Medication to Reduce Risk  FRAX Risk Assessment  ICSI Preventive Guidelines  Dietary Guidelines for Americans, 2010  readeo's MyPlate  ASA Prophylaxis  Lung CA Screening    Dean Rider MD  [facility]    Results for orders placed or performed in visit on 08/31/20   Comprehensive metabolic panel (BMP + Alb, Alk Phos, ALT, AST, Total. Bili, TP)     Status: Abnormal   Result Value Ref Range    Sodium 134 133 - 144 mmol/L    Potassium 4.5 3.4 - 5.3 mmol/L    Chloride 103 94 - 109 mmol/L    Carbon Dioxide 26 20 - 32 mmol/L    Anion Gap 5 3 - 14 mmol/L    Glucose 101 (H) 70 - 99 mg/dL    Urea Nitrogen 22 7 - 30 mg/dL    Creatinine 0.82 0.66 - 1.25 mg/dL    GFR Estimate 79 >60 mL/min/[1.73_m2]    GFR Estimate If Black >90 >60 mL/min/[1.73_m2]    Calcium 8.9 8.5 - 10.1 mg/dL    " Bilirubin Total 0.9 0.2 - 1.3 mg/dL    Albumin 3.8 3.4 - 5.0 g/dL    Protein Total 7.0 6.8 - 8.8 g/dL    Alkaline Phosphatase 78 40 - 150 U/L    ALT 41 0 - 70 U/L    AST 35 0 - 45 U/L   CBC with platelets and differential     Status: Abnormal   Result Value Ref Range    WBC 5.8 4.0 - 11.0 10e9/L    RBC Count 4.56 4.4 - 5.9 10e12/L    Hemoglobin 13.3 13.3 - 17.7 g/dL    Hematocrit 39.8 (L) 40.0 - 53.0 %    MCV 87 78 - 100 fl    MCH 29.2 26.5 - 33.0 pg    MCHC 33.4 31.5 - 36.5 g/dL    RDW 14.5 10.0 - 15.0 %    Platelet Count 147 (L) 150 - 450 10e9/L    % Neutrophils 72.7 %    % Lymphocytes 10.7 %    % Monocytes 13.7 %    % Eosinophils 2.6 %    % Basophils 0.3 %    Absolute Neutrophil 4.2 1.6 - 8.3 10e9/L    Absolute Lymphocytes 0.6 (L) 0.8 - 5.3 10e9/L    Absolute Monocytes 0.8 0.0 - 1.3 10e9/L    Absolute Eosinophils 0.2 0.0 - 0.7 10e9/L    Absolute Basophils 0.0 0.0 - 0.2 10e9/L    Diff Method Automated Method      Letter sent.                                Your lab results are normal,including the liver,kidney,glucose,bone marrow,and the potassium level.      Keep taking the same medications.

## 2020-08-31 NOTE — PATIENT INSTRUCTIONS
I will let you know your lab results.             Start taking the hydrochlorothiazide again.                                   Try wearing the ACE wraps on most days.                   They can help so that your blood pressure does not drop when you stand up, and will help minimize your leg swelling.                      Work on the balance exercises.

## 2020-08-31 NOTE — LETTER
August 31, 2020      Ayaan Herrera  [address] [facility] [address]        Dear ,    We are writing to inform you of your test results.         Your lab results are normal,including the liver,kidney,glucose,bone marrow,and the potassium level.      Keep taking the same medications.     Results for orders placed or performed in visit on 08/31/20   Comprehensive metabolic panel (BMP + Alb, Alk Phos, ALT, AST, Total. Bili, TP)     Status: Abnormal   Result Value Ref Range    Sodium 134 133 - 144 mmol/L    Potassium 4.5 3.4 - 5.3 mmol/L    Chloride 103 94 - 109 mmol/L    Carbon Dioxide 26 20 - 32 mmol/L    Anion Gap 5 3 - 14 mmol/L    Glucose 101 (H) 70 - 99 mg/dL    Urea Nitrogen 22 7 - 30 mg/dL    Creatinine 0.82 0.66 - 1.25 mg/dL    GFR Estimate 79 >60 mL/min/[1.73_m2]    GFR Estimate If Black >90 >60 mL/min/[1.73_m2]    Calcium 8.9 8.5 - 10.1 mg/dL    Bilirubin Total 0.9 0.2 - 1.3 mg/dL    Albumin 3.8 3.4 - 5.0 g/dL    Protein Total 7.0 6.8 - 8.8 g/dL    Alkaline Phosphatase 78 40 - 150 U/L    ALT 41 0 - 70 U/L    AST 35 0 - 45 U/L   CBC with platelets and differential     Status: Abnormal   Result Value Ref Range    WBC 5.8 4.0 - 11.0 10e9/L    RBC Count 4.56 4.4 - 5.9 10e12/L    Hemoglobin 13.3 13.3 - 17.7 g/dL    Hematocrit 39.8 (L) 40.0 - 53.0 %    MCV 87 78 - 100 fl    MCH 29.2 26.5 - 33.0 pg    MCHC 33.4 31.5 - 36.5 g/dL    RDW 14.5 10.0 - 15.0 %    Platelet Count 147 (L) 150 - 450 10e9/L    % Neutrophils 72.7 %    % Lymphocytes 10.7 %    % Monocytes 13.7 %    % Eosinophils 2.6 %    % Basophils 0.3 %    Absolute Neutrophil 4.2 1.6 - 8.3 10e9/L    Absolute Lymphocytes 0.6 (L) 0.8 - 5.3 10e9/L    Absolute Monocytes 0.8 0.0 - 1.3 10e9/L    Absolute Eosinophils 0.2 0.0 - 0.7 10e9/L    Absolute Basophils 0.0 0.0 - 0.2 10e9/L    Diff Method Automated Method          If you have any questions or concerns, please call the clinic at the number listed above.       Sincerely,        Dean Rider,  MD

## 2020-09-10 ENCOUNTER — HOSPITAL ENCOUNTER (OUTPATIENT)
Dept: ULTRASOUND IMAGING | Facility: CLINIC | Age: 85
Discharge: HOME | End: 2020-09-10
Attending: INTERNAL MEDICINE | Admitting: INTERNAL MEDICINE
Payer: COMMERCIAL

## 2020-09-10 DIAGNOSIS — I73.9 CLAUDICATION OF RIGHT LOWER EXTREMITY (H): ICD-10-CM

## 2020-09-10 PROCEDURE — 93925 LOWER EXTREMITY STUDY: CPT

## 2020-09-11 ENCOUNTER — TELEPHONE (OUTPATIENT)
Dept: FAMILY MEDICINE | Facility: CLINIC | Age: 85
End: 2020-09-11

## 2020-09-11 NOTE — TELEPHONE ENCOUNTER
Patient would like to know if PCP ordered lipid panel during 08/31 wellness exam. Informed pt this was not ordered. He will call clinic back if he would like lab order placed by provider. No further cation needed at this time.

## 2020-09-11 NOTE — TELEPHONE ENCOUNTER
Reason for Call:  Other call back    Detailed comments: The patient called and stated that he had his wellness exam on 8/31 with Dr. Rider but he has questions about the appointment. He would like a call back to discuss these questions.     Phone Number Patient can be reached at: Home number on file [phone] (home)    Best Time: Any    Can we leave a detailed message on this number? YES    Call taken on 9/11/2020 at 3:34 PM by Oriana Arvizu

## 2020-09-25 ENCOUNTER — OFFICE VISIT (OUTPATIENT)
Dept: OTHER | Facility: CLINIC | Age: 85
End: 2020-09-25
Attending: INTERNAL MEDICINE
Payer: COMMERCIAL

## 2020-09-25 VITALS — DIASTOLIC BLOOD PRESSURE: 80 MMHG | SYSTOLIC BLOOD PRESSURE: 120 MMHG | HEART RATE: 80 BPM

## 2020-09-25 DIAGNOSIS — I73.9 CLAUDICATION OF RIGHT LOWER EXTREMITY (H): ICD-10-CM

## 2020-09-25 DIAGNOSIS — I71.40 ABDOMINAL AORTIC ANEURYSM (AAA) WITHOUT RUPTURE (H): ICD-10-CM

## 2020-09-25 PROCEDURE — G0463 HOSPITAL OUTPT CLINIC VISIT: HCPCS

## 2020-09-25 PROCEDURE — 99214 OFFICE O/P EST MOD 30 MIN: CPT | Mod: ZP | Performed by: INTERNAL MEDICINE

## 2020-09-25 NOTE — NURSING NOTE
"Ayaan Herrera is a 87 year old male who presents for:  Chief Complaint   Patient presents with     RECHECK     review US        Vitals:    Vitals:    09/25/20 1153   BP: 120/80   BP Location: Left arm   Patient Position: Sitting   Cuff Size: Adult Regular   Pulse: 80       BMI:  Estimated body mass index is 23.62 kg/m  as calculated from the following:    Height as of 8/25/20: 6' 1\" (1.854 m).    Weight as of 8/31/20: 179 lb (81.2 kg).    Pain Score:  Data Unavailable        Rajani Pantoja MA      "

## 2020-09-25 NOTE — PROGRESS NOTES
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  We will continue with vascular risk factors modifications, patient is nondiabetic, not hypertensive, and not a smoker    Physical Exam       BP: 120/80 Pulse: 80            There were no vitals filed for this visit.  Vital Signs with Ranges  Pulse:  [80] 80  BP: (120)/(80) 120/80  [unfilled]    Constitutional: awake, alert, cooperative, no apparent distress, and appears stated age  Eyes: Lids and lashes normal, pupils equal, round and reactive to light, extra ocular muscles intact, sclera clear, conjunctiva normal  ENT: normocepalic, without obvious abnormality, oropharynx pink and moist  Hematologic / Lymphatic: no lymphadenopathy  Respiratory: No increased work of breathing, good air exchange, clear to auscultation bilaterally, no crackles or wheezing  Cardiovascular: regular rate and rhythm, normal S1 and S2 and no murmur noted  GI: Normal bowel sounds, soft, non-distended, non-tender  Skin: no redness, warmth, or swelling, no rashes and no lesions  Musculoskeletal: There is no redness, warmth, or swelling of the joints.  Full range of motion noted.  Motor strength is 5 out of 5 all extremities bilaterally.  Tone is normal.  Neurologic: Awake, alert, oriented to name, place and time.  Cranial nerves II-XII are grossly intact.  Motor is 5 out of 5 bilaterally.    Neuropsychiatric:  Normal affect, memory, insight.  Pulses: Palpable bilateral and equal. No  carotid bruits appreciated.     Medications         Data   No results found for this or any previous visit (from the past 24 hour(s)).    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

## 2020-10-07 ENCOUNTER — ANTICOAGULATION THERAPY VISIT (OUTPATIENT)
Dept: NURSING | Facility: CLINIC | Age: 85
End: 2020-10-07
Payer: COMMERCIAL

## 2020-10-07 ENCOUNTER — TELEPHONE (OUTPATIENT)
Dept: FAMILY MEDICINE | Facility: CLINIC | Age: 85
End: 2020-10-07

## 2020-10-07 DIAGNOSIS — Z79.01 LONG TERM CURRENT USE OF ANTICOAGULANT THERAPY: ICD-10-CM

## 2020-10-07 DIAGNOSIS — I48.20 CHRONIC ATRIAL FIBRILLATION (H): ICD-10-CM

## 2020-10-07 DIAGNOSIS — I48.20 CHRONIC ATRIAL FIBRILLATION (H): Primary | Chronic | ICD-10-CM

## 2020-10-07 LAB
CAPILLARY BLOOD COLLECTION: NORMAL
INR PPP: 2.5 (ref 0.86–1.14)

## 2020-10-07 PROCEDURE — 36416 COLLJ CAPILLARY BLOOD SPEC: CPT | Performed by: INTERNAL MEDICINE

## 2020-10-07 PROCEDURE — 99207 PR NO CHARGE NURSE ONLY: CPT

## 2020-10-07 PROCEDURE — 85610 PROTHROMBIN TIME: CPT | Performed by: INTERNAL MEDICINE

## 2020-10-07 NOTE — PROGRESS NOTES
Patient returned call to [facility], unable to reach [facility] nurse.    Please followup with patient when able.    Thanks,   Macrina Hogue RN

## 2020-10-07 NOTE — PROGRESS NOTES
Anticoagulation Management    Unable to reach Ayaan today.    Today's INR result of 2.5 is therapeutic (goal INR of 2.0-3.0).  Result received from: Clinic Lab    Follow up required to confirm warfarin dose taken and assess for changes    Left message to continue current dose of warfarin 5 mg tonight.    Transfer to [phone]    Anticoagulation clinic to follow up    Luisa Matthews RN  ANTICOAGULATION FOLLOW-UP CLINIC VISIT    Patient Name:  Ayaan Herrera  Date:  10/7/2020  Contact Type:  Telephone/ Patient    SUBJECTIVE:  Patient Findings     Comments:  The patient was assessed for diet, medication, and activity level changes, missed or extra doses, bruising or bleeding, with no problem findings. Pt reports that he got a better pill organizer which is helping him keep better track of his medications.        Clinical Outcomes     Negatives:  Major bleeding event, Thromboembolic event, Anticoagulation-related hospital admission, Anticoagulation-related ED visit, Anticoagulation-related fatality    Comments:  The patient was assessed for diet, medication, and activity level changes, missed or extra doses, bruising or bleeding, with no problem findings. Pt reports that he got a better pill organizer which is helping him keep better track of his medications.           OBJECTIVE    Recent labs: (last 7 days)     10/07/20  1119   INR 2.50*       ASSESSMENT / PLAN  INR assessment THER    Recheck INR In: 6 WEEKS    INR Location Outside lab      Anticoagulation Summary  As of 10/7/2020    INR goal:  2.0-3.0   TTR:  85.2 % (1 y)   INR used for dosin.50 (10/7/2020)   Warfarin maintenance plan:  5 mg (5 mg x 1) every day   Full warfarin instructions:  5 mg every day   Weekly warfarin total:  35 mg   Weekly max warfarin dose:  35 mg   No change documented:  Luisa Matthews RN   Plan last modified:  Janene Smith RN (3/18/2020)   Next INR check:  2020   Priority:  Maintenance   Target end date:   Indefinite    Indications    Chronic atrial fibrillation (H) [I48.20]             Anticoagulation Episode Summary     INR check location:  Clinic Lab    Preferred lab:      Send INR reminders to:  [facility]    Comments:              See the Encounter Report to view Anticoagulation Flowsheet and Dosing Calendar (Go to Encounters tab in chart review, and find the Anticoagulation Therapy Visit)    Pt INR is 2.5 today. Pt advised to continue taking 5 mg daily. Recheck INR in 6 weeks. Pt advised to get the INR checked sooner if he has a change in health. Pt scheduled for next INR check on 11/18/20 at 11:30 am at [facility]. Ayaan aware if signs of clotting (pain, tenderness, swelling, color change in leg or arm, SOB) and bleeding occur (blood in stool, urine, large bruising, bleeding gums, nosebleeds) to have INR check sooner. If sx severe report to ER or concerned for stroke call 911. If general questions or concerns arise, call clinic.       Luisa Matthews RN

## 2020-10-07 NOTE — TELEPHONE ENCOUNTER
ANTICOAGULATION MANAGEMENT      Ayaan Herrera due for annual renewal of referral to anticoagulation monitoring. Order pended for your review and signature.      ANTICOAGULATION SUMMARY      Warfarin indication(s)     Atrial fibrillation    Heart valve present?  NO       Current goal range   INR: 2.0-3.0     Goal appropriate for indication? Yes, INR 2-3 appropriate for hx of DVT, PE, hypercoagulable state, Afib, LVAD, or bileaflet AVR without risk factors     Current duration of therapy Indefinite/long term therapy   Time in Therapeutic Range (TTR)  (Goal > 60%) 85.2 %       Office visit with referring provider's group within last year yes on 8/31/20       Luisa Matthews RN

## 2020-10-26 DIAGNOSIS — I48.20 CHRONIC ATRIAL FIBRILLATION (H): Primary | ICD-10-CM

## 2020-10-26 DIAGNOSIS — Z79.01 LONG TERM CURRENT USE OF ANTICOAGULANT THERAPY: ICD-10-CM

## 2020-10-26 RX ORDER — WARFARIN SODIUM 5 MG/1
5 TABLET ORAL DAILY
Qty: 96 TABLET | Refills: 3
Start: 2020-10-26 | End: 2021-12-08 | Stop reason: DRUGHIGH

## 2020-11-18 ENCOUNTER — ANTICOAGULATION THERAPY VISIT (OUTPATIENT)
Dept: NURSING | Facility: CLINIC | Age: 85
End: 2020-11-18
Payer: COMMERCIAL

## 2020-11-18 DIAGNOSIS — I48.20 CHRONIC ATRIAL FIBRILLATION (H): ICD-10-CM

## 2020-11-18 DIAGNOSIS — Z79.01 LONG TERM CURRENT USE OF ANTICOAGULANT THERAPY: ICD-10-CM

## 2020-11-18 LAB
CAPILLARY BLOOD COLLECTION: NORMAL
INR PPP: 5.9 (ref 0.86–1.14)

## 2020-11-18 PROCEDURE — 36416 COLLJ CAPILLARY BLOOD SPEC: CPT | Performed by: INTERNAL MEDICINE

## 2020-11-18 PROCEDURE — 85610 PROTHROMBIN TIME: CPT | Performed by: INTERNAL MEDICINE

## 2020-11-18 PROCEDURE — 99207 PR NO CHARGE NURSE ONLY: CPT

## 2020-11-18 NOTE — PROGRESS NOTES
ANTICOAGULATION FOLLOW-UP CLINIC VISIT    Patient Name:  Ayaan Herrera  Date:  2020  Contact Type:  Telephone/ Patient    SUBJECTIVE:  Patient Findings     Comments:  Pt started on amiodarone through cardiology on 10/26/20 which would explain why pt's INR is supra elevated today.        Clinical Outcomes     Comments:  Pt started on amiodarone through cardiology on 10/26/20 which would explain why pt's INR is supra elevated today.           OBJECTIVE    Recent labs: (last 7 days)     20  1115   INR 5.90*       ASSESSMENT / PLAN  INR assessment SUPRA    Recheck INR In: 2 DAYS    INR Location Outside lab      Anticoagulation Summary  As of 2020    INR goal:  2.0-3.0   TTR:  75.4 % (1 y)   INR used for dosin.90 (2020)   Warfarin maintenance plan:  2.5 mg (5 mg x 0.5) every Mon, Fri; 5 mg (5 mg x 1) all other days   Full warfarin instructions:  : Hold; : Hold; Otherwise 2.5 mg every Mon, Fri; 5 mg all other days   Weekly warfarin total:  30 mg   Weekly max warfarin dose:  35 mg   Plan last modified:  uLisa Matthews RN (2020)   Next INR check:  2020   Priority:  Maintenance   Target end date:  Indefinite    Indications    Chronic atrial fibrillation (H) [I48.20]             Anticoagulation Episode Summary     INR check location:  Clinic Lab    Preferred lab:      Send INR reminders to:  [facility]    Comments:        Anticoagulation Care Providers     Provider Role Specialty Phone number    Dean Rider MD Referring Internal Medicine [phone]            See the Encounter Report to view Anticoagulation Flowsheet and Dosing Calendar (Go to Encounters tab in chart review, and find the Anticoagulation Therapy Visit)    Pt INR is 5.9 today. See findings. Pt advised to HOLD warfarin today 20 and tomorrow 20 recheck INR on 20 at 11:45 am at [facility]. Maintenance dose reduced by 14.3%. Will discuss with pt on Friday. Ayaan  aware if signs of clotting (pain, tenderness, swelling, color change in leg or arm, SOB) and bleeding occur (blood in stool, urine, large bruising, bleeding gums, nosebleeds) to have INR check sooner. If sx severe report to ER or concerned for stroke call 911. If general questions or concerns arise, call clinic.         Luisa Matthews RN

## 2020-11-20 ENCOUNTER — ANTICOAGULATION THERAPY VISIT (OUTPATIENT)
Dept: ANTICOAGULATION | Facility: CLINIC | Age: 85
End: 2020-11-20

## 2020-11-20 DIAGNOSIS — Z79.01 LONG TERM CURRENT USE OF ANTICOAGULANT THERAPY: ICD-10-CM

## 2020-11-20 DIAGNOSIS — I48.20 CHRONIC ATRIAL FIBRILLATION (H): ICD-10-CM

## 2020-11-20 LAB
CAPILLARY BLOOD COLLECTION: NORMAL
INR PPP: 4.1 (ref 0.86–1.14)

## 2020-11-20 PROCEDURE — 85610 PROTHROMBIN TIME: CPT | Performed by: INTERNAL MEDICINE

## 2020-11-20 PROCEDURE — 36416 COLLJ CAPILLARY BLOOD SPEC: CPT | Performed by: INTERNAL MEDICINE

## 2020-11-20 PROCEDURE — 99207 PR NO CHARGE NURSE ONLY: CPT

## 2020-11-20 NOTE — PROGRESS NOTES
ANTICOAGULATION FOLLOW-UP CLINIC VISIT    Patient Name:  Ayaan Herrera  Date:  2020  Contact Type:  Telephone    SUBJECTIVE:         OBJECTIVE    Recent labs: (last 7 days)     20  1123   INR 4.10*       ASSESSMENT / PLAN  INR assessment SUPRA    Recheck INR In: 3 DAYS    INR Location Clinic      Anticoagulation Summary  As of 2020    INR goal:  2.0-3.0   TTR:  74.8 % (1 y)   INR used for dosin.10 (2020)   Warfarin maintenance plan:  2.5 mg (5 mg x 0.5) every Mon, Fri; 5 mg (5 mg x 1) all other days   Full warfarin instructions:  : Hold; Otherwise 2.5 mg every Mon, Fri; 5 mg all other days   Weekly warfarin total:  30 mg   Weekly max warfarin dose:  35 mg   Plan last modified:  Luisa Matthews RN (2020)   Next INR check:  2020   Priority:  High   Target end date:  Indefinite    Indications    Chronic atrial fibrillation (H) [I48.20]             Anticoagulation Episode Summary     INR check location:  Clinic Lab    Preferred lab:      Send INR reminders to:  [facility]    Comments:        Anticoagulation Care Providers     Provider Role Specialty Phone number    Dean Rider MD Referring Internal Medicine [phone]            See the Encounter Report to view Anticoagulation Flowsheet and Dosing Calendar (Go to Encounters tab in chart review, and find the Anticoagulation Therapy Visit)        Janene Smith RN

## 2020-11-23 ENCOUNTER — ANTICOAGULATION THERAPY VISIT (OUTPATIENT)
Dept: ANTICOAGULATION | Facility: CLINIC | Age: 85
End: 2020-11-23

## 2020-11-23 DIAGNOSIS — I48.20 CHRONIC ATRIAL FIBRILLATION (H): ICD-10-CM

## 2020-11-23 DIAGNOSIS — Z79.01 LONG TERM CURRENT USE OF ANTICOAGULANT THERAPY: ICD-10-CM

## 2020-11-23 LAB
CAPILLARY BLOOD COLLECTION: NORMAL
INR PPP: 2.4 (ref 0.86–1.14)

## 2020-11-23 PROCEDURE — 36416 COLLJ CAPILLARY BLOOD SPEC: CPT | Performed by: INTERNAL MEDICINE

## 2020-11-23 PROCEDURE — 99207 PR NO CHARGE NURSE ONLY: CPT

## 2020-11-23 PROCEDURE — 85610 PROTHROMBIN TIME: CPT | Performed by: INTERNAL MEDICINE

## 2020-11-23 NOTE — PROGRESS NOTES
ANTICOAGULATION FOLLOW-UP CLINIC VISIT    Patient Name:  Ayaan Herrera  Date:  11/23/2020  Contact Type:  Telephone    SUBJECTIVE:  Patient Findings     Positives:  Change in medications (amindarone had been 400 mg from 10/28 to 11/18.  and then  dropped to 200 mg.  )    Comments:  The patient was assessed for diet, medication, and activity level changes, missed or extra doses, bruising or bleeding,           Clinical Outcomes     Comments:  The patient was assessed for diet, medication, and activity level changes, missed or extra doses, bruising or bleeding,              OBJECTIVE    Recent labs: (last 7 days)     11/23/20  1051   INR 2.40*       ASSESSMENT / PLAN  INR assessment THER    Recheck INR In: 1 WEEK    INR Location Clinic      Anticoagulation Summary  As of 11/23/2020    INR goal:  2.0-3.0   TTR:  74.3 % (1 y)   INR used for dosing:  No new INR was available at the time of this encounter.   Warfarin maintenance plan:  2.5 mg (5 mg x 0.5) every Mon, Fri; 5 mg (5 mg x 1) all other days   Full warfarin instructions:  11/25: 2.5 mg; Otherwise 2.5 mg every Mon, Fri; 5 mg all other days   Weekly warfarin total:  30 mg   Weekly max warfarin dose:  35 mg   Plan last modified:  Luisa Matthews RN (11/18/2020)   Next INR check:  11/30/2020   Priority:  High   Target end date:  Indefinite    Indications    Chronic atrial fibrillation (H) [I48.20]             Anticoagulation Episode Summary     INR check location:  Clinic Lab    Preferred lab:      [facility]R [facility]    Comments:        Anticoagulation Care Providers     Provider Role Specialty Phone number    Dean Rider MD Referring Internal Medicine [phone]            See the Encounter Report to view Anticoagulation Flowsheet and Dosing Calendar (Go to Encounters tab in chart review, and find the Anticoagulation Therapy Visit)        Janene Smith RN

## 2020-11-30 ENCOUNTER — ANTICOAGULATION THERAPY VISIT (OUTPATIENT)
Dept: ANTICOAGULATION | Facility: CLINIC | Age: 85
End: 2020-11-30

## 2020-11-30 DIAGNOSIS — I48.20 CHRONIC ATRIAL FIBRILLATION (H): ICD-10-CM

## 2020-11-30 DIAGNOSIS — Z79.01 LONG TERM CURRENT USE OF ANTICOAGULANT THERAPY: ICD-10-CM

## 2020-11-30 LAB
CAPILLARY BLOOD COLLECTION: NORMAL
INR PPP: 3.9 (ref 0.86–1.14)

## 2020-11-30 PROCEDURE — 85610 PROTHROMBIN TIME: CPT | Performed by: INTERNAL MEDICINE

## 2020-11-30 PROCEDURE — 99207 PR NO CHARGE NURSE ONLY: CPT

## 2020-11-30 PROCEDURE — 36416 COLLJ CAPILLARY BLOOD SPEC: CPT | Performed by: INTERNAL MEDICINE

## 2020-11-30 NOTE — PROGRESS NOTES
ANTICOAGULATION FOLLOW-UP CLINIC VISIT    Patient Name:  Ayaan Herrera  Date:  11/30/2020  Contact Type:  Telephone    SUBJECTIVE:  Patient Findings     Comments:  The patient was assessed for diet, medication, and activity level changes, missed or extra doses, bruising or bleeding, with no problem findings.          Clinical Outcomes     Comments:  The patient was assessed for diet, medication, and activity level changes, missed or extra doses, bruising or bleeding, with no problem findings.             OBJECTIVE    Recent labs: (last 7 days)     11/30/20  1058   INR 3.90*       ASSESSMENT / PLAN  INR assessment SUPRA    Recheck INR In: 2 WEEKS    INR Location Clinic      Anticoagulation Summary  As of 11/30/2020    INR goal:  2.0-3.0   TTR:  73.1 % (1 y)   INR used for dosing:  3.90 (11/30/2020)   Warfarin maintenance plan:  5 mg (5 mg x 1) every Sun, Tue, Thu; 2.5 mg (5 mg x 0.5) all other days   Full warfarin instructions:  12/2: 2.5 mg; 12/5: 2.5 mg; Otherwise 5 mg every Sun, Tue, Thu; 2.5 mg all other days   Weekly warfarin total:  25 mg   Weekly max warfarin dose:  35 mg   Plan last modified:  Janene Smith RN (11/30/2020)   Next INR check:  12/14/2020   Priority:  High   Target end date:  Indefinite    Indications    Chronic atrial fibrillation (H) [I48.20]             Anticoagulation Episode Summary     INR check location:  Clinic Lab    Preferred lab:      Send INR reminders to:  [facility]    Comments:        Anticoagulation Care Providers     Provider Role Specialty Phone number    Dean Rider MD Referring Internal Medicine [phone]            See the Encounter Report to view Anticoagulation Flowsheet and Dosing Calendar (Go to Encounters tab in chart review, and find the Anticoagulation Therapy Visit)        Janene Smith RN

## 2020-12-02 ENCOUNTER — TRANSFERRED RECORDS (OUTPATIENT)
Dept: HEALTH INFORMATION MANAGEMENT | Facility: CLINIC | Age: 85
End: 2020-12-02

## 2020-12-14 ENCOUNTER — ANTICOAGULATION THERAPY VISIT (OUTPATIENT)
Dept: ANTICOAGULATION | Facility: CLINIC | Age: 85
End: 2020-12-14

## 2020-12-14 DIAGNOSIS — I48.20 CHRONIC ATRIAL FIBRILLATION (H): ICD-10-CM

## 2020-12-14 DIAGNOSIS — Z79.01 LONG TERM CURRENT USE OF ANTICOAGULANT THERAPY: ICD-10-CM

## 2020-12-14 LAB
CAPILLARY BLOOD COLLECTION: NORMAL
INR PPP: 3.2 (ref 0.86–1.14)

## 2020-12-14 PROCEDURE — 99207 PR NO CHARGE NURSE ONLY: CPT

## 2020-12-14 PROCEDURE — 36416 COLLJ CAPILLARY BLOOD SPEC: CPT | Performed by: INTERNAL MEDICINE

## 2020-12-14 PROCEDURE — 85610 PROTHROMBIN TIME: CPT | Performed by: INTERNAL MEDICINE

## 2020-12-14 NOTE — PROGRESS NOTES
ANTICOAGULATION FOLLOW-UP CLINIC VISIT    Patient Name:  Ayaan Herrera  Date:  12/14/2020  Contact Type:  Telephone    SUBJECTIVE:  Patient Findings     Comments:  The patient was assessed for diet, medication, and activity level changes, missed or extra doses, bruising or bleeding, with no problem findings.          Clinical Outcomes     Comments:  The patient was assessed for diet, medication, and activity level changes, missed or extra doses, bruising or bleeding, with no problem findings.             OBJECTIVE    Recent labs: (last 7 days)     12/14/20  1128   INR 3.20*       ASSESSMENT / PLAN  INR assessment SUPRA    Recheck INR In: 2 WEEKS    INR Location Clinic      Anticoagulation Summary  As of 12/14/2020    INR goal:  2.0-3.0   TTR:  69.3 % (1 y)   INR used for dosing:  3.20 (12/14/2020)   Warfarin maintenance plan:  5 mg (5 mg x 1) every Sun, Tue, Thu; 2.5 mg (5 mg x 0.5) all other days   Full warfarin instructions:  12/15: 2.5 mg; 12/22: 2.5 mg; Otherwise 5 mg every Sun, Tue, Thu; 2.5 mg all other days   Weekly warfarin total:  25 mg   Weekly max warfarin dose:  35 mg   Plan last modified:  Janene Smith RN (11/30/2020)   Next INR check:  12/28/2020   Priority:  Maintenance   Target end date:  Indefinite    Indications    Chronic atrial fibrillation (H) [I48.20]             Anticoagulation Episode Summary     INR check location:  Clinic Lab    Preferred lab:      Send INR reminders to:  [facility]    Comments:        Anticoagulation Care Providers     Provider Role Specialty Phone number    Dean Rider MD Referring Internal Medicine [phone]            See the Encounter Report to view Anticoagulation Flowsheet and Dosing Calendar (Go to Encounters tab in chart review, and find the Anticoagulation Therapy Visit)        Janene Smith RN

## 2020-12-28 ENCOUNTER — ANTICOAGULATION THERAPY VISIT (OUTPATIENT)
Dept: ANTICOAGULATION | Facility: CLINIC | Age: 85
End: 2020-12-28

## 2020-12-28 DIAGNOSIS — I48.20 CHRONIC ATRIAL FIBRILLATION (H): ICD-10-CM

## 2020-12-28 DIAGNOSIS — Z79.01 LONG TERM CURRENT USE OF ANTICOAGULANT THERAPY: ICD-10-CM

## 2020-12-28 LAB
CAPILLARY BLOOD COLLECTION: NORMAL
INR PPP: 2.5 (ref 0.86–1.14)

## 2020-12-28 PROCEDURE — 36416 COLLJ CAPILLARY BLOOD SPEC: CPT | Performed by: INTERNAL MEDICINE

## 2020-12-28 PROCEDURE — 99207 PR NO CHARGE NURSE ONLY: CPT

## 2020-12-28 PROCEDURE — 85610 PROTHROMBIN TIME: CPT | Performed by: INTERNAL MEDICINE

## 2020-12-28 NOTE — PROGRESS NOTES
ANTICOAGULATION FOLLOW-UP CLINIC VISIT    Patient Name:  Ayaan Herrera  Date:  2020  Contact Type:  Telephone    SUBJECTIVE:  Patient Findings     Comments:  The patient was assessed for diet, medication, and activity level changes, missed or extra doses, bruising or bleeding, with no problem findings.          Clinical Outcomes     Comments:  The patient was assessed for diet, medication, and activity level changes, missed or extra doses, bruising or bleeding, with no problem findings.             OBJECTIVE    Recent labs: (last 7 days)     20  1147   INR 2.50*       ASSESSMENT / PLAN  INR assessment THER    Recheck INR In: 3 WEEKS    INR Location Clinic      Anticoagulation Summary  As of 2020    INR goal:  2.0-3.0   TTR:  69.3 % (1 y)   INR used for dosin.50 (2020)   Warfarin maintenance plan:  5 mg (5 mg x 1) every Sun, Tue, Thu; 2.5 mg (5 mg x 0.5) all other days   Full warfarin instructions:  : 2.5 mg; : 2.5 mg; : 2.5 mg; Otherwise 5 mg every Sun, Tue, Thu; 2.5 mg all other days   Weekly warfarin total:  25 mg   Weekly max warfarin dose:  35 mg   Plan last modified:  Janene Smith RN (2020)   Next INR check:  2021   Priority:  Maintenance   Target end date:  Indefinite    Indications    Chronic atrial fibrillation (H) [I48.20]             Anticoagulation Episode Summary     INR check location:  Clinic Lab    Preferred lab:      Send INR reminders to:  [facility]    Comments:        Anticoagulation Care Providers     Provider Role Specialty Phone number    Dean Rider MD Referring Internal Medicine [phone]            See the Encounter Report to view Anticoagulation Flowsheet and Dosing Calendar (Go to Encounters tab in chart review, and find the Anticoagulation Therapy Visit)        Janene Smith RN

## 2020-12-31 ENCOUNTER — TELEPHONE (OUTPATIENT)
Dept: INTERNAL MEDICINE | Facility: CLINIC | Age: 85
End: 2020-12-31

## 2020-12-31 NOTE — TELEPHONE ENCOUNTER
Please ask him why he wants this, in other words what is his disabling handicap.             Does he still drive?                             Then, please find one of the disability parking certificate applications and put it on my desk with the patient's name on it.

## 2020-12-31 NOTE — TELEPHONE ENCOUNTER
Pt wants to have Dr Rider fill out paperwork for Handicapped parking . . do you have the forms at the clinic for that? Please call him back.

## 2021-01-04 ENCOUNTER — NURSE TRIAGE (OUTPATIENT)
Dept: NURSING | Facility: CLINIC | Age: 86
End: 2021-01-04

## 2021-01-05 NOTE — TELEPHONE ENCOUNTER
Dr. Rider's nurse - is trying to reach her.   Disabling handicap: fell on the ice on 2/24/2020 - took him to [address] to walk without a cane. Balance problem - balance is terrible. When he cooley in large parking lot he has difficulty walking to door. Patient is still driving - but not on the freeway and not at night.    Darlene Augustin RN on 1/4/2021 at 8:27 PM

## 2021-01-18 ENCOUNTER — ANTICOAGULATION THERAPY VISIT (OUTPATIENT)
Dept: ANTICOAGULATION | Facility: CLINIC | Age: 86
End: 2021-01-18

## 2021-01-18 DIAGNOSIS — I48.20 CHRONIC ATRIAL FIBRILLATION (H): ICD-10-CM

## 2021-01-18 DIAGNOSIS — Z79.01 LONG TERM CURRENT USE OF ANTICOAGULANT THERAPY: ICD-10-CM

## 2021-01-18 LAB
CAPILLARY BLOOD COLLECTION: NORMAL
INR PPP: 2.6 (ref 0.86–1.14)

## 2021-01-18 PROCEDURE — 99207 PR NO CHARGE NURSE ONLY: CPT

## 2021-01-18 PROCEDURE — 85610 PROTHROMBIN TIME: CPT | Performed by: INTERNAL MEDICINE

## 2021-01-18 PROCEDURE — 36416 COLLJ CAPILLARY BLOOD SPEC: CPT | Performed by: INTERNAL MEDICINE

## 2021-01-18 NOTE — PROGRESS NOTES
ANTICOAGULATION FOLLOW-UP CLINIC VISIT    Patient Name:  Ayaan Herrera  Date:  2021  Contact Type:  Telephone    SUBJECTIVE:  Patient Findings     Comments:  The patient was assessed for diet, medication, and activity level changes, missed or extra doses, bruising or bleeding, with no problem findings. Reduced dosing worked well last 2 weeks so made it permanent          Clinical Outcomes     Comments:  The patient was assessed for diet, medication, and activity level changes, missed or extra doses, bruising or bleeding, with no problem findings. Reduced dosing worked well last 2 weeks so made it permanent             OBJECTIVE    Recent labs: (last 7 days)     21  1115   INR 2.60*       ASSESSMENT / PLAN  INR assessment THER    Recheck INR In: 3 WEEKS    INR Location Clinic      Anticoagulation Summary  As of 2021    INR goal:  2.0-3.0   TTR:  75.1 % (1 y)   INR used for dosin.60 (2021)   Warfarin maintenance plan:  5 mg (5 mg x 1) every Sun, Thu; 2.5 mg (5 mg x 0.5) all other days   Full warfarin instructions:  5 mg every Sun, Thu; 2.5 mg all other days   Weekly warfarin total:  22.5 mg   Weekly max warfarin dose:  35 mg   Plan last modified:  Janene Smith RN (2021)   Next INR check:  2021   Priority:  Maintenance   Target end date:  Indefinite    Indications    Chronic atrial fibrillation (H) [I48.20]             Anticoagulation Episode Summary     INR check location:  Clinic Lab    Preferred lab:      Send INR reminders to:  [facility]    Comments:        Anticoagulation Care Providers     Provider Role Specialty Phone number    Dean Rider MD Referring Internal Medicine [phone]            See the Encounter Report to view Anticoagulation Flowsheet and Dosing Calendar (Go to Encounters tab in chart review, and find the Anticoagulation Therapy Visit)        Janene Smith RN

## 2021-02-04 NOTE — TELEPHONE ENCOUNTER
Patient called, he got the form in the mail, signed it and sent it in on 1/10 to the state. He has not heard anything and the phone number does not go anywhere or get answered. Can you fill out another one and send to home address for him to try again? Pt will try to call again and if he reaches them he will call back and cancel this message.  Pt phone [phone] ok to lv detailed message

## 2021-02-05 NOTE — TELEPHONE ENCOUNTER
Spoke with patient, found form. He will send it in to [place] [employer]. The address given to Patient.

## 2021-02-08 ENCOUNTER — ANTICOAGULATION THERAPY VISIT (OUTPATIENT)
Dept: NURSING | Facility: CLINIC | Age: 86
End: 2021-02-08

## 2021-02-08 DIAGNOSIS — Z79.01 LONG TERM CURRENT USE OF ANTICOAGULANT THERAPY: ICD-10-CM

## 2021-02-08 DIAGNOSIS — I48.20 CHRONIC ATRIAL FIBRILLATION (H): ICD-10-CM

## 2021-02-08 LAB
CAPILLARY BLOOD COLLECTION: NORMAL
INR PPP: 3.4 (ref 0.86–1.14)

## 2021-02-08 PROCEDURE — 36416 COLLJ CAPILLARY BLOOD SPEC: CPT | Performed by: INTERNAL MEDICINE

## 2021-02-08 PROCEDURE — 85610 PROTHROMBIN TIME: CPT | Performed by: INTERNAL MEDICINE

## 2021-02-08 NOTE — PROGRESS NOTES
Anticoagulation Management    Unable to reach Ayaan today.    Today's INR result of 3.4 is supratherapeutic (goal INR of 2.0-3.0).  Result received from: Clinic Lab    Follow up required to discuss out of range INR     Left message to hold warfarin tonight. then continue taking 5 mg on Sundays, Thursdays and 2.5 mg all the other days. Request a call back to discuss    Transfer to [phone]      Anticoagulation clinic to follow up    Luisa Matthews RN  ANTICOAGULATION FOLLOW-UP CLINIC VISIT    Patient Name:  Ayaan Herrera  Date:  2/8/2021  Contact Type:  Telephone/ Patient    SUBJECTIVE:  Patient Findings     Positives:  Change in diet/appetite    Comments:  Pt reports that he started on amiodarone late August early September 2020. Pt seeing his cardiologist in the next two months and will determine whether he will continue to stay on that drug. Pt reports that he usually has greens a couple times a week but has not been able to that recently. Pt reports that he does not want to reduce his warfarin dose at this time. Pt states that he will eat greens the way he was before. If the INR level is elevated again at next INR check, may need to consider a maintenance dose reduction.        Clinical Outcomes     Comments:  Pt reports that he started on amiodarone late August early September 2020. Pt seeing his cardiologist in the next two months and will determine whether he will continue to stay on that drug. Pt reports that he usually has greens a couple times a week but has not been able to that recently. Pt reports that he does not want to reduce his warfarin dose at this time. Pt states that he will eat greens the way he was before. If the INR level is elevated again at next INR check, may need to consider a maintenance dose reduction.           OBJECTIVE    Recent labs: (last 7 days)     02/08/21  1046   INR 3.40*       ASSESSMENT / PLAN  INR assessment SUPRA    Recheck INR In: 2 WEEKS    INR Location Outside  lab      Anticoagulation Summary  As of 2/8/2021    INR goal:  2.0-3.0   TTR:  73.5 % (1 y)   INR used for dosing:  3.40 (2/8/2021)   Warfarin maintenance plan:  5 mg (5 mg x 1) every Sun, Thu; 2.5 mg (5 mg x 0.5) all other days   Full warfarin instructions:  2/8: Hold; Otherwise 5 mg every Sun, Thu; 2.5 mg all other days   Weekly warfarin total:  22.5 mg   Weekly max warfarin dose:  35 mg   Plan last modified:  Janene Smith RN (1/18/2021)   Next INR check:     Priority:  Maintenance   Target end date:  Indefinite    Indications    Chronic atrial fibrillation (H) [I48.20]             Anticoagulation Episode Summary     INR check location:  Clinic Lab    Preferred lab:      Send INR reminders to:  ORLIN KENNY    Comments:        Anticoagulation Care Providers     Provider Role Specialty Phone number    Dean Rider MD Referring Internal Medicine [phone]            See the Encounter Report to view Anticoagulation Flowsheet and Dosing Calendar (Go to Encounters tab in chart review, and find the Anticoagulation Therapy Visit)    Pt INR is 3.4 today.See findings. Pt advised to HOLD warfarin today 2/8/21 then continue taking 5 mg on Sundays, Thursdays and 2.5 mg all other days. Recheck INR in 2 weeks scheduled on 2/23/21 at [facility]. Ayaan aware if signs of clotting (pain, tenderness, swelling, color change in leg or arm, SOB) and bleeding occur (blood in stool, urine, large bruising, bleeding gums, nosebleeds) to have INR check sooner. If sx severe report to ER or concerned for stroke call 911. If general questions or concerns arise, call clinic.         Luisa Matthews, RN

## 2021-02-23 ENCOUNTER — ANTICOAGULATION THERAPY VISIT (OUTPATIENT)
Dept: ANTICOAGULATION | Facility: CLINIC | Age: 86
End: 2021-02-23

## 2021-02-23 DIAGNOSIS — I48.20 CHRONIC ATRIAL FIBRILLATION (H): ICD-10-CM

## 2021-02-23 DIAGNOSIS — Z79.01 LONG TERM CURRENT USE OF ANTICOAGULANT THERAPY: ICD-10-CM

## 2021-02-23 LAB
CAPILLARY BLOOD COLLECTION: NORMAL
INR PPP: 3 (ref 0.86–1.14)

## 2021-02-23 PROCEDURE — 85610 PROTHROMBIN TIME: CPT | Performed by: INTERNAL MEDICINE

## 2021-02-23 PROCEDURE — 36416 COLLJ CAPILLARY BLOOD SPEC: CPT | Performed by: INTERNAL MEDICINE

## 2021-02-23 NOTE — PROGRESS NOTES
ANTICOAGULATION MANAGEMENT     Patient Name:  Ayaan Herrera  Date:  2/23/2021    ASSESSMENT /SUBJECTIVE:    Today's INR result of 3.0 is therapeutic. Goal INR of 2.0-3.0      Warfarin dose taken: Warfarin taken as instructed    Diet: No new diet changes affecting INR    Medication changes/ interactions: No new medications/supplements affecting INR    Previous INR: Supratherapeutic     S/S of bleeding or thromboembolism: No    New injury or illness: No    Upcoming surgery, procedure or cardioversion: No    Additional findings: None      PLAN:    Telephone call with Ayaan regarding INR result and instructed:     Warfarin Dosing Instructions: Continue your current warfarin dose 5mg Sun/Thur and 2.5mg AOD    Instructed patient to follow up no later than: 3 weeks  Lab visit scheduled    Education provided: None required      Ayaan verbalizes understanding and agrees to warfarin dosing plan.    Instructed to call the Anticoagulation Clinic for any changes, questions or concerns. (#[phone])        Adriana Oliveira RN      OBJECTIVE:  Recent labs: (last 7 days)     02/23/21  1121   INR 3.00*         INR assessment THER    Recheck INR In: 3 WEEKS    INR Location Clinic      Anticoagulation Summary  As of 2/23/2021    INR goal:  2.0-3.0   TTR:  69.4 % (1 y)   INR used for dosing:  No new INR was available at the time of this encounter.   Warfarin maintenance plan:  5 mg (5 mg x 1) every Sun, Thu; 2.5 mg (5 mg x 0.5) all other days   Full warfarin instructions:  5 mg every Sun, Thu; 2.5 mg all other days   Weekly warfarin total:  22.5 mg   Weekly max warfarin dose:  35 mg   No change documented:  Adriana Oliveira RN   Plan last modified:  Janene Smith RN (1/18/2021)   Next INR check:  3/16/2021   Priority:  Maintenance   Target end date:  Indefinite    Indications    Chronic atrial fibrillation (H) [I48.20]             Anticoagulation Episode Summary     INR check location:  Clinic Lab    Preferred lab:       Send INR reminders to:  [facility]    Comments:        Anticoagulation Care Providers     Provider Role Specialty Phone number    Dean Rider MD Referring Internal Medicine [phone]       Anticoagulation Management    Unable to reach Ayaan today.    Today's INR result of 3.0 is therapeutic (goal INR of 2.0-3.0).  Result received from: Clinic Lab    Follow up required to confirm warfarin dose taken and assess for changes    Left message to continue current dose of warfarin 2.5 mg tonight.      Anticoagulation clinic to follow up    Adriana Oliveira RN

## 2021-03-16 ENCOUNTER — ANTICOAGULATION THERAPY VISIT (OUTPATIENT)
Dept: ANTICOAGULATION | Facility: CLINIC | Age: 86
End: 2021-03-16

## 2021-03-16 DIAGNOSIS — I48.20 CHRONIC ATRIAL FIBRILLATION (H): ICD-10-CM

## 2021-03-16 DIAGNOSIS — Z79.01 LONG TERM CURRENT USE OF ANTICOAGULANT THERAPY: ICD-10-CM

## 2021-03-16 LAB
CAPILLARY BLOOD COLLECTION: NORMAL
INR PPP: 2.7 (ref 0.86–1.14)

## 2021-03-16 PROCEDURE — 99207 PR NO CHARGE NURSE ONLY: CPT

## 2021-03-16 PROCEDURE — 85610 PROTHROMBIN TIME: CPT | Performed by: INTERNAL MEDICINE

## 2021-03-16 PROCEDURE — 36416 COLLJ CAPILLARY BLOOD SPEC: CPT | Performed by: INTERNAL MEDICINE

## 2021-03-16 NOTE — PROGRESS NOTES
ANTICOAGULATION FOLLOW-UP CLINIC VISIT    Patient Name:  Ayaan Herrera  Date:  3/16/2021  Contact Type:  Telephone    SUBJECTIVE:  Patient Findings     Comments:  The patient was assessed for diet, medication, and activity level changes, missed or extra doses, bruising or bleeding, with no problem findings.          Clinical Outcomes     Comments:  The patient was assessed for diet, medication, and activity level changes, missed or extra doses, bruising or bleeding, with no problem findings.             OBJECTIVE    Recent labs: (last 7 days)     21  1129   INR 2.70*       ASSESSMENT / PLAN  INR assessment THER    Recheck INR In: 4 WEEKS    INR Location Clinic      Anticoagulation Summary  As of 3/16/2021    INR goal:  2.0-3.0   TTR:  71.1 % (1 y)   INR used for dosin.70 (3/16/2021)   Warfarin maintenance plan:  5 mg (5 mg x 1) every Sun, Thu; 2.5 mg (5 mg x 0.5) all other days   Full warfarin instructions:  5 mg every Sun, Thu; 2.5 mg all other days   Weekly warfarin total:  22.5 mg   Weekly max warfarin dose:  35 mg   No change documented:  Janene Smith RN   Plan last modified:  Janene Smith RN (2021)   Next INR check:  2021   Priority:  Maintenance   Target end date:  Indefinite    Indications    Chronic atrial fibrillation (H) [I48.20]             Anticoagulation Episode Summary     INR check location:  Clinic Lab    Preferred lab:      Send INR reminders to:  [facility]    Comments:        Anticoagulation Care Providers     Provider Role Specialty Phone number    Dean Rider MD Referring Internal Medicine [phone]            See the Encounter Report to view Anticoagulation Flowsheet and Dosing Calendar (Go to Encounters tab in chart review, and find the Anticoagulation Therapy Visit)        Janene Smith, RN

## 2021-04-04 PROBLEM — I49.3 PVC'S (PREMATURE VENTRICULAR CONTRACTIONS): Status: ACTIVE | Noted: 2021-04-04

## 2021-04-04 NOTE — PATIENT INSTRUCTIONS
I will let you know your lab results.                         Keep taking the hydrochlorothiazide for now.                                       Call [phone] to set up an appointment with an ear specialist.

## 2021-04-05 ENCOUNTER — OFFICE VISIT (OUTPATIENT)
Dept: INTERNAL MEDICINE | Facility: CLINIC | Age: 86
End: 2021-04-05
Payer: COMMERCIAL

## 2021-04-05 VITALS
SYSTOLIC BLOOD PRESSURE: 120 MMHG | DIASTOLIC BLOOD PRESSURE: 76 MMHG | HEART RATE: 84 BPM | WEIGHT: 173 LBS | BODY MASS INDEX: 22.82 KG/M2 | OXYGEN SATURATION: 97 %

## 2021-04-05 DIAGNOSIS — I49.3 PVC'S (PREMATURE VENTRICULAR CONTRACTIONS): ICD-10-CM

## 2021-04-05 DIAGNOSIS — I48.20 CHRONIC ATRIAL FIBRILLATION (H): ICD-10-CM

## 2021-04-05 DIAGNOSIS — R26.89 POOR BALANCE: ICD-10-CM

## 2021-04-05 DIAGNOSIS — R60.0 BILATERAL LEG EDEMA: ICD-10-CM

## 2021-04-05 DIAGNOSIS — H60.392 INFECTIVE OTITIS EXTERNA, LEFT: Primary | ICD-10-CM

## 2021-04-05 LAB
ANION GAP SERPL CALCULATED.3IONS-SCNC: <1 MMOL/L (ref 3–14)
BUN SERPL-MCNC: 26 MG/DL (ref 7–30)
CALCIUM SERPL-MCNC: 9.4 MG/DL (ref 8.5–10.1)
CHLORIDE SERPL-SCNC: 99 MMOL/L (ref 94–109)
CO2 SERPL-SCNC: 35 MMOL/L (ref 20–32)
CREAT SERPL-MCNC: 0.96 MG/DL (ref 0.66–1.25)
GFR SERPL CREATININE-BSD FRML MDRD: 70 ML/MIN/{1.73_M2}
GLUCOSE SERPL-MCNC: 103 MG/DL (ref 70–99)
POTASSIUM SERPL-SCNC: 4.1 MMOL/L (ref 3.4–5.3)
SODIUM SERPL-SCNC: 130 MMOL/L (ref 133–144)

## 2021-04-05 PROCEDURE — 36415 COLL VENOUS BLD VENIPUNCTURE: CPT | Performed by: INTERNAL MEDICINE

## 2021-04-05 PROCEDURE — 80048 BASIC METABOLIC PNL TOTAL CA: CPT | Performed by: INTERNAL MEDICINE

## 2021-04-05 PROCEDURE — 99214 OFFICE O/P EST MOD 30 MIN: CPT | Performed by: INTERNAL MEDICINE

## 2021-04-05 RX ORDER — AMIODARONE HYDROCHLORIDE 200 MG/1
TABLET ORAL
COMMUNITY
Start: 2020-11-09 | End: 2021-04-05

## 2021-04-05 RX ORDER — AMIODARONE HYDROCHLORIDE 200 MG/1
200 TABLET ORAL DAILY
COMMUNITY
Start: 2021-04-05

## 2021-04-05 RX ORDER — FUROSEMIDE 20 MG
TABLET ORAL
COMMUNITY
Start: 2021-04-05 | End: 2021-07-16

## 2021-04-05 RX ORDER — NEOMYCIN SULFATE, POLYMYXIN B SULFATE AND HYDROCORTISONE 10; 3.5; 1 MG/ML; MG/ML; [USP'U]/ML
3 SUSPENSION/ DROPS AURICULAR (OTIC) 4 TIMES DAILY
Qty: 10 ML | Refills: 1 | Status: SHIPPED | OUTPATIENT
Start: 2021-04-05 | End: 2022-02-28

## 2021-04-05 NOTE — PROGRESS NOTES
"    Assessment & Plan     Infective otitis externa, left  Rx as listed.  Then he should see ENT.    - neomycin-polymyxin-hydrocortisone (CORTISPORIN) 3.5-84135-7 otic suspension  Dispense: 10 mL; Refill: 1  - OTOLARYNGOLOGY REFERRAL    PVC's (premature ventricular contractions)  Improved with addition of amiodarone.  He is at risk for hypokalemia though with HCTZ plus furosemide added.  - Basic metabolic panel  (Ca, Cl, CO2, Creat, Gluc, K, Na, BUN)    Chronic atrial fibrillation (H)    - Basic metabolic panel  (Ca, Cl, CO2, Creat, Gluc, K, Na, BUN)    Poor balance  Okay for PT referral  - PHYSICAL THERAPY REFERRAL    Bilateral leg edema  Check labs and adjust medications as indicated.    - Basic metabolic panel  (Ca, Cl, CO2, Creat, Gluc, K, Na, BUN)               Patient Instructions                I will let you know your lab results.                         Keep taking the hydrochlorothiazide for now.                                       Call [phone] to set up an appointment with an ear specialist.                No follow-ups on file.    Dean Rider MD  [facility]    Matteo Kuo is a 88 year old who presents for the following health issues     HPI     Concern - left ear pain  Onset: 3 weeks  Description: left ear pain  Intensity: moderate  Progression of Symptoms:  worsening  Accompanying Signs & Symptoms: none  Previous history of similar problem: none  Precipitating factors:        Worsened by: none  Alleviating factors:        Improved by: none  Therapies tried and outcome:  none                        Has been working with Cardiology.                 Now on amio; EF was lower.          Also given furosemide to take \"prn\".       Took a very few doses.            He still takes hydrochlorothiazide.    He has not had recent electrolytes checked.                                           Very poor balance; wants a PT order for Arben Carrion.                    " "             He still has not gotten his disability parking certificate.  He wants me to fill out another application.                      He also complains of some left ear symptoms.        Approximately 3 wk duration.          Some L ear ache and L jaw pain recently.         APAP helped.                            Worse at night or evening.                                   Review of Systems         Current Outpatient Medications   Medication Sig Dispense Refill     acetaminophen (TYLENOL) 325 MG tablet Take 325 mg by mouth as needed for mild pain       alendronate (FOSAMAX) 70 MG tablet TAKE 1 TAB WITH 8OZ OF WATER EVERY 7 DAYS 30 MIN BEFORE BREAKFAST & REMAIN UPRIGHT DURING THIS TIME 12 tablet 3     amiodarone (PACERONE) 200 MG tablet Take 1 tablet (200 mg) by mouth daily       brimonidine (ALPHAGAN P) 0.15 % ophthalmic solution Place 1 drop into both eyes 2 times daily 1 Bottle 11     COSOPT 2-0.5 % OP SOLN Apply 1 Dose to eye 2 times daily Both eyes.       furosemide (LASIX) 20 MG tablet \"Prn\" per Cardiology       hydrochlorothiazide (HYDRODIURIL) 25 MG tablet Take 1/2 (one-half) tablet by mouth once daily 45 tablet 3     ketotifen (ZADITOR) 0.025 % SOLN Place 1 drop into both eyes as needed       latanoprost (XALATAN) 0.005 % ophthalmic solution Place 1 drop into both eyes At Bedtime 3 Bottle 4     tamsulosin (FLOMAX) 0.4 MG capsule TAKE ONE CAPSULE BY MOUTH ONCE A DAY  3     Vitamin D, Cholecalciferol, 1000 UNITS TABS        warfarin ANTICOAGULANT (COUMADIN) 5 MG tablet Take 1 tablet (5 mg) by mouth daily 96 tablet 3     warfarin ANTICOAGULANT (COUMADIN) 5 MG tablet Take 2.5 mg on Fridays and 5 mg by mouth on all other days of the week       Wt Readings from Last 4 Encounters:   04/05/21 78.5 kg (173 lb)   08/31/20 81.2 kg (179 lb)   08/25/20 79.7 kg (175 lb 12.8 oz)   07/22/20 80.1 kg (176 lb 8 oz)       Objective    /76   Pulse 84   Wt 78.5 kg (173 lb)   SpO2 97%   BMI 22.82 kg/m    Body mass " index is 22.82 kg/m .  Physical Exam   GENERAL APPEARANCE: alert, no distress, fatigued and elderly and frail  HENT: There was lots of ordinary appearing brown wax in the left ear canal.  I removed this with an ear pick.  In the proximal ear canal was creamy whitish discharge which could not be easily removed.  There was some erythema of the proximal ear canal.  NEURO: Uses a cane    Labs are pending    Addendum:            Results for orders placed or performed in visit on 04/05/21   Basic metabolic panel  (Ca, Cl, CO2, Creat, Gluc, K, Na, BUN)     Status: Abnormal   Result Value Ref Range    Sodium 130 (L) 133 - 144 mmol/L    Potassium 4.1 3.4 - 5.3 mmol/L    Chloride 99 94 - 109 mmol/L    Carbon Dioxide 35 (H) 20 - 32 mmol/L    Anion Gap <1 (L) 3 - 14 mmol/L    Glucose 103 (H) 70 - 99 mg/dL    Urea Nitrogen 26 7 - 30 mg/dL    Creatinine 0.96 0.66 - 1.25 mg/dL    GFR Estimate 70 >60 mL/min/[1.73_m2]    GFR Estimate If Black 81 >60 mL/min/[1.73_m2]    Calcium 9.4 8.5 - 10.1 mg/dL               Letter and my chart message sent.                       The potassium level is normal.    If you take furosemide in the future, you should take 1 potassium tablet with each dose of furosemide.  You can keep taking the low dose of hydrochlorothiazide.                The sodium level is slightly low.     Please cut back your water intake by 6 ounces per day.

## 2021-04-05 NOTE — LETTER
April 5, 2021      Ayaan Herrera  [address] [address]        Dear ,    We are writing to inform you of your test results.                The potassium level is normal.    If you take furosemide in the future, you should take 1 potassium tablet with each dose of furosemide.  You can keep taking the low dose of hydrochlorothiazide.                The sodium level is slightly low.     Please cut back your water intake by 6 ounces per day.       Results for orders placed or performed in visit on 04/05/21   Basic metabolic panel  (Ca, Cl, CO2, Creat, Gluc, K, Na, BUN)     Status: Abnormal   Result Value Ref Range    Sodium 130 (L) 133 - 144 mmol/L    Potassium 4.1 3.4 - 5.3 mmol/L    Chloride 99 94 - 109 mmol/L    Carbon Dioxide 35 (H) 20 - 32 mmol/L    Anion Gap <1 (L) 3 - 14 mmol/L    Glucose 103 (H) 70 - 99 mg/dL    Urea Nitrogen 26 7 - 30 mg/dL    Creatinine 0.96 0.66 - 1.25 mg/dL    GFR Estimate 70 >60 mL/min/[1.73_m2]    GFR Estimate If Black 81 >60 mL/min/[1.73_m2]    Calcium 9.4 8.5 - 10.1 mg/dL         If you have any questions or concerns, please call the clinic at the number listed above.       Sincerely,    Dean Rider MD

## 2021-04-13 ENCOUNTER — ANTICOAGULATION THERAPY VISIT (OUTPATIENT)
Dept: ANTICOAGULATION | Facility: CLINIC | Age: 86
End: 2021-04-13

## 2021-04-13 DIAGNOSIS — I48.20 CHRONIC ATRIAL FIBRILLATION (H): ICD-10-CM

## 2021-04-13 DIAGNOSIS — Z79.01 LONG TERM CURRENT USE OF ANTICOAGULANT THERAPY: ICD-10-CM

## 2021-04-13 LAB
CAPILLARY BLOOD COLLECTION: NORMAL
INR PPP: 2.4 (ref 0.86–1.14)

## 2021-04-13 PROCEDURE — 36416 COLLJ CAPILLARY BLOOD SPEC: CPT | Performed by: INTERNAL MEDICINE

## 2021-04-13 PROCEDURE — 99207 PR NO CHARGE NURSE ONLY: CPT

## 2021-04-13 PROCEDURE — 85610 PROTHROMBIN TIME: CPT | Performed by: INTERNAL MEDICINE

## 2021-04-13 NOTE — PROGRESS NOTES
ANTICOAGULATION FOLLOW-UP CLINIC VISIT    Patient Name:  Ayaan Herrera  Date:  2021  Contact Type:  Telephone    SUBJECTIVE:  Patient Findings     Comments:  The patient was assessed for diet, medication, and activity level changes, missed or extra doses, bruising or bleeding, with no problem findings.          Clinical Outcomes     Comments:  The patient was assessed for diet, medication, and activity level changes, missed or extra doses, bruising or bleeding, with no problem findings.             OBJECTIVE    Recent labs: (last 7 days)     21  1300   INR 2.40*       ASSESSMENT / PLAN  INR assessment THER    Recheck INR In: 4 WEEKS    INR Location Clinic      Anticoagulation Summary  As of 2021    INR goal:  2.0-3.0   TTR:  73.2 % (1 y)   INR used for dosin.40 (2021)   Warfarin maintenance plan:  5 mg (5 mg x 1) every Sun, Thu; 2.5 mg (5 mg x 0.5) all other days   Full warfarin instructions:  5 mg every Sun, Thu; 2.5 mg all other days   Weekly warfarin total:  22.5 mg   Weekly max warfarin dose:  35 mg   No change documented:  Janene Smith RN   Plan last modified:  Janene Smith RN (2021)   Next INR check:  2021   Priority:  Maintenance   Target end date:  Indefinite    Indications    Chronic atrial fibrillation (H) [I48.20]             Anticoagulation Episode Summary     INR check location:  Clinic Lab    Preferred lab:      Send INR reminders to:  [facility]    Comments:        Anticoagulation Care Providers     Provider Role Specialty Phone number    Dean Rider MD Referring Internal Medicine [phone]            See the Encounter Report to view Anticoagulation Flowsheet and Dosing Calendar (Go to Encounters tab in chart review, and find the Anticoagulation Therapy Visit)        Janene Smith, RN

## 2021-04-16 ENCOUNTER — APPOINTMENT (OUTPATIENT)
Dept: CT IMAGING | Facility: CLINIC | Age: 86
End: 2021-04-16
Attending: NURSE PRACTITIONER
Payer: COMMERCIAL

## 2021-04-16 ENCOUNTER — HOSPITAL ENCOUNTER (EMERGENCY)
Facility: CLINIC | Age: 86
Discharge: HOME | End: 2021-04-16
Attending: NURSE PRACTITIONER | Admitting: NURSE PRACTITIONER
Payer: COMMERCIAL

## 2021-04-16 VITALS
DIASTOLIC BLOOD PRESSURE: 80 MMHG | TEMPERATURE: 97 F | OXYGEN SATURATION: 94 % | RESPIRATION RATE: 16 BRPM | SYSTOLIC BLOOD PRESSURE: 125 MMHG | HEART RATE: 88 BPM

## 2021-04-16 DIAGNOSIS — M62.81 GENERALIZED MUSCLE WEAKNESS: ICD-10-CM

## 2021-04-16 DIAGNOSIS — I48.20 CHRONIC ATRIAL FIBRILLATION (H): Primary | Chronic | ICD-10-CM

## 2021-04-16 LAB
ALBUMIN SERPL-MCNC: 3.8 G/DL (ref 3.4–5)
ALBUMIN UR-MCNC: NEGATIVE MG/DL
ALP SERPL-CCNC: 86 U/L (ref 40–150)
ALT SERPL W P-5'-P-CCNC: 42 U/L (ref 0–70)
ANION GAP SERPL CALCULATED.3IONS-SCNC: 4 MMOL/L (ref 3–14)
APPEARANCE UR: CLEAR
AST SERPL W P-5'-P-CCNC: 40 U/L (ref 0–45)
BASOPHILS # BLD AUTO: 0 10E9/L (ref 0–0.2)
BASOPHILS NFR BLD AUTO: 0.7 %
BILIRUB SERPL-MCNC: 0.7 MG/DL (ref 0.2–1.3)
BILIRUB UR QL STRIP: NEGATIVE
BUN SERPL-MCNC: 17 MG/DL (ref 7–30)
CALCIUM SERPL-MCNC: 9.3 MG/DL (ref 8.5–10.1)
CHLORIDE SERPL-SCNC: 101 MMOL/L (ref 94–109)
CO2 SERPL-SCNC: 28 MMOL/L (ref 20–32)
COLOR UR AUTO: YELLOW
CREAT SERPL-MCNC: 0.82 MG/DL (ref 0.66–1.25)
DIFFERENTIAL METHOD BLD: ABNORMAL
EOSINOPHIL # BLD AUTO: 0.2 10E9/L (ref 0–0.7)
EOSINOPHIL NFR BLD AUTO: 3.6 %
ERYTHROCYTE [DISTWIDTH] IN BLOOD BY AUTOMATED COUNT: 13.7 % (ref 10–15)
GFR SERPL CREATININE-BSD FRML MDRD: 79 ML/MIN/{1.73_M2}
GLUCOSE SERPL-MCNC: 108 MG/DL (ref 70–99)
GLUCOSE UR STRIP-MCNC: NEGATIVE MG/DL
HCT VFR BLD AUTO: 37.9 % (ref 40–53)
HGB BLD-MCNC: 12.9 G/DL (ref 13.3–17.7)
HGB UR QL STRIP: NEGATIVE
HYALINE CASTS #/AREA URNS LPF: 1 /LPF (ref 0–2)
IMM GRANULOCYTES # BLD: 0 10E9/L (ref 0–0.4)
IMM GRANULOCYTES NFR BLD: 0.5 %
INR PPP: 2.16 (ref 0.86–1.14)
INTERPRETATION ECG - MUSE: NORMAL
KETONES UR STRIP-MCNC: NEGATIVE MG/DL
LEUKOCYTE ESTERASE UR QL STRIP: NEGATIVE
LYMPHOCYTES # BLD AUTO: 0.5 10E9/L (ref 0.8–5.3)
LYMPHOCYTES NFR BLD AUTO: 9.3 %
MCH RBC QN AUTO: 29.8 PG (ref 26.5–33)
MCHC RBC AUTO-ENTMCNC: 34 G/DL (ref 31.5–36.5)
MCV RBC AUTO: 88 FL (ref 78–100)
MONOCYTES # BLD AUTO: 0.8 10E9/L (ref 0–1.3)
MONOCYTES NFR BLD AUTO: 14.8 %
MUCOUS THREADS #/AREA URNS LPF: PRESENT /LPF
NEUTROPHILS # BLD AUTO: 4 10E9/L (ref 1.6–8.3)
NEUTROPHILS NFR BLD AUTO: 71.1 %
NITRATE UR QL: NEGATIVE
NRBC # BLD AUTO: 0 10*3/UL
NRBC BLD AUTO-RTO: 0 /100
PH UR STRIP: 6 PH (ref 5–7)
PLATELET # BLD AUTO: 161 10E9/L (ref 150–450)
POTASSIUM SERPL-SCNC: 4.3 MMOL/L (ref 3.4–5.3)
PROT SERPL-MCNC: 7.4 G/DL (ref 6.8–8.8)
RBC # BLD AUTO: 4.33 10E12/L (ref 4.4–5.9)
RBC #/AREA URNS AUTO: 4 /HPF (ref 0–2)
SODIUM SERPL-SCNC: 133 MMOL/L (ref 133–144)
SOURCE: ABNORMAL
SP GR UR STRIP: 1.02 (ref 1–1.03)
SQUAMOUS #/AREA URNS AUTO: 0 /HPF (ref 0–1)
TROPONIN I SERPL-MCNC: <0.015 UG/L (ref 0–0.04)
UROBILINOGEN UR STRIP-MCNC: 0 MG/DL (ref 0–2)
WBC # BLD AUTO: 5.6 10E9/L (ref 4–11)
WBC #/AREA URNS AUTO: <1 /HPF (ref 0–5)

## 2021-04-16 PROCEDURE — 85025 COMPLETE CBC W/AUTO DIFF WBC: CPT | Performed by: NURSE PRACTITIONER

## 2021-04-16 PROCEDURE — 99285 EMERGENCY DEPT VISIT HI MDM: CPT | Mod: 25

## 2021-04-16 PROCEDURE — 85610 PROTHROMBIN TIME: CPT | Performed by: NURSE PRACTITIONER

## 2021-04-16 PROCEDURE — 93005 ELECTROCARDIOGRAM TRACING: CPT

## 2021-04-16 PROCEDURE — 84484 ASSAY OF TROPONIN QUANT: CPT | Performed by: NURSE PRACTITIONER

## 2021-04-16 PROCEDURE — 81001 URINALYSIS AUTO W/SCOPE: CPT | Performed by: NURSE PRACTITIONER

## 2021-04-16 PROCEDURE — 80053 COMPREHEN METABOLIC PANEL: CPT | Performed by: NURSE PRACTITIONER

## 2021-04-16 PROCEDURE — 70450 CT HEAD/BRAIN W/O DYE: CPT

## 2021-04-16 ASSESSMENT — ENCOUNTER SYMPTOMS
FREQUENCY: 0
DYSURIA: 0
NAUSEA: 0
NUMBNESS: 0
VOMITING: 0
CONSTIPATION: 0
WEAKNESS: 1
COUGH: 0
CHILLS: 0
HEADACHES: 0
APPETITE CHANGE: 0
DIZZINESS: 0
FEVER: 0
ACTIVITY CHANGE: 1
DIFFICULTY URINATING: 0
HEMATURIA: 0
SHORTNESS OF BREATH: 0
LIGHT-HEADEDNESS: 0
ABDOMINAL PAIN: 0
DIARRHEA: 0

## 2021-04-16 NOTE — ED PROVIDER NOTES
History   Chief Complaint:  Generalized Weakness     The history is provided by the patient.      Ayaan Herrera is an anticoagulated 88 year old male with history of AAA, atrial fibrillation, hyperlipidemia, hypertension, prostate and skin cancer, and sinus node dysfunction, s/p in situ cardiac pacemaker, checked yesterday, who presents with intermittent generalized weakness though worse over the last few days. Patient notes he was recently (2 months ago) prescribed Amiodarone, though states this was his only recent medication change.     Patient also reports he usually walks his dogs twice a day, but could not today due to his weakness which prompted his arrival to the . While at the , they did an EKG which was not overly concerning, but was sent over to the ED for further evaluation.     Patient denies fever, chest pain, shortness of breath, cough, nausea, vomiting, diarrhea, constipation, urinary symptoms, numbness, weakness in his upper extremities, or any appetite changes.    Review of Systems   Constitutional: Positive for activity change. Negative for appetite change, chills and fever.   HENT: Negative for congestion.    Respiratory: Negative for cough and shortness of breath.    Cardiovascular: Negative for chest pain and leg swelling.   Gastrointestinal: Negative for abdominal pain, constipation, diarrhea, nausea and vomiting.   Genitourinary: Negative for decreased urine volume, difficulty urinating, dysuria, frequency, hematuria, penile pain and urgency.   Neurological: Positive for weakness. Negative for dizziness, light-headedness, numbness and headaches.   All other systems reviewed and are negative.      Allergies:  Azithromycin  Amoxicillin  Augmentin    Medications:  Fosamax  Amiodarone  Lasix  Hydrochlorothiazide  Coumadin     Past Medical History:    Atrial fibrillation  Primary open-angle glaucoma  PVC's  Sinus node dysfunction s/p pacemaker  Hyperlipidemia  Hypertension  ED  AAA  Prostate  cancer   Skin cancer    Past Surgical History:    Cataract removal  Glaucoma surgery  Colonoscopy  Stress ECHO  Hernia repair  Tonsillectomy and adenoidectomy  TURP  Pacemaker implantation      Family History:    Glaucoma  Hypertension  Macular degeneration     Social History:  Smoking status: Former - quit date: 1980  Alcohol use: Positive  Drug use: Negative   Marital Status: Single   Presents to the ED alone.   PCP: Dean Rider    Physical Exam     Patient Vitals for the past 24 hrs:   BP Temp Pulse Resp SpO2   04/16/21 1520 125/80 97  F (36.1  C) 88 14 93 %     The patient's orthostatic vitals were monitored here in the emergency department and found to be 136/85 with a pulse of 73 while lying and 149/90 with a pulse of 70 while standing.    Physical Exam  General: Alert, No obvious discomfort, well kept  Eyes: PERRL, conjunctivae pink no scleral icterus or conjunctival injection  ENT:   Moist mucus membranes, posterior oropharynx clear without erythema or exudates, No lymphadenopathy, Normal voice  Resp:  No increased work of breathing. Good air movement  CV:  Normal rate and rhythm, no murmurs/rubs/gallops  GI:  Abdomen soft and non-distended.  Normoactive BS.  No tenderness, guarding or rebound, No masses  Skin:  Warm, dry.  No rashes or petechiae  Musculoskeletal: No peripheral edema or calf tenderness, Normal gross ROM   Neuro: Alert and oriented to person/place/time, normal sensation  Psychiatric: Normal affect, cooperative, good eye contact    Emergency Department Course   ECG  ECG taken at 1723, ECG read at 1730  Rate 71 bpm. NE interval * ms. QRS duration 200 ms. QT/QTc 488/530 ms. P-R-T axes * -35 113.   Ventricular-paced rhythm. Abnormal ECG.     Imaging:  CT Head without contrast:   1. No CT evidence of acute ischemia or hemorrhage.  2. Volume loss and white matter hypoattenuation which likely represents chronic small vessel ischemic change. As per radiology.    Laboratory:  CBC: WBC: 5.6, HGB:  12.9 (L), PLT: 161  CMP: Glucose: 108 (H). o/w WNL (Creatinine: 0.82)    INR: 2.16 (H)    Troponin (Collected 1659): <0.015    UA: RBC: 4 (H), Mucous: Present, o/w Negative    Emergency Department Course:    Reviewed:  I reviewed nursing notes, vitals and past medical history    Assessments:  1715 I obtained history and examined the patient as noted above.     1950 I rechecked the patient and discussed the results of his workup thus far.     Disposition:  The patient was discharged to home.     Impression & Plan   Medical Decision Making:  Ayaan Herrera is a 88 year old male who presents today for generally feeling weak.  He states that the symptoms have been ongoing for approximately 3 months however the last 2 days he felt they had worsened.  He did not go on his usual walk with his dog today as he felt his legs were weak.  Here he is ambulatory without difficulty.  He describes a injury to his right leg that occurred around the time of his weakness.  He described a large bruise to his posterior and medial right thigh as a result of his injury.  He has not of this today.  This has healed.  I suspect that his weakness is due to deconditioning.  He is scheduled to see physical therapy within the next week.  His evaluation today showed appropriate INR for anticoagulated patient.  Remainder his laboratory studies are noncontributory.  He has a nonacute EKG and negative troponin.  He has no signs of CVA.  A head CT was obtained which showed no acute intracranial process.  I discussed findings with patient and discussed if he had any specific concerns which he did not.  He just wanted to be evaluated.  There is no indication for further testing or imagery.  He appears to be safe and appropriate for outpatient management follow-up and is discharged home.    Diagnosis:    ICD-10-CM    1. Generalized muscle weakness  M62.81        Scribe Disclosure:  I, Leticia Hill, am serving as a scribe on 4/16/2021 at 5:56 PM to  personally document services performed by Florentin Murguia APRN* based on my observations and the provider's statements to me.      Florentin Murguia APRN CNP  04/17/21 0048

## 2021-04-17 NOTE — DISCHARGE INSTRUCTIONS
Continue with plan for physical therapy as scheduled.  Follow-up with primary care provider for further concerns.

## 2021-04-19 ENCOUNTER — DOCUMENTATION ONLY (OUTPATIENT)
Dept: INTERNAL MEDICINE | Facility: CLINIC | Age: 86
End: 2021-04-19

## 2021-04-19 NOTE — PROGRESS NOTES
ANTICOAGULATION  MANAGEMENT: Discharge Review    Ayaan Herrera chart reviewed for anticoagulation continuity of care    Emergency room visit on 4/16 for generalized weakness.    Discharge disposition: Home    Results:    Recent labs: (last 7 days)     04/13/21  1300 04/16/21  1659   INR 2.40* 2.16*     Anticoagulation inpatient management:     not applicable     Anticoagulation discharge instructions:     Warfarin dosing: home regimen continued   Bridging: No   INR goal change: No      Medication changes affecting anticoagulation: No    Additional factors affecting anticoagulation: No    Plan     No adjustment to anticoagulation plan needed    Patient not contacted, has upcoming INR scheduled     No adjustment to Anticoagulation Calendar was required    Adriana Oliveira RN

## 2021-04-26 DIAGNOSIS — I10 ESSENTIAL HYPERTENSION: ICD-10-CM

## 2021-04-26 RX ORDER — HYDROCHLOROTHIAZIDE 25 MG/1
TABLET ORAL
Qty: 45 TABLET | Refills: 0 | Status: SHIPPED | OUTPATIENT
Start: 2021-04-26 | End: 2021-07-16

## 2021-05-04 ENCOUNTER — ANTICOAGULATION THERAPY VISIT (OUTPATIENT)
Dept: ANTICOAGULATION | Facility: CLINIC | Age: 86
End: 2021-05-04

## 2021-05-04 DIAGNOSIS — I48.20 CHRONIC ATRIAL FIBRILLATION (H): Chronic | ICD-10-CM

## 2021-05-04 DIAGNOSIS — I48.20 CHRONIC ATRIAL FIBRILLATION (H): ICD-10-CM

## 2021-05-04 LAB
CAPILLARY BLOOD COLLECTION: NORMAL
INR PPP: 2.3 (ref 0.86–1.14)

## 2021-05-04 PROCEDURE — 36416 COLLJ CAPILLARY BLOOD SPEC: CPT | Performed by: INTERNAL MEDICINE

## 2021-05-04 PROCEDURE — 85610 PROTHROMBIN TIME: CPT | Performed by: INTERNAL MEDICINE

## 2021-05-04 NOTE — PROGRESS NOTES
ANTICOAGULATION MANAGEMENT     Patient Name:  Ayaan Herrera  Date:  5/4/2021    ASSESSMENT /SUBJECTIVE:    Today's INR result of 2.3 is therapeutic. Goal INR of 2.0-3.0      Warfarin dose taken: Warfarin taken as instructed    Diet: No new diet changes affecting INR    Medication changes/ interactions: No new medications/supplements affecting INR    Previous INR: Therapeutic     S/S of bleeding or thromboembolism: No    New injury or illness: No    Upcoming surgery, procedure or cardioversion: No    Additional findings: None      PLAN:    Telephone call with Ayaan regarding INR result and instructed:     Warfarin Dosing Instructions: Continue your current warfarin dose 5mg Sun/Thu/ and 2.5mg AOD    Instructed patient to follow up no later than: 4 weeks  Lab visit scheduled    Education provided: Monitoring for bleeding signs and symptoms and Monitoring for clotting signs and symptoms      Ayaan verbalizes understanding and agrees to warfarin dosing plan.    Instructed to call the Anticoagulation Clinic for any changes, questions or concerns. (#[phone])        Adriana Oliveira RN      OBJECTIVE:  Recent labs: (last 7 days)     05/04/21  1141   INR 2.30*         No question data found.  Anticoagulation Summary  As of 5/4/2021    INR goal:  2.0-3.0   TTR:  73.2 % (1 y)   INR used for dosing:  No new INR was available at the time of this encounter.   Warfarin maintenance plan:  5 mg (5 mg x 1) every Sun, Thu; 2.5 mg (5 mg x 0.5) all other days   Full warfarin instructions:  5 mg every Sun, Thu; 2.5 mg all other days   Weekly warfarin total:  22.5 mg   Weekly max warfarin dose:  35 mg   No change documented:  Adriana Oliveira RN   Plan last modified:  Janene Smith RN (1/18/2021)   Next INR check:  6/1/2021   Priority:  Maintenance   Target end date:  Indefinite    Indications    Chronic atrial fibrillation (H) [I48.20]             Anticoagulation Episode Summary     INR check location:  Clinic Lab     Preferred lab:      Send INR reminders to:  [facility]    Comments:        Anticoagulation Care Providers     Provider Role Specialty Phone number    Dean Rider MD Referring Internal Medicine [phone]       Anticoagulation Management    Unable to reach Ayaan today.    Today's INR result of 2.3 is therapeutic (goal INR of 2.0-3.0).  Result received from: Clinic Lab    Follow up required to confirm warfarin dose taken and assess for changes    Left message to continue current dose of warfarin 2.5 mg tonight.      Anticoagulation clinic to follow up    Adriana Oliveira RN

## 2021-06-03 ENCOUNTER — ANTICOAGULATION THERAPY VISIT (OUTPATIENT)
Dept: ANTICOAGULATION | Facility: CLINIC | Age: 86
End: 2021-06-03

## 2021-06-03 DIAGNOSIS — I48.20 CHRONIC ATRIAL FIBRILLATION (H): Chronic | ICD-10-CM

## 2021-06-03 DIAGNOSIS — I48.20 CHRONIC ATRIAL FIBRILLATION (H): ICD-10-CM

## 2021-06-03 LAB
CAPILLARY BLOOD COLLECTION: NORMAL
INR PPP: 2.1 (ref 0.86–1.14)

## 2021-06-03 PROCEDURE — 85610 PROTHROMBIN TIME: CPT | Performed by: INTERNAL MEDICINE

## 2021-06-03 PROCEDURE — 36416 COLLJ CAPILLARY BLOOD SPEC: CPT | Performed by: INTERNAL MEDICINE

## 2021-06-03 PROCEDURE — 99207 PR NO CHARGE NURSE ONLY: CPT

## 2021-06-04 NOTE — PROGRESS NOTES
ANTICOAGULATION FOLLOW-UP CLINIC VISIT    Patient Name:  Ayaan Herrera  Date:  2021  Contact Type:  Telephone    SUBJECTIVE:  Patient Findings     Comments:  The patient was assessed for diet, medication, and activity level changes, missed or extra doses, bruising or bleeding, with no problem findings.            Clinical Outcomes     Comments:  The patient was assessed for diet, medication, and activity level changes, missed or extra doses, bruising or bleeding, with no problem findings.               OBJECTIVE    Recent labs: (last 7 days)     21  1611   INR 2.10*       ASSESSMENT / PLAN  INR assessment THER    Recheck INR In: 4 WEEKS    INR Location Clinic      Anticoagulation Summary  As of 6/3/2021    INR goal:  2.0-3.0   TTR:  76.0 % (1 y)   INR used for dosin.10 (6/3/2021)   Warfarin maintenance plan:  5 mg (5 mg x 1) every Sun, Thu; 2.5 mg (5 mg x 0.5) all other days   Full warfarin instructions:  5 mg every Sun, Thu; 2.5 mg all other days   Weekly warfarin total:  22.5 mg   Weekly max warfarin dose:  35 mg   Plan last modified:  Janene Smith RN (2021)   Next INR check:  2021   Priority:  Maintenance   Target end date:  Indefinite    Indications    Chronic atrial fibrillation (H) [I48.20]             Anticoagulation Episode Summary     INR check location:  Clinic Lab    Preferred lab:      Send INR reminders to:  [facility]    Comments:        Anticoagulation Care Providers     Provider Role Specialty Phone number    Dean Rider MD Referring Internal Medicine [phone]            See the Encounter Report to view Anticoagulation Flowsheet and Dosing Calendar (Go to Encounters tab in chart review, and find the Anticoagulation Therapy Visit)        Janene Smith, RN

## 2021-06-13 NOTE — PATIENT INSTRUCTIONS
Consider continuing physical therapy.   You can do some of this at home.              Stand up from a chair multiple times per day.         This will help strengthen your quardiceps muscles (upper leg muscles).                      Consider getting some spikes to put on your shoes for winter time dog walking.                   FYI: new [facility] date of 12/31/21.       This date is definite assuming no unusual circumstance.

## 2021-06-14 ENCOUNTER — OFFICE VISIT (OUTPATIENT)
Dept: INTERNAL MEDICINE | Facility: CLINIC | Age: 86
End: 2021-06-14
Payer: COMMERCIAL

## 2021-06-14 VITALS
TEMPERATURE: 98 F | BODY MASS INDEX: 23.22 KG/M2 | SYSTOLIC BLOOD PRESSURE: 114 MMHG | OXYGEN SATURATION: 98 % | DIASTOLIC BLOOD PRESSURE: 70 MMHG | WEIGHT: 176 LBS | HEART RATE: 78 BPM

## 2021-06-14 DIAGNOSIS — C61 MALIGNANT NEOPLASM OF PROSTATE (H): Chronic | ICD-10-CM

## 2021-06-14 DIAGNOSIS — M62.81 GENERALIZED MUSCLE WEAKNESS: Primary | ICD-10-CM

## 2021-06-14 DIAGNOSIS — M21.611 BUNION, RIGHT: ICD-10-CM

## 2021-06-14 DIAGNOSIS — R22.1 LUMP IN NECK: ICD-10-CM

## 2021-06-14 DIAGNOSIS — L84 CALLUS OF FOOT: ICD-10-CM

## 2021-06-14 DIAGNOSIS — S51.811A SKIN TEAR OF RIGHT FOREARM WITHOUT COMPLICATION, INITIAL ENCOUNTER: ICD-10-CM

## 2021-06-14 PROBLEM — I73.9 INTERMITTENT CLAUDICATION (H): Status: RESOLVED | Noted: 2018-09-05 | Resolved: 2021-06-14

## 2021-06-14 PROBLEM — I73.9 CLAUDICATION OF RIGHT LOWER EXTREMITY (H): Status: RESOLVED | Noted: 2018-09-14 | Resolved: 2021-06-14

## 2021-06-14 PROBLEM — I71.40 ABDOMINAL AORTIC ANEURYSM (AAA) WITHOUT RUPTURE (H): Chronic | Status: RESOLVED | Noted: 2017-01-04 | Resolved: 2021-06-14

## 2021-06-14 PROCEDURE — 99213 OFFICE O/P EST LOW 20 MIN: CPT | Performed by: INTERNAL MEDICINE

## 2021-06-14 NOTE — PROGRESS NOTES
Assessment & Plan     Generalized muscle weakness  I instructed him in quadricep strengthening exercises.  See patient instructions.    Bunion, right      Callus of foot  His toes overlap.  He will keep a bandage over the callus where the second toe overlaps.    Lump in neck  Based on his history.  I do not find anything palpably abnormal today.    Malignant neoplasm of prostate (H)  Discussed with patient.  He continues with antiandrogen therapy.    Skin tear of right forearm without complication, initial encounter  Wound is redressed.               Patient Instructions      Consider continuing physical therapy.   You can do some of this at home.              Stand up from a chair multiple times per day.         This will help strengthen your quardiceps muscles (upper leg muscles).                      Consider getting some spikes to put on your shoes for winter time dog walking.                   FYI: new [facility] date of 12/31/21.       This date is definite assuming no unusual circumstance.                          Return in about 4 months (around 10/14/2021) for follow up of several issues.    Dean Rider MD  [facility]    Matteo Kuo is a 88 year old who presents for the following health issues     HPI     Weakness, balance and brain fog -still the same     Lump on right neck and appears and disappears    Callus on big toe    He finished PT. They thought he had improved somewhat.          He is not sure about that.           He is using a cane.        He does not want to use a walker.          Cannot get up off the ground. C/o R leg weakness. Can go up or down stairs however.                           Head CT in 4/21 was neg except for moderate volume loss.                      He has noted an intermittent soft lump R neck.               Has 1+ leg edema; improves overnight; takes 12.5 mg hydrochlorothiazide every day.            Awakens with a sour mouth.     "   Does not have a hx of GERD.                      Saw Cardiology recently.                     He also saw Urology  recently;  PSA 0; AAT continued.                                     C/o intermittent cloudy vision.             He sees Ophthalmology soon.          He suffered a skin tear left forearm going through a doorway recently.  There was some bleeding.  He takes warfarin.                                                 Review of Systems         Current Outpatient Medications   Medication Sig Dispense Refill     acetaminophen (TYLENOL) 325 MG tablet Take 325 mg by mouth as needed for mild pain       alendronate (FOSAMAX) 70 MG tablet TAKE 1 TAB WITH 8OZ OF WATER EVERY 7 DAYS 30 MIN BEFORE BREAKFAST & REMAIN UPRIGHT DURING THIS TIME 12 tablet 3     amiodarone (PACERONE) 200 MG tablet Take 1 tablet (200 mg) by mouth daily       brimonidine (ALPHAGAN P) 0.15 % ophthalmic solution Place 1 drop into both eyes 2 times daily 1 Bottle 11     COSOPT 2-0.5 % OP SOLN Apply 1 Dose to eye 2 times daily Both eyes.       furosemide (LASIX) 20 MG tablet \"Prn\" per Cardiology       hydrochlorothiazide (HYDRODIURIL) 25 MG tablet Take 1/2 (one-half) tablet by mouth once daily 45 tablet 0     ketotifen (ZADITOR) 0.025 % SOLN Place 1 drop into both eyes as needed       latanoprost (XALATAN) 0.005 % ophthalmic solution Place 1 drop into both eyes At Bedtime 3 Bottle 4     neomycin-polymyxin-hydrocortisone (CORTISPORIN) 3.5-57576-2 otic suspension Place 3 drops Into the left ear 4 times daily 10 mL 1     tamsulosin (FLOMAX) 0.4 MG capsule TAKE ONE CAPSULE BY MOUTH ONCE A DAY  3     Vitamin D, Cholecalciferol, 1000 UNITS TABS        warfarin ANTICOAGULANT (COUMADIN) 5 MG tablet Take 1 tablet (5 mg) by mouth daily 96 tablet 3     warfarin ANTICOAGULANT (COUMADIN) 5 MG tablet Take 2.5 mg on Fridays and 5 mg by mouth on all other days of the week       Wt Readings from Last 4 Encounters:   06/14/21 79.8 kg (176 lb)   04/05/21 78.5 kg " (173 lb)   08/31/20 81.2 kg (179 lb)   08/25/20 79.7 kg (175 lb 12.8 oz)       Objective    /70   Pulse 78   Temp 98  F (36.7  C) (Oral)   Wt 79.8 kg (176 lb)   SpO2 98%   BMI 23.22 kg/m    Body mass index is 23.22 kg/m .  Physical Exam   GENERAL APPEARANCE: alert and no distress  NECK: no adenopathy, no asymmetry, masses, or scars and thyroid normal to palpation  MS: Large bunion right foot, first and second toes overlap, there is a callus dorsal aspect of the big toe.  He also has hammertoes.  NEURO: mentation intact, speech normal and gait abnormal ; uses a cane  PSYCH: affect normal/bright  Skin: 2 small skin tears right forearm covered with Band-Aids.  These were removed, the wounds were clean, bacitracin and cotton bandages with paper tape were applied.

## 2021-07-01 ENCOUNTER — ANTICOAGULATION THERAPY VISIT (OUTPATIENT)
Dept: ANTICOAGULATION | Facility: CLINIC | Age: 86
End: 2021-07-01

## 2021-07-01 DIAGNOSIS — I48.20 CHRONIC ATRIAL FIBRILLATION (H): ICD-10-CM

## 2021-07-01 DIAGNOSIS — I48.20 CHRONIC ATRIAL FIBRILLATION (H): Chronic | ICD-10-CM

## 2021-07-01 LAB
CAPILLARY BLOOD COLLECTION: NORMAL
INR PPP: 2.4 (ref 0.86–1.14)

## 2021-07-01 PROCEDURE — 36416 COLLJ CAPILLARY BLOOD SPEC: CPT | Performed by: INTERNAL MEDICINE

## 2021-07-01 PROCEDURE — 85610 PROTHROMBIN TIME: CPT | Performed by: INTERNAL MEDICINE

## 2021-07-01 NOTE — PROGRESS NOTES
ANTICOAGULATION MANAGEMENT     Ayaan Herrera 88 year old male is on warfarin with therapeutic INR result. (Goal INR 2.0-3.0)    Recent labs: (last 7 days)     07/01/21  1030   INR 2.40*       ASSESSMENT     Source(s): Patient/Caregiver Call       Warfarin doses taken: Warfarin taken as instructed    Diet: No new diet changes identified    New illness, injury, or hospitalization: No    Medication/supplement changes: None noted    Signs or symptoms of bleeding or clotting: No    Previous INR: Therapeutic last 2(+) visits    Additional findings: None     PLAN     Recommended plan for no diet, medication or health factor changes affecting INR     Dosing Instructions: Continue your current warfarin dose with next INR in 4 weeks       Summary  As of 7/1/2021    Full warfarin instructions:  5 mg every Sun, Thu; 2.5 mg all other days   Next INR check:  7/29/2021             Telephone call with Ayaan whom verbalizes understanding and agrees to plan    Lab visit scheduled    Education provided: Monitoring for bleeding signs and symptoms and Monitoring for clotting signs and symptoms    Plan made per [facility] anticoagulation protocol   AVS mailed to pt.    Adriana Oliveira RN  Anticoagulation Clinic  7/1/2021    _______________________________________________________________________     Anticoagulation Episode Summary     Current INR goal:  2.0-3.0   TTR:  76.1 % (1 y)   Target end date:  Indefinite   Send INR reminders to:  [facility]    Indications    Chronic atrial fibrillation (H) [I48.20]           Comments:           Anticoagulation Care Providers     Provider Role Specialty Phone number    Dean Rider MD Referring Internal Medicine [phone]

## 2021-07-15 ENCOUNTER — OFFICE VISIT (OUTPATIENT)
Dept: INTERNAL MEDICINE | Facility: CLINIC | Age: 86
End: 2021-07-15
Payer: COMMERCIAL

## 2021-07-15 VITALS
BODY MASS INDEX: 22.82 KG/M2 | OXYGEN SATURATION: 96 % | WEIGHT: 173 LBS | SYSTOLIC BLOOD PRESSURE: 124 MMHG | HEART RATE: 85 BPM | DIASTOLIC BLOOD PRESSURE: 80 MMHG | TEMPERATURE: 98.1 F

## 2021-07-15 DIAGNOSIS — R60.0 BILATERAL LEG EDEMA: Primary | ICD-10-CM

## 2021-07-15 DIAGNOSIS — I48.20 CHRONIC ATRIAL FIBRILLATION (H): ICD-10-CM

## 2021-07-15 DIAGNOSIS — R26.89 POOR BALANCE: ICD-10-CM

## 2021-07-15 PROCEDURE — 80048 BASIC METABOLIC PNL TOTAL CA: CPT | Performed by: INTERNAL MEDICINE

## 2021-07-15 PROCEDURE — 99213 OFFICE O/P EST LOW 20 MIN: CPT | Performed by: INTERNAL MEDICINE

## 2021-07-15 PROCEDURE — 36415 COLL VENOUS BLD VENIPUNCTURE: CPT | Performed by: INTERNAL MEDICINE

## 2021-07-15 NOTE — PATIENT INSTRUCTIONS
Please wear your support stockings almost every day.                                         I will let you know your lab results.                                          I will let you know if you need to take potassium with your furosemide.                                                                                                FYI: new residential date of 12/31/21.       This date is definite assuming no unusual circumstance.

## 2021-07-15 NOTE — PROGRESS NOTES
Assessment & Plan     Bilateral leg edema  In large part this is due to his chronic venous stasis.                    See patient instructions  - Basic metabolic panel  (Ca, Cl, CO2, Creat, Gluc, K, Na, BUN)    Poor balance  Continue physical therapy    Chronic atrial fibrillation (H)                 Patient Instructions   Please wear your support stockings almost every day.                                         I will let you know your lab results.                                          I will let you know if you need to take potassium with your furosemide.                                                                                                FYI: new intermediate date of 12/31/21.       This date is definite assuming no unusual circumstance.           Return in about 4 months (around 11/15/2021) for follow up of several issues.    Dean Rider MD  [facility]    Matteo Kuo is a 88 year old who presents for the following health issues     HPI     Concern - edema  Onset: 1 year  Description: edema and redness in both legs, left worse than right  Intensity: moderate  Progression of Symptoms:  worsening  Accompanying Signs & Symptoms: weakness  Previous history of similar problem: no  Precipitating factors:        Worsened by: none  Alleviating factors:        Improved by: none  Therapies tried and outcome: None        this patient is here with complaints of worsening peripheral edema.     This is chronic, and he has chronic venous stasis.  He has support stockings but does not like to wear them in warm weather.  Sometimes uses lotion      , but nevertheless has ongoing problems with dry skin.            He takes HCTZ 12.5 mg/day.  Within the past few weeks, cardiology ordered furosemide 20 mg/day to use as needed, but then a subsequent cardiologist discontinued this medication.  He still has a few tablets left with 1 refill of 30 tablets that he could obtain.  " He does not take any potassium supplements.  His recent sodium was 133, potassium was 4.3 that was in April.       He continues with physical therapy to try and improve his balance, and he thinks that helps a little bit.                                He uses a cane.               He takes amiodarone per cardiology.  Thyroid functions, liver functions, and pulmonary functions were normal or unremarkable.                                 Review of Systems         Current Outpatient Medications   Medication Sig Dispense Refill     acetaminophen (TYLENOL) 325 MG tablet Take 325 mg by mouth as needed for mild pain       alendronate (FOSAMAX) 70 MG tablet TAKE 1 TAB WITH 8OZ OF WATER EVERY 7 DAYS 30 MIN BEFORE BREAKFAST & REMAIN UPRIGHT DURING THIS TIME 12 tablet 3     amiodarone (PACERONE) 200 MG tablet Take 1 tablet (200 mg) by mouth daily       brimonidine (ALPHAGAN P) 0.15 % ophthalmic solution Place 1 drop into both eyes 2 times daily 1 Bottle 11     COSOPT 2-0.5 % OP SOLN Apply 1 Dose to eye 2 times daily Both eyes.       furosemide (LASIX) 20 MG tablet \"Prn\" per Cardiology       hydrochlorothiazide (HYDRODIURIL) 25 MG tablet Take 1/2 (one-half) tablet by mouth once daily 45 tablet 0     ketotifen (ZADITOR) 0.025 % SOLN Place 1 drop into both eyes as needed       latanoprost (XALATAN) 0.005 % ophthalmic solution Place 1 drop into both eyes At Bedtime 3 Bottle 4     neomycin-polymyxin-hydrocortisone (CORTISPORIN) 3.5-93772-3 otic suspension Place 3 drops Into the left ear 4 times daily 10 mL 1     tamsulosin (FLOMAX) 0.4 MG capsule TAKE ONE CAPSULE BY MOUTH ONCE A DAY  3     Vitamin D, Cholecalciferol, 1000 UNITS TABS        warfarin ANTICOAGULANT (COUMADIN) 5 MG tablet Take 1 tablet (5 mg) by mouth daily 96 tablet 3     warfarin ANTICOAGULANT (COUMADIN) 5 MG tablet Take 2.5 mg on Fridays and 5 mg by mouth on all other days of the week         Objective    /80   Pulse 85   Temp 98.1  F (36.7  C) (Oral)   Wt " 78.5 kg (173 lb)   SpO2 96%   BMI 22.82 kg/m    Body mass index is 22.82 kg/m .  Physical Exam   GENERAL APPEARANCE: alert and elderly and frail  CV: pitting B/L LE edema to 2+ bilaterally, with dry skin, dependent rubor but no skin breakdown  NEURO: gait abnormal     Labs are pending    Addendum:  Results for orders placed or performed in visit on 07/15/21   Basic metabolic panel  (Ca, Cl, CO2, Creat, Gluc, K, Na, BUN)     Status: Abnormal   Result Value Ref Range    Sodium 125 (L) 133 - 144 mmol/L    Potassium 4.4 3.4 - 5.3 mmol/L    Chloride 93 (L) 94 - 109 mmol/L    Carbon Dioxide (CO2) 25 20 - 32 mmol/L    Anion Gap 7 3 - 14 mmol/L    Urea Nitrogen 19 7 - 30 mg/dL    Creatinine 0.97 0.66 - 1.25 mg/dL    Calcium 9.2 8.5 - 10.1 mg/dL    Glucose 98 70 - 99 mg/dL    GFR Estimate 69 >60 mL/min/1.73m2     He will need to be contacted.                         Triage to call him.                  Please tell him to stop taking hydrochlorothiazide , and start taking furosemide 20 mg every day. ( he can get one refill on his current Rx).         See me in person on July 28; use one of the virtual appointments in the afternoon.      Also tell him to drink less water; cut back by 8 ounces per day.     No extra potassium needed yet; we will recheck his electrolytes on July 28.

## 2021-07-16 ENCOUNTER — TELEPHONE (OUTPATIENT)
Dept: INTERNAL MEDICINE | Facility: CLINIC | Age: 86
End: 2021-07-16

## 2021-07-16 LAB
ANION GAP SERPL CALCULATED.3IONS-SCNC: 7 MMOL/L (ref 3–14)
BUN SERPL-MCNC: 19 MG/DL (ref 7–30)
CALCIUM SERPL-MCNC: 9.2 MG/DL (ref 8.5–10.1)
CHLORIDE BLD-SCNC: 93 MMOL/L (ref 94–109)
CO2 SERPL-SCNC: 25 MMOL/L (ref 20–32)
CREAT SERPL-MCNC: 0.97 MG/DL (ref 0.66–1.25)
GFR SERPL CREATININE-BSD FRML MDRD: 69 ML/MIN/1.73M2
GLUCOSE BLD-MCNC: 98 MG/DL (ref 70–99)
POTASSIUM BLD-SCNC: 4.4 MMOL/L (ref 3.4–5.3)
SODIUM SERPL-SCNC: 125 MMOL/L (ref 133–144)

## 2021-07-16 RX ORDER — FUROSEMIDE 20 MG
20 TABLET ORAL DAILY
COMMUNITY
Start: 2021-07-16 | End: 2021-07-19

## 2021-07-16 NOTE — TELEPHONE ENCOUNTER
Pt called back; relayed Dr. Walls message below. Pt will stop hydrochlorothiazide and  furosemide from the pharmacy today. Scheduled pt to see Dr. Rider on 7-28-21 in a virtual afternoon spot as requested.     Pt verbalized understanding, all questions answered.     Ana Maria Saldivar RN

## 2021-07-16 NOTE — TELEPHONE ENCOUNTER
Copied and pasted from Result Notes:    Dean Rider MD  [facility] Triage Im       Please tell him to stop taking hydrochlorothiazide , and start taking furosemide 20 mg every day. ( he can get one refill on his current Rx).         See me in person on July 28; use one of the virtual appointments in the afternoon.      Also tell him to drink less water; cut back by 8 ounces per day.     No extra potassium needed yet; we will recheck his electrolytes on July 28.                   PLEASE KEEP TRYING; HE DOES NOT USE MY CHART.       Left message for patient to contact clinic today for an important message from his provider regarding a change in his medication.      Meredith Ruiz, MSN, RN

## 2021-07-18 DIAGNOSIS — R60.0 BILATERAL LEG EDEMA: Primary | ICD-10-CM

## 2021-07-18 NOTE — TELEPHONE ENCOUNTER
Reason for call:  Medication   If this is a refill request, has the caller requested the refill from the pharmacy already? Yes  Will the patient be using a [facility]? No  Name of the pharmacy and phone number for the current request: [facility]     Name of the medication requested: Lasix     Other request:   lasix refill needed -   Phone number to reach patient:  Home number on file [phone] (home)    Best Time:  Anytime     Can we leave a detailed message on this number?  YES    Travel screening: Not Applicable

## 2021-07-19 RX ORDER — FUROSEMIDE 20 MG
20 TABLET ORAL DAILY
Qty: 90 TABLET | Refills: 3 | Status: ON HOLD | OUTPATIENT
Start: 2021-07-19 | End: 2022-03-01

## 2021-07-19 NOTE — TELEPHONE ENCOUNTER
Routing refill request to provider for review/approval because:  Labs out of range:    Sodium   Date Value Ref Range Status   07/15/2021 125 (L) 133 - 144 mmol/L Final   04/16/2021 133 133 - 144 mmol/L Final     Meredith Awan RN

## 2021-07-19 NOTE — CONFIDENTIAL NOTE
Pt called stating Rx is not at pharmacy, said they did not receive it. Resent Rx to [facility] for pt  Marian Huang RN

## 2021-07-27 ENCOUNTER — TELEPHONE (OUTPATIENT)
Dept: INTERNAL MEDICINE | Facility: CLINIC | Age: 86
End: 2021-07-27

## 2021-07-27 PROBLEM — E87.1 HYPONATREMIA: Status: ACTIVE | Noted: 2021-07-27

## 2021-07-27 NOTE — TELEPHONE ENCOUNTER
Reason for Call:  Same Day Appointment, Requested Provider:  LAB INR    PCP: Dean Rider    Reason for visit: INR CHECK    Duration of symptoms:     Have you been treated for this in the past? Yes    Additional comments: is wondering if he could be worked in around his appt with Dr Rider on 7-28    Can we leave a detailed message on this number? YES    Phone number patient can be reached at: Home number on file [phone] (home)    Best Time: any    Call taken on 7/27/2021 at 10:46 AM by Avani Grijalva

## 2021-07-27 NOTE — TELEPHONE ENCOUNTER
Spoke with patient and he can have the INR done after his appointment with the MD.  He will mention it to the staff, so they can send him down after the MD appt.    Adriana Oliveira RN  [facility] Anticoagulation [facility]

## 2021-07-28 ENCOUNTER — ANTICOAGULATION THERAPY VISIT (OUTPATIENT)
Dept: ANTICOAGULATION | Facility: CLINIC | Age: 86
End: 2021-07-28

## 2021-07-28 ENCOUNTER — OFFICE VISIT (OUTPATIENT)
Dept: INTERNAL MEDICINE | Facility: CLINIC | Age: 86
End: 2021-07-28
Payer: COMMERCIAL

## 2021-07-28 VITALS
TEMPERATURE: 98.5 F | OXYGEN SATURATION: 96 % | WEIGHT: 172 LBS | SYSTOLIC BLOOD PRESSURE: 112 MMHG | HEART RATE: 80 BPM | DIASTOLIC BLOOD PRESSURE: 62 MMHG | BODY MASS INDEX: 22.69 KG/M2

## 2021-07-28 DIAGNOSIS — E87.1 HYPONATREMIA: Primary | ICD-10-CM

## 2021-07-28 DIAGNOSIS — R60.0 BILATERAL LEG EDEMA: ICD-10-CM

## 2021-07-28 DIAGNOSIS — I48.20 CHRONIC ATRIAL FIBRILLATION (H): ICD-10-CM

## 2021-07-28 DIAGNOSIS — I48.20 CHRONIC ATRIAL FIBRILLATION (H): Primary | ICD-10-CM

## 2021-07-28 LAB
ANION GAP SERPL CALCULATED.3IONS-SCNC: 7 MMOL/L (ref 3–14)
BUN SERPL-MCNC: 24 MG/DL (ref 7–30)
CALCIUM SERPL-MCNC: 9.1 MG/DL (ref 8.5–10.1)
CHLORIDE BLD-SCNC: 100 MMOL/L (ref 94–109)
CO2 SERPL-SCNC: 26 MMOL/L (ref 20–32)
CORTIS SERPL-MCNC: 23 UG/DL (ref 4–22)
CREAT SERPL-MCNC: 0.88 MG/DL (ref 0.66–1.25)
GFR SERPL CREATININE-BSD FRML MDRD: 77 ML/MIN/1.73M2
GLUCOSE BLD-MCNC: 100 MG/DL (ref 70–99)
INR BLD: 3.3 (ref 0.9–1.1)
POTASSIUM BLD-SCNC: 4 MMOL/L (ref 3.4–5.3)
SODIUM SERPL-SCNC: 133 MMOL/L (ref 133–144)

## 2021-07-28 PROCEDURE — 85610 PROTHROMBIN TIME: CPT | Performed by: INTERNAL MEDICINE

## 2021-07-28 PROCEDURE — 36415 COLL VENOUS BLD VENIPUNCTURE: CPT | Performed by: INTERNAL MEDICINE

## 2021-07-28 PROCEDURE — 99213 OFFICE O/P EST LOW 20 MIN: CPT | Performed by: INTERNAL MEDICINE

## 2021-07-28 PROCEDURE — 80048 BASIC METABOLIC PNL TOTAL CA: CPT | Performed by: INTERNAL MEDICINE

## 2021-07-28 PROCEDURE — 82533 TOTAL CORTISOL: CPT | Performed by: INTERNAL MEDICINE

## 2021-07-28 RX ORDER — POTASSIUM CHLORIDE 750 MG/1
10 TABLET, EXTENDED RELEASE ORAL 2 TIMES DAILY
Qty: 30 TABLET | Refills: 1 | Status: SHIPPED | OUTPATIENT
Start: 2021-07-28 | End: 2021-07-30

## 2021-07-28 NOTE — LETTER
July 28, 2021      Ayaan Herrera  [address] [facility] [place] [address]        Dear ,    We are writing to inform you of your test results.             Your sodium level is much better.               Your potassium level is a bit lower though it is still normal.                You should take 1 potassium tablet with each dose of furosemide.               I have sent a prescription to your pharmacy for this.               Please see me in the clinic in 4 to 6 weeks.          Results for orders placed or performed in visit on 07/28/21   INR point of care     Status: Abnormal   Result Value Ref Range    INR 3.3 (H) 0.9 - 1.1    Narrative    This test is intended for monitoring Coumadin therapy. Results are not accurate in patients with prolonged INR due to factor deficiency.   Basic metabolic panel  (Ca, Cl, CO2, Creat, Gluc, K, Na, BUN)     Status: Abnormal   Result Value Ref Range    Sodium 133 133 - 144 mmol/L    Potassium 4.0 3.4 - 5.3 mmol/L    Chloride 100 94 - 109 mmol/L    Carbon Dioxide (CO2) 26 20 - 32 mmol/L    Anion Gap 7 3 - 14 mmol/L    Urea Nitrogen 24 7 - 30 mg/dL    Creatinine 0.88 0.66 - 1.25 mg/dL    Calcium 9.1 8.5 - 10.1 mg/dL    Glucose 100 (H) 70 - 99 mg/dL    GFR Estimate 77 >60 mL/min/1.73m2     '    If you have any questions or concerns, please call the clinic at the number listed above.       Sincerely,          Dean Rider MD

## 2021-07-28 NOTE — PROGRESS NOTES
ANTICOAGULATION MANAGEMENT     Ayaan Herrera 88 year old male is on warfarin with therapeutic INR result. (Goal INR 2.0-3.0)    Recent labs: (last 7 days)     07/28/21  1502   INR 3.3*       ASSESSMENT     Source(s): Chart Review and Patient/Caregiver Call       Warfarin doses taken: Warfarin taken as instructed    Diet: No new diet changes identified    New illness, injury, or hospitalization: No    Medication/supplement changes: None noted    Signs or symptoms of bleeding or clotting: No    Previous INR: Therapeutic last 2(+) visits    Additional findings: None     PLAN     Recommended plan for no diet, medication or health factor changes affecting INR     Dosing Instructions: Continue your current warfarin dose with next INR in 2 weeks       Summary  As of 7/28/2021    Full warfarin instructions:  5 mg every Sun, Thu; 2.5 mg all other days   Next INR check:  8/11/2021             Telephone call with Ayaan who verbalizes understanding and agrees to plan    Lab visit scheduled    Education provided: Target INR goal and significance of current INR result, Monitoring for bleeding signs and symptoms and Monitoring for clotting signs and symptoms    Plan made per [facility] anticoagulation protocol    Adriana Oliveira RN  Anticoagulation Clinic  7/28/2021    _______________________________________________________________________     Anticoagulation Episode Summary     Current INR goal:  2.0-3.0   TTR:  73.6 % (1 y)   Target end date:  Indefinite   [place]ers [place]  [facility] [place] [place]    Indications    Chronic atrial fibrillation (H) [I48.20]           Comments:           Anticoagulation Care Providers     Provider Role Specialty Phone number    Dean Rider MD Referring Internal Medicine [phone]

## 2021-07-28 NOTE — PROGRESS NOTES
Assessment & Plan     Hyponatremia  We discussed water restriction.       Check labs and adjust medications as indicated.    - Basic metabolic panel  (Ca, Cl, CO2, Creat, Gluc, K, Na, BUN)  - Cortisol    Bilateral leg edema  Continue using leg wraps and leg elevation.              Check labs and adjust medications as indicated.    - Basic metabolic panel  (Ca, Cl, CO2, Creat, Gluc, K, Na, BUN)                   Return to be determined.    Dean Rider MD  [facility]             Addendum:  Results for orders placed or performed in visit on 07/28/21   INR point of care     Status: Abnormal   Result Value Ref Range    INR 3.3 (H) 0.9 - 1.1    Narrative    This test is intended for monitoring Coumadin therapy. Results are not accurate in patients with prolonged INR due to factor deficiency.   Basic metabolic panel  (Ca, Cl, CO2, Creat, Gluc, K, Na, BUN)     Status: Abnormal   Result Value Ref Range    Sodium 133 133 - 144 mmol/L    Potassium 4.0 3.4 - 5.3 mmol/L    Chloride 100 94 - 109 mmol/L    Carbon Dioxide (CO2) 26 20 - 32 mmol/L    Anion Gap 7 3 - 14 mmol/L    Urea Nitrogen 24 7 - 30 mg/dL    Creatinine 0.88 0.66 - 1.25 mg/dL    Calcium 9.1 8.5 - 10.1 mg/dL    Glucose 100 (H) 70 - 99 mg/dL    GFR Estimate 77 >60 mL/min/1.73m2           Letter sent                your sodium level is much better.               Your potassium level is a bit lower though it is still normal.                You should take 1 potassium tablet with each dose of furosemide.               I have sent a prescription to your pharmacy for this.               Please see me in the clinic in 4 to 6 weeks.             Matteo Kuo is a 88 year old who presents for the following health issues     HPI     Recheck edema - doing better when taking pills and wrapping legs           This patient has been taking furosemide 20 mg/day for the past 2 weeks.        He got the prescription, but he states he  never got the message to cut back his water intake.            He has been wrapping his legs for part of the day every day, except for today.           He is satisfied that there has been some improvement.  Review of Systems         Current Outpatient Medications   Medication Sig Dispense Refill     acetaminophen (TYLENOL) 325 MG tablet Take 325 mg by mouth as needed for mild pain       alendronate (FOSAMAX) 70 MG tablet TAKE 1 TAB WITH 8OZ OF WATER EVERY 7 DAYS 30 MIN BEFORE BREAKFAST & REMAIN UPRIGHT DURING THIS TIME 12 tablet 3     amiodarone (PACERONE) 200 MG tablet Take 1 tablet (200 mg) by mouth daily       brimonidine (ALPHAGAN P) 0.15 % ophthalmic solution Place 1 drop into both eyes 2 times daily 1 Bottle 11     COSOPT 2-0.5 % OP SOLN Apply 1 Dose to eye 2 times daily Both eyes.       furosemide (LASIX) 20 MG tablet Take 1 tablet (20 mg) by mouth daily 90 tablet 3     ketotifen (ZADITOR) 0.025 % SOLN Place 1 drop into both eyes as needed       latanoprost (XALATAN) 0.005 % ophthalmic solution Place 1 drop into both eyes At Bedtime 3 Bottle 4     neomycin-polymyxin-hydrocortisone (CORTISPORIN) 3.5-55617-5 otic suspension Place 3 drops Into the left ear 4 times daily 10 mL 1     tamsulosin (FLOMAX) 0.4 MG capsule TAKE ONE CAPSULE BY MOUTH ONCE A DAY  3     Vitamin D, Cholecalciferol, 1000 UNITS TABS        warfarin ANTICOAGULANT (COUMADIN) 5 MG tablet Take 1 tablet (5 mg) by mouth daily 96 tablet 3     warfarin ANTICOAGULANT (COUMADIN) 5 MG tablet Take 2.5 mg on Fridays and 5 mg by mouth on all other days of the week       Wt Readings from Last 4 Encounters:   07/28/21 78 kg (172 lb)   07/15/21 78.5 kg (173 lb)   06/14/21 79.8 kg (176 lb)   04/05/21 78.5 kg (173 lb)       Objective    /62   Pulse 80   Temp 98.5  F (36.9  C) (Oral)   Wt 78 kg (172 lb)   SpO2 96%   BMI 22.69 kg/m    Body mass index is 22.69 kg/m .  Physical Exam   GENERAL APPEARANCE: alert, no distress and elderly and frail  CV:  regular rates and rhythm, normal S1 S2, no S3 or S4, no murmur, click or rub and pitting B/L LE edema to 2+  NEURO: gait abnormal ; he uses a cane    Today's labs are pending

## 2021-07-30 ENCOUNTER — TELEPHONE (OUTPATIENT)
Dept: INTERNAL MEDICINE | Facility: CLINIC | Age: 86
End: 2021-07-30

## 2021-07-30 DIAGNOSIS — R60.0 BILATERAL LEG EDEMA: ICD-10-CM

## 2021-07-30 RX ORDER — POTASSIUM CHLORIDE 750 MG/1
TABLET, EXTENDED RELEASE ORAL
Qty: 30 TABLET | Refills: 1 | Status: SHIPPED | OUTPATIENT
Start: 2021-07-30 | End: 2021-09-04

## 2021-07-30 NOTE — TELEPHONE ENCOUNTER
PCP:      Patient called asking for lab results and clarification of new medication - KLOR-CoN M..  Read PCP Result Note to patient.  Scheduled F/u in 4 weeks as directed in Result Note.    PCP:    Your result note directs patient to take 1 potassium tablet with each dose of furosemide..  The sig on the new Rx says 1 tab two time a day.  An you clarify? Thanks!     Ok to leave detailed message on VM    Meredith Ruiz, MSN, RN   [facility]

## 2021-08-11 ENCOUNTER — ANTICOAGULATION THERAPY VISIT (OUTPATIENT)
Dept: ANTICOAGULATION | Facility: CLINIC | Age: 86
End: 2021-08-11

## 2021-08-11 ENCOUNTER — LAB (OUTPATIENT)
Dept: LAB | Facility: CLINIC | Age: 86
End: 2021-08-11
Payer: COMMERCIAL

## 2021-08-11 DIAGNOSIS — I48.20 CHRONIC ATRIAL FIBRILLATION (H): ICD-10-CM

## 2021-08-11 DIAGNOSIS — I48.20 CHRONIC ATRIAL FIBRILLATION (H): Primary | ICD-10-CM

## 2021-08-11 LAB — INR BLD: 2.9 (ref 0.9–1.1)

## 2021-08-11 PROCEDURE — 36416 COLLJ CAPILLARY BLOOD SPEC: CPT

## 2021-08-11 PROCEDURE — 85610 PROTHROMBIN TIME: CPT

## 2021-08-11 NOTE — PROGRESS NOTES
Anticoagulation Management    Unable to reach Ayaan today.    Today's INR result of 2.9 is therapeutic (goal INR of 2.0-3.0).  Result received from: Clinic Lab    Follow up required to confirm warfarin dose taken and assess for changes    Left voicemail requesting a callback when the message is received.    Tentative plan: Continue 5mg Sun,Thurs; 2.5mg all other days. Recheck the INR again in 3 weeks, Sept 1st      Anticoagulation clinic to follow up    Prabhu MENA RN

## 2021-08-12 NOTE — PROGRESS NOTES
ANTICOAGULATION FOLLOW-UP CLINIC VISIT    Patient Name:  Ayaan Herrera  Date:  2021  Contact Type:  Telephone    SUBJECTIVE:         OBJECTIVE    Recent labs: (last 7 days)     21  1304   INR 2.9*       ASSESSMENT / PLAN  INR assessment THER    Recheck INR In: 3 WEEKS    INR Location Clinic      Anticoagulation Summary  As of 2021    INR goal:  2.0-3.0   TTR:  70.6 % (1 y)   INR used for dosin.9 (2021)   Warfarin maintenance plan:  5 mg (5 mg x 1) every Sun, Thu; 2.5 mg (5 mg x 0.5) all other days   Full warfarin instructions:  5 mg every Sun, Thu; 2.5 mg all other days   Weekly warfarin total:  22.5 mg   Weekly max warfarin dose:  35 mg   No change documented:  Mary Zelaya RN   Plan last modified:  Janene Smith RN (2021)   Next INR check:  2021   Priority:  Maintenance   Target end date:  Indefinite    Indications    Chronic atrial fibrillation (H) [I48.20]             Anticoagulation Episode Summary     INR check location:  Clinic Lab    Preferred lab:      Send INR reminders to:  [facility]    Comments:        Anticoagulation Care Providers     Provider Role Specialty Phone number    Dean Rider MD Referring Internal Medicine [phone]            See the Encounter Report to view Anticoagulation Flowsheet and Dosing Calendar (Go to Encounters tab in chart review, and find the Anticoagulation Therapy Visit)        Janene Smith, RN

## 2021-08-12 NOTE — PROGRESS NOTES
ANTICOAGULATION MANAGEMENT     Ayaan Herrera 88 year old male is on warfarin with therapeutic INR result. (Goal INR 2.0-3.0)    Recent labs: (last 7 days)     08/11/21  1304   INR 2.9*       ASSESSMENT     Source(s): Chart Review and Patient/Caregiver Call       Warfarin doses taken: Warfarin taken as instructed    Diet: No new diet changes identified    New illness, injury, or hospitalization: No    Medication/supplement changes: None noted    Signs or symptoms of bleeding or clotting: No    Previous INR: Supratherapeutic    Additional findings: None     PLAN     Recommended plan for no diet, medication or health factor changes affecting INR     Dosing Instructions: Continue your current warfarin dose with next INR in 3 weeks       Summary  As of 8/11/2021    Full warfarin instructions:  5 mg every Sun, Thu; 2.5 mg all other days   Next INR check:  9/1/2021             Telephone call with Ayaan who verbalizes understanding and agrees to plan    Lab visit scheduled    Education provided: None required    Plan made per [facility] anticoagulation protocol    Janene Smith RN  Anticoagulation Clinic  8/12/2021    _______________________________________________________________________     Anticoagulation Episode Summary     Current INR goal:  2.0-3.0   TTR:  70.6 % (1 y)   Target end date:  Indefinite   Send INR reminders to:  [facility]    Indications    Chronic atrial fibrillation (H) [I48.20]           Comments:           Anticoagulation Care Providers     Provider Role Specialty Phone number    Dean Rider MD Referring Internal Medicine [phone]

## 2021-08-20 DIAGNOSIS — M81.0 SENILE OSTEOPOROSIS: ICD-10-CM

## 2021-08-20 RX ORDER — ALENDRONATE SODIUM 70 MG/1
TABLET ORAL
Qty: 12 TABLET | Refills: 3 | Status: SHIPPED | OUTPATIENT
Start: 2021-08-20 | End: 2022-05-23

## 2021-09-02 ENCOUNTER — OFFICE VISIT (OUTPATIENT)
Dept: INTERNAL MEDICINE | Facility: CLINIC | Age: 86
End: 2021-09-02
Payer: COMMERCIAL

## 2021-09-02 ENCOUNTER — TELEPHONE (OUTPATIENT)
Dept: INTERNAL MEDICINE | Facility: CLINIC | Age: 86
End: 2021-09-02

## 2021-09-02 ENCOUNTER — ANTICOAGULATION THERAPY VISIT (OUTPATIENT)
Dept: NURSING | Facility: CLINIC | Age: 86
End: 2021-09-02

## 2021-09-02 VITALS
BODY MASS INDEX: 22.3 KG/M2 | DIASTOLIC BLOOD PRESSURE: 70 MMHG | SYSTOLIC BLOOD PRESSURE: 124 MMHG | HEART RATE: 87 BPM | TEMPERATURE: 97.7 F | WEIGHT: 169 LBS | OXYGEN SATURATION: 97 %

## 2021-09-02 DIAGNOSIS — I48.20 CHRONIC ATRIAL FIBRILLATION (H): Chronic | ICD-10-CM

## 2021-09-02 DIAGNOSIS — I48.20 CHRONIC ATRIAL FIBRILLATION (H): Primary | ICD-10-CM

## 2021-09-02 DIAGNOSIS — E87.1 HYPONATREMIA: Primary | ICD-10-CM

## 2021-09-02 DIAGNOSIS — R60.0 BILATERAL LEG EDEMA: ICD-10-CM

## 2021-09-02 LAB
ALBUMIN SERPL-MCNC: 3.6 G/DL (ref 3.4–5)
ALP SERPL-CCNC: 76 U/L (ref 40–150)
ALT SERPL W P-5'-P-CCNC: 32 U/L (ref 0–70)
ANION GAP SERPL CALCULATED.3IONS-SCNC: 2 MMOL/L (ref 3–14)
AST SERPL W P-5'-P-CCNC: 29 U/L (ref 0–45)
BILIRUB SERPL-MCNC: 0.6 MG/DL (ref 0.2–1.3)
BUN SERPL-MCNC: 23 MG/DL (ref 7–30)
CALCIUM SERPL-MCNC: 9.3 MG/DL (ref 8.5–10.1)
CHLORIDE BLD-SCNC: 105 MMOL/L (ref 94–109)
CO2 SERPL-SCNC: 29 MMOL/L (ref 20–32)
CREAT SERPL-MCNC: 0.83 MG/DL (ref 0.66–1.25)
GFR SERPL CREATININE-BSD FRML MDRD: 79 ML/MIN/1.73M2
GLUCOSE BLD-MCNC: 88 MG/DL (ref 70–99)
INR PPP: 2.54 (ref 0.85–1.15)
POTASSIUM BLD-SCNC: 4.2 MMOL/L (ref 3.4–5.3)
PROT SERPL-MCNC: 6.9 G/DL (ref 6.8–8.8)
SODIUM SERPL-SCNC: 136 MMOL/L (ref 133–144)

## 2021-09-02 PROCEDURE — 36415 COLL VENOUS BLD VENIPUNCTURE: CPT | Performed by: INTERNAL MEDICINE

## 2021-09-02 PROCEDURE — 85610 PROTHROMBIN TIME: CPT | Performed by: INTERNAL MEDICINE

## 2021-09-02 PROCEDURE — 80053 COMPREHEN METABOLIC PANEL: CPT | Performed by: INTERNAL MEDICINE

## 2021-09-02 PROCEDURE — 99213 OFFICE O/P EST LOW 20 MIN: CPT | Performed by: INTERNAL MEDICINE

## 2021-09-02 NOTE — TELEPHONE ENCOUNTER
ANTICOAGULATION MANAGEMENT      Ayaan Herrera due for annual renewal of referral to anticoagulation monitoring. Order pended for your review and signature.      ANTICOAGULATION SUMMARY      Warfarin indication(s)     Atrial fibrillation    Heart valve present?  NO       Current goal range   INR: 2.0-3.0     Goal appropriate for indication? Yes, INR 2-3 appropriate for hx of DVT, PE, hypercoagulable state, Afib, LVAD, or bileaflet AVR without risk factors     Current duration of therapy Indefinite/long term therapy   Time in Therapeutic Range (TTR)  (Goal > 60%) 70.7%       Office visit with referring provider's group within last year yes on 9/2/21       Luisa Matthews RN

## 2021-09-02 NOTE — PROGRESS NOTES
ANTICOAGULATION MANAGEMENT     Ayaan Herrera 88 year old male is on warfarin with therapeutic INR result. (Goal INR 2.0-3.0)    Recent labs: (last 7 days)     09/02/21  1209   INR 2.54*       ASSESSMENT     Source(s): Chart Review and Patient/Caregiver Call       Warfarin doses taken: Warfarin taken as instructed    Diet: No new diet changes identified    New illness, injury, or hospitalization: No    Medication/supplement changes: None noted    Signs or symptoms of bleeding or clotting: No    Previous INR: Therapeutic last visit; previously outside of goal range    Additional findings: None     PLAN     Recommended plan for no diet, medication or health factor changes affecting INR     Dosing Instructions: Continue your current warfarin dose with next INR in 5 weeks       Summary  As of 9/2/2021    Full warfarin instructions:  5 mg every Sun, Thu; 2.5 mg all other days   Next INR check:               Telephone call with Ayaan who agrees to plan and repeated back plan correctly    Lab visit scheduled    Education provided: Importance of notifying clinic for changes in medications; a sooner lab recheck maybe needed. and Contact [phone]  with any changes, questions or concerns.     Plan made per [facility] anticoagulation protocol    Liusa Matthews RN  Anticoagulation Clinic  9/2/2021    _______________________________________________________________________     Anticoagulation Episode Summary     Current INR goal:  2.0-3.0   TTR:  70.7 % (1 y)   Target end date:  Indefinite   Send INR reminders to:  [facility]    Indications    Chronic atrial fibrillation (H) [I48.20]           Comments:           Anticoagulation Care Providers     Provider Role Specialty Phone number    Dean Rider MD Referring Internal Medicine [phone]

## 2021-09-02 NOTE — PROGRESS NOTES
Assessment & Plan     Hyponatremia  Check labs and adjust medications as indicated.    - Comprehensive metabolic panel (BMP + Alb, Alk Phos, ALT, AST, Total. Bili, TP)      Bilateral leg edema  stable  - Comprehensive metabolic panel (BMP + Alb, Alk Phos, ALT, AST, Total. Bili, TP)      Chronic atrial fibrillation (H)  Continue warfarin  - INR               Patient Instructions               I will let you know your lab results.         Return in about 2 months (around 11/2/2021) for follow up of several issues.    Dean Rider MD  [facility]                  ADDENDUM:  Results for orders placed or performed in visit on 09/02/21   Comprehensive metabolic panel (BMP + Alb, Alk Phos, ALT, AST, Total. Bili, TP)     Status: Abnormal   Result Value Ref Range    Sodium 136 133 - 144 mmol/L    Potassium 4.2 3.4 - 5.3 mmol/L    Chloride 105 94 - 109 mmol/L    Carbon Dioxide (CO2) 29 20 - 32 mmol/L    Anion Gap 2 (L) 3 - 14 mmol/L    Urea Nitrogen 23 7 - 30 mg/dL    Creatinine 0.83 0.66 - 1.25 mg/dL    Calcium 9.3 8.5 - 10.1 mg/dL    Glucose 88 70 - 99 mg/dL    Alkaline Phosphatase 76 40 - 150 U/L    AST 29 0 - 45 U/L    ALT 32 0 - 70 U/L    Protein Total 6.9 6.8 - 8.8 g/dL    Albumin 3.6 3.4 - 5.0 g/dL    Bilirubin Total 0.6 0.2 - 1.3 mg/dL    GFR Estimate 79 >60 mL/min/1.73m2   INR     Status: Abnormal   Result Value Ref Range    INR 2.54 (H) 0.85 - 1.15     Letter sent.                Your lab results are normal,including the liver,kidney,glucose,sodium,and the potassium level.      Keep taking the same medications.     Matteo Kuo is a 88 year old who presents for the following health issues     HPI     Medication Followup of multiple    Taking Medication as prescribed: yes    Side Effects:  None    Medication Helping Symptoms:  yes     Ongoing leg edema; taking furosemide 20 mg per day; and KCL 10 meq per day.                   Not wearing support wraps in the warm  weather.  Going to PT; working on balance and strength.     Review of Systems         Current Outpatient Medications   Medication Sig Dispense Refill     acetaminophen (TYLENOL) 325 MG tablet Take 325 mg by mouth as needed for mild pain       alendronate (FOSAMAX) 70 MG tablet TAKE 1 TAB WITH 8OZ OF WATER EVERY 7 DAYS 30 MIN BEFORE BREAKFAST & REMAIN UPRIGHT DURING THIS TIME 12 tablet 3     amiodarone (PACERONE) 200 MG tablet Take 1 tablet (200 mg) by mouth daily       brimonidine (ALPHAGAN P) 0.15 % ophthalmic solution Place 1 drop into both eyes 2 times daily 1 Bottle 11     COSOPT 2-0.5 % OP SOLN Apply 1 Dose to eye 2 times daily Both eyes.       furosemide (LASIX) 20 MG tablet Take 1 tablet (20 mg) by mouth daily 90 tablet 3     ketotifen (ZADITOR) 0.025 % SOLN Place 1 drop into both eyes as needed       latanoprost (XALATAN) 0.005 % ophthalmic solution Place 1 drop into both eyes At Bedtime 3 Bottle 4     neomycin-polymyxin-hydrocortisone (CORTISPORIN) 3.5-02128-8 otic suspension Place 3 drops Into the left ear 4 times daily 10 mL 1     potassium chloride ER (KLOR-CON M) 10 MEQ CR tablet Take one tablet daily with each 20 mg dose of furosemide. 30 tablet 1     tamsulosin (FLOMAX) 0.4 MG capsule TAKE ONE CAPSULE BY MOUTH ONCE A DAY  3     Vitamin D, Cholecalciferol, 1000 UNITS TABS        warfarin ANTICOAGULANT (COUMADIN) 5 MG tablet Take 1 tablet (5 mg) by mouth daily 96 tablet 3     warfarin ANTICOAGULANT (COUMADIN) 5 MG tablet Take 2.5 mg on Fridays and 5 mg by mouth on all other days of the week       Wt Readings from Last 4 Encounters:   09/02/21 76.7 kg (169 lb)   07/28/21 78 kg (172 lb)   07/15/21 78.5 kg (173 lb)   06/14/21 79.8 kg (176 lb)       Objective    /70   Pulse 87   Temp 97.7  F (36.5  C) (Oral)   Wt 76.7 kg (169 lb)   SpO2 97%   BMI 22.30 kg/m    Body mass index is 22.3 kg/m .  Physical Exam   GENERAL APPEARANCE: alert, no distress and elderly and frail  CV: pitting B/L LE edema to  1-2+ with venous stasis dermatitis. and paced rhythm.  NEURO: uses a cane.    Results for orders placed or performed in visit on 09/02/21   INR     Status: Abnormal   Result Value Ref Range    INR 2.54 (H) 0.85 - 1.15

## 2021-09-02 NOTE — LETTER
September 3, 2021      Ayaan Herrera  [address] [facility] [address]        Dear ,    We are writing to inform you of your test results.            Your lab results are normal,including the liver,kidney,glucose,sodium,and the potassium level.      Keep taking the same medications.     Results for orders placed or performed in visit on 09/02/21   Comprehensive metabolic panel (BMP + Alb, Alk Phos, ALT, AST, Total. Bili, TP)     Status: Abnormal   Result Value Ref Range    Sodium 136 133 - 144 mmol/L    Potassium 4.2 3.4 - 5.3 mmol/L    Chloride 105 94 - 109 mmol/L    Carbon Dioxide (CO2) 29 20 - 32 mmol/L    Anion Gap 2 (L) 3 - 14 mmol/L    Urea Nitrogen 23 7 - 30 mg/dL    Creatinine 0.83 0.66 - 1.25 mg/dL    Calcium 9.3 8.5 - 10.1 mg/dL    Glucose 88 70 - 99 mg/dL    Alkaline Phosphatase 76 40 - 150 U/L    AST 29 0 - 45 U/L    ALT 32 0 - 70 U/L    Protein Total 6.9 6.8 - 8.8 g/dL    Albumin 3.6 3.4 - 5.0 g/dL    Bilirubin Total 0.6 0.2 - 1.3 mg/dL    GFR Estimate 79 >60 mL/min/1.73m2   INR     Status: Abnormal   Result Value Ref Range    INR 2.54 (H) 0.85 - 1.15         If you have any questions or concerns, please call the clinic at the number listed above.       Sincerely,        Dean Rider MD

## 2021-10-06 ENCOUNTER — TRANSFERRED RECORDS (OUTPATIENT)
Dept: HEALTH INFORMATION MANAGEMENT | Facility: CLINIC | Age: 86
End: 2021-10-06
Payer: COMMERCIAL

## 2021-10-07 ENCOUNTER — ANTICOAGULATION THERAPY VISIT (OUTPATIENT)
Dept: ANTICOAGULATION | Facility: CLINIC | Age: 86
End: 2021-10-07

## 2021-10-07 ENCOUNTER — LAB (OUTPATIENT)
Dept: LAB | Facility: CLINIC | Age: 86
End: 2021-10-07
Payer: COMMERCIAL

## 2021-10-07 DIAGNOSIS — I48.20 CHRONIC ATRIAL FIBRILLATION (H): Primary | ICD-10-CM

## 2021-10-07 DIAGNOSIS — I48.20 CHRONIC ATRIAL FIBRILLATION (H): ICD-10-CM

## 2021-10-07 LAB — INR BLD: 3.1 (ref 0.9–1.1)

## 2021-10-07 PROCEDURE — 36416 COLLJ CAPILLARY BLOOD SPEC: CPT

## 2021-10-07 PROCEDURE — 85610 PROTHROMBIN TIME: CPT

## 2021-10-07 NOTE — PROGRESS NOTES
Anticoagulation Management    Unable to reach Ayaan today.    Today's INR result of 3.1 is supratherapeutic (goal INR of 2.0-3.0).  Result received from: Clinic Lab    Follow up required to confirm warfarin dose taken and assess for changes    Left message to continue current dose of warfarin 5 mg tonight.      Anticoagulation clinic to follow up    Adriana Oliveira RN

## 2021-10-07 NOTE — PROGRESS NOTES
ANTICOAGULATION MANAGEMENT     Ayaan Herrera 88 year old male is on warfarin with supratherapeutic INR result. (Goal INR 2.0-3.0)    Recent labs: (last 7 days)     10/07/21  1308   INR 3.1*       ASSESSMENT     Source(s): Patient/Caregiver Call       Warfarin doses taken: More warfarin taken than planned which may be affecting INR and patient thinks that he may have taken 5mg on a 2.5mg day but is unsure..    Diet: No new diet changes identified    New illness, injury, or hospitalization: No    Medication/supplement changes: None noted    Signs or symptoms of bleeding or clotting: No    Previous INR: Therapeutic last 2(+) visits    Additional findings: None     PLAN     Recommended plan for temporary change(s) affecting INR     Dosing Instructions: Booster dose then continue your current warfarin dose with next INR in 2 weeks       Summary  As of 10/7/2021    Full warfarin instructions:  10/7: 2.5 mg; Otherwise 5 mg every Sun, Thu; 2.5 mg all other days   Next INR check:  10/21/2021             Telephone call with Ayaan who verbalizes understanding and agrees to plan    Lab visit scheduled    Education provided: Monitoring for bleeding signs and symptoms and Monitoring for clotting signs and symptoms    Plan made per [facility] anticoagulation protocol    Adriana Oliveira RN  Anticoagulation Clinic  10/7/2021    _______________________________________________________________________     Anticoagulation Episode Summary     Current INR goal:  2.0-3.0   TTR:  68.9 % (1 y)   Target end date:  Indefinite   Send INR reminders to:  [facility]    Indications    Chronic atrial fibrillation (H) [I48.20]           Comments:           Anticoagulation Care Providers     Provider Role Specialty Phone number    Dean Rider MD Referring Internal Medicine [phone]

## 2021-10-23 ENCOUNTER — HEALTH MAINTENANCE LETTER (OUTPATIENT)
Age: 86
End: 2021-10-23

## 2021-10-26 ENCOUNTER — TELEPHONE (OUTPATIENT)
Dept: OTHER | Facility: CLINIC | Age: 86
End: 2021-10-26

## 2021-10-26 NOTE — TELEPHONE ENCOUNTER
2+ attempts have been made to schedule follow up for Dr. Burnett. No further attempts to be made.     Attempt:  8/19/2021  1st LS   10/19/2021  2nd LS     Rachell BOOTH

## 2021-10-29 ENCOUNTER — OFFICE VISIT (OUTPATIENT)
Dept: INTERNAL MEDICINE | Facility: CLINIC | Age: 86
End: 2021-10-29
Payer: COMMERCIAL

## 2021-10-29 VITALS
OXYGEN SATURATION: 97 % | TEMPERATURE: 98 F | WEIGHT: 171.6 LBS | SYSTOLIC BLOOD PRESSURE: 122 MMHG | BODY MASS INDEX: 22.64 KG/M2 | HEART RATE: 83 BPM | RESPIRATION RATE: 16 BRPM | DIASTOLIC BLOOD PRESSURE: 74 MMHG

## 2021-10-29 DIAGNOSIS — Z91.81 PERSONAL HISTORY OF FALL: ICD-10-CM

## 2021-10-29 DIAGNOSIS — S10.93XA CONTUSION OF FACE, SCALP AND NECK, INITIAL ENCOUNTER: ICD-10-CM

## 2021-10-29 DIAGNOSIS — R60.0 BILATERAL LEG EDEMA: Primary | ICD-10-CM

## 2021-10-29 DIAGNOSIS — S00.83XA CONTUSION OF FACE, SCALP AND NECK, INITIAL ENCOUNTER: ICD-10-CM

## 2021-10-29 DIAGNOSIS — Z79.01 LONG TERM CURRENT USE OF ANTICOAGULANT THERAPY: ICD-10-CM

## 2021-10-29 DIAGNOSIS — S00.81XA ABRASION OF FACE, INITIAL ENCOUNTER: ICD-10-CM

## 2021-10-29 DIAGNOSIS — S00.03XA CONTUSION OF FACE, SCALP AND NECK, INITIAL ENCOUNTER: ICD-10-CM

## 2021-10-29 DIAGNOSIS — R26.89 POOR BALANCE: ICD-10-CM

## 2021-10-29 PROCEDURE — 99214 OFFICE O/P EST MOD 30 MIN: CPT | Performed by: PHYSICIAN ASSISTANT

## 2021-10-29 RX ORDER — BETAMETHASONE DIPROPIONATE 0.5 MG/G
LOTION TOPICAL
COMMUNITY
Start: 2021-05-24 | End: 2022-02-28

## 2021-10-29 NOTE — PROGRESS NOTES
Assessment & Plan     Bilateral leg edema  Chronic- upcoming visit with vascular provider  Has duplex arterial ultrasound schedule as well as aorta and iliac US     Poor balance  With fall     Abrasion of face, initial encounter      Contusion of face, scalp and neck, initial encounter      Personal history of fall      Long term current use of anticoagulant therapy  On warfarin                Patient Instructions   Wrap the legs for swelling  Elevate the legs.     See your vascular provider you are due for follow up.         Return in about 17 days (around 11/15/2021) for specialist .    Elizabeth Donaldson PA-C  [facility]    Matteo Kuo is a 88 year old who presents for the following health issues     HPI     Fell on ice a year ago. Has been seeing PT for the leg pain. Legs swell up though at times and are painful.     Concern - fall  Onset: 2 days ago   Description: injury to face and ear on the left side has bruising noted at PT visit yesterday.   No injury to the legs   Chronic issues with peripheral edema.   Intensity: moderate  Progression of Symptoms:  improving  Accompanying Signs & Symptoms: no LOC   Denies any dizziness today   Previous history of similar problem: yes   Precipitating factors:        Worsened by:   Alleviating factors:        Improved by:   Therapies tried and outcome:     Reviewed chart, chronic issues with PVD and upcoming appt with Vascular provider      Review of Systems   Constitutional, HEENT, cardiovascular, pulmonary, gi and gu systems are negative, except as otherwise noted.      Objective    /74   Pulse 83   Temp 98  F (36.7  C) (Tympanic)   Resp 16   Wt 77.8 kg (171 lb 9.6 oz)   SpO2 97%   BMI 22.64 kg/m    Body mass index is 22.64 kg/m .  Physical Exam   GENERAL: healthy, alert and no distress  RESP: lungs clear to auscultation - no rales, rhonchi or wheezes  CV: regular rates and rhythm, normal S1 S2, no S3 or S4, no  murmur, click or rub, peripheral pulses strong and 2+ bilateral lower extremity pitting edema to mid tibia    MS: extremities normal- no gross deformities noted  SKIN: hemosiderin changes, surinder appearance to the skin   Dry   NEURO: Normal strength and tone, mentation intact and speech normal

## 2021-10-29 NOTE — PATIENT INSTRUCTIONS
Wrap the legs for swelling  Elevate the legs.     See your vascular provider you are due for follow up.

## 2021-11-01 ENCOUNTER — LAB (OUTPATIENT)
Dept: LAB | Facility: CLINIC | Age: 86
End: 2021-11-01
Payer: COMMERCIAL

## 2021-11-01 ENCOUNTER — ANTICOAGULATION THERAPY VISIT (OUTPATIENT)
Dept: ANTICOAGULATION | Facility: CLINIC | Age: 86
End: 2021-11-01

## 2021-11-01 DIAGNOSIS — I48.20 CHRONIC ATRIAL FIBRILLATION (H): Primary | ICD-10-CM

## 2021-11-01 DIAGNOSIS — I48.20 CHRONIC ATRIAL FIBRILLATION (H): ICD-10-CM

## 2021-11-01 LAB — INR BLD: 3.6 (ref 0.9–1.1)

## 2021-11-01 PROCEDURE — 85610 PROTHROMBIN TIME: CPT

## 2021-11-01 PROCEDURE — 36416 COLLJ CAPILLARY BLOOD SPEC: CPT

## 2021-11-01 PROCEDURE — 99207 PR NO CHARGE NURSE ONLY: CPT

## 2021-11-01 NOTE — CONFIDENTIAL NOTE
ANTICOAGULATION MANAGEMENT     Ayaan Herrera 89 year old male is on warfarin with supratherapeutic INR result. (Goal INR 2.0-3.0)    Recent labs: (last 7 days)     11/01/21  1112   INR 3.6*       ASSESSMENT     Source(s): Chart Review and Patient/Caregiver Call       Warfarin doses taken: Warfarin taken as instructed    Diet: No new diet changes identified    New illness, injury, or hospitalization: Yes: Has sore and swollen left leg below the knee.  Will be see specialist.    Medication/supplement changes: None noted    Signs or symptoms of bleeding or clotting: No    Previous INR: Supratherapeutic    Additional findings: None     PLAN     Recommended plan for no diet, medication or health factor changes affecting INR     Dosing Instructions: Hold dose then continue your current warfarin dose with next INR in 2 weeks       Summary  As of 11/1/2021    Full warfarin instructions:  11/1: Hold; Otherwise 5 mg every Sun, Thu; 2.5 mg all other days   Next INR check:  11/15/2021             Telephone call with Ayaan who verbalizes understanding and agrees to plan    Lab visit scheduled    Education provided: Importance of consistent vitamin K intake    Plan made per [facility] anticoagulation protocol    Janene Smith RN  Anticoagulation Clinic  11/1/2021    _______________________________________________________________________     Anticoagulation Episode Summary     Current INR goal:  2.0-3.0   TTR:  67.2 % (1 y)   Target end date:  Indefinite   Send INR reminders to:  [facility]    Indications    Chronic atrial fibrillation (H) [I48.20]           Comments:           Anticoagulation Care Providers     Provider Role Specialty Phone number    Dean Rider MD Referring Internal Medicine [phone]            
ADMIT

## 2021-11-01 NOTE — PROGRESS NOTES
ANTICOAGULATION FOLLOW-UP CLINIC VISIT    Patient Name:  Ayaan Herrera  Date:  11/1/2021  Contact Type:  Telephone    SUBJECTIVE:         OBJECTIVE    Recent labs: (last 7 days)     11/01/21  1112   INR 3.6*       ASSESSMENT / PLAN  INR assessment SUPRA    Recheck INR In: 2 WEEKS    INR Location Clinic      Anticoagulation Summary  As of 11/1/2021    INR goal:  2.0-3.0   TTR:  67.2 % (1 y)   INR used for dosing:  No new INR was available at the time of this encounter.   Warfarin maintenance plan:  5 mg (5 mg x 1) every Sun, Thu; 2.5 mg (5 mg x 0.5) all other days   Full warfarin instructions:  11/1: Hold; Otherwise 5 mg every Sun, Thu; 2.5 mg all other days   Weekly warfarin total:  22.5 mg   Weekly max warfarin dose:  35 mg   Plan last modified:  Janene Smith RN (1/18/2021)   Next INR check:  11/15/2021   Priority:  High   Target end date:  Indefinite    Indications    Chronic atrial fibrillation (H) [I48.20]             Anticoagulation Episode Summary     INR check location:  Clinic Lab    Preferred lab:      Send INR reminders to:  [facility]    Comments:        Anticoagulation Care Providers     Provider Role Specialty Phone number    Dean Rider MD Referring Internal Medicine [phone]            See the Encounter Report to view Anticoagulation Flowsheet and Dosing Calendar (Go to Encounters tab in chart review, and find the Anticoagulation Therapy Visit)        Janene Smith RN

## 2021-11-15 ENCOUNTER — ANTICOAGULATION THERAPY VISIT (OUTPATIENT)
Dept: ANTICOAGULATION | Facility: CLINIC | Age: 86
End: 2021-11-15

## 2021-11-15 ENCOUNTER — LAB (OUTPATIENT)
Dept: LAB | Facility: CLINIC | Age: 86
End: 2021-11-15
Payer: COMMERCIAL

## 2021-11-15 DIAGNOSIS — I48.20 CHRONIC ATRIAL FIBRILLATION (H): ICD-10-CM

## 2021-11-15 DIAGNOSIS — I48.20 CHRONIC ATRIAL FIBRILLATION (H): Primary | ICD-10-CM

## 2021-11-15 LAB — INR BLD: 5 (ref 0.9–1.1)

## 2021-11-15 PROCEDURE — 36416 COLLJ CAPILLARY BLOOD SPEC: CPT

## 2021-11-15 PROCEDURE — 99207 PR NO CHARGE NURSE ONLY: CPT

## 2021-11-15 PROCEDURE — 85610 PROTHROMBIN TIME: CPT

## 2021-11-15 NOTE — PROGRESS NOTES
ANTICOAGULATION FOLLOW-UP CLINIC VISIT    Patient Name:  Ayaan Herrera  Date:  11/15/2021  Contact Type:  Telephone    SUBJECTIVE:         OBJECTIVE    Recent labs: (last 7 days)     11/15/21  1125   INR 5.0*       ASSESSMENT / PLAN  INR assessment SUPRA    Recheck INR In: 1 WEEK    INR Location Clinic      Anticoagulation Summary  As of 11/15/2021    INR goal:  2.0-3.0   TTR:  67.2 % (1 y)   INR used for dosin.0 (11/15/2021)   Warfarin maintenance plan:  5 mg (5 mg x 1) every Sun, Thu; 2.5 mg (5 mg x 0.5) all other days   Full warfarin instructions:  11/15: Hold; : Hold; Otherwise 5 mg every Sun, Thu; 2.5 mg all other days   Weekly warfarin total:  22.5 mg   Weekly max warfarin dose:  35 mg   Plan last modified:  Janene Smith RN (2021)   Next INR check:  2021   Priority:  High   Target end date:  Indefinite    Indications    Chronic atrial fibrillation (H) [I48.20]             Anticoagulation Episode Summary     INR check location:  Clinic Lab    Preferred lab:      Send INR reminders to:  [facility]    Comments:        Anticoagulation Care Providers     Provider Role Specialty Phone number    Dean Rider MD Referring Internal Medicine [phone]            See the Encounter Report to view Anticoagulation Flowsheet and Dosing Calendar (Go to Encounters tab in chart review, and find the Anticoagulation Therapy Visit)        Janene Smith RN          ANTICOAGULATION MANAGEMENT     Ayaan Herrera 89 year old male is on warfarin with supratherapeutic INR result. (Goal INR 2.0-3.0)    Recent labs: (last 7 days)     11/15/21  1125   INR 5.0*       ASSESSMENT     Source(s): Chart Review and Patient/Caregiver Call       Warfarin doses taken: Warfarin taken as instructed    Diet: No new diet changes identified    New illness, injury, or hospitalization: No    Medication/supplement changes: None noted    Signs or symptoms of bleeding or clotting: No    Previous  INR: Supratherapeutic    Additional findings: None     PLAN     Recommended plan for temporary change(s) affecting INR Had taken extra greens last week  Dosing Instructions: hold today and tomorrow and recheck 1 week with next INR in 1 week       Summary  As of 11/15/2021    Full warfarin instructions:  11/15: Hold; 11/16: Hold; Otherwise 5 mg every Sun, Thu; 2.5 mg all other days   Next INR check:  11/22/2021             Telephone call with Ayaan who verbalizes understanding and agrees to plan    Lab visit scheduled    Education provided: Importance of consistent vitamin K intake    Plan made per [facility] anticoagulation protocol    Janene Smith RN  Anticoagulation Clinic  11/15/2021    _______________________________________________________________________     Anticoagulation Episode Summary     Current INR goal:  2.0-3.0   TTR:  67.2 % (1 y)   Target end date:  Indefinite   Send INR reminders to:  [facility]    Indications    Chronic atrial fibrillation (H) [I48.20]           Comments:           Anticoagulation Care Providers     Provider Role Specialty Phone number    Dean Rider MD Referring Internal Medicine [phone]

## 2021-11-18 ENCOUNTER — OFFICE VISIT (OUTPATIENT)
Dept: INTERNAL MEDICINE | Facility: CLINIC | Age: 86
End: 2021-11-18
Payer: COMMERCIAL

## 2021-11-18 VITALS
DIASTOLIC BLOOD PRESSURE: 68 MMHG | HEART RATE: 76 BPM | BODY MASS INDEX: 22.56 KG/M2 | SYSTOLIC BLOOD PRESSURE: 116 MMHG | WEIGHT: 171 LBS | TEMPERATURE: 97.9 F | OXYGEN SATURATION: 97 %

## 2021-11-18 DIAGNOSIS — R60.0 BILATERAL LEG EDEMA: ICD-10-CM

## 2021-11-18 DIAGNOSIS — R26.89 POOR BALANCE: Primary | ICD-10-CM

## 2021-11-18 DIAGNOSIS — M62.81 GENERALIZED MUSCLE WEAKNESS: ICD-10-CM

## 2021-11-18 DIAGNOSIS — Z91.81 PERSONAL HISTORY OF FALL: ICD-10-CM

## 2021-11-18 PROCEDURE — 99213 OFFICE O/P EST LOW 20 MIN: CPT | Performed by: INTERNAL MEDICINE

## 2021-11-18 NOTE — PROGRESS NOTES
Assessment & Plan     Poor balance  Continue to use a cane. Declines use of a walker.         Bilateral leg edema  Continue furosemide. Add support stockings.     Generalized muscle weakness      Personal history of fall                 Patient Instructions   You are planning to find some knee high compression stockings.                          Best wishes!       Return in about 6 months (around 5/18/2022) for follow up of several issues.    Dean Rider MD  [facility]    Matteo Kuo is a 89 year old who presents for the following health issues     HPI     Follow up - still having edema - comes and goes.       He reports his balance is reasonably good. Able to walk his dog bid; uses a cane.                    Does not have support stockings yet; using furosemide 20 mg per day.   S/p COVID booster and flu vaccine.     Review of Systems         Objective    /68   Pulse 76   Temp 97.9  F (36.6  C) (Oral)   Wt 77.6 kg (171 lb)   SpO2 97%   BMI 22.56 kg/m    There is no height or weight on file to calculate BMI.  Physical Exam   GENERAL APPEARANCE: alert and no distress  CV: pitting B/L LE edema to 2+ with venous stasis dermatitis  NEURO: mentation intact, speech normal and gait abnormal ; uses a cane

## 2021-11-18 NOTE — PATIENT INSTRUCTIONS
You are planning to find some knee high compression stockings.                          Best wishes!

## 2021-11-23 ENCOUNTER — LAB (OUTPATIENT)
Dept: LAB | Facility: CLINIC | Age: 86
End: 2021-11-23
Payer: COMMERCIAL

## 2021-11-23 DIAGNOSIS — I48.20 CHRONIC ATRIAL FIBRILLATION (H): ICD-10-CM

## 2021-11-23 LAB — INR BLD: 3.8 (ref 0.9–1.1)

## 2021-11-23 PROCEDURE — 36416 COLLJ CAPILLARY BLOOD SPEC: CPT

## 2021-11-23 PROCEDURE — 85610 PROTHROMBIN TIME: CPT

## 2021-11-24 ENCOUNTER — TELEPHONE (OUTPATIENT)
Dept: INTERNAL MEDICINE | Facility: CLINIC | Age: 86
End: 2021-11-24
Payer: COMMERCIAL

## 2021-11-24 ENCOUNTER — ANTICOAGULATION THERAPY VISIT (OUTPATIENT)
Dept: ANTICOAGULATION | Facility: CLINIC | Age: 86
End: 2021-11-24
Payer: COMMERCIAL

## 2021-11-24 DIAGNOSIS — I48.20 CHRONIC ATRIAL FIBRILLATION (H): Primary | ICD-10-CM

## 2021-11-24 NOTE — TELEPHONE ENCOUNTER
See [facility] encounter from 11/24/21    Adriana Oliveira RN  [facility] Anticoagulation [facility]

## 2021-11-24 NOTE — TELEPHONE ENCOUNTER
Spoke with patient and questions answered.     Adriana Oliveira RN  [facility] Anticoagulation [facility]

## 2021-11-24 NOTE — PROGRESS NOTES
ANTICOAGULATION MANAGEMENT     Ayaan Herrera 89 year old male is on warfarin with supratherapeutic INR result. (Goal INR 2.0-3.0)    Recent labs: (last 7 days)     11/23/21  1530   INR 3.8*       ASSESSMENT     Source(s): Chart Review and Patient/Caregiver Call       Warfarin doses taken: Warfarin taken as instructed    Diet: No new diet changes identified    New illness, injury, or hospitalization: No    Medication/supplement changes: None noted    Signs or symptoms of bleeding or clotting: No    Previous INR: Supratherapeutic    Additional findings: does have some peripheral edema, no other changes. Decreased activity due to balance issues      PLAN     Recommended plan for ongoing change(s) affecting INR     Dosing Instructions: Hold dose then Decrease your warfarin dose (11% change) with next INR in 2 weeks       Summary  As of 11/24/2021    Full warfarin instructions:  11/24: Hold; Otherwise 5 mg every Sun; 2.5 mg all other days   Next INR check:  12/8/2021             Telephone call with Ayaan who verbalizes understanding and agrees to plan    Patient offered & declined to schedule next visit    Education provided: Goal range and significance of current result    Plan made per [facility] anticoagulation protocol    Adriana Oliveira RN  Anticoagulation Clinic  11/24/2021    _______________________________________________________________________     Anticoagulation Episode Summary     Current INR goal:  2.0-3.0   TTR:  66.9 % (1 y)   Target end date:  Indefinite   Send INR reminders to:  [facility]    Indications    Chronic atrial fibrillation (H) [I48.20]           Comments:           Anticoagulation Care Providers     Provider Role Specialty Phone number    Dean Rider MD Referring Internal Medicine [phone]

## 2021-11-24 NOTE — TELEPHONE ENCOUNTER
Pt calling in wanting to know if his INR nurse is going to adjust his coumadin based on his INR result form yesterday. Reports he has called numerous times without a call back.     Hawa Brooks RN

## 2021-11-29 ENCOUNTER — HOSPITAL ENCOUNTER (OUTPATIENT)
Dept: ULTRASOUND IMAGING | Facility: CLINIC | Age: 86
End: 2021-11-29
Attending: INTERNAL MEDICINE
Payer: COMMERCIAL

## 2021-11-29 ENCOUNTER — OFFICE VISIT (OUTPATIENT)
Dept: OTHER | Facility: CLINIC | Age: 86
End: 2021-11-29
Attending: INTERNAL MEDICINE
Payer: COMMERCIAL

## 2021-11-29 VITALS
BODY MASS INDEX: 22.66 KG/M2 | OXYGEN SATURATION: 94 % | DIASTOLIC BLOOD PRESSURE: 72 MMHG | HEART RATE: 54 BPM | HEIGHT: 73 IN | SYSTOLIC BLOOD PRESSURE: 111 MMHG | WEIGHT: 171 LBS | RESPIRATION RATE: 14 BRPM

## 2021-11-29 DIAGNOSIS — I71.40 ABDOMINAL AORTIC ANEURYSM (AAA) WITHOUT RUPTURE (H): ICD-10-CM

## 2021-11-29 DIAGNOSIS — I73.9 CLAUDICATION OF RIGHT LOWER EXTREMITY (H): ICD-10-CM

## 2021-11-29 PROCEDURE — 99214 OFFICE O/P EST MOD 30 MIN: CPT | Performed by: INTERNAL MEDICINE

## 2021-11-29 PROCEDURE — 93978 VASCULAR STUDY: CPT

## 2021-11-29 PROCEDURE — 93978 VASCULAR STUDY: CPT | Mod: 26 | Performed by: SURGERY

## 2021-11-29 PROCEDURE — 93925 LOWER EXTREMITY STUDY: CPT | Mod: 26 | Performed by: SURGERY

## 2021-11-29 PROCEDURE — 93925 LOWER EXTREMITY STUDY: CPT

## 2021-11-29 PROCEDURE — G0463 HOSPITAL OUTPT CLINIC VISIT: HCPCS

## 2021-11-29 ASSESSMENT — MIFFLIN-ST. JEOR: SCORE: 1494.53

## 2021-11-29 NOTE — PROGRESS NOTES
"[facility]        Patient is here for a follow up  to discuss about illiac duplex US rresults      /72 (BP Location: Left arm, Patient Position: Chair, Cuff Size: Adult Regular)   Pulse 54   Resp 14   Ht 6' 1\" (1.854 m)   Wt 171 lb (77.6 kg)   SpO2 94%   BMI 22.56 kg/m      The provider has been notified that the patient has no concerns.     Questions patient would like addressed today are: N/A.    Refills are needed: No    Has homecare services and agency name:  Cecilia Jamison [facility]      "

## 2021-11-29 NOTE — PROGRESS NOTES
[facility] CENTER  VASCULAR MEDICINE FOLLOW-UP VISIT      PRIMARY HEALTH CARE PROVIDER:  Dean Rider    REASON FOR VISIT: Follow-up on Doppler arterial ultrasound of aorta, iliac arteries and popliteal arteries      HPI: Ayaan Herrera is a very pleasant 89 year old       PAST MEDICAL HISTORY  Past Medical History:   Diagnosis Date     Atrial fibrillation (H)      Nonsenile cataract      POAG (primary open-angle glaucoma)     Advanced        PAST SURGICAL HISTORY:  Past Surgical History:   Procedure Laterality Date     CATARACT IOL, RT/LT  1985/1996    RE/LE     GLAUCOMA SURGERY  1996    LE     GLAUCOMA SURGERY  1997    Bleb revision LE     LASER SURGERY OF EYE  10/14/04 & 2/17/05    SLT RE     PUNCTAL CLOSURE, PLUGS  2000    BE     TURP  2003         CURRENT MEDICATIONS  acetaminophen (TYLENOL) 325 MG tablet, Take 325 mg by mouth as needed for mild pain  alendronate (FOSAMAX) 70 MG tablet, TAKE 1 TAB WITH 8OZ OF WATER EVERY 7 DAYS 30 MIN BEFORE BREAKFAST & REMAIN UPRIGHT DURING THIS TIME  amiodarone (PACERONE) 200 MG tablet, Take 1 tablet (200 mg) by mouth daily  brimonidine (ALPHAGAN P) 0.15 % ophthalmic solution, Place 1 drop into both eyes 2 times daily  COSOPT 2-0.5 % OP SOLN, Apply 1 Dose to eye 2 times daily Both eyes.  furosemide (LASIX) 20 MG tablet, Take 1 tablet (20 mg) by mouth daily  ketotifen (ZADITOR) 0.025 % SOLN, Place 1 drop into both eyes as needed  latanoprost (XALATAN) 0.005 % ophthalmic solution, Place 1 drop into both eyes At Bedtime  netarsudil (RHOPRESSA) 0.02 % ophthalmic solution, 1 drop At Bedtime  potassium chloride ER (KLOR-CON) 10 MEQ CR tablet, TAKE ONE TABLET DAILY WITH EACH 20 MG DOSE OF FUROSEMIDE.  tamsulosin (FLOMAX) 0.4 MG capsule, TAKE ONE CAPSULE BY MOUTH ONCE A DAY  Vitamin D, Cholecalciferol, 1000 UNITS TABS,   warfarin ANTICOAGULANT (COUMADIN) 5 MG tablet, Take 1 tablet (5 mg) by mouth daily  warfarin ANTICOAGULANT (COUMADIN) 5 MG tablet, Take 2.5 mg on   and 5 mg by mouth on all other days of the week  betamethasone dipropionate (DIPROSONE) 0.05 % external lotion, INSTILL 5 DROPS INTO AFFECTED EAR TWICE DAILY X 7 DAYS, THEN TWICE WEEKLY (Patient not taking: Reported on 2021)  neomycin-polymyxin-hydrocortisone (CORTISPORIN) 3.5-49910-8 otic suspension, Place 3 drops Into the left ear 4 times daily (Patient not taking: Reported on 2021)    No current facility-administered medications on file prior to visit.      ALLERGIES     Allergies   Allergen Reactions     Zithromax [Azithromycin] Diarrhea            Amoxicillin Diarrhea     Augmentin GI Disturbance       FAMILY HISTORY  Family History   Problem Relation Age of Onset     Glaucoma Father      Hypertension Other      C.A.D. Other      Macular Degeneration Mother        SOCIAL HISTORY  Social History     Socioeconomic History     Marital status: Single     Spouse name: Not on file     Number of children: 2     Years of education: Not on file     Highest education level: Not on file   Occupational History     Not on file   Tobacco Use     Smoking status: Former Smoker     Packs/day: 0.20     Years: 25.00     Pack years: 5.00     Start date: 1955     Quit date: 1980     Years since quittin.9     Smokeless tobacco: Former User   Substance and Sexual Activity     Alcohol use: Yes     Comment: occasionally     Drug use: No     Sexual activity: Not Currently   Other Topics Concern     Parent/sibling w/ CABG, MI or angioplasty before 65F 55M? Not Asked   Social History Narrative        [employer] ; [employer]                                     Retired ;                2 sons; [place], and [place]     Social Determinants of Health     Financial Resource Strain: Not on file   Food Insecurity: Not on file   Transportation Needs: Not on file   Physical Activity: Not on file   Stress: Not on file   Social Connections: Not on file   Intimate Partner Violence: Not on file  "  Housing Stability: Not on file       ROS:   Complete ROS negative other than what is stated in HPI.     EXAM:  /72 (BP Location: Left arm, Patient Position: Chair, Cuff Size: Adult Regular)   Pulse 54   Resp 14   Ht 6' 1\" (1.854 m)   Wt 171 lb (77.6 kg)   SpO2 94%   BMI 22.56 kg/m    In general, the patient is a pleasant male in no apparent distress.    HEENT: NC/AT.  PERRLA.  EOMI.  Sclerae white, not injected.    Neck: No adenopathy.  Carotids +2/2 bilaterally without bruits.   Heart: RRR. Normal S1, S2 splits physiologically. No murmur, rub, click, or gallop.   Lungs: CTA.  No ronchi, wheezes, rales.    Abdomen: Soft, nontender, nondistended.   Extremities: No clubbing, cyanosis, or edema.  No wounds.     Labs:  LIPID RESULTS:  Lab Results   Component Value Date    CHOL 145 08/07/2017    HDL 63 08/01/2016    LDL 80 08/01/2016    TRIG 72 08/07/2017       LIVER ENZYME RESULTS:  Lab Results   Component Value Date    AST 29 09/02/2021    AST 40 04/16/2021    ALT 32 09/02/2021    ALT 42 04/16/2021       CBC RESULTS:  Lab Results   Component Value Date    WBC 5.6 04/16/2021    RBC 4.33 (L) 04/16/2021    HGB 12.9 (L) 04/16/2021    HCT 37.9 (L) 04/16/2021    MCV 88 04/16/2021    MCH 29.8 04/16/2021    MCHC 34.0 04/16/2021    RDW 13.7 04/16/2021     04/16/2021       BMP RESULTS:  Lab Results   Component Value Date     09/02/2021     04/16/2021    POTASSIUM 4.2 09/02/2021    POTASSIUM 4.3 04/16/2021    CHLORIDE 105 09/02/2021    CHLORIDE 101 04/16/2021    CO2 29 09/02/2021    CO2 28 04/16/2021    ANIONGAP 2 (L) 09/02/2021    ANIONGAP 4 04/16/2021    GLC 88 09/02/2021     (H) 04/16/2021    BUN 23 09/02/2021    BUN 17 04/16/2021    CR 0.83 09/02/2021    CR 0.82 04/16/2021    GFRESTIMATED 79 09/02/2021    GFRESTIMATED 79 04/16/2021    GFRESTBLACK >90 04/16/2021    BRENDAN 9.3 09/02/2021    BRENDAN 9.3 04/16/2021        A1C RESULTS:  No results found for: A1C    THYROID RESULTS:  Lab Results "   Component Value Date    TSH 1.30 08/07/2017         Procedures: Did the interpretation of the ultrasound of aorta, iliac arteries, and popliteal arteries      Assessment and Plan:    (I71.4) Abdominal aortic aneurysm (AAA) without rupture (H)  Comment: Stable  Plan: Continue with vascular risk factors modifications     (I73.9) Claudication of right lower extremity (H)  Comment: No claudication  Plan: 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

## 2021-12-06 ENCOUNTER — TELEPHONE (OUTPATIENT)
Dept: INTERNAL MEDICINE | Facility: CLINIC | Age: 86
End: 2021-12-06
Payer: COMMERCIAL

## 2021-12-06 NOTE — TELEPHONE ENCOUNTER
[facility]   His urologist has been checking his PSA.                        Please let him know.

## 2021-12-06 NOTE — TELEPHONE ENCOUNTER
Dr Rider,     Patient calling to request labs,     Pended lab.     Can we leave a detailed message on this number? YES  Phone number patient can be reached at: Cell number on file:    No relevant phone numbers on file.[phone]       Odessa Douglas RN  [facility] Triage        Odessa Douglas RN

## 2021-12-08 ENCOUNTER — LAB (OUTPATIENT)
Dept: LAB | Facility: CLINIC | Age: 86
End: 2021-12-08
Payer: COMMERCIAL

## 2021-12-08 ENCOUNTER — ANTICOAGULATION THERAPY VISIT (OUTPATIENT)
Dept: ANTICOAGULATION | Facility: CLINIC | Age: 86
End: 2021-12-08

## 2021-12-08 DIAGNOSIS — I48.20 CHRONIC ATRIAL FIBRILLATION (H): ICD-10-CM

## 2021-12-08 DIAGNOSIS — I48.20 CHRONIC ATRIAL FIBRILLATION (H): Primary | ICD-10-CM

## 2021-12-08 LAB — INR BLD: 3.2 (ref 0.9–1.1)

## 2021-12-08 PROCEDURE — 85610 PROTHROMBIN TIME: CPT

## 2021-12-08 PROCEDURE — 36416 COLLJ CAPILLARY BLOOD SPEC: CPT

## 2021-12-08 RX ORDER — WARFARIN SODIUM 2.5 MG/1
TABLET ORAL
Qty: 135 TABLET | Refills: 0 | Status: ON HOLD | OUTPATIENT
Start: 2021-12-08 | End: 2022-03-01

## 2021-12-08 NOTE — PROGRESS NOTES
ANTICOAGULATION MANAGEMENT     Ayaan Herrera 89 year old male is on warfarin with supratherapeutic INR result. (Goal INR 2.0-3.0)    Recent labs: (last 7 days)     12/08/21  1118   INR 3.2*       ASSESSMENT     Source(s): Chart Review and Patient/Caregiver Call       Warfarin doses taken: Warfarin taken as instructed    Diet: No new diet changes identified    New illness, injury, or hospitalization: No    Medication/supplement changes: Changed the warfarin tablet strength from 5 mg to 2.5 mg. Rx sent to pharmacy    Signs or symptoms of bleeding or clotting: No    Previous INR: Supratherapeutic    Additional findings: Pt reports that he is scheduled to have basal cell lesion removed from his left ear on 12/21/21. Pt needs his INR level to be between 2.0-3.0 per pt report.     PLAN     Recommended plan for ongoing change(s) affecting INR     Dosing Instructions: Take 2.5 mg of warfarin today 12/8/21 then take 1.25 mg of warfarin on 12/9/21 then take 3.75 mg on Sundays and 2.5 mg all other days with next INR in 10 days     (6.2% decrease)     Summary  As of 12/8/2021    Full warfarin instructions:  12/9: 1.25 mg; Otherwise 3.75 mg every Sun; 2.5 mg all other days   Next INR check:  12/20/2021             Telephone call with Ayaan who agrees to plan and repeated back plan correctly (Pt will discuss with pharmacist when he picks up new warfarin prescription tomorrow 12/9/21)  Lab visit scheduled (12/20/21 before derm procedure on 12/21/21)    Education provided: Warfarin tablet strength change; remove and/or discard previous strength from medication supply, Importance of notifying clinic for changes in medications; a sooner lab recheck maybe needed. and Contact [phone]  with any changes, questions or concerns.     Plan made per [facility] anticoagulation protocol    Luisa Matthews RN  Anticoagulation Clinic  12/8/2021    _______________________________________________________________________     Anticoagulation  Episode Summary     Current INR goal:  2.0-3.0   TTR:  66.2 % (1 y)   Target end date:  Indefinite   Send INR reminders to:  [facility]    Indications    Chronic atrial fibrillation (H) [I48.20]           Comments:           Anticoagulation Care Providers     Provider Role Specialty Phone number    Dean Rider MD Referring Internal Medicine [phone]

## 2021-12-10 ENCOUNTER — TRANSFERRED RECORDS (OUTPATIENT)
Dept: HEALTH INFORMATION MANAGEMENT | Facility: CLINIC | Age: 86
End: 2021-12-10
Payer: COMMERCIAL

## 2021-12-20 ENCOUNTER — ANTICOAGULATION THERAPY VISIT (OUTPATIENT)
Dept: ANTICOAGULATION | Facility: CLINIC | Age: 86
End: 2021-12-20

## 2021-12-20 ENCOUNTER — LAB (OUTPATIENT)
Dept: LAB | Facility: CLINIC | Age: 86
End: 2021-12-20
Payer: COMMERCIAL

## 2021-12-20 DIAGNOSIS — I48.20 CHRONIC ATRIAL FIBRILLATION (H): Primary | ICD-10-CM

## 2021-12-20 DIAGNOSIS — I48.20 CHRONIC ATRIAL FIBRILLATION (H): ICD-10-CM

## 2021-12-20 LAB — INR BLD: 2.8 (ref 0.9–1.1)

## 2021-12-20 PROCEDURE — 36416 COLLJ CAPILLARY BLOOD SPEC: CPT

## 2021-12-20 PROCEDURE — 99207 PR NO CHARGE NURSE ONLY: CPT

## 2021-12-20 PROCEDURE — 85610 PROTHROMBIN TIME: CPT

## 2021-12-20 NOTE — PROGRESS NOTES
ANTICOAGULATION FOLLOW-UP CLINIC VISIT    Patient Name:  Ayaan Herrera  Date:  2021  Contact Type:  Telephone    SUBJECTIVE:         OBJECTIVE    Recent labs: (last 7 days)     21  1209   INR 2.8*       ASSESSMENT / PLAN  INR assessment THER    Recheck INR In: 3 WEEKS    INR Location Clinic      Anticoagulation Summary  As of 2021    INR goal:  2.0-3.0   TTR:  67.4 % (1 y)   INR used for dosin.8 (2021)   Warfarin maintenance plan:  3.75 mg (2.5 mg x 1.5) every Sun; 2.5 mg (2.5 mg x 1) all other days   Full warfarin instructions:  3.75 mg every Sun; 2.5 mg all other days   Weekly warfarin total:  18.75 mg   Weekly max warfarin dose:  35 mg   No change documented:  Janene Smith RN   Plan last modified:  Luisa Matthews RN (2021)   Next INR check:  1/10/2022   Priority:  Maintenance   Target end date:  Indefinite    Indications    Chronic atrial fibrillation (H) [I48.20]             Anticoagulation Episode Summary     INR check location:  Clinic Lab    Preferred lab:      Send INR reminders to:  [facility]    Comments:        Anticoagulation Care Providers     Provider Role Specialty Phone number    Dean Rider MD Referring Internal Medicine [phone]            See the Encounter Report to view Anticoagulation Flowsheet and Dosing Calendar (Go to Encounters tab in chart review, and find the Anticoagulation Therapy Visit)        Janene Smith RN          ANTICOAGULATION MANAGEMENT     Ayaan Herrera 89 year old male is on warfarin with therapeutic INR result. (Goal INR 2.0-3.0)    Recent labs: (last 7 days)     21  1209   INR 2.8*       ASSESSMENT     Source(s): Chart Review and Patient/Caregiver Call       Warfarin doses taken: Warfarin taken as instructed    Diet: No new diet changes identified    New illness, injury, or hospitalization: No    Medication/supplement changes: None noted    Signs or symptoms of bleeding or clotting:  No    Previous INR: Supratherapeutic    Additional findings: None     PLAN     Recommended plan for no diet, medication or health factor changes affecting INR     Dosing Instructions: Continue your current warfarin dose with next INR in 3 weeks       Summary  As of 12/20/2021    Full warfarin instructions:  3.75 mg every Sun; 2.5 mg all other days   Next INR check:  1/10/2022             Telephone call with Ayaan who verbalizes understanding and agrees to plan    Lab visit scheduled    Education provided: Please call back if any changes to your diet, medications or how you've been taking warfarin    Plan made per [facility] anticoagulation protocol    Janene Smith RN  Anticoagulation Clinic  12/20/2021    _______________________________________________________________________     Anticoagulation Episode Summary     Current INR goal:  2.0-3.0   TTR:  67.4 % (1 y)   Target end date:  Indefinite   Send INR reminders to:  [facility]    Indications    Chronic atrial fibrillation (H) [I48.20]           Comments:           Anticoagulation Care Providers     Provider Role Specialty Phone number    Dean Rider MD Referring Internal Medicine [phone]

## 2022-01-19 ENCOUNTER — LAB (OUTPATIENT)
Dept: LAB | Facility: CLINIC | Age: 87
End: 2022-01-19
Payer: COMMERCIAL

## 2022-01-19 ENCOUNTER — ANTICOAGULATION THERAPY VISIT (OUTPATIENT)
Dept: ANTICOAGULATION | Facility: CLINIC | Age: 87
End: 2022-01-19

## 2022-01-19 DIAGNOSIS — I48.20 CHRONIC ATRIAL FIBRILLATION (H): ICD-10-CM

## 2022-01-19 DIAGNOSIS — I48.20 CHRONIC ATRIAL FIBRILLATION (H): Primary | ICD-10-CM

## 2022-01-19 LAB — INR BLD: 2.9 (ref 0.9–1.1)

## 2022-01-19 PROCEDURE — 85610 PROTHROMBIN TIME: CPT

## 2022-01-19 PROCEDURE — 36416 COLLJ CAPILLARY BLOOD SPEC: CPT

## 2022-01-19 NOTE — PROGRESS NOTES
ANTICOAGULATION MANAGEMENT     Ayaan Herrera 89 year old male is on warfarin with therapeutic INR result. (Goal INR 2.0-3.0)    Recent labs: (last 7 days)     01/19/22  1420   INR 2.9*       ASSESSMENT     Source(s): Chart Review and Patient/Caregiver Call       Warfarin doses taken: Warfarin taken as instructed    Diet: No new diet changes identified    New illness, injury, or hospitalization: No    Medication/supplement changes: None noted    Signs or symptoms of bleeding or clotting: No    Previous INR: Therapeutic last visit; previously outside of goal range    Additional findings: None     PLAN     Recommended plan for no diet, medication or health factor changes affecting INR     Dosing Instructions: Continue your current warfarin dose with next INR in 5 weeks       Summary  As of 1/19/2022    Full warfarin instructions:  3.75 mg every Sun; 2.5 mg all other days   Next INR check:  2/23/2022             Telephone call with Ayaan who verbalizes understanding and agrees to plan    Lab visit scheduled. Mailing out AVS dosing calendar.    Education provided: Please call back if any changes to your diet, medications or how you've been taking warfarin, Importance of consistent vitamin K intake, Impact of vitamin K foods on INR, Goal range and significance of current result and Contact [phone]  with any changes, questions or concerns.     Plan made per [facility] anticoagulation protocol    Vilma Truong RN  Anticoagulation Clinic  1/19/2022    _______________________________________________________________________     Anticoagulation Episode Summary     Current INR goal:  2.0-3.0   TTR:  67.4 % (1 y)   Target end date:  Indefinite   Send INR [facility]ers to:  [facility]    Indications    Chronic atrial fibrillation (H) [I48.20]           Comments:           Anticoagulation Care Providers     Provider Role Specialty Phone number    Dean Rider MD Referring Internal Medicine [phone]

## 2022-02-24 ENCOUNTER — LAB (OUTPATIENT)
Dept: LAB | Facility: CLINIC | Age: 87
End: 2022-02-24
Payer: COMMERCIAL

## 2022-02-24 ENCOUNTER — ANTICOAGULATION THERAPY VISIT (OUTPATIENT)
Dept: ANTICOAGULATION | Facility: CLINIC | Age: 87
End: 2022-02-24

## 2022-02-24 DIAGNOSIS — I48.20 CHRONIC ATRIAL FIBRILLATION (H): ICD-10-CM

## 2022-02-24 DIAGNOSIS — I48.20 CHRONIC ATRIAL FIBRILLATION (H): Primary | ICD-10-CM

## 2022-02-24 LAB — INR BLD: 2.3 (ref 0.9–1.1)

## 2022-02-24 PROCEDURE — 85610 PROTHROMBIN TIME: CPT

## 2022-02-24 PROCEDURE — 36416 COLLJ CAPILLARY BLOOD SPEC: CPT

## 2022-02-24 NOTE — PROGRESS NOTES
ANTICOAGULATION MANAGEMENT     Ayaan Herrera 89 year old male is on warfarin with therapeutic INR result. (Goal INR 2.0-3.0)    Recent labs: (last 7 days)     02/24/22  1141   INR 2.3*       ASSESSMENT     Source(s): Chart Review and Patient/Caregiver Call       Warfarin doses taken: Warfarin taken as instructed    Diet: No new diet changes identified    New illness, injury, or hospitalization: No    Medication/supplement changes: None noted    Signs or symptoms of bleeding or clotting: No    Previous INR: Therapeutic last 2(+) visits    Additional findings: AVS mailed Pt to check INR sooner if he has a change in health     PLAN     Recommended plan for no diet, medication or health factor changes affecting INR     Dosing Instructions: Continue your current warfarin dose with next INR in 6 weeks       Summary  As of 2/24/2022    Full warfarin instructions:  3.75 mg every Sun; 2.5 mg all other days   Next INR check:  4/7/2022             Telephone call with Ayaan who verbalizes understanding and agrees to plan    Lab visit scheduled    Education provided: Importance of notifying clinic for changes in medications; a sooner lab recheck maybe needed. and Contact [phone]  with any changes, questions or concerns.     Plan made per [facility] anticoagulation protocol    Luisa Matthews RN  Anticoagulation Clinic  2/24/2022    _______________________________________________________________________     Anticoagulation Episode Summary     Current INR goal:  2.0-3.0   TTR:  74.4 % (1 y)   Target end date:  Indefinite   Send INR reminders to:  [facility]    Indications    Chronic atrial fibrillation (H) [I48.20]           Comments:           Anticoagulation Care Providers     Provider Role Specialty Phone number    Dean Rider MD Referring Internal Medicine [phone]

## 2022-02-28 ENCOUNTER — APPOINTMENT (OUTPATIENT)
Dept: CT IMAGING | Facility: CLINIC | Age: 87
End: 2022-02-28
Attending: EMERGENCY MEDICINE
Payer: COMMERCIAL

## 2022-02-28 ENCOUNTER — HOSPITAL ENCOUNTER (OUTPATIENT)
Facility: CLINIC | Age: 87
Setting detail: OBSERVATION
Discharge: NURSING, REHAB OR LONG-TERM CARE | End: 2022-03-01
Attending: EMERGENCY MEDICINE | Admitting: HOSPITALIST
Payer: COMMERCIAL

## 2022-02-28 ENCOUNTER — APPOINTMENT (OUTPATIENT)
Dept: ULTRASOUND IMAGING | Facility: CLINIC | Age: 87
End: 2022-02-28
Attending: PHYSICIAN ASSISTANT
Payer: COMMERCIAL

## 2022-02-28 DIAGNOSIS — R60.0 BILATERAL LEG EDEMA: ICD-10-CM

## 2022-02-28 DIAGNOSIS — Z79.01 ANTICOAGULATED ON COUMADIN: ICD-10-CM

## 2022-02-28 DIAGNOSIS — R26.2 UNABLE TO AMBULATE: ICD-10-CM

## 2022-02-28 DIAGNOSIS — K59.00 CONSTIPATION, UNSPECIFIED CONSTIPATION TYPE: ICD-10-CM

## 2022-02-28 DIAGNOSIS — S22.080A CLOSED WEDGE COMPRESSION FRACTURE OF T11 VERTEBRA, INITIAL ENCOUNTER (H): ICD-10-CM

## 2022-02-28 DIAGNOSIS — D64.9 ANEMIA, UNSPECIFIED TYPE: ICD-10-CM

## 2022-02-28 DIAGNOSIS — I48.20 CHRONIC ATRIAL FIBRILLATION (H): ICD-10-CM

## 2022-02-28 DIAGNOSIS — N28.9 LESION OF RIGHT NATIVE KIDNEY: ICD-10-CM

## 2022-02-28 LAB
ALBUMIN SERPL-MCNC: 3.4 G/DL (ref 3.4–5)
ALBUMIN UR-MCNC: NEGATIVE MG/DL
ALP SERPL-CCNC: 65 U/L (ref 40–150)
ALT SERPL W P-5'-P-CCNC: 30 U/L (ref 0–70)
ANION GAP SERPL CALCULATED.3IONS-SCNC: 6 MMOL/L (ref 3–14)
APPEARANCE UR: CLEAR
AST SERPL W P-5'-P-CCNC: 30 U/L (ref 0–45)
ATRIAL RATE - MUSE: 68 BPM
BASOPHILS # BLD AUTO: 0 10E3/UL (ref 0–0.2)
BASOPHILS NFR BLD AUTO: 0 %
BILIRUB SERPL-MCNC: 0.7 MG/DL (ref 0.2–1.3)
BILIRUB UR QL STRIP: NEGATIVE
BUN SERPL-MCNC: 34 MG/DL (ref 7–30)
CALCIUM SERPL-MCNC: 9.2 MG/DL (ref 8.5–10.1)
CHLORIDE BLD-SCNC: 103 MMOL/L (ref 94–109)
CO2 SERPL-SCNC: 24 MMOL/L (ref 20–32)
COLOR UR AUTO: YELLOW
CREAT SERPL-MCNC: 1.04 MG/DL (ref 0.66–1.25)
DIASTOLIC BLOOD PRESSURE - MUSE: NORMAL MMHG
EOSINOPHIL # BLD AUTO: 0.1 10E3/UL (ref 0–0.7)
EOSINOPHIL NFR BLD AUTO: 1 %
ERYTHROCYTE [DISTWIDTH] IN BLOOD BY AUTOMATED COUNT: 14 % (ref 10–15)
GFR SERPL CREATININE-BSD FRML MDRD: 69 ML/MIN/1.73M2
GLUCOSE BLD-MCNC: 126 MG/DL (ref 70–99)
GLUCOSE UR STRIP-MCNC: NEGATIVE MG/DL
HCT VFR BLD AUTO: 33 % (ref 40–53)
HGB BLD-MCNC: 10.6 G/DL (ref 13.3–17.7)
HGB UR QL STRIP: NEGATIVE
HOLD SPECIMEN: NORMAL
HOLD SPECIMEN: NORMAL
IMM GRANULOCYTES # BLD: 0.1 10E3/UL
IMM GRANULOCYTES NFR BLD: 1 %
INR PPP: 1.98 (ref 0.85–1.15)
INTERPRETATION ECG - MUSE: NORMAL
KETONES UR STRIP-MCNC: NEGATIVE MG/DL
LEUKOCYTE ESTERASE UR QL STRIP: NEGATIVE
LYMPHOCYTES # BLD AUTO: 0.5 10E3/UL (ref 0.8–5.3)
LYMPHOCYTES NFR BLD AUTO: 5 %
MCH RBC QN AUTO: 28 PG (ref 26.5–33)
MCHC RBC AUTO-ENTMCNC: 32.1 G/DL (ref 31.5–36.5)
MCV RBC AUTO: 87 FL (ref 78–100)
MONOCYTES # BLD AUTO: 1.4 10E3/UL (ref 0–1.3)
MONOCYTES NFR BLD AUTO: 15 %
MUCOUS THREADS #/AREA URNS LPF: PRESENT /LPF
NEUTROPHILS # BLD AUTO: 7.4 10E3/UL (ref 1.6–8.3)
NEUTROPHILS NFR BLD AUTO: 78 %
NITRATE UR QL: NEGATIVE
NRBC # BLD AUTO: 0 10E3/UL
NRBC BLD AUTO-RTO: 0 /100
P AXIS - MUSE: NORMAL DEGREES
PH UR STRIP: 6 [PH] (ref 5–7)
PLATELET # BLD AUTO: 176 10E3/UL (ref 150–450)
POTASSIUM BLD-SCNC: 4.3 MMOL/L (ref 3.4–5.3)
PR INTERVAL - MUSE: NORMAL MS
PROT SERPL-MCNC: 6.7 G/DL (ref 6.8–8.8)
QRS DURATION - MUSE: 198 MS
QT - MUSE: 496 MS
QTC - MUSE: 535 MS
R AXIS - MUSE: 97 DEGREES
RBC # BLD AUTO: 3.78 10E6/UL (ref 4.4–5.9)
RBC URINE: 2 /HPF
SARS-COV-2 RNA RESP QL NAA+PROBE: NEGATIVE
SODIUM SERPL-SCNC: 133 MMOL/L (ref 133–144)
SP GR UR STRIP: 1.01 (ref 1–1.03)
SQUAMOUS EPITHELIAL: <1 /HPF
SYSTOLIC BLOOD PRESSURE - MUSE: NORMAL MMHG
T AXIS - MUSE: -58 DEGREES
UROBILINOGEN UR STRIP-MCNC: NORMAL MG/DL
VENTRICULAR RATE- MUSE: 70 BPM
WBC # BLD AUTO: 9.5 10E3/UL (ref 4–11)
WBC URINE: <1 /HPF

## 2022-02-28 PROCEDURE — 258N000003 HC RX IP 258 OP 636: Performed by: EMERGENCY MEDICINE

## 2022-02-28 PROCEDURE — G0378 HOSPITAL OBSERVATION PER HR: HCPCS

## 2022-02-28 PROCEDURE — 70450 CT HEAD/BRAIN W/O DYE: CPT

## 2022-02-28 PROCEDURE — 250N000013 HC RX MED GY IP 250 OP 250 PS 637: Performed by: HOSPITALIST

## 2022-02-28 PROCEDURE — 72128 CT CHEST SPINE W/O DYE: CPT

## 2022-02-28 PROCEDURE — 87635 SARS-COV-2 COVID-19 AMP PRB: CPT | Performed by: EMERGENCY MEDICINE

## 2022-02-28 PROCEDURE — 93005 ELECTROCARDIOGRAM TRACING: CPT

## 2022-02-28 PROCEDURE — 36415 COLL VENOUS BLD VENIPUNCTURE: CPT | Performed by: EMERGENCY MEDICINE

## 2022-02-28 PROCEDURE — 85025 COMPLETE CBC W/AUTO DIFF WBC: CPT | Performed by: EMERGENCY MEDICINE

## 2022-02-28 PROCEDURE — C9803 HOPD COVID-19 SPEC COLLECT: HCPCS

## 2022-02-28 PROCEDURE — 51702 INSERT TEMP BLADDER CATH: CPT | Mod: XU

## 2022-02-28 PROCEDURE — 76770 US EXAM ABDO BACK WALL COMP: CPT

## 2022-02-28 PROCEDURE — 99285 EMERGENCY DEPT VISIT HI MDM: CPT | Mod: 25

## 2022-02-28 PROCEDURE — 74177 CT ABD & PELVIS W/CONTRAST: CPT

## 2022-02-28 PROCEDURE — 85610 PROTHROMBIN TIME: CPT | Performed by: EMERGENCY MEDICINE

## 2022-02-28 PROCEDURE — 81001 URINALYSIS AUTO W/SCOPE: CPT | Performed by: EMERGENCY MEDICINE

## 2022-02-28 PROCEDURE — 250N000009 HC RX 250: Performed by: HOSPITALIST

## 2022-02-28 PROCEDURE — 250N000011 HC RX IP 250 OP 636: Performed by: EMERGENCY MEDICINE

## 2022-02-28 PROCEDURE — 99220 PR INITIAL OBSERVATION CARE,LEVEL III: CPT | Performed by: HOSPITALIST

## 2022-02-28 PROCEDURE — 250N000009 HC RX 250: Performed by: EMERGENCY MEDICINE

## 2022-02-28 PROCEDURE — 99225 PR SUBSEQUENT OBSERVATION CARE,LEVEL II: CPT | Performed by: HOSPITALIST

## 2022-02-28 PROCEDURE — 80053 COMPREHEN METABOLIC PANEL: CPT | Performed by: EMERGENCY MEDICINE

## 2022-02-28 RX ORDER — BRIMONIDINE TARTRATE 2 MG/ML
1 SOLUTION/ DROPS OPHTHALMIC 2 TIMES DAILY
Status: DISCONTINUED | OUTPATIENT
Start: 2022-02-28 | End: 2022-03-01 | Stop reason: HOSPADM

## 2022-02-28 RX ORDER — ACETAMINOPHEN 325 MG/1
650 TABLET ORAL EVERY 6 HOURS PRN
Status: DISCONTINUED | OUTPATIENT
Start: 2022-02-28 | End: 2022-03-01 | Stop reason: HOSPADM

## 2022-02-28 RX ORDER — AMOXICILLIN 250 MG
1 CAPSULE ORAL 2 TIMES DAILY PRN
Status: DISCONTINUED | OUTPATIENT
Start: 2022-02-28 | End: 2022-03-01 | Stop reason: HOSPADM

## 2022-02-28 RX ORDER — ONDANSETRON 2 MG/ML
4 INJECTION INTRAMUSCULAR; INTRAVENOUS EVERY 6 HOURS PRN
Status: DISCONTINUED | OUTPATIENT
Start: 2022-02-28 | End: 2022-02-28

## 2022-02-28 RX ORDER — LIDOCAINE 40 MG/G
CREAM TOPICAL
Status: DISCONTINUED | OUTPATIENT
Start: 2022-02-28 | End: 2022-03-01 | Stop reason: HOSPADM

## 2022-02-28 RX ORDER — BISACODYL 10 MG
10 SUPPOSITORY, RECTAL RECTAL DAILY PRN
Status: DISCONTINUED | OUTPATIENT
Start: 2022-02-28 | End: 2022-03-01 | Stop reason: HOSPADM

## 2022-02-28 RX ORDER — ACETAMINOPHEN 650 MG/1
650 SUPPOSITORY RECTAL EVERY 6 HOURS PRN
Status: DISCONTINUED | OUTPATIENT
Start: 2022-02-28 | End: 2022-03-01 | Stop reason: HOSPADM

## 2022-02-28 RX ORDER — AMOXICILLIN 250 MG
2 CAPSULE ORAL 2 TIMES DAILY PRN
Status: DISCONTINUED | OUTPATIENT
Start: 2022-02-28 | End: 2022-03-01 | Stop reason: HOSPADM

## 2022-02-28 RX ORDER — NALOXONE HYDROCHLORIDE 0.4 MG/ML
0.2 INJECTION, SOLUTION INTRAMUSCULAR; INTRAVENOUS; SUBCUTANEOUS
Status: DISCONTINUED | OUTPATIENT
Start: 2022-02-28 | End: 2022-03-01 | Stop reason: HOSPADM

## 2022-02-28 RX ORDER — OXYCODONE HYDROCHLORIDE 5 MG/1
5-10 TABLET ORAL EVERY 4 HOURS PRN
Status: DISCONTINUED | OUTPATIENT
Start: 2022-02-28 | End: 2022-03-01 | Stop reason: HOSPADM

## 2022-02-28 RX ORDER — POLYETHYLENE GLYCOL 3350 17 G/17G
17 POWDER, FOR SOLUTION ORAL DAILY PRN
Status: DISCONTINUED | OUTPATIENT
Start: 2022-02-28 | End: 2022-03-01 | Stop reason: HOSPADM

## 2022-02-28 RX ORDER — PROCHLORPERAZINE MALEATE 5 MG
5 TABLET ORAL EVERY 6 HOURS PRN
Status: DISCONTINUED | OUTPATIENT
Start: 2022-02-28 | End: 2022-03-01 | Stop reason: HOSPADM

## 2022-02-28 RX ORDER — HYDROMORPHONE HYDROCHLORIDE 1 MG/ML
0.5 INJECTION, SOLUTION INTRAMUSCULAR; INTRAVENOUS; SUBCUTANEOUS EVERY 30 MIN PRN
Status: DISCONTINUED | OUTPATIENT
Start: 2022-02-28 | End: 2022-02-28

## 2022-02-28 RX ORDER — HYDROMORPHONE HCL IN WATER/PF 6 MG/30 ML
.2-.3 PATIENT CONTROLLED ANALGESIA SYRINGE INTRAVENOUS
Status: DISCONTINUED | OUTPATIENT
Start: 2022-02-28 | End: 2022-03-01 | Stop reason: HOSPADM

## 2022-02-28 RX ORDER — NALOXONE HYDROCHLORIDE 0.4 MG/ML
0.4 INJECTION, SOLUTION INTRAMUSCULAR; INTRAVENOUS; SUBCUTANEOUS
Status: DISCONTINUED | OUTPATIENT
Start: 2022-02-28 | End: 2022-03-01 | Stop reason: HOSPADM

## 2022-02-28 RX ORDER — ONDANSETRON 4 MG/1
4 TABLET, ORALLY DISINTEGRATING ORAL EVERY 6 HOURS PRN
Status: DISCONTINUED | OUTPATIENT
Start: 2022-02-28 | End: 2022-02-28

## 2022-02-28 RX ORDER — PROCHLORPERAZINE 25 MG
12.5 SUPPOSITORY, RECTAL RECTAL EVERY 12 HOURS PRN
Status: DISCONTINUED | OUTPATIENT
Start: 2022-02-28 | End: 2022-03-01 | Stop reason: HOSPADM

## 2022-02-28 RX ORDER — DORZOLAMIDE HYDROCHLORIDE AND TIMOLOL MALEATE 20; 5 MG/ML; MG/ML
1 SOLUTION/ DROPS OPHTHALMIC 2 TIMES DAILY
Status: DISCONTINUED | OUTPATIENT
Start: 2022-02-28 | End: 2022-03-01 | Stop reason: HOSPADM

## 2022-02-28 RX ORDER — WARFARIN SODIUM 2.5 MG/1
2.5 TABLET ORAL
Status: COMPLETED | OUTPATIENT
Start: 2022-02-28 | End: 2022-02-28

## 2022-02-28 RX ORDER — TAMSULOSIN HYDROCHLORIDE 0.4 MG/1
0.4 CAPSULE ORAL DAILY
Status: DISCONTINUED | OUTPATIENT
Start: 2022-03-01 | End: 2022-03-01 | Stop reason: HOSPADM

## 2022-02-28 RX ORDER — LATANOPROST 50 UG/ML
1 SOLUTION/ DROPS OPHTHALMIC AT BEDTIME
Status: DISCONTINUED | OUTPATIENT
Start: 2022-02-28 | End: 2022-03-01 | Stop reason: HOSPADM

## 2022-02-28 RX ORDER — LORAZEPAM 2 MG/ML
0.25 INJECTION INTRAMUSCULAR ONCE
Status: DISCONTINUED | OUTPATIENT
Start: 2022-02-28 | End: 2022-02-28 | Stop reason: CLARIF

## 2022-02-28 RX ORDER — AMIODARONE HYDROCHLORIDE 200 MG/1
200 TABLET ORAL DAILY
Status: DISCONTINUED | OUTPATIENT
Start: 2022-02-28 | End: 2022-03-01 | Stop reason: HOSPADM

## 2022-02-28 RX ORDER — IOPAMIDOL 755 MG/ML
86 INJECTION, SOLUTION INTRAVASCULAR ONCE
Status: COMPLETED | OUTPATIENT
Start: 2022-02-28 | End: 2022-02-28

## 2022-02-28 RX ORDER — ACETAMINOPHEN 325 MG/1
975 TABLET ORAL EVERY 8 HOURS
Status: DISCONTINUED | OUTPATIENT
Start: 2022-02-28 | End: 2022-03-01 | Stop reason: HOSPADM

## 2022-02-28 RX ADMIN — SODIUM CHLORIDE 1000 ML: 9 INJECTION, SOLUTION INTRAVENOUS at 01:08

## 2022-02-28 RX ADMIN — DORZOLAMIDE HYDROCHLORIDE AND TIMOLOL MALEATE 1 DROP: 20; 5 SOLUTION/ DROPS OPHTHALMIC at 20:23

## 2022-02-28 RX ADMIN — SODIUM CHLORIDE 66 ML: 900 INJECTION INTRAVENOUS at 02:26

## 2022-02-28 RX ADMIN — NETARSUDIL 1 DROP: 0.2 SOLUTION/ DROPS OPHTHALMIC; TOPICAL at 21:16

## 2022-02-28 RX ADMIN — HYDROMORPHONE HYDROCHLORIDE 0.5 MG: 1 INJECTION, SOLUTION INTRAMUSCULAR; INTRAVENOUS; SUBCUTANEOUS at 01:19

## 2022-02-28 RX ADMIN — LATANOPROST 1 DROP: 50 SOLUTION/ DROPS OPHTHALMIC at 21:17

## 2022-02-28 RX ADMIN — IOPAMIDOL 86 ML: 755 INJECTION, SOLUTION INTRAVENOUS at 02:26

## 2022-02-28 RX ADMIN — WARFARIN SODIUM 2.5 MG: 2.5 TABLET ORAL at 18:43

## 2022-02-28 RX ADMIN — AMIODARONE HYDROCHLORIDE 200 MG: 200 TABLET ORAL at 18:12

## 2022-02-28 RX ADMIN — ACETAMINOPHEN 975 MG: 325 TABLET, FILM COATED ORAL at 18:11

## 2022-02-28 RX ADMIN — BRIMONIDINE TARTRATE 1 DROP: 2 SOLUTION OPHTHALMIC at 20:22

## 2022-02-28 ASSESSMENT — ENCOUNTER SYMPTOMS
SHORTNESS OF BREATH: 0
DYSURIA: 0
ABDOMINAL PAIN: 0
FEVER: 0
HEADACHES: 0
WEAKNESS: 1
COUGH: 0
BACK PAIN: 1
LIGHT-HEADEDNESS: 1
DIFFICULTY URINATING: 0

## 2022-02-28 NOTE — ED PROVIDER NOTES
History   Chief Complaint:  Generalized weakness       HPI   Ayaan Herrera is a 89 year old male with history of atrial fibrillation anticoagulated by Coumadin, hypertension, prostate cancer, and AAA who presents with generalized weakness. Patient reports that he fell every couples of weeks. He last fell a week and a half ago. Patient was sitting on the edge of the bed and slipped because the mattress was slippery. He hit his head and back and has had sustained pain in the right side of the forehead, right eyebrows, and right back. He states most pain in his back on the right side. Patient reports feeling increasing weakness ever since until he cannot walk to the room by himself.  Patient feels lightheaded when he stands. He states that he has been eating and drinking ok. He lives independently. He used to have a friend who can help him out at home but not any more. He is on Coumadin.  No leg pain, neck pain, abdominal pain, headache, trouble urinating, dysuria, cough, fever, trouble breathing, chest pain. No history of urinary infection.     Review of Systems   Constitutional: Negative for fever.   Respiratory: Negative for cough and shortness of breath.    Cardiovascular: Negative for chest pain.   Gastrointestinal: Negative for abdominal pain.   Genitourinary: Negative for difficulty urinating and dysuria.   Musculoskeletal: Positive for back pain.   Neurological: Positive for weakness and light-headedness. Negative for headaches.   All other systems reviewed and are negative.    Allergies:  Zithromax [Azithromycin]  Amoxicillin  Augmentin    Medications:  alendronate   amiodarone   furosemide   tamsulosin   warfarin ANTICOAGULANT (COUMADIN) 2.5 MG tablet    Past Medical History:     Atrial fibrillation  Primary open-angle glaucoma  PVC's  Sinus node dysfunction s/p pacemaker  Hyperlipidemia  Hypertension  ED  AAA  Prostate cancer   Skin cancer      Past Surgical History:    Cataract removal  Glaucoma  surgery  Colonoscopy  Stress ECHO  Hernia repair  Tonsillectomy and adenoidectomy  TURP  Pacemaker implantation       Family History:    Father: lung cancer.     Social History:  Presents alone.    Physical Exam     Patient Vitals for the past 24 hrs:   BP Temp Pulse Resp SpO2   02/28/22 0100 136/79 98.3  F (36.8  C) 70 16 94 %   02/28/22 0030 (!) 139/93 -- 75 16 92 %     Physical Exam  Nursing note and vitals reviewed.  Constitutional:  Appears well-developed and well-nourished. Winces in pain with movement of his trunk.  HENT:   Head:    Atraumatic.   Mouth/Throat:   Oropharynx is clear and moist. No oropharyngeal exudate.   Eyes:    Pupils are equal, round, and reactive to light.   Neck:    Normal range of motion. Neck supple.      No tracheal deviation present. No thyromegaly present.   Cardiovascular:  Normal rate, regular rhythm, no murmur   Pulmonary/Chest: Breath sounds are clear and equal without wheezes or crackles.Tenderness to bilateral posterior lower ribs without crepitance.  Abdominal:   Soft. Bowel sounds are normal. Exhibits no distension and      no mass. There is no tenderness.      There is no rebound and no guarding.  Back Exam:                Tenderness across the back in the region of the lower Thoracic spine without deformity or swelling.   Musculoskeletal:  Exhibits no edema. Hips and arms are non tender.  Neurological:   Alert and oriented to person, place, and time. GCS 15.  CN 2-12 intact.  and proximal upper extremity strength strong and equal.  Bilateral lower extremity strength intact and equal, including intact dorsiflexion and plantarflexion strength.  Sensation intact and equal to the face, arms and legs.  No facial droop or weakness. Normal speech.  Follows commands and answers questions normally.    Skin:    Skin is warm and dry. No rash noted. No pallor.     Emergency Department Course     Imaging:  CT Thoracic Spine w/o Contrast   Final Result   IMPRESSION:   1.  T11  vertebral body superior endplate acute fracture with mild compression deformity.    2.  No additional acute fractures identified.   3.  Diffuse bony demineralization.   4.  Multilevel spondylosis.         CT Chest/Abdomen/Pelvis w Contrast   Final Result   IMPRESSION:   1.  T11 superior endplate fracture. No significant retropulsion of fracture fragments. If patient has persistent weakness or difficulty ambulating, consider MRI for further evaluation.      2.  Severe atherosclerotic changes including multivessel coronary artery disease. 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      3.  11 mm enhancing exophytic focus arising from the dorsal right renal midpole, concerning for a small renal cell carcinoma. Consider evaluation with dedicated renal mass protocol CT or MRI.      4.  Findings suggestive of constipation.      5.  Mild pulmonary interstitial edema.      Head CT w/o contrast   Final Result   IMPRESSION:   1.  No acute intracranial process.        Report per radiology    Laboratory:  Labs Ordered and Resulted from Time of ED Arrival to Time of ED Departure   INR - Abnormal       Result Value    INR 1.98 (*)    COMPREHENSIVE METABOLIC PANEL - Abnormal    Sodium 133      Potassium 4.3      Chloride 103      Carbon Dioxide (CO2) 24      Anion Gap 6      Urea Nitrogen 34 (*)     Creatinine 1.04      Calcium 9.2      Glucose 126 (*)     Alkaline Phosphatase 65      AST 30      ALT 30      Protein Total 6.7 (*)     Albumin 3.4      Bilirubin Total 0.7      GFR Estimate 69     CBC WITH PLATELETS AND DIFFERENTIAL - Abnormal    WBC Count 9.5      RBC Count 3.78 (*)     Hemoglobin 10.6 (*)     Hematocrit 33.0 (*)     MCV 87      MCH 28.0      MCHC 32.1      RDW 14.0      Platelet Count 176      % Neutrophils 78      % Lymphocytes 5      % Monocytes 15      % Eosinophils 1      % Basophils 0      % Immature Granulocytes 1      NRBCs per 100 WBC 0      Absolute Neutrophils 7.4      Absolute Lymphocytes 0.5 (*)      Absolute Monocytes 1.4 (*)     Absolute Eosinophils 0.1      Absolute Basophils 0.0      Absolute Immature Granulocytes 0.1      Absolute NRBCs 0.0     COVID-19 VIRUS (CORONAVIRUS) BY PCR - Normal    SARS CoV2 PCR Negative     ROUTINE UA WITH MICROSCOPIC REFLEX TO CULTURE      Emergency Department Course:     Reviewed:  I reviewed nursing notes, vitals, past medical history and Care Everywhere    Assessments:  0028 I obtained history and examined the patient as noted above.   0425 I rechecked the patient and explained findings.     Consults:  0420 I called Dr. Ramos, who will admit the patient.     Interventions:  0108 Bolus 1L IV  0119 Hydromorphone 0.5mg IV    Disposition:  The patient was admitted to the hospital under the care of Dr. Ramos.     Impression & Plan     Medical Decision Making:  I found this patient to have a T11 vertebral compression fracture which I feel is the cause of his intractable back pain and trouble with ambulation.  This resulted from his fall from just over a week ago.  He does not have any neurologic deficits here.  There is no sign of any active bleeding or internal bleeding although his hemoglobin has dropped 2 g as compared to 10 months ago which would be concerning for possible occult GI bleeding especially since he is on Coumadin with anticoagulation, although he does not have any signs of GI bleeding.  I also discussed with the patient and the admitting hospitalist the kidney lesion concerning for kidney cancer.  He was also found to have constipation on CT imaging also so he will need to be initiated on bowel regime.  Because he has been having intractable back pain due to this new compression fracture and unable to ambulate on his own and having multiple falls and unable to care for himself, he was admitted to the care of the hospitalist.    Diagnosis:    ICD-10-CM    1. Closed wedge compression fracture of T11 vertebra, initial encounter (H)  S22.080A    2. Unable to  ambulate  R26.2    3. Anemia, unspecified type  D64.9    4. Anticoagulated on Coumadin  Z79.01    5. Constipation, unspecified constipation type  K59.00    6. Lesion of right native kidney  N28.9      Scribe Disclosure:  I, Araceli Sanchez, am serving as a scribe on 2/28/2022 at 12:28 AM to personally document services performed by Graciela Wilson MD based on my observations and the provider's statements to me.          Graciela Wilson MD  02/28/22 0755

## 2022-02-28 NOTE — PHARMACY-ADMISSION MEDICATION HISTORY
Pharmacy Medication History  Admission medication history interview status for the 2/28/2022  admission is complete. See EPIC admission navigator for prior to admission medications     Location of Interview: Patient room  Medication history sources: Patient, Surescripts and Care Everywhere    In the past week, patient estimated taking medication this percent of the time: greater than 90%      Time spent in this activity: 20 minutes    Prior to Admission medications    Medication Sig Last Dose Taking? Auth Provider   acetaminophen (TYLENOL) 325 MG tablet Take 325 mg by mouth as needed for mild pain Unknown at Unknown time Yes Reported, Patient   alendronate (FOSAMAX) 70 MG tablet TAKE 1 TAB WITH 8OZ OF WATER EVERY 7 DAYS 30 MIN BEFORE BREAKFAST & REMAIN UPRIGHT DURING THIS TIME Past Week at Unknown time Yes Dean Rider MD   amiodarone (PACERONE) 200 MG tablet Take 1 tablet (200 mg) by mouth daily 2/27/2022 at AM Yes Dean Rider MD   brimonidine (ALPHAGAN P) 0.15 % ophthalmic solution Place 1 drop into both eyes 2 times daily 2/27/2022 at HS Yes Lissa Berry MD   COSOPT 2-0.5 % OP SOLN Place 1 Dose into both eyes 2 times daily Both eyes. 2/27/2022 at HS Yes Reported, Patient   furosemide (LASIX) 20 MG tablet Take 1 tablet (20 mg) by mouth daily 2/27/2022 at AM Yes Dean Rider MD   ketotifen (ZADITOR) 0.025 % SOLN Place 1 drop into both eyes as needed Unknown at Unknown time Yes Reported, Patient   latanoprost (XALATAN) 0.005 % ophthalmic solution Place 1 drop into both eyes At Bedtime 2/27/2022 at HS Yes Lissa Berry MD   netarsudil (RHOPRESSA) 0.02 % ophthalmic solution 1 drop At Bedtime 2/27/2022 at HS Yes Reported, Patient   potassium chloride ER (KLOR-CON) 10 MEQ CR tablet TAKE ONE TABLET DAILY WITH EACH 20 MG DOSE OF FUROSEMIDE. 2/27/2022 at AM Yes Dean Rider MD   tamsulosin (FLOMAX) 0.4 MG capsule TAKE ONE CAPSULE BY MOUTH ONCE A DAY 2/27/2022 at AM Yes Reported, Patient   Vitamin  D, Cholecalciferol, 1000 UNITS TABS Take 1 tablet by mouth daily  2/27/2022 at AM Yes Dean Rider MD   warfarin ANTICOAGULANT (COUMADIN) 2.5 MG tablet Take 0.5 tablet (1.25 mg) on 12/9/21 then take 1.5 tablets (3.75 mg) on Sundays and take 1 tablet (2.5 mg) all other days or as directed by the INR clinic 2/27/2022 at PM Yes Dean Rider MD       The information provided in this note is only as accurate as the sources available at the time of update(s)

## 2022-02-28 NOTE — CONSULTS
Orthopedic Surgery Consult / History and Physical    Ayaan Herrera MRN# [id number]   Age: 89 year old YOB: 1932     Date of Admission:   2/28/2022  Date of Consult: 02/28/22   Location:    [facility]             Assessment and Plan:   Assessment:  Ayaan Herrera is a 89 year old male with PMH as below who presents to the ED with generalized weakness and back after a fall. Images of his spine were removed and demonstrates a T11 compression fracture without significant height loss. I have informed the patient that his fracture is stable and amenable to non-operative management. He is neurologically intact.      Plan:  1. No need for surgical intervention   2. Does not need spine precautions  3. He should avoid any aggravating or strenuous activities such excessive bending, lifting or twisting of her spine as this can put strain on the fractured vertabrae   4. I do recommend outpatient consultation for bone health 2/2 this insuffiencey fractures  5. I do recommend and encourage mobilization. Will need PT/OT in house  and may need rehab +/- outpatient PT  6. Agree with pain control and gradual return to activities  7.  I have informed him that most patients can be treated with observation(medications) and gradual return to activities after this fracture.  8. I do not recommend a brace for this fracture at this time as he is fragile and fracture is stable and may cause skin issues for him  9. Calcitonin(nasal spray) can be considered for pain control although most evidence is for acute vertebral compression fractures  10. Can follow-up in my clinic after discharge in 2-3 weeks.         Chief Complaint:   T11 compression fracture           History of Present Illness:   This patient is a 89 year old male with a significant past medical history for atrial fibrillation(on Coumadin), hypertension, prostate cancer and AAA who presents with generalized weakness/back pain. Patient  reports that he has experienced multiple recent falls with the last fall being a little more than a week ago. He reports that he did hit his head during the fall but does not recall LOC.He reports also landing on his back and has had back pain and difficulty walking without discomfort/pain since the incident. He states that it flares up from time to time since the injury. He denies fever, chills, bowel bladder dysfunction, paresthesias, saddle anesthesia, radiculopathy or significant balance issues.       Past Medical History:     Past Medical History:   Diagnosis Date     Atrial fibrillation (H)      BPH (benign prostatic hyperplasia)      Dyslipidemia      History of basal cell carcinoma      HTN (hypertension)      Nonsenile cataract      NSVT (nonsustained ventricular tachycardia) (H)      POAG (primary open-angle glaucoma)     Advanced      Prostate cancer (H)      Psoriasis      Sinus node dysfunction (H)             Past Surgical History:     Past Surgical History:   Procedure Laterality Date     CATARACT IOL, RT/LT      RE/LE     EP PACEMAKER      [employer]     GLAUCOMA SURGERY  1996    LE     GLAUCOMA SURGERY  1997    Bleb revision LE     LASER SURGERY OF EYE  10/14/04 & 05    SLT RE     PUNCTAL CLOSURE, PLUGS  2000    BE     TURP  2003            Social History:   Smoking: No  Alcohol: Occasional  Street drugs: No  Living situation:  [place], [place] lives alone but does have a friend that helps  Ambulatory status:  Cane    Social History     Tobacco Use     Smoking status: Former Smoker     Packs/day: 0.20     Years: 25.00     Pack years: 5.00     Start date: 1955     Quit date: 1980     Years since quittin.1     Smokeless tobacco: Former User   Substance Use Topics     Alcohol use: Yes     Comment: occasionally            Family History:   Denies family history of bleeding or clotting disorders  Family History   Problem Relation Age of Onset      Glaucoma Father      Hypertension Other      C.A.D. Other      Macular Degeneration Mother             Allergies:     Allergies   Allergen Reactions     Zithromax [Azithromycin] Diarrhea            Amoxicillin Diarrhea     Augmentin GI Disturbance            Medications:   Anticoagulants:  Warfarin  (Not in a hospital admission)            Review of Systems:   A 10 point review of systems was performed, and was negative except as noted in HPI.         Physical Exam:     Patient is alert and oriented and in no acute distress.  Patient is a good historian.    Conjunctiva are clear.  Nonlabored breathing.  Mood is good and affect is appropriate.    Gait: No tested  CV: Regular rate, rhythm  Respiratory: Respirations unlabored on RA  Abd: soft, NT, ND  BLE:  No gross deformities. No swelling. No TTP/crepitus atthigh/knee/leg/ankle/foot.  He does have tenderness around the posterior hip and gluteal region with right straight leg raise. AROM/PROM of right hip limited secondary to pain. Able to perform ADF/APF, toe flex/ext. Able to identify light touch in the 1st DWS/medial/lateral/plantar/dorsal foot.  Does have bunions and cross over toe. Pulses palpable, toes WWP, < 3 s CR.Moves all extremities is neurologically intact. No obvious motor deficits noted.        Data:   CT Thoracic spine obtained 2/28/2022  Imaging:  FINDINGS:  VERTEBRA:   Accurate vertebral body count obtained from the CT chest, abdomen, and pelvis     Diffuse bony demineralization.     T11 vertebral body superior endplate acute fracture with mild compression deformity. No retropulsion. Posterior elements appear intact.     No additional acute fractures identified. Remaining vertebral body heights are maintained. No acute posttraumatic subluxations.     Multiple right-sided posterior chronic rib fractures noted inferiorly. Left T12 chronic remote fracture.     CANAL/FORAMINA: Mild to moderate thoracic dextroscoliosis. Thoracolumbar mild to moderate  levoscoliosis. Multilevel spondylosis. No significant central canal or neural foraminal stenosis. Several mild degenerative grade 1 subluxations.     PARASPINAL: Vascular calcifications. Left pacer leads partially visualized. Interstitial edema.                                                              IMPRESSION:  1.  T11 vertebral body superior endplate acute fracture with mild compression deformity.   2.  No additional acute fractures identified.  3.  Diffuse bony demineralization.  4.  Multilevel spondylosis.      Efraín Mcqueen MD  Orthopaedic Spine Surgery  [facility]

## 2022-02-28 NOTE — ED NOTES
[facility]  ED Nurse Handoff Report    ED Chief complaint: Fall and Rib Pain      ED Diagnosis:   Final diagnoses:   Closed wedge compression fracture of T11 vertebra, initial encounter (H)   Unable to ambulate   Anemia, unspecified type   Anticoagulated on Coumadin   Constipation, unspecified constipation type   Lesion of right native kidney       Code Status: Full Code    Allergies:   Allergies   Allergen Reactions     Zithromax [Azithromycin] Diarrhea            Amoxicillin Diarrhea     Augmentin GI Disturbance       Patient Story:  Pt has had multiple falls, pt states he falls nearly every day and has his neighbor help him up.  Fell tonight and she was unable help him up and then called 911.  Pt fell 10 days ago and hit the right side of his back and pain has been getting progressively worse. Poor appetite and getting increasingly  Weak.    Focused Assessment:  Extreme pain mid back and R low rib area.    Treatments and/or interventions provided: IVF, dilaudid, po fluid (chokes easily)  Patient's response to treatments and/or interventions: feels more comfortable  Results for orders placed or performed during the hospital encounter of 02/28/22   Head CT w/o contrast     Status: None    Narrative    EXAM: CT HEAD W/O CONTRAST  LOCATION: [facility]  DATE/TIME: 2/28/2022 2:01 AM    INDICATION: weakness after he hit his head   1 week ago  COMPARISON: 4/16/2021  TECHNIQUE: Routine CT Head without IV contrast. Multiplanar reformats. Dose reduction techniques were used.    FINDINGS:  INTRACRANIAL CONTENTS: No intracranial hemorrhage, extraaxial collection, or mass effect.  No CT evidence of acute infarct. Mild to moderate presumed chronic small vessel ischemic changes. Moderate generalized volume loss. No hydrocephalus.     VISUALIZED ORBITS/SINUSES/MASTOIDS: No intraorbital abnormality. No paranasal sinus mucosal disease. No middle ear or mastoid effusion.    BONES/SOFT  TISSUES: No acute abnormality.      Impression    IMPRESSION:  1.  No acute intracranial process.   CT Chest/Abdomen/Pelvis w Contrast     Status: None    Narrative    EXAM: CT CHEST/ABDOMEN/PELVIS W CONTRAST  LOCATION: [facility]  DATE/TIME: 2/28/2022 2:01 AM    INDICATION: bilateral flank pain after fall 1.5 weeks ago and now weak with trouble walking  COMPARISON: None.  TECHNIQUE: CT scan of the chest, abdomen, and pelvis was performed following injection of IV contrast. Multiplanar reformats were obtained. Dose reduction techniques were used.   CONTRAST: 86mL Isovue 370.    FINDINGS:   LUNGS AND PLEURA: Dependent atelectasis in the lower lung zones. There are areas of mild smooth interstitial thickening which may reflect mild edema. No pneumothorax or pleural effusion.    MEDIASTINUM/AXILLAE: Borderline cardiomegaly. Cardiac pacer leads. Coronary artery and aortic valve leaflet calcifications. Atherosclerotic thoracic aorta without aneurysm or dissection.    CORONARY ARTERY CALCIFICATION: Severe.    HEPATOBILIARY: A few subcentimeter hepatic hypodensities, too small to characterize. Gallbladder is normal.    PANCREAS: Normal.    SPLEEN: Normal.    ADRENAL GLANDS: Normal.    KIDNEYS/BLADDER: Lobulated cortical contours. 3 mm left lower pole nonobstructing stone. Exophytic enhancing focus measuring 11 mm at the dorsal right midpole, concerning for small malignancy. 2 mm right lower pole nonobstructing renal stone. No   hydronephrosis on either side. Bladder within normal limits.    BOWEL: Moderate amount of formed stool throughout the colon, particularly in the rectum. Small bowel of normal caliber. No free air.    LYMPH NODES: Normal.    VASCULATURE: Severe aortoiliac atherosclerotic change. Mild aneurysmal dilatation of the infrarenal abdominal aorta measuring 3.6 x 3.2 cm image 192 of series 7.    PELVIC ORGANS: Normal.    MUSCULOSKELETAL: Osteopenia. Please see separate dictation for  detailed findings of the thoracic spine. There is a superior endplate fracture at the T11 level. There is no significant retropulsion of fracture fragments into the spinal canal. There is an   old upper sternal body fracture.      Impression    IMPRESSION:  1.  T11 superior endplate fracture. No significant retropulsion of fracture fragments. If patient has persistent weakness or difficulty ambulating, consider MRI for further evaluation.    2.  Severe atherosclerotic changes including multivessel coronary artery disease. 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    3.  11 mm enhancing exophytic focus arising from the dorsal right renal midpole, concerning for a small renal cell carcinoma. Consider evaluation with dedicated renal mass protocol CT or MRI.    4.  Findings suggestive of constipation.    5.  Mild pulmonary interstitial edema.   CT Thoracic Spine w/o Contrast     Status: None    Narrative    EXAM: CT THORACIC SPINE W/O CONTRAST  LOCATION: [facility]  DATE/TIME: 2/28/2022 2:01 AM    INDICATION: lower thoracic back pain after fall  COMPARISON: None.  TECHNIQUE: Routine CT Thoracic Spine without IV contrast. Multiplanar reformats. Dose reduction techniques were used.     FINDINGS:  VERTEBRA:   Accurate vertebral body count obtained from the CT chest, abdomen, and pelvis    Diffuse bony demineralization.    T11 vertebral body superior endplate acute fracture with mild compression deformity. No retropulsion. Posterior elements appear intact.    No additional acute fractures identified. Remaining vertebral body heights are maintained. No acute posttraumatic subluxations.    Multiple right-sided posterior chronic rib fractures noted inferiorly. Left T12 chronic remote fracture.    CANAL/FORAMINA: Mild to moderate thoracic dextroscoliosis. Thoracolumbar mild to moderate levoscoliosis. Multilevel spondylosis. No significant central canal or neural foraminal stenosis. Several  mild degenerative grade 1 subluxations.    PARASPINAL: Vascular calcifications. Left pacer leads partially visualized. Interstitial edema.      Impression    IMPRESSION:  1.  T11 vertebral body superior endplate acute fracture with mild compression deformity.   2.  No additional acute fractures identified.  3.  Diffuse bony demineralization.  4.  Multilevel spondylosis.     INR     Status: Abnormal   Result Value Ref Range    INR 1.98 (H) 0.85 - 1.15   Comprehensive metabolic panel     Status: Abnormal   Result Value Ref Range    Sodium 133 133 - 144 mmol/L    Potassium 4.3 3.4 - 5.3 mmol/L    Chloride 103 94 - 109 mmol/L    Carbon Dioxide (CO2) 24 20 - 32 mmol/L    Anion Gap 6 3 - 14 mmol/L    Urea Nitrogen 34 (H) 7 - 30 mg/dL    Creatinine 1.04 0.66 - 1.25 mg/dL    Calcium 9.2 8.5 - 10.1 mg/dL    Glucose 126 (H) 70 - 99 mg/dL    Alkaline Phosphatase 65 40 - 150 U/L    AST 30 0 - 45 U/L    ALT 30 0 - 70 U/L    Protein Total 6.7 (L) 6.8 - 8.8 g/dL    Albumin 3.4 3.4 - 5.0 g/dL    Bilirubin Total 0.7 0.2 - 1.3 mg/dL    GFR Estimate 69 >60 mL/min/1.73m2   Asymptomatic COVID-19 Virus (Coronavirus) by PCR Nasopharyngeal     Status: Normal    Specimen: Nasopharyngeal; Swab   Result Value Ref Range    SARS CoV2 PCR Negative Negative    Narrative    Testing was performed using the [employer]  SARS-CoV-2 & Influenza A/B Assay on the [employer]  Kassie  System.  This test should be ordered for the detection of SARS-COV-2 in individuals who meet SARS-CoV-2 clinical and/or epidemiological criteria. Test performance is unknown in asymptomatic patients.  This test is for in vitro diagnostic use under the FDA EUA for laboratories certified under CLIA to perform moderate and/or high complexity testing. This test has not been FDA cleared or approved.  A negative test does not rule out the presence of PCR inhibitors in the specimen or target RNA in concentration below the limit of detection for the assay. The possibility of a false negative  should be considered if the patient's recent exposure or clinical presentation suggests COVID-19.  [facility] are certified under the Clinical Laboratory Improvement Amendments of 1988 (CLIA-88) as qualified to perform moderate and/or high complexity laboratory testing.   CBC with platelets and differential     Status: Abnormal   Result Value Ref Range    WBC Count 9.5 4.0 - 11.0 10e3/uL    RBC Count 3.78 (L) 4.40 - 5.90 10e6/uL    Hemoglobin 10.6 (L) 13.3 - 17.7 g/dL    Hematocrit 33.0 (L) 40.0 - 53.0 %    MCV 87 78 - 100 fL    MCH 28.0 26.5 - 33.0 pg    MCHC 32.1 31.5 - 36.5 g/dL    RDW 14.0 10.0 - 15.0 %    Platelet Count 176 150 - 450 10e3/uL    % Neutrophils 78 %    % Lymphocytes 5 %    % Monocytes 15 %    % Eosinophils 1 %    % Basophils 0 %    % Immature Granulocytes 1 %    NRBCs per 100 WBC 0 <1 /100    Absolute Neutrophils 7.4 1.6 - 8.3 10e3/uL    Absolute Lymphocytes 0.5 (L) 0.8 - 5.3 10e3/uL    Absolute Monocytes 1.4 (H) 0.0 - 1.3 10e3/uL    Absolute Eosinophils 0.1 0.0 - 0.7 10e3/uL    Absolute Basophils 0.0 0.0 - 0.2 10e3/uL    Absolute Immature Granulocytes 0.1 <=0.4 10e3/uL    Absolute NRBCs 0.0 10e3/uL   CBC with platelets differential     Status: Abnormal    Narrative    The following orders were created for panel order CBC with platelets differential.  Procedure                               Abnormality         Status                     ---------                               -----------         ------                     CBC with platelets and d...[[id number]]  Abnormal            Final result                 Please view results for these tests on the individual orders.         To be done/followed up on inpatient unit:      Does this patient have any cognitive concerns?: none    Activity level - Baseline/Home:  Stand with Assist  Activity Level - Current:   Total Care    Patient's Preferred language: English   Needed?: No    Isolation: None  Infection: Not  Applicable  Patient tested for COVID 19 prior to admission: YES  Bariatric?: No    Vital Signs:   Vitals:    02/28/22 0030 02/28/22 0100   BP: (!) 139/93 136/79   Pulse: 75 70   Resp: 16 16   Temp:  98.3  F (36.8  C)   SpO2: 92% 94%       Cardiac Rhythm:     Was the PSS-3 completed:   Yes  What interventions are required if any?               Family Comments: Has son and dtr that live out of state.  Son was at his house Sunday am and helped him out of bed.  Son is back in [place].  OBS brochure/video discussed/provided to patient/family: Yes              Name of person given brochure if not patient:               Relationship to patient:     For the majority of the shift this patient's behavior was Green.   Behavioral interventions performed were .    ED NURSE PHONE NUMBER: [phone]

## 2022-02-28 NOTE — PROGRESS NOTES
RECEIVING UNIT ED HANDOFF REVIEW    ED Nurse Handoff Report was reviewed by: Ghazal Francis RN on February 28, 2022 at 1:25 PM

## 2022-02-28 NOTE — PHARMACY-ANTICOAGULATION SERVICE
Clinical Pharmacy - Warfarin Dosing Consult     Pharmacy has been consulted to manage this patient s warfarin therapy.  Indication: Atrial Fibrillation  Therapy Goal: INR 2-3  Warfarin Prior to Admission: Yes  Warfarin PTA Regimen: 3.75 mg Sundays, 2.5 mg AOD  Significant drug interactions: PTA Amiodarone    INR   Date Value Ref Range Status   02/28/2022 1.98 (H) 0.85 - 1.15 Final   02/24/2022 2.3 (H) 0.9 - 1.1 Final       Recommend warfarin 2.5 mg today.  Pharmacy will monitor Ayaan Herrera daily and order warfarin doses to achieve specified goal.      Please contact pharmacy as soon as possible if the warfarin needs to be held for a procedure or if the warfarin goals change.

## 2022-02-28 NOTE — PROGRESS NOTES
[facility]    Hospitalist Progress Note      Assessment & Plan   Ayaan Herrera is a 89 year old male with past medical history that is most notable for osteoporosis, chronic atrial fibrillation on Warfarin, as well as sinus node dysfunction status post pacemaker placement, ventricular tachycardia on Amiodarone, and prostate cancer, among others; who presented on 2/28/22 with multiple recent falls at home and is found to have acute T11 compression fracture and a renal mass.    Acute T11 compression fracture  Seen on CT; CTH was negative for acute intracranial pathology.  - Orthopedics appreciated, recommends no surgical intervention, brace or spine precautions     --avoid strenuous bending/lifting/twisting     --follow up in clinic in 2-3 weeks after discharge  - Tylenol, Oxycodone, IV Dilaudid as needed for pain.   - schedule tylenol q8h  - Anti-emetics as needed.   - SW/PT consulted for disposition planning.   - likely needs TCU on discharge     Renal mass  Seen incidentally on CT scans, concerning for possible malignancy. He has a history of prostate cancer, status post prostatectomy in 2003 with subsequent XRT and chemotherapy treatments. His urologist is Dr. Russell of [facility]. Renal US does not show right renal lesion seen on CT.  - recommends repeat imaging in one year with Dr Way follow up    Urinary retention  Hx prostate cancer  Eagle catheter placed in ED  - if able to get up with PT on 3/1 then plan for void trial. If unable to get up, will keep eagle in place and void trial at likely TCU  - continue flomax     Acute anemia  No signs of active bleeding at present. Hgb was 12.9 Feb 2021. Down to 10.6 on 2/28/22. Monitor while hospitalized.     Chronic atrial fibrillation and non-sustained ventricular tachycardia  Also sick sinus syndrome. He had a pacemaker placed in 2007. In 2019 he developed NSVT and Amiodarone was started. Most recent TTE in 2021 showed LVEF 45-50% with  several wall motion abnormalities, as well as moderate to severe mitral regurgitation. He is on warfarin for stroke prevention.  - Continue Warfarin and amiodarone     Glaucoma  Resume eye drops     COVID-19 screening, negative  Pfizer x2 in Feb 2021, booster 10/12/21    Clinically Significant Risk Factors Present on Admission              # Coagulation Defect: home medication list includes an anticoagulant medication          DVT Prophylaxis: Warfarin  Code Status: Full Code  Expected Discharge: 03/01/2022  Anticipated discharge location:  Awaiting care coordination huddle  Delays:     placement      Aleah Mackey DO  Hospitalist Service  [facility]  Securely message with the Vocera Web Console (learn more here)  Text Page (7am - 6pm) via [email or web address] Paging/Directory      Interval History   Patient seen and examined. He is doing okay resting in bed after eating his meal. He reports pain is gone if he isn't moving, hard for him to walk and change position however. He is nervous about working with physical therapy tomorrow but discussed that this is needed to meet criteria to go to TCU. Discussed with RN    -Data reviewed today: I reviewed all new labs and imaging results over the last 24 hours. I personally reviewed Renal US does not show right renal lesion seen on CT.    Physical Exam   Temp: 98.3  F (36.8  C) Temp src: Oral BP: 134/72 Pulse: 73   Resp: 16 SpO2: 93 % O2 Device: None (Room air)    Vitals:    02/28/22 1604   Weight: 77.1 kg (170 lb)     Vital Signs with Ranges  Temp:  [98.2  F (36.8  C)-98.5  F (36.9  C)] 98.3  F (36.8  C)  Pulse:  [70-75] 73  Resp:  [16] 16  BP: (121-149)/(72-93) 134/72  SpO2:  [90 %-95 %] 93 %  I/O last 3 completed shifts:  In: -   Out: 1050 [Urine:1050]    Constitutional: Awake, alert, cooperative, no apparent distress, frail  Respiratory: Clear to auscultation bilaterally, no crackles or wheezing  Cardiovascular: Regular rate and rhythm, normal S1 and S2,  and no murmur noted  GI: Normal bowel sounds, soft, non-distended, non-tender  Skin/Integumen: No rashes, no cyanosis, no edema  Other: Chronic venous stasis changes lower extremity bilaterally    Medications     Warfarin Therapy Reminder         acetaminophen  975 mg Oral Q8H     amiodarone  200 mg Oral Daily     brimonidine  1 drop Both Eyes BID     dorzolamide-timolol  1 drop Both Eyes BID     latanoprost  1 drop Both Eyes At Bedtime     netarsudil  1 drop Both Eyes At Bedtime     sodium chloride (PF)  3 mL Intracatheter Q8H     [START ON 3/1/2022] tamsulosin  0.4 mg Oral Daily     warfarin ANTICOAGULANT  2.5 mg Oral ONCE at 18:00       Data   Recent Labs   Lab 02/28/22  0105 02/24/22  1141   WBC 9.5  --    HGB 10.6*  --    MCV 87  --      --    INR 1.98* 2.3*     --    POTASSIUM 4.3  --    CHLORIDE 103  --    CO2 24  --    BUN 34*  --    CR 1.04  --    ANIONGAP 6  --    BRENDAN 9.2  --    *  --    ALBUMIN 3.4  --    PROTTOTAL 6.7*  --    BILITOTAL 0.7  --    ALKPHOS 65  --    ALT 30  --    AST 30  --        Recent Results (from the past 24 hour(s))   Head CT w/o contrast    Narrative    EXAM: CT HEAD W/O CONTRAST  LOCATION: [facility]  DATE/TIME: 2/28/2022 2:01 AM    INDICATION: weakness after he hit his head   1 week ago  COMPARISON: 4/16/2021  TECHNIQUE: Routine CT Head without IV contrast. Multiplanar reformats. Dose reduction techniques were used.    FINDINGS:  INTRACRANIAL CONTENTS: No intracranial hemorrhage, extraaxial collection, or mass effect.  No CT evidence of acute infarct. Mild to moderate presumed chronic small vessel ischemic changes. Moderate generalized volume loss. No hydrocephalus.     VISUALIZED ORBITS/SINUSES/MASTOIDS: No intraorbital abnormality. No paranasal sinus mucosal disease. No middle ear or mastoid effusion.    BONES/SOFT TISSUES: No acute abnormality.      Impression    IMPRESSION:  1.  No acute intracranial process.   CT  Chest/Abdomen/Pelvis w Contrast    Narrative    EXAM: CT CHEST/ABDOMEN/PELVIS W CONTRAST  LOCATION: [facility]  DATE/TIME: 2/28/2022 2:01 AM    INDICATION: bilateral flank pain after fall 1.5 weeks ago and now weak with trouble walking  COMPARISON: None.  TECHNIQUE: CT scan of the chest, abdomen, and pelvis was performed following injection of IV contrast. Multiplanar reformats were obtained. Dose reduction techniques were used.   CONTRAST: 86mL Isovue 370.    FINDINGS:   LUNGS AND PLEURA: Dependent atelectasis in the lower lung zones. There are areas of mild smooth interstitial thickening which may reflect mild edema. No pneumothorax or pleural effusion.    MEDIASTINUM/AXILLAE: Borderline cardiomegaly. Cardiac pacer leads. Coronary artery and aortic valve leaflet calcifications. Atherosclerotic thoracic aorta without aneurysm or dissection.    CORONARY ARTERY CALCIFICATION: Severe.    HEPATOBILIARY: A few subcentimeter hepatic hypodensities, too small to characterize. Gallbladder is normal.    PANCREAS: Normal.    SPLEEN: Normal.    ADRENAL GLANDS: Normal.    KIDNEYS/BLADDER: Lobulated cortical contours. 3 mm left lower pole nonobstructing stone. Exophytic enhancing focus measuring 11 mm at the dorsal right midpole, concerning for small malignancy. 2 mm right lower pole nonobstructing renal stone. No   hydronephrosis on either side. Bladder within normal limits.    BOWEL: Moderate amount of formed stool throughout the colon, particularly in the rectum. Small bowel of normal caliber. No free air.    LYMPH NODES: Normal.    VASCULATURE: Severe aortoiliac atherosclerotic change. Mild aneurysmal dilatation of the infrarenal abdominal aorta measuring 3.6 x 3.2 cm image 192 of series 7.    PELVIC ORGANS: Normal.    MUSCULOSKELETAL: Osteopenia. Please see separate dictation for detailed findings of the thoracic spine. There is a superior endplate fracture at the T11 level. There is no significant  retropulsion of fracture fragments into the spinal canal. There is an   old upper sternal body fracture.      Impression    IMPRESSION:  1.  T11 superior endplate fracture. No significant retropulsion of fracture fragments. If patient has persistent weakness or difficulty ambulating, consider MRI for further evaluation.    2.  Severe atherosclerotic changes including multivessel coronary artery disease. 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    3.  11 mm enhancing exophytic focus arising from the dorsal right renal midpole, concerning for a small renal cell carcinoma. Consider evaluation with dedicated renal mass protocol CT or MRI.    4.  Findings suggestive of constipation.    5.  Mild pulmonary interstitial edema.   CT Thoracic Spine w/o Contrast    Narrative    EXAM: CT THORACIC SPINE W/O CONTRAST  LOCATION: [facility]  DATE/TIME: 2/28/2022 2:01 AM    INDICATION: lower thoracic back pain after fall  COMPARISON: None.  TECHNIQUE: Routine CT Thoracic Spine without IV contrast. Multiplanar reformats. Dose reduction techniques were used.     FINDINGS:  VERTEBRA:   Accurate vertebral body count obtained from the CT chest, abdomen, and pelvis    Diffuse bony demineralization.    T11 vertebral body superior endplate acute fracture with mild compression deformity. No retropulsion. Posterior elements appear intact.    No additional acute fractures identified. Remaining vertebral body heights are maintained. No acute posttraumatic subluxations.    Multiple right-sided posterior chronic rib fractures noted inferiorly. Left T12 chronic remote fracture.    CANAL/FORAMINA: Mild to moderate thoracic dextroscoliosis. Thoracolumbar mild to moderate levoscoliosis. Multilevel spondylosis. No significant central canal or neural foraminal stenosis. Several mild degenerative grade 1 subluxations.    PARASPINAL: Vascular calcifications. Left pacer leads partially visualized. Interstitial edema.       Impression    IMPRESSION:  1.  T11 vertebral body superior endplate acute fracture with mild compression deformity.   2.  No additional acute fractures identified.  3.  Diffuse bony demineralization.  4.  Multilevel spondylosis.     US Renal Complete    Narrative    US RENAL COMPLETE   2/28/2022 10:36 AM     HISTORY: Right renal mass noted on CT.    COMPARISON: CT of the chest, abdomen, and pelvis performed earlier  today.    FINDINGS:     Right kidney measures 12.1 x 6.1 x 5.9 cm. Cortical thickness measures  1.4 cm AP. There is no hydronephrosis. No renal calculi or solid renal  masses are evident. The previously described indeterminate right renal  lesion is not visualized sonographically.    Left kidney measures 10.9 x 5.2 x 6.6 cm. Cortical thickness measures  1.2 cm AP. There is no hydronephrosis. A 0.9 cm echogenic focus in the  lower pole of the left kidney likely represents a nonobstructing  stone. No solid renal masses are evident.     Chirinos catheter in the bladder. Limited images of the bladder are  otherwise unremarkable.       Impression    IMPRESSION:   1. The previously described small indeterminate right renal lesion is  not visualized sonographically. Renal mass protocol MRI should be  considered for further characterization.   2. A 0.9 cm echogenic focus in the lower pole of the left kidney  likely represents a nonobstructing stone.    WILNER TSAI MD         SYSTEM ID:  [id number]

## 2022-02-28 NOTE — CONSULTS
[facility] Inpatient Consultation Note    Ayaan Herrera MRN# [id number]   Age: 89 year old YOB: 1932     Date of Admission: 2/28/2022    Reason for consult: Right renal lesion on CT       Requesting physician: Dr. Ramos                History of Present Illness:   Ayaan Herrera is a pleasant 89 year old year old male who presented on 2/28/22  for evaluation of generalized weakness and frequent falls.     PMH includes history of atrial fibrillation anticoagulated by Coumadin, hypertension, prostate cancer, and AAA.    During work up for his falls, a CT C/A/P was obtained which revealed a 11 mm enhancing focus arising form the dorsal right renal midpole, concerning for small renal cell carcinoma. Urology was consulted. Creatinine normal at 1.04, urinalysis without signs of infection. He denies dysuria or hematuria. Says he wasn't urinating well. Flomax is listed on prior to admission medication list. He had noted voided in ED, so a eagle was placed.     Patient has a history of prostate cancer. He has seen Dr. Russell in the past., last seen in Dec 2021.    S/P IMRT 3/4 - 8/3/2015. with LHRH agonist stopping in end of 2016, restarted in this spring. No significant hot flashes. Urinary habits are stable, occ has urgency and frequency with some trouble starting the stream. Takes Flomax with min issues.  Last 45 mg Eligard injection was given on 6/9/21. His PSA did increase when he came off LHRH agonist several yrs ago. He wishes to stay on ADT as he has NO hot flashes.    Patient denies chest pain or shortness of breath. No abdominal pain, nausea or vomiting. No change in bowel habits. Denies chills or fever.            Past Medical History:     Past Medical History:   Diagnosis Date     Atrial fibrillation (H)      BPH (benign prostatic hyperplasia)      Dyslipidemia      History of basal cell carcinoma      HTN (hypertension)      Nonsenile cataract      NSVT (nonsustained ventricular  tachycardia) (H)      POAG (primary open-angle glaucoma)     Advanced      Prostate cancer (H)      Psoriasis      Sinus node dysfunction (H)              Past Surgical History:     Past Surgical History:   Procedure Laterality Date     CATARACT IOL, RT/LT      RE/LE     EP PACEMAKER      [employer]     GLAUCOMA SURGERY  1996    LE     GLAUCOMA SURGERY  1997    Bleb revision LE     LASER SURGERY OF EYE  10/14/04 & 05    SLT RE     PUNCTAL CLOSURE, PLUGS  2000    BE     TURP  2003             Social History:     Social History     Socioeconomic History     Marital status: Single     Spouse name: Not on file     Number of children: 2     Years of education: Not on file     Highest education level: Not on file   Occupational History     Not on file   Tobacco Use     Smoking status: Former Smoker     Packs/day: 0.20     Years: 25.00     Pack years: 5.00     Start date: 1955     Quit date: 1980     Years since quittin.1     Smokeless tobacco: Former User   Substance and Sexual Activity     Alcohol use: Yes     Comment: occasionally     Drug use: No     Sexual activity: Not Currently   Other Topics Concern     Parent/sibling w/ CABG, MI or angioplasty before 65F 55M? Not Asked   Social History Narrative        [employer] ; [employer].                                     Retired ;                2 sons; [place], and [employer]     Social Determinants of Health     Financial Resource Strain: Not on file   Food Insecurity: Not on file   Transportation Needs: Not on file   Physical Activity: Not on file   Stress: Not on file   Social Connections: Not on file   Intimate Partner Violence: Not on file   Housing Stability: Not on file             Family History:     Family History   Problem Relation Age of Onset     Glaucoma Father      Hypertension Other      C.A.D. Other      Macular Degeneration Mother              Immunizations:     Immunization History    Administered Date(s) Administered     COVID-19,PF,Pfizer (12+ Yrs) 02/05/2021, 02/26/2021, 10/12/2021     FLU 6-35 months 10/15/2011     Influenza (High Dose) 3 valent vaccine 09/13/2012, 10/03/2014, 10/06/2015, 10/19/2016, 09/20/2017, 09/06/2018, 09/27/2019     Influenza (IIV3) PF 11/06/2002, 10/25/2007, 09/01/2009, 10/01/2010, 10/15/2011, 09/30/2012, 09/13/2013     Influenza Quad, Recombinant, pf(RIV4) (Flublok) 10/12/2021     Influenza, Quad, High Dose, Pf, 65yr+ (Fluzone HD) 09/14/2020     Pneumo Conj 13-V (2010&after) 07/14/2015     Pneumococcal 23 valent 01/01/1999, 11/19/2004, 11/14/2014     TD (ADULT, 7+) 01/01/1995, 06/01/2003, 06/13/2003     TDAP Vaccine (Adacel) 11/17/2011, 11/17/2011     Td (Adult), Adsorbed 06/13/2003     Zoster vaccine recombinant adjuvanted (SHINGRIX) 08/27/2018, 11/27/2018     Zoster vaccine, live 02/01/2008             Allergies:     Allergies   Allergen Reactions     Zithromax [Azithromycin] Diarrhea            Amoxicillin Diarrhea     Augmentin GI Disturbance             Medications:     Current Facility-Administered Medications   Medication     HYDROmorphone (DILAUDID) injection 0.2-0.3 mg     HYDROmorphone (PF) (DILAUDID) injection 0.5 mg     oxyCODONE (ROXICODONE) tablet 5-10 mg     Warfarin Therapy Reminder (Check START DATE - warfarin may be starting in the FUTURE)     Current Outpatient Medications   Medication Sig     acetaminophen (TYLENOL) 325 MG tablet Take 325 mg by mouth as needed for mild pain     alendronate (FOSAMAX) 70 MG tablet TAKE 1 TAB WITH 8OZ OF WATER EVERY 7 DAYS 30 MIN BEFORE BREAKFAST & REMAIN UPRIGHT DURING THIS TIME     amiodarone (PACERONE) 200 MG tablet Take 1 tablet (200 mg) by mouth daily     brimonidine (ALPHAGAN P) 0.15 % ophthalmic solution Place 1 drop into both eyes 2 times daily     COSOPT 2-0.5 % OP SOLN Place 1 Dose into both eyes 2 times daily Both eyes.     furosemide (LASIX) 20 MG tablet Take 1 tablet (20 mg) by mouth daily     ketotifen  (ZADITOR) 0.025 % SOLN Place 1 drop into both eyes as needed     latanoprost (XALATAN) 0.005 % ophthalmic solution Place 1 drop into both eyes At Bedtime     netarsudil (RHOPRESSA) 0.02 % ophthalmic solution 1 drop At Bedtime     potassium chloride ER (KLOR-CON) 10 MEQ CR tablet TAKE ONE TABLET DAILY WITH EACH 20 MG DOSE OF FUROSEMIDE.     tamsulosin (FLOMAX) 0.4 MG capsule TAKE ONE CAPSULE BY MOUTH ONCE A DAY     Vitamin D, Cholecalciferol, 1000 UNITS TABS Take 1 tablet by mouth daily      warfarin ANTICOAGULANT (COUMADIN) 2.5 MG tablet Take 0.5 tablet (1.25 mg) on 12/9/21 then take 1.5 tablets (3.75 mg) on Sundays and take 1 tablet (2.5 mg) all other days or as directed by the INR clinic             Review of Systems:   Comprehensive review of systems from the Admission note dated 2/28/22 at [facility] was reviewed with no changes except per HPI.     Examination:  /86   Pulse 70   Temp 98.3  F (36.8  C)   Resp 16   SpO2 95%   General: Alert and oriented, no distress. Elderly male laying in bed.   HEENT: Face symmetric, atraumatic.   Eyes: No scleral icterus, EOM intact. Glasses.   Chest wall: Symmetric.  Respiratory: Breathing unlabored, no signs of respiratory distress.   Abdomen: Soft, non tender, non distended. Small umbilical hernia   Back: unable to assess  : eagle in place with clover output. uncircumcised   Extremities: No evidence of deformities or trauma.  Pysch: Normal mood and affect.  Skin: No evident rashes or lesions.            Data:     Lab Results   Component Value Date    WBC 9.5 02/28/2022    WBC 5.6 04/16/2021     Lab Results   Component Value Date    RBC 3.78 02/28/2022    RBC 4.33 04/16/2021     Lab Results   Component Value Date    HGB 10.6 02/28/2022    HGB 12.9 04/16/2021     Lab Results   Component Value Date    HCT 33.0 02/28/2022    HCT 37.9 04/16/2021     Lab Results   Component Value Date     02/28/2022     04/16/2021     Creatinine   Date  Value Ref Range Status   02/28/2022 1.04 0.66 - 1.25 mg/dL Final   04/16/2021 0.82 0.66 - 1.25 mg/dL Final   ]  Lab Results   Component Value Date    BUN 34 02/28/2022    BUN 17 04/16/2021       Imaging:  CT C/A/P dated 2/28/22:  KIDNEYS/BLADDER: Lobulated cortical contours. 3 mm left lower pole nonobstructing stone. Exophytic enhancing focus measuring 11 mm at the dorsal right midpole, concerning for small malignancy. 2 mm right lower pole nonobstructing renal stone. No   hydronephrosis on either side. Bladder within normal limits    IMPRESSION:  1.  T11 superior endplate fracture. No significant retropulsion of fracture fragments. If patient has persistent weakness or difficulty ambulating, consider MRI for further evaluation.     2.  Severe atherosclerotic changes including multivessel coronary artery disease. 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     3.  11 mm enhancing exophytic focus arising from the dorsal right renal midpole, concerning for a small renal cell carcinoma. Consider evaluation with dedicated renal mass protocol CT or MRI.     4.  Findings suggestive of constipation.     5.  Mild pulmonary interstitial edema.      Renal US dated 2/28/22:  IMPRESSION:   1. The previously described small indeterminate right renal lesion is  not visualized sonographically. Renal mass protocol MRI should be  considered for further characterization.   2. A 0.9 cm echogenic focus in the lower pole of the left kidney  likely represents a nonobstructing stone.      Assessment/Plan:  Right renal lesion  Incidental finding from admission CT scan.   I discussed with Dr. Russell, he personally reviewed imaging from 2019 which did not show the small lesion that was noted today.   Not able to see on US.   - Would recommend repeat CT scan in one year during a follow up appointment with Dr. Way.    - Would follow clinically. Would not recommend aggressive treatment at this time.     Urinary retention  Unclear how  distended he was, but apparently had not voided in ED, a eagle was placed.   - Can continue eagle for now, depending on how long he is hospitalized, and how his mobility improves, can consider voiding trial prior to discharge home.   - Otherwise may discharge with eagle and would need follow up in about 10 days for voiding trial on the nursing schedule at [facility].    History of prostate cancer  S/p radiation.   Remains on ADT.   - Continue outpatient follow up with Dr. Russell.   - Continue flomax.     I have discussed the patient with attending physician, Dr. Russell, Urology  Yari Jernigan PA-C  [facility]

## 2022-02-28 NOTE — ED NOTES
Dr Wilson notified that pt falls more often than he stated at first.  He has fallen multiple times since his last stated fall.  He had to have his son (who lives in [place] but was here visiting Sunday AM) help him up out of bed.

## 2022-02-28 NOTE — ED NOTES
Bed: ED19  Expected date:   Expected time:   Means of arrival:   Comments:  424 89m generalized weakness falls eta 10

## 2022-02-28 NOTE — H&P
[facility]    History and Physical  Hospitalist       Date of Admission:  2/28/2022  Date of Service (when I saw the patient): 02/28/22    ASSESSMENT  Ayaan Herrera is a markedly pleasant 89 year old gentleman with past medical history that is most notable for osteoporosis, chronic atrial fibrillation on Warfarin, as well as sinus node dysfunction status post pacemaker placement, ventricular tachycardia on Amiodarone, and prostate cancer, among others; who presents with multiple recent falls at home and is found to have acute T11 compression fracture and a renal mass.    PLAN    Acute T11 compression fracture: Seen on CT; CTH was negative for acute intracranial pathology.    -- Observation for pain relief. Fall precautions. Orthopedics consulted for further evaluation. Tylenol, Oxycodone, IV Dilaudid as needed for pain. Anti-emetics as needed. SW consulted for disposition planning. We discussed TCU may be recommended at discharge and he seemed open to that    Renal mass: seen incidentally on CT scans, concerning for possible malignancy. He has a history of prostate cancer, status post prostatectomy in 2033 with subsequent XRT and chemotherapy treatments. His urologist is Dr. Russell of [facility].    -- Urology consulted for further evaluation    Acute anemia: No signs of active bleeding at present. Monitor while hospitalized.  Recent Labs   Lab Test 02/28/22  0105 04/16/21  1659 08/31/20  0901   HGB 10.6* 12.9* 13.3     Chronic atrial fibrillation and non-sustained ventricular tachycardia: also sick sinus syndrome. He had a pacemaker placed in 2007. In 2019 he developed NSVT and Amiodarone was started. Most recent TTE in 2021 showed LVEF 45-50% with several wall motion abnormalities, as well as moderate to severe mitral regurgitation. He is on warfarin for stroke prevention.    -- Continue Warfarin. Resume Amiodarone when the dose is verified    Glaucoma: Resume eye drops when  verified    Rule Out COVID-19 infection  This patient was evaluated during a global COVID-19 pandemic, which necessitated consideration that the patient might be at risk for infection with the SARS-CoV-2 virus that causes COVID-19. Applicable protocols for evaluation were followed during the patient's care.   -negative COVID-19 PCR test result  -no current indication for precautions    Chief Complaint   Back pain    History is obtained from the patient and the ED physician whom I have spoken with    History of Present Illness   Ayaan Herrera is a markedly pleasant 89 year old gentleman who presents with back pain. He says this is sharp, severe constant pain in the middle of the back, radiating to both sides, that started immediately after he fell about 10 days ago, at home where he lives alone. He says his bed is high and he slipped off it on that day, falling and striking his head and back. Since then he has felt progressive weakness due to the pain, which is exacerbated whenever he tries to stand or move, and in fact he has fallen at home several more times since then. Tonight he could not stand up unassisted and EMS was called. He was given Dilaudid in the ED with partial relief of symptoms. He otherwise denies any other acute complaints.    In the ED, Blood pressure 136/79, pulse 70, temperature 98.3  F (36.8  C), resp. rate 16, SpO2 94 %.    CBC and CMP were notable for HGB 10.6,BUN 34, Cr 1.04, tprot 6.7, glucose 126, otherwise were within the normal reference range. INR was 1.98. COVID PCR was negative. EKG showed a ventricular paced rhythm.    He was also given IV fluid in the ED.    Recent Results (from the past 24 hour(s))   Head CT w/o contrast    Narrative    EXAM: CT HEAD W/O CONTRAST  LOCATION: [facility]  DATE/TIME: 2/28/2022 2:01 AM    INDICATION: weakness after he hit his head   1 week ago  COMPARISON: 4/16/2021  TECHNIQUE: Routine CT Head without IV contrast.  Multiplanar reformats. Dose reduction techniques were used.    FINDINGS:  INTRACRANIAL CONTENTS: No intracranial hemorrhage, extraaxial collection, or mass effect.  No CT evidence of acute infarct. Mild to moderate presumed chronic small vessel ischemic changes. Moderate generalized volume loss. No hydrocephalus.     VISUALIZED ORBITS/SINUSES/MASTOIDS: No intraorbital abnormality. No paranasal sinus mucosal disease. No middle ear or mastoid effusion.    BONES/SOFT TISSUES: No acute abnormality.      Impression    IMPRESSION:  1.  No acute intracranial process.   CT Chest/Abdomen/Pelvis w Contrast    Narrative    EXAM: CT CHEST/ABDOMEN/PELVIS W CONTRAST  LOCATION: [facility]  DATE/TIME: 2/28/2022 2:01 AM    INDICATION: bilateral flank pain after fall 1.5 weeks ago and now weak with trouble walking  COMPARISON: None.  TECHNIQUE: CT scan of the chest, abdomen, and pelvis was performed following injection of IV contrast. Multiplanar reformats were obtained. Dose reduction techniques were used.   CONTRAST: 86mL Isovue 370.    FINDINGS:   LUNGS AND PLEURA: Dependent atelectasis in the lower lung zones. There are areas of mild smooth interstitial thickening which may reflect mild edema. No pneumothorax or pleural effusion.    MEDIASTINUM/AXILLAE: Borderline cardiomegaly. Cardiac pacer leads. Coronary artery and aortic valve leaflet calcifications. Atherosclerotic thoracic aorta without aneurysm or dissection.    CORONARY ARTERY CALCIFICATION: Severe.    HEPATOBILIARY: A few subcentimeter hepatic hypodensities, too small to characterize. Gallbladder is normal.    PANCREAS: Normal.    SPLEEN: Normal.    ADRENAL GLANDS: Normal.    KIDNEYS/BLADDER: Lobulated cortical contours. 3 mm left lower pole nonobstructing stone. Exophytic enhancing focus measuring 11 mm at the dorsal right midpole, concerning for small malignancy. 2 mm right lower pole nonobstructing renal stone. No   hydronephrosis on either  side. Bladder within normal limits.    BOWEL: Moderate amount of formed stool throughout the colon, particularly in the rectum. Small bowel of normal caliber. No free air.    LYMPH NODES: Normal.    VASCULATURE: Severe aortoiliac atherosclerotic change. Mild aneurysmal dilatation of the infrarenal abdominal aorta measuring 3.6 x 3.2 cm image 192 of series 7.    PELVIC ORGANS: Normal.    MUSCULOSKELETAL: Osteopenia. Please see separate dictation for detailed findings of the thoracic spine. There is a superior endplate fracture at the T11 level. There is no significant retropulsion of fracture fragments into the spinal canal. There is an   old upper sternal body fracture.      Impression    IMPRESSION:  1.  T11 superior endplate fracture. No significant retropulsion of fracture fragments. If patient has persistent weakness or difficulty ambulating, consider MRI for further evaluation.    2.  Severe atherosclerotic changes including multivessel coronary artery disease. 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    3.  11 mm enhancing exophytic focus arising from the dorsal right renal midpole, concerning for a small renal cell carcinoma. Consider evaluation with dedicated renal mass protocol CT or MRI.    4.  Findings suggestive of constipation.    5.  Mild pulmonary interstitial edema.   CT Thoracic Spine w/o Contrast    Narrative    EXAM: CT THORACIC SPINE W/O CONTRAST  LOCATION: [facility]  DATE/TIME: 2/28/2022 2:01 AM    INDICATION: lower thoracic back pain after fall  COMPARISON: None.  TECHNIQUE: Routine CT Thoracic Spine without IV contrast. Multiplanar reformats. Dose reduction techniques were used.     FINDINGS:  VERTEBRA:   Accurate vertebral body count obtained from the CT chest, abdomen, and pelvis    Diffuse bony demineralization.    T11 vertebral body superior endplate acute fracture with mild compression deformity. No retropulsion. Posterior elements appear intact.    No  additional acute fractures identified. Remaining vertebral body heights are maintained. No acute posttraumatic subluxations.    Multiple right-sided posterior chronic rib fractures noted inferiorly. Left T12 chronic remote fracture.    CANAL/FORAMINA: Mild to moderate thoracic dextroscoliosis. Thoracolumbar mild to moderate levoscoliosis. Multilevel spondylosis. No significant central canal or neural foraminal stenosis. Several mild degenerative grade 1 subluxations.    PARASPINAL: Vascular calcifications. Left pacer leads partially visualized. Interstitial edema.      Impression    IMPRESSION:  1.  T11 vertebral body superior endplate acute fracture with mild compression deformity.   2.  No additional acute fractures identified.  3.  Diffuse bony demineralization.  4.  Multilevel spondylosis.         PHYSICAL EXAM  Blood pressure 136/79, pulse 70, temperature 98.3  F (36.8  C), resp. rate 16, SpO2 94 %.  Constitutional: Alert and oriented to person, place and time; no apparent distress; appears very frail  HEENT: normocephalic moist mucus membranes  Respiratory: lungs clear to auscultation bilaterally  Cardiovascular: regular S1 S2  GI: abdomen soft non tender non distended bowel sounds positive  Lymph/Hematologic: pale, no cervical lymphadenopathy  Skin: no rash, good turgor  Musculoskeletal: no clubbing, cyanosis or edema  Neurologic: extra-ocular muscles intact; moves all four extremities  Psychiatric: appropriate affect, insight and judgment     DVT Prophylaxis: Warfarin  Code Status: Full Code, discussed with him and confirmed    Disposition: Expected discharge in 0-4 days    Mohsen Ramos MD    Past Medical History    I have reviewed this patient's medical history and updated it with pertinent information if needed.   Past Medical History:   Diagnosis Date     Atrial fibrillation (H)      BPH (benign prostatic hyperplasia)      Dyslipidemia      History of basal cell carcinoma      HTN (hypertension)       Nonsenile cataract      NSVT (nonsustained ventricular tachycardia) (H)      POAG (primary open-angle glaucoma)     Advanced      Prostate cancer (H)      Psoriasis      Sinus node dysfunction (H)        Past Surgical History   I have reviewed this patient's surgical history and updated it with pertinent information if needed.  Past Surgical History:   Procedure Laterality Date     CATARACT IOL, RT/LT  1985/1996    RE/LE     EP PACEMAKER  2007    [employer]     GLAUCOMA SURGERY  01/01/1996    LE     GLAUCOMA SURGERY  01/01/1997    Bleb revision LE     LASER SURGERY OF EYE  10/14/04 & 2/17/05    SLT RE     PUNCTAL CLOSURE, PLUGS  01/01/2000    BE     TURP  01/01/2003       Prior to Admission Medications   Prior to Admission Medications   Prescriptions Last Dose Informant Patient Reported? Taking?   COSOPT 2-0.5 % OP SOLN   Yes No   Sig: Apply 1 Dose to eye 2 times daily Both eyes.   Vitamin D, Cholecalciferol, 1000 UNITS TABS   Yes No   acetaminophen (TYLENOL) 325 MG tablet   Yes No   Sig: Take 325 mg by mouth as needed for mild pain   alendronate (FOSAMAX) 70 MG tablet   No No   Sig: TAKE 1 TAB WITH 8OZ OF WATER EVERY 7 DAYS 30 MIN BEFORE BREAKFAST & REMAIN UPRIGHT DURING THIS TIME   amiodarone (PACERONE) 200 MG tablet   Yes No   Sig: Take 1 tablet (200 mg) by mouth daily   betamethasone dipropionate (DIPROSONE) 0.05 % external lotion   Yes No   Sig: INSTILL 5 DROPS INTO AFFECTED EAR TWICE DAILY X 7 DAYS, THEN TWICE WEEKLY   Patient not taking: Reported on 11/29/2021   brimonidine (ALPHAGAN P) 0.15 % ophthalmic solution   No No   Sig: Place 1 drop into both eyes 2 times daily   furosemide (LASIX) 20 MG tablet   No No   Sig: Take 1 tablet (20 mg) by mouth daily   ketotifen (ZADITOR) 0.025 % SOLN   Yes No   Sig: Place 1 drop into both eyes as needed   latanoprost (XALATAN) 0.005 % ophthalmic solution   No No   Sig: Place 1 drop into both eyes At Bedtime   neomycin-polymyxin-hydrocortisone (CORTISPORIN) 3.5-20961-2  otic suspension   No No   Sig: Place 3 drops Into the left ear 4 times daily   Patient not taking: Reported on 2021   netarsudil (RHOPRESSA) 0.02 % ophthalmic solution   Yes No   Si drop At Bedtime   potassium chloride ER (KLOR-CON) 10 MEQ CR tablet   No No   Sig: TAKE ONE TABLET DAILY WITH EACH 20 MG DOSE OF FUROSEMIDE.   tamsulosin (FLOMAX) 0.4 MG capsule   Yes No   Sig: TAKE ONE CAPSULE BY MOUTH ONCE A DAY   warfarin ANTICOAGULANT (COUMADIN) 2.5 MG tablet   No No   Sig: Take 0.5 tablet (1.25 mg) on 21 then take 1.5 tablets (3.75 mg) on Sundays and take 1 tablet (2.5 mg) all other days or as directed by the INR clinic      Facility-Administered Medications: None     Allergies   Allergies   Allergen Reactions     Zithromax [Azithromycin] Diarrhea            Amoxicillin Diarrhea     Augmentin GI Disturbance       Social History   I have reviewed this patient's social history and updated it with pertinent information if needed. Ayaan Herrera  reports that he quit smoking about 42 years ago. He started smoking about 66 years ago. He has a 5.00 pack-year smoking history. He has quit using smokeless tobacco. He reports current alcohol use. He reports that he does not use drugs.    Family History   Family history assessed and, except as above, is non-contributory.    Family History   Problem Relation Age of Onset     Glaucoma Father      Hypertension Other      C.A.D. Other      Macular Degeneration Mother        Review of Systems   The 10 point Review of Systems is negative other than noted in the HPI or here.     Primary Care Physician   Physician No Ref-Primary    Data   Labs Ordered and Resulted from Time of ED Arrival to Time of ED Departure   INR - Abnormal       Result Value    INR 1.98 (*)    COMPREHENSIVE METABOLIC PANEL - Abnormal    Sodium 133      Potassium 4.3      Chloride 103      Carbon Dioxide (CO2) 24      Anion Gap 6      Urea Nitrogen 34 (*)     Creatinine 1.04      Calcium 9.2       Glucose 126 (*)     Alkaline Phosphatase 65      AST 30      ALT 30      Protein Total 6.7 (*)     Albumin 3.4      Bilirubin Total 0.7      GFR Estimate 69     CBC WITH PLATELETS AND DIFFERENTIAL - Abnormal    WBC Count 9.5      RBC Count 3.78 (*)     Hemoglobin 10.6 (*)     Hematocrit 33.0 (*)     MCV 87      MCH 28.0      MCHC 32.1      RDW 14.0      Platelet Count 176      % Neutrophils 78      % Lymphocytes 5      % Monocytes 15      % Eosinophils 1      % Basophils 0      % Immature Granulocytes 1      NRBCs per 100 WBC 0      Absolute Neutrophils 7.4      Absolute Lymphocytes 0.5 (*)     Absolute Monocytes 1.4 (*)     Absolute Eosinophils 0.1      Absolute Basophils 0.0      Absolute Immature Granulocytes 0.1      Absolute NRBCs 0.0     COVID-19 VIRUS (CORONAVIRUS) BY PCR - Normal    SARS CoV2 PCR Negative     ROUTINE UA WITH MICROSCOPIC REFLEX TO CULTURE       Data reviewed today:  I personally reviewed the EKG tracing showing ventricular paced rhythm.

## 2022-02-28 NOTE — PLAN OF CARE
Goal Outcome Evaluation:     Writer settled pt, ordered food for pt. C/O 1/10 back pain, increases w/ movement and ambulation. Chirinos patent and draining. Denies n/t. Urology and Spine Surg consulted.

## 2022-03-01 ENCOUNTER — APPOINTMENT (OUTPATIENT)
Dept: PHYSICAL THERAPY | Facility: CLINIC | Age: 87
End: 2022-03-01
Attending: HOSPITALIST
Payer: COMMERCIAL

## 2022-03-01 ENCOUNTER — TELEPHONE (OUTPATIENT)
Dept: ANTICOAGULATION | Facility: CLINIC | Age: 87
End: 2022-03-01
Payer: COMMERCIAL

## 2022-03-01 VITALS
OXYGEN SATURATION: 93 % | HEIGHT: 73 IN | RESPIRATION RATE: 18 BRPM | SYSTOLIC BLOOD PRESSURE: 90 MMHG | DIASTOLIC BLOOD PRESSURE: 56 MMHG | WEIGHT: 171.3 LBS | BODY MASS INDEX: 22.7 KG/M2 | HEART RATE: 71 BPM | TEMPERATURE: 97.4 F

## 2022-03-01 DIAGNOSIS — I48.20 CHRONIC ATRIAL FIBRILLATION (H): Primary | ICD-10-CM

## 2022-03-01 LAB
ANION GAP SERPL CALCULATED.3IONS-SCNC: 5 MMOL/L (ref 3–14)
BUN SERPL-MCNC: 22 MG/DL (ref 7–30)
CALCIUM SERPL-MCNC: 8.4 MG/DL (ref 8.5–10.1)
CHLORIDE BLD-SCNC: 106 MMOL/L (ref 94–109)
CO2 SERPL-SCNC: 22 MMOL/L (ref 20–32)
CREAT SERPL-MCNC: 0.69 MG/DL (ref 0.66–1.25)
ERYTHROCYTE [DISTWIDTH] IN BLOOD BY AUTOMATED COUNT: 14.2 % (ref 10–15)
GFR SERPL CREATININE-BSD FRML MDRD: 88 ML/MIN/1.73M2
GLUCOSE BLD-MCNC: 99 MG/DL (ref 70–99)
HCT VFR BLD AUTO: 31.6 % (ref 40–53)
HGB BLD-MCNC: 10.4 G/DL (ref 13.3–17.7)
INR PPP: 2.3 (ref 0.85–1.15)
MCH RBC QN AUTO: 28.1 PG (ref 26.5–33)
MCHC RBC AUTO-ENTMCNC: 32.9 G/DL (ref 31.5–36.5)
MCV RBC AUTO: 85 FL (ref 78–100)
PLATELET # BLD AUTO: 176 10E3/UL (ref 150–450)
POTASSIUM BLD-SCNC: 3.7 MMOL/L (ref 3.4–5.3)
RBC # BLD AUTO: 3.7 10E6/UL (ref 4.4–5.9)
SODIUM SERPL-SCNC: 133 MMOL/L (ref 133–144)
WBC # BLD AUTO: 8.3 10E3/UL (ref 4–11)

## 2022-03-01 PROCEDURE — 99217 PR OBSERVATION CARE DISCHARGE: CPT | Performed by: HOSPITALIST

## 2022-03-01 PROCEDURE — 250N000013 HC RX MED GY IP 250 OP 250 PS 637: Performed by: HOSPITALIST

## 2022-03-01 PROCEDURE — 97161 PT EVAL LOW COMPLEX 20 MIN: CPT | Mod: GP | Performed by: PHYSICAL THERAPIST

## 2022-03-01 PROCEDURE — 85027 COMPLETE CBC AUTOMATED: CPT | Performed by: HOSPITALIST

## 2022-03-01 PROCEDURE — 85610 PROTHROMBIN TIME: CPT | Performed by: HOSPITALIST

## 2022-03-01 PROCEDURE — G0378 HOSPITAL OBSERVATION PER HR: HCPCS

## 2022-03-01 PROCEDURE — 36415 COLL VENOUS BLD VENIPUNCTURE: CPT | Performed by: HOSPITALIST

## 2022-03-01 PROCEDURE — 97116 GAIT TRAINING THERAPY: CPT | Mod: GP | Performed by: PHYSICAL THERAPIST

## 2022-03-01 PROCEDURE — 97530 THERAPEUTIC ACTIVITIES: CPT | Mod: GP | Performed by: PHYSICAL THERAPIST

## 2022-03-01 PROCEDURE — 82947 ASSAY GLUCOSE BLOOD QUANT: CPT | Performed by: HOSPITALIST

## 2022-03-01 RX ORDER — WARFARIN SODIUM 2.5 MG/1
2.5 TABLET ORAL
Status: DISCONTINUED | OUTPATIENT
Start: 2022-03-01 | End: 2022-03-01 | Stop reason: HOSPADM

## 2022-03-01 RX ORDER — FUROSEMIDE 20 MG
20 TABLET ORAL DAILY
Qty: 90 TABLET | Refills: 3
Start: 2022-03-02 | End: 2022-05-17

## 2022-03-01 RX ORDER — ACETAMINOPHEN 325 MG/1
975 TABLET ORAL EVERY 8 HOURS
DISCHARGE
Start: 2022-03-01 | End: 2022-03-06

## 2022-03-01 RX ORDER — POTASSIUM CHLORIDE 750 MG/1
TABLET, EXTENDED RELEASE ORAL
Qty: 90 TABLET | Refills: 1
Start: 2022-03-01 | End: 2022-05-17

## 2022-03-01 RX ORDER — WARFARIN SODIUM 2.5 MG/1
TABLET ORAL
Qty: 135 TABLET | Refills: 0 | DISCHARGE
Start: 2022-03-01 | End: 2023-06-06

## 2022-03-01 RX ADMIN — TAMSULOSIN HYDROCHLORIDE 0.4 MG: 0.4 CAPSULE ORAL at 08:27

## 2022-03-01 RX ADMIN — DORZOLAMIDE HYDROCHLORIDE AND TIMOLOL MALEATE 1 DROP: 20; 5 SOLUTION/ DROPS OPHTHALMIC at 08:28

## 2022-03-01 RX ADMIN — ACETAMINOPHEN 975 MG: 325 TABLET, FILM COATED ORAL at 02:11

## 2022-03-01 RX ADMIN — BRIMONIDINE TARTRATE 1 DROP: 2 SOLUTION OPHTHALMIC at 08:28

## 2022-03-01 RX ADMIN — AMIODARONE HYDROCHLORIDE 200 MG: 200 TABLET ORAL at 08:27

## 2022-03-01 RX ADMIN — ACETAMINOPHEN 975 MG: 325 TABLET, FILM COATED ORAL at 08:28

## 2022-03-01 NOTE — PROGRESS NOTES
PRIOR TO DISCHARGE        Comments: -diagnostic tests and consults completed and resulted SW still needs to see pt  -vital signs normal or at patient baseline met  -tolerating oral intake to maintain hydration met  -adequate pain control on oral analgesics met  -safe disposition plan has been identified not met  Nurse to notify provider when observation goals have been met and patient is ready for discharge

## 2022-03-01 NOTE — PLAN OF CARE
[facility]      OUTPATIENT PHYSICAL THERAPY EVALUATION  PLAN OF TREATMENT FOR OUTPATIENT REHABILITATION  (COMPLETE FOR INITIAL CLAIMS ONLY)  Patient's Last Name, First Name, M.I.  YOB: 1932  Ayaan Herrera                        Provider's Name  [facility] Medical Record No.  [id number]                               Onset Date:  03/01/22   Start of Care Date:  03/01/22      Type:     _X_PT   ___OT   ___SLP Medical Diagnosis:  Generalized Weakness                        PT Diagnosis:  Impaired gait   Visits from SOC:  1   _________________________________________________________________________________  Plan of Treatment/Functional Goals    Planned Interventions: bed mobility training, balance training, gait training, home exercise program, patient/family education, strengthening, transfer training     Goals: See Physical Therapy Goals on Care Plan in [employer] electronic health record.    Therapy Frequency: 5x/week  Predicted Duration of Therapy Intervention: 03/06/22  _________________________________________________________________________________    I CERTIFY THE NEED FOR THESE SERVICES FURNISHED UNDER        THIS PLAN OF TREATMENT AND WHILE UNDER MY CARE     (Physician co-signature of this document indicates review and certification of the therapy plan).              Certification date from: 03/01/22, Certification date to: 03/08/22    Referring Physician: Aleah Mackey,             Initial Assessment        See Physical Therapy evaluation dated 03/01/22 in Epic electronic health record.

## 2022-03-01 NOTE — PLAN OF CARE
Physical Therapy Discharge Summary    Reason for therapy discharge:    Discharged to transitional care facility.    Progress towards therapy goal(s). See goals on Care Plan in [facility] electronic health record for goal details.  Goals not met.  Barriers to achieving goals:   discharge from facility.    Therapy recommendation(s):    Continued therapy is recommended.  Rationale/Recommendations:  Pt would benefit from continued skilled PT services via TCU to address deficits and improve IND with safety and functional mobility.

## 2022-03-01 NOTE — PLAN OF CARE
Observation goals  PRIOR TO DISCHARGE        Comments:  -diagnostic tests and consults completed and resulted: partially met,  pending PT/OT   -vital signs normal or at patient baseline: met, VSS on RA   -tolerating oral intake to maintain hydration: met   -adequate pain control on oral analgesics: met, patient w/ minimal c/o pain, never asked for pain meds   -safe disposition plan has been identified: unmet   Nurse to notify provider when observation goals have been met and patient is ready for discharge.

## 2022-03-01 NOTE — PROVIDER NOTIFICATION
.MD Notification    Notified Person: MD    Notified Person Name:  Eddie Mueller     Notification Date/Time: 2/28/22 at 1637     Notification Interaction: AMCOM     Purpose of Notification: NAIN, i received an instruction from [facility] hospitalist not to give warfarin while awaiting Orders/instructions from Dr. Mcqueen (ortho) but has not seen any orders placed. Can you get this ordered pls?     Orders Received:    Comments: Dr Mackey placed a note ok to give warfarin

## 2022-03-01 NOTE — PROGRESS NOTES
UROLOGY PROGRESS NOTE    March 1, 2022    SUBJECTIVE:  Doing well.  States he feels more himself today.  Tolerating diet.  Denies N/V.  Tolerating eagle.    AVSS  Cr 1.04 -> 0.69  Hgb 10.6 -> 10.4  400/1450cc UOP    OBJECTIVE:  Temp:  [97.6  F (36.4  C)-98.5  F (36.9  C)] 97.6  F (36.4  C)  Pulse:  [70-73] 71  Resp:  [16-18] 18  BP: (125-149)/(66-83) 129/76  SpO2:  [90 %-97 %] 97 %    Intake/Output Summary (Last 24 hours) at 3/1/2022 0947  Last data filed at 3/1/2022 0623  Gross per 24 hour   Intake 150 ml   Output 1450 ml   Net -1300 ml       GENERAL:  Awake, alert, no acute distress, elderly male resting in bed  HEAD: Normocephalic atraumatic  SCLERA: Anicteric  EXTREMITIES: Warm and well perfused  : eagle with clover output    LABS:  Lab Results   Component Value Date    WBC 8.3 03/01/2022    WBC 5.6 04/16/2021     Lab Results   Component Value Date    HGB 10.4 03/01/2022    HGB 12.9 04/16/2021     Lab Results   Component Value Date    HCT 31.6 03/01/2022    HCT 37.9 04/16/2021     Lab Results   Component Value Date     03/01/2022     04/16/2021     Last Basic Metabolic Panel:  Lab Results   Component Value Date     03/01/2022     04/16/2021      Lab Results   Component Value Date    POTASSIUM 3.7 03/01/2022    POTASSIUM 4.3 04/16/2021     Lab Results   Component Value Date    CHLORIDE 106 03/01/2022    CHLORIDE 101 04/16/2021     Lab Results   Component Value Date    BRENDAN 8.4 03/01/2022    BRENDAN 9.3 04/16/2021     Lab Results   Component Value Date    CO2 22 03/01/2022    CO2 28 04/16/2021     Lab Results   Component Value Date    BUN 22 03/01/2022    BUN 17 04/16/2021     Lab Results   Component Value Date    CR 0.69 03/01/2022    CR 0.82 04/16/2021     Lab Results   Component Value Date    GLC 99 03/01/2022     04/16/2021       ASSESSMENT/PLAN:     Right renal lesion  Incidental finding on admission CT scan.   Was discussed with Dr. Russell, he personally reviewed imaging from 2019  which did not show the small lesion that was noted today.   Not visible on US.   - Recommend repeat CT scan in one year during a follow up appointment with Dr. Way.    - Recommend following clinically. Would not recommend aggressive treatment at this time.      Urinary retention  Unclear how distended he was, but apparently had not voided in ED, so eagle was placed 2/28.   - Continue eagle for now-- depending on how long he is hospitalized and how his mobility improves, can consider voiding trial prior to discharge home.   - Otherwise may discharge with eagle and would need follow up in about 10 days for voiding trial on the nursing schedule at [facility].     History of prostate cancer  S/p radiation.   Remains on ADT.   - Continue outpatient follow up with Dr. Russell.   - Continue flomax.       After 12 PM today, please contact Anju Tucker PA-C.  Yari Jernigan PA-C will be rounding for our team tomorrow.     Althea Smith PA-C  [facility]  Office Phone: [phone]  After 4pm and on weekends, please call [phone]

## 2022-03-01 NOTE — CONSULTS
Care Management Initial Consult    General Information  Assessment completed with: Patient, Friend    Type of CM/SW Visit: Initial Assessment    Primary Care Provider verified and updated as needed:     Readmission within the last 30 days:        Reason for Consult: discharge planning  Advance Care Planning:            Communication Assessment  Patient's communication style: spoken language (English or Bilingual)    Hearing Difficulty or Deaf: no        Cognitive  Cognitive/Neuro/Behavioral: WDL                      Living Environment:   People in home: alone     Current living Arrangements: house      Able to return to prior arrangements: yes       Family/Social Support:  Care provided by:    Provides care for:    Marital Status: Single             Description of Support System:           Current Resources:   Patient receiving home care services:       Community Resources:    Equipment currently used at home: cane, straight  Supplies currently used at home:      Employment/Financial:  Employment Status:          Financial Concerns:             Lifestyle & Psychosocial Needs:  Social Determinants of Health     Tobacco Use: Medium Risk     Smoking Tobacco Use: Former Smoker     Smokeless Tobacco Use: Former User   Alcohol Use: Not on file   Financial Resource Strain: Not on file   Food Insecurity: Not on file   Transportation Needs: Not on file   Physical Activity: Not on file   Stress: Not on file   Social Connections: Not on file   Intimate Partner Violence: Not on file   Depression: Not at risk     PHQ-2 Score: 0   Housing Stability: Not on file       Functional Status:  Prior to admission patient needed assistance:              Mental Health Status:          Chemical Dependency Status:                Values/Beliefs:  Spiritual, Cultural Beliefs, Rastafarian Practices, Values that affect care:                 Additional Information:  SW consulted to assist with discharge planning, emotional support and  transportation arrangements.  SW reviewed patient chart and discussed in rounds.  Pt is an 89 yr old single male who admitted on 2/28 got compression fracture. Patient is in agreement with therapy recommendations for TCU. Patient provided TCU choices and requests a medivan ride at discharge.    TCU referrals sent and pending.    SW to continue to follow and assist with discharge planning.    EDUARDO Young  Daytime (8:00am-4:30pm): [phone]  After-Hours SW Pager (4:30pm-11:30pm): [phone]         EDUARDO Ramey

## 2022-03-01 NOTE — PLAN OF CARE
Goal Outcome Evaluation:        Orientation/Cognitive: A&Ox4, at times forgetful  Observation Goals (Met/ Not Met): partially met  Mobility Level/Assist Equipment: Ax1, cane, GB  Fall Risk (Y/N): Y  Behavior Concerns: Calm  Pain Management: Pain managed with scheduled tylenol   ABNL VS/O2: VSS   ABNL Lab/BG: Hgb:10.6  Diet: Regular  Bowel/Bladder: Incontinent  Skin Concerns: scrape on L elbow, redness in scrotal and perineal areas  Drains/Devices: Chirinos in place  Tests/Procedures for next shift: Urology and PT consult  Anticipated DC date: 1-2 days  Patient Stated Goal for Today: I just want to sleep      Observation goals  PRIOR TO DISCHARGE        -diagnostic tests and consults completed and resulted: Not met     -vital signs normal or at patient baseline: Met     -tolerating oral intake to maintain hydration: Met      -adequate pain control on oral analgesics: Met w/ scheduled tylenol       -safe disposition plan has been identified: Not met     Nurse to notify provider when observation goals have been met and patient is ready for discharge.

## 2022-03-01 NOTE — PROGRESS NOTES
Care Management Discharge Note    Discharge Date: 03/01/22     Discharge Disposition: Skilled Nursing Facilty- [facility]    Discharge Services: Transportation Services    Discharge DME:      Discharge Transportation: agency    Private pay costs discussed: transportation costs and insurance costs out of pocket expenses and co-pays    PAS Confirmation Code:    Patient/family educated on Medicare website which has current facility and service quality ratings: yes    Education Provided on the Discharge Plan:  yes  Persons Notified of Discharge Plans: pt, medical team, TCU  Patient/Family in Agreement with the Plan:  yes    Handoff Referral Completed: No    Additional Information:  DC orders: sent via DOD at 1446  Scripts:  PAS:   Trans: 1600    SW to continue to follow and assist with discharge planning.    EDUARDO Young  Daytime (8:00am-4:30pm): [phone]  After-Hours SW Pager (4:30pm-11:30pm): [phone]               EDUARDO Ramey

## 2022-03-01 NOTE — TELEPHONE ENCOUNTER
ANTICOAGULATION  MANAGEMENT: Discharge Review    Ayaan Herrera chart reviewed for anticoagulation continuity of care    Hospital Admission on 2/28-3/1/22 for compression fx.    Discharge disposition: TCU - [facility]    Results:    Recent labs: (last 7 days)     02/24/22  1141 02/28/22  0105 03/01/22  0615   INR 2.3* 1.98* 2.30*     Anticoagulation inpatient management:     home regimen continued    Anticoagulation discharge instructions:     Warfarin dosing: home regimen continued   Bridging: No   INR goal change: No      Medication changes affecting anticoagulation: No    Additional factors affecting anticoagulation: No    Plan     No adjustment to anticoagulation plan needed    ACC will resume monitoring upon discharge from TCU    No adjustment to Anticoagulation Calendar was required    Vale Morales RN

## 2022-03-01 NOTE — DISCHARGE SUMMARY
[facility]    Discharge Summary  Hospitalist    Date of Admission:  2/28/2022  Date of Discharge:  3/1/2022  Discharging Provider: Aleah [place] DO  Date of Service (when I saw the patient): 03/01/22    Discharge Diagnoses   Acute T11 compression fracture  Renal mass  Urinary retention  Hx prostate cancer  Acute anemia  Chronic atrial fibrillation and non-sustained ventricular tachycardia  Glaucoma  COVID-19 screening, negative    History of Present Illness   Ayaan Herrera is an 89 year old male who presented with past medical history that is most notable for osteoporosis, chronic atrial fibrillation on Warfarin, as well as sinus node dysfunction status post pacemaker placement, ventricular tachycardia on Amiodarone, and prostate cancer, among others; who presented on 2/28/22 with multiple recent falls at home and is found to have acute T11 compression fracture and a renal mass.    Hospital Course   Ayaan Herrera was admitted on 2/28/2022.  The following problems were addressed during his hospitalization:    Acute T11 compression fracture  Seen on CT; CTH was negative for acute intracranial pathology.  - Orthopedics appreciated, recommends no surgical intervention, brace or spine precautions     --avoid strenuous bending/lifting/twisting     --follow up in clinic in 2-3 weeks after discharge  - Tylenol q8h controls his pain, did not require opioids at all this admit  - recommended TCU, going to [facility]  - follow up with PCP after discharge     Renal mass  Seen incidentally on CT scans, concerning for possible malignancy. He has a history of prostate cancer, status post prostatectomy in 2003 with subsequent XRT and chemotherapy treatments. His urologist is Dr. Russell of [facility]. Renal US does not show right renal lesion seen on CT.  - Urology recommends repeat imaging in one year with Dr Way follow up     Urinary retention  Hx prostate cancer  Chirinos catheter placed in ED  - can  trial void at TCU if he is moving well. If not, keep eagle in until urology follow-up in 10-14 days  - continue flomax     Acute anemia  No signs of active bleeding at present. Hgb was 12.9 Feb 2021. Down to 10.4 on 2/28/22.   - CBC in one week     Chronic atrial fibrillation and non-sustained ventricular tachycardia  Also sick sinus syndrome. He had a pacemaker placed in 2007. In 2019 he developed NSVT and Amiodarone was started. Most recent TTE in 2021 showed LVEF 45-50% with several wall motion abnormalities, as well as moderate to severe mitral regurgitation. He is on warfarin for stroke prevention.  - Continue Warfarin and amiodarone  - PTA lasix and potassium not given in hospital due to decreased intake, resume as needed at TCU    Glaucoma  Resume eye drops     COVID-19 screening, negative  Pfizer x2 in Feb 2021, booster 10/12/21    Aleah [place], DO    Significant Results and Procedures   See above    Pending Results   NA    Code Status   Full Code       Primary Care Physician   Physician No Ref-Primary    Physical Exam   Temp: 97.4  F (36.3  C) Temp src: Axillary BP: 90/56 Pulse: 71   Resp: 18 SpO2: 93 % O2 Device: None (Room air)    Vitals:    02/28/22 1604 03/01/22 0511   Weight: 77.1 kg (170 lb) 77.7 kg (171 lb 4.8 oz)     Vital Signs with Ranges  Temp:  [97.4  F (36.3  C)-98.3  F (36.8  C)] 97.4  F (36.3  C)  Pulse:  [70-73] 71  Resp:  [16-18] 18  BP: ()/(56-76) 90/56  SpO2:  [93 %-97 %] 93 %  I/O last 3 completed shifts:  In: 150 [P.O.:150]  Out: 1450 [Urine:1450]    Patient seen and examined. He is doing well. His good friend is at bedside. Pain is controlled with scheduled tylenol. He worked with physical therapy and was recommended to TCU. They were able to get a bed at his first choice. No chest pain, shortness of breath, nausea, vomiting, fevers, chills. Eagle in place. Stable for discharge to TCU    Constitutional: Awake, alert, cooperative, no apparent distress, frail  Eyes: Conjunctiva  and pupils examined and normal.  HEENT: Moist mucous membranes, normal dentition.  Respiratory: Clear to auscultation bilaterally, no crackles or wheezing.  Cardiovascular: Regular rate and rhythm, normal S1 and S2, and no murmur noted.  GI: Soft, non-distended, non-tender, normal bowel sounds.  Lymph/Hematologic: No anterior cervical or supraclavicular adenopathy.  Skin: No rashes, no cyanosis, no edema. Chronic venous stasis changes at   Musculoskeletal: No joint swelling, erythema or tenderness.  Neurologic: Cranial nerves 2-12 intact, normal strength and sensation.  Psychiatric: Alert, oriented to person, place and time, no obvious anxiety or depression.    Discharge Disposition   Discharged to home  Condition at discharge: Stable    Consultations This Hospital Stay   PHARMACY TO DOSE WARFARIN  CARE MANAGEMENT / SOCIAL WORK IP CONSULT  ORTHOPEDIC SURGERY IP CONSULT  UROLOGY IP CONSULT  SPINE SURGERY ADULT IP CONSULT  PHYSICAL THERAPY ADULT IP CONSULT  PHYSICAL THERAPY ADULT IP CONSULT  OCCUPATIONAL THERAPY ADULT IP CONSULT    Time Spent on this Encounter   IAleah DO, personally saw the patient today and spent greater than 30 minutes discharging this patient.    Discharge Orders      Follow Up ([facility])    You will need to follow up at [facility], with Dr. Way, in one year. At this time a repeat CT scan can be obtained to evaluate the kidney lesion on the right.     If  you discharge home with a eagle, you will need follow up in 10-14 days in the clinic for a trial of void, this can be a nursing visit.     [facility]  [address] [place] [address]  You may call [phone] with any questions or concerns.         Appointments on [place] and/or [facility] (with [facility] or [facility] provider or service). Call [phone] if you haven't heard regarding these appointments within 7 days of discharge.     General info for SNF    Length of Stay  Estimate: Short Term Care: Estimated # of Days <30  Condition at Discharge: Improving  Level of care:skilled   Rehabilitation Potential: Good  Admission H&P remains valid and up-to-date: Yes  Recent Chemotherapy: N/A  Use Nursing Home Standing Orders: Yes     Follow Up and recommended labs and tests    Follow up with long term physician.  The following labs/tests are recommended: CBC, BMP in one week.    Follow up with [facility] ([facility] orthopedics) Dr Mcqueen to discuss back pain if ongoing/uncontrolled or limiting movements     Mantoux instructions    Give two-step Mantoux (PPD) Per Facility Policy Yes     Reason for your hospital stay    Multiple falls, compression fracture     Chirinos catheter    To straight gravity drainage. If moving well, can discuss void trial at TCU. If not removed while at TCU, then plan to follow up with Urology in outpatient in 10-14 days for removal of catheter at that time. Can also change PRN for leaking or decreased urine output with signs of bladder distention. DO NOT change catheter without a specific MD order IF diagnosis of benign prostatic hypertrophy (BPH), neurogenic bladder, or other urological conditions     Activity - Up with assistive device     Additional Discharge Instructions    Try to avoid twisting, strenuous lifting    Take tylenol 975mg every 8 hours for next 5 days to help with pain, can reduce to as needed after that if doing well    He did not receive lasix/potassium while in hospital due to decreased intake, resume on discharge if intake is improving     Full Code     Physical Therapy Adult Consult    Evaluate and treat as clinically indicated.    Reason:  multiple falls     Occupational Therapy Adult Consult    Evaluate and treat as clinically indicated.    Reason:  multiple falls     Fall precautions     Diet    Follow this diet upon discharge:  Regular Diet Adult     Discharge Medications   Current Discharge Medication List      CONTINUE these medications which  have CHANGED    Details   acetaminophen (TYLENOL) 325 MG tablet Take 3 tablets (975 mg) by mouth every 8 hours for 5 days    Associated Diagnoses: Closed wedge compression fracture of T11 vertebra, initial encounter (H)      furosemide (LASIX) 20 MG tablet Take 1 tablet (20 mg) by mouth daily  Qty: 90 tablet, Refills: 3    Associated Diagnoses: Bilateral leg edema      potassium chloride ER (KLOR-CON) 10 MEQ CR tablet TAKE ONE TABLET DAILY WITH EACH 20 MG DOSE OF FUROSEMIDE.  Qty: 90 tablet, Refills: 1    Associated Diagnoses: Bilateral leg edema      warfarin ANTICOAGULANT (COUMADIN) 2.5 MG tablet Take 1.5 tablets (3.75 mg) on Sundays and take 1 tablet (2.5 mg) all other days or as directed by the INR clinic  Qty: 135 tablet, Refills: 0    Associated Diagnoses: Chronic atrial fibrillation (H)         CONTINUE these medications which have NOT CHANGED    Details   alendronate (FOSAMAX) 70 MG tablet TAKE 1 TAB WITH 8OZ OF WATER EVERY 7 DAYS 30 MIN BEFORE BREAKFAST & REMAIN UPRIGHT DURING THIS TIME  Qty: 12 tablet, Refills: 3    Associated Diagnoses: Senile osteoporosis      amiodarone (PACERONE) 200 MG tablet Take 1 tablet (200 mg) by mouth daily      brimonidine (ALPHAGAN P) 0.15 % ophthalmic solution Place 1 drop into both eyes 2 times daily  Qty: 1 Bottle, Refills: 11    Associated Diagnoses: Acute maxillary sinusitis      COSOPT 2-0.5 % OP SOLN Place 1 Dose into both eyes 2 times daily Both eyes.    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)      netarsudil (RHOPRESSA) 0.02 % ophthalmic solution 1 drop At Bedtime      tamsulosin (FLOMAX) 0.4 MG capsule TAKE ONE CAPSULE BY MOUTH ONCE A DAY  Refills: 3      Vitamin D, Cholecalciferol, 1000 UNITS TABS Take 1  tablet by mouth daily            Allergies   Allergies   Allergen Reactions     Zithromax [Azithromycin] Diarrhea            Amoxicillin Diarrhea     Augmentin GI Disturbance     Data   Most Recent 3 CBC's:  Recent Labs   Lab Test 03/01/22  0615 02/28/22  0105 04/16/21  1659   WBC 8.3 9.5 5.6   HGB 10.4* 10.6* 12.9*   MCV 85 87 88    176 161      Most Recent 3 BMP's:  Recent Labs   Lab Test 03/01/22  0615 02/28/22  0105 09/02/21  1546    133 136   POTASSIUM 3.7 4.3 4.2   CHLORIDE 106 103 105   CO2 22 24 29   BUN 22 34* 23   CR 0.69 1.04 0.83   ANIONGAP 5 6 2*   BRENDAN 8.4* 9.2 9.3   GLC 99 126* 88     Most Recent 2 LFT's:  Recent Labs   Lab Test 02/28/22  0105 09/02/21  1546   AST 30 29   ALT 30 32   ALKPHOS 65 76   BILITOTAL 0.7 0.6     Most Recent INR's and Anticoagulation Dosing History:  Anticoagulation Dose History     Recent Dosing and Labs Latest Ref Rng & Units 11/23/2021 12/8/2021 12/20/2021 1/19/2022 2/24/2022 2/28/2022 3/1/2022    Warfarin 2.5 mg - - - - - - 2.5 mg -    INR 0.85 - 1.15 3.8(H) 3.2(H) 2.8(H) 2.9(H) 2.3(H) 1.98(H) 2.30(H)        Most Recent 3 Troponin's:  Recent Labs   Lab Test 04/16/21  1659   TROPI <0.015     Most Recent Cholesterol Panel:  Recent Labs   Lab Test 08/07/17  0000 08/01/16  0000   CHOL 145 168   LDL  --  80   HDL  --  63   TRIG 72 126     Most Recent 6 Bacteria Isolates From Any Culture (See EPIC Reports for Culture Details):No lab results found.  Most Recent TSH, T4 and A1c Labs:  Recent Labs   Lab Test 08/07/17  0000   TSH 1.30     Results for orders placed or performed during the hospital encounter of 02/28/22   Head CT w/o contrast    Narrative    EXAM: CT HEAD W/O CONTRAST  LOCATION: [facility]  DATE/TIME: 2/28/2022 2:01 AM    INDICATION: weakness after he hit his head   1 week ago  COMPARISON: 4/16/2021  TECHNIQUE: Routine CT Head without IV contrast. Multiplanar reformats. Dose reduction techniques were  used.    FINDINGS:  INTRACRANIAL CONTENTS: No intracranial hemorrhage, extraaxial collection, or mass effect.  No CT evidence of acute infarct. Mild to moderate presumed chronic small vessel ischemic changes. Moderate generalized volume loss. No hydrocephalus.     VISUALIZED ORBITS/SINUSES/MASTOIDS: No intraorbital abnormality. No paranasal sinus mucosal disease. No middle ear or mastoid effusion.    BONES/SOFT TISSUES: No acute abnormality.      Impression    IMPRESSION:  1.  No acute intracranial process.   CT Chest/Abdomen/Pelvis w Contrast    Narrative    EXAM: CT CHEST/ABDOMEN/PELVIS W CONTRAST  LOCATION: [facility]  DATE/TIME: 2/28/2022 2:01 AM    INDICATION: bilateral flank pain after fall 1.5 weeks ago and now weak with trouble walking  COMPARISON: None.  TECHNIQUE: CT scan of the chest, abdomen, and pelvis was performed following injection of IV contrast. Multiplanar reformats were obtained. Dose reduction techniques were used.   CONTRAST: 86mL Isovue 370.    FINDINGS:   LUNGS AND PLEURA: Dependent atelectasis in the lower lung zones. There are areas of mild smooth interstitial thickening which may reflect mild edema. No pneumothorax or pleural effusion.    MEDIASTINUM/AXILLAE: Borderline cardiomegaly. Cardiac pacer leads. Coronary artery and aortic valve leaflet calcifications. Atherosclerotic thoracic aorta without aneurysm or dissection.    CORONARY ARTERY CALCIFICATION: Severe.    HEPATOBILIARY: A few subcentimeter hepatic hypodensities, too small to characterize. Gallbladder is normal.    PANCREAS: Normal.    SPLEEN: Normal.    ADRENAL GLANDS: Normal.    KIDNEYS/BLADDER: Lobulated cortical contours. 3 mm left lower pole nonobstructing stone. Exophytic enhancing focus measuring 11 mm at the dorsal right midpole, concerning for small malignancy. 2 mm right lower pole nonobstructing renal stone. No   hydronephrosis on either side. Bladder within normal limits.    BOWEL: Moderate  amount of formed stool throughout the colon, particularly in the rectum. Small bowel of normal caliber. No free air.    LYMPH NODES: Normal.    VASCULATURE: Severe aortoiliac atherosclerotic change. Mild aneurysmal dilatation of the infrarenal abdominal aorta measuring 3.6 x 3.2 cm image 192 of series 7.    PELVIC ORGANS: Normal.    MUSCULOSKELETAL: Osteopenia. Please see separate dictation for detailed findings of the thoracic spine. There is a superior endplate fracture at the T11 level. There is no significant retropulsion of fracture fragments into the spinal canal. There is an   old upper sternal body fracture.      Impression    IMPRESSION:  1.  T11 superior endplate fracture. No significant retropulsion of fracture fragments. If patient has persistent weakness or difficulty ambulating, consider MRI for further evaluation.    2.  Severe atherosclerotic changes including multivessel coronary artery disease. 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    3.  11 mm enhancing exophytic focus arising from the dorsal right renal midpole, concerning for a small renal cell carcinoma. Consider evaluation with dedicated renal mass protocol CT or MRI.    4.  Findings suggestive of constipation.    5.  Mild pulmonary interstitial edema.   CT Thoracic Spine w/o Contrast    Narrative    EXAM: CT THORACIC SPINE W/O CONTRAST  LOCATION: [facility]  DATE/TIME: 2/28/2022 2:01 AM    INDICATION: lower thoracic back pain after fall  COMPARISON: None.  TECHNIQUE: Routine CT Thoracic Spine without IV contrast. Multiplanar reformats. Dose reduction techniques were used.     FINDINGS:  VERTEBRA:   Accurate vertebral body count obtained from the CT chest, abdomen, and pelvis    Diffuse bony demineralization.    T11 vertebral body superior endplate acute fracture with mild compression deformity. No retropulsion. Posterior elements appear intact.    No additional acute fractures identified. Remaining  vertebral body heights are maintained. No acute posttraumatic subluxations.    Multiple right-sided posterior chronic rib fractures noted inferiorly. Left T12 chronic remote fracture.    CANAL/FORAMINA: Mild to moderate thoracic dextroscoliosis. Thoracolumbar mild to moderate levoscoliosis. Multilevel spondylosis. No significant central canal or neural foraminal stenosis. Several mild degenerative grade 1 subluxations.    PARASPINAL: Vascular calcifications. Left pacer leads partially visualized. Interstitial edema.      Impression    IMPRESSION:  1.  T11 vertebral body superior endplate acute fracture with mild compression deformity.   2.  No additional acute fractures identified.  3.  Diffuse bony demineralization.  4.  Multilevel spondylosis.     US Renal Complete    Narrative    US RENAL COMPLETE   2/28/2022 10:36 AM     HISTORY: Right renal mass noted on CT.    COMPARISON: CT of the chest, abdomen, and pelvis performed earlier  today.    FINDINGS:     Right kidney measures 12.1 x 6.1 x 5.9 cm. Cortical thickness measures  1.4 cm AP. There is no hydronephrosis. No renal calculi or solid renal  masses are evident. The previously described indeterminate right renal  lesion is not visualized sonographically.    Left kidney measures 10.9 x 5.2 x 6.6 cm. Cortical thickness measures  1.2 cm AP. There is no hydronephrosis. A 0.9 cm echogenic focus in the  lower pole of the left kidney likely represents a nonobstructing  stone. No solid renal masses are evident.     Chirinos catheter in the bladder. Limited images of the bladder are  otherwise unremarkable.       Impression    IMPRESSION:   1. The previously described small indeterminate right renal lesion is  not visualized sonographically. Renal mass protocol MRI should be  considered for further characterization.   2. A 0.9 cm echogenic focus in the lower pole of the left kidney  likely represents a nonobstructing stone.    WILNER TSAI MD         SYSTEM ID:   [id number]

## 2022-03-01 NOTE — PROGRESS NOTES
D: Per [facility] regulation, SW completed and submitted PAS-RR to [place] [employer] Direct Connect via the Senior LinkAge Line.  PAS-RR confirmation # is : [id number]    I: SW spoke with pt and they are aware a PAS-RR has been submitted.  SW reviewed with pt that they may be contacted for a follow up appointment within 10 days of hospital discharge if their SNF stay is < 30 days.  Contact information for Senior LinkAge Line was also provided.    A: pt verbalized understanding.    P: Further questions may be directed to [facility] LinkAge Line at #[phone], option #4 for PAS-RR staff.    JAMES Johansen

## 2022-03-01 NOTE — PROGRESS NOTES
PRIOR TO DISCHARGE        Comments: -diagnostic tests and consults completed and resulted met  -vital signs normal or at patient baseline met  -tolerating oral intake to maintain hydration met  -adequate pain control on oral analgesics met  -safe disposition plan has been identified met  Nurse to notify provider when observation goals have been met and patient is ready for discharge

## 2022-03-01 NOTE — PLAN OF CARE
Observation goals  PRIOR TO DISCHARGE        Comments:  -diagnostic tests and consults completed and resulted: partially met,  pending Ortho   -vital signs normal or at patient baseline: met, VSS on RA   -tolerating oral intake to maintain hydration: met   -adequate pain control on oral analgesics: met, patient w/ minimal c/o pain, never asked for pain meds   -safe disposition plan has been identified: unmet   Nurse to notify provider when observation goals have been met and patient is ready for discharge.

## 2022-03-01 NOTE — PROGRESS NOTES
"   03/01/22 0900   Quick Adds   Quick Adds Certification   Type of Visit Initial PT Evaluation       Present no   Living Environment   People in Home alone   Current Living Arrangements house   Home Accessibility stairs to enter home   Number of Stairs, Main Entrance 2   Stair Railings, Main Entrance railing on left side (ascending)   Transportation Anticipated family or friend will provide   Living Environment Comments Pt reports living alone in a house. Pt reports needing to negotiate stairs to enter the home but once inside, all needs met on the main floor. Pt reports a family member will pick him up upon discharge and has access to 24/7 assist if needed.   Self-Care   Usual Activity Tolerance good   Current Activity Tolerance moderate   Regular Exercise No   Equipment Currently Used at Home cane, straight   Fall history within last six months yes   Number of times patient has fallen within last six months 5   Activity/Exercise/Self-Care Comment Pt reports being IND at baseline with all ADLs. Pt ambulated w/ a SEC in the community but without an AD at home. Pt reports not having a FWW at home. Pt reports stairs are not difficult for pt. Pt reports being able to ambulate ~1 block w/ SEC before needing a rest break. Pt drives and does errands IND.   General Information   Onset of Illness/Injury or Date of Surgery 03/01/22   Referring Physician Aleah Mackey, DO   Patient/Family Therapy Goals Statement (PT) \"To go home\"   Pertinent History of Current Problem (include personal factors and/or comorbidities that impact the POC) Per Chart: Ayaan Herrera is a markedly pleasant 89 year old gentleman with past medical history that is most notable for osteoporosis, chronic atrial fibrillation on Warfarin, as well as sinus node dysfunction status post pacemaker placement, ventricular tachycardia on Amiodarone, and prostate cancer, among others; who presents with multiple recent falls at " home and is found to have acute T11 compression fracture and a renal mass.   Existing Precautions/Restrictions fall   Weight-Bearing Status - LLE full weight-bearing   Weight-Bearing Status - RLE full weight-bearing   Cognition   Orientation Status (Cognition) oriented x 3   Pain Assessment   Patient Currently in Pain No   Integumentary/Edema   Integumentary/Edema no deficits were identifed   Posture    Posture Forward head position;Protracted shoulders   Range of Motion (ROM)   Range of Motion ROM is WFL   Strength (Manual Muscle Testing)   Strength (Manual Muscle Testing) Deficits observed during functional mobility   Strength Comments Bilateral Hip Flexion: 3/5   Bed Mobility   Comment, (Bed Mobility) Supine>sit w/ mod A x 1   Transfers   Comment, (Transfers) Sit>stand w/ FWW and min A x 1   Gait/Stairs (Locomotion)   [place] Level (Gait) minimum assist (75% patient effort)   Assistive Device (Gait) walker, front-wheeled   Distance in Feet (Required for LE Total Joints) 100'  (10' eval)   Comment, (Gait/Stairs) Pt ambulated ~10' w/ FWW and min A x 1 for safety. Pt presented with path deviations and was unsteady.   Balance   Balance Comments Pt able to sit at EOB unsupported without LOB. Pt unsteady in standing and ambulation, needing assist from FWW and PT to maintain balance.   Sensory Examination   Sensory Perception patient reports no sensory changes   Clinical Impression   Criteria for Skilled Therapeutic Intervention Yes, treatment indicated   PT Diagnosis (PT) Impaired gait   Influenced by the following impairments Decreased activity tolerance; decreased balance; decreased strength   Functional limitations due to impairments Impaired functional mobility   Clinical Presentation (PT Evaluation Complexity) Stable/Uncomplicated   Clinical Presentation Rationale Clinical Judgement   Clinical Decision Making (Complexity) low complexity   Planned Therapy Interventions (PT) bed mobility training;balance  training;gait training;home exercise program;patient/family education;strengthening;transfer training   Anticipated Equipment Needs at Discharge (PT) walker, rolling   Risk & Benefits of therapy have been explained evaluation/treatment results reviewed;care plan/treatment goals reviewed;risks/benefits reviewed;current/potential barriers reviewed;participants voiced agreement with care plan;participants included;patient   PT Discharge Planning   PT Discharge Recommendation (DC Rec) Transitional Care Facility;home with home care physical therapy;home with assist   PT Rationale for DC Rec Pt is below baseline. Pt requiring assist with all functional mobility. Pt presents with deficits in activity tolerance, balance, and strength. Due to these deficits and recent falls history. Pt is a high falls risk and unsafe to return home alone at this time. Pt would benefit from continued skilled PT services via TCU to address deficits and improve IND with safety and functional mobility. If pt were to return home, pt would require 24/7 A x 1 with all functional mobility and ADLs, along with HHPT.   PT Brief overview of current status Supine>sit w/ mod A x 1; sit>stand w/ FWW and min A x 1; gait w/ FWW and min A x 1   Therapy Certification   Start of care date 03/01/22   Certification date from 03/01/22   Certification date to 03/08/22   Medical Diagnosis Generalized Weakness   Total Evaluation Time   Total Evaluation Time (Minutes) 10   Physical Therapy Goals   PT Frequency 5x/week   PT Predicated Duration/Target Date for Goal Attainment 03/06/22   PT Goals Bed Mobility;Transfers;Gait;Stairs   PT: Bed Mobility Supine to/from sit;Independent   PT: Transfers Modified independent;Sit to/from stand;Assistive device   PT: Gait Modified independent;Assistive device;100 feet   PT: Stairs Modified independent;2 stairs;Rail on left

## 2022-03-01 NOTE — PROGRESS NOTES
Orientation/Cognitive: A/Ox3-4 X8T8-0Y5  Observation Goals (Met/ Not Met): partially met, see notes   Mobility Level/Assist Equipment: Ax1 w/ cane and gaitbelt   Fall Risk (Y/N): Yes   Behavior Concerns: None   Pain Management: Tylenol, pt c/o of very minimal pain   ABNL VS/O2: VSS on RA   ABNL Lab/BG: none   Diet: Regular   Bowel/Bladder: Incontinent of bowel, BMx1  Skin Concerns: Scrape at Left elbow-dressing done, Redness at perineal and scrotal areas, barrier cream applied, Blanchable redness at sacrum-Mepilex applied   Drains/Devices: Chirinos catheter  Tests/Procedures for next shift: INR, BMP and CBC   Anticipated DC date:  1-2 days pending PT/CC/SW consult and recommendation   Patient Stated Goal for Today: pain mgt

## 2022-03-01 NOTE — PLAN OF CARE
Goal Outcome Evaluation:        Observation goals  PRIOR TO DISCHARGE        -diagnostic tests and consults completed and resulted: Not met    -vital signs normal or at patient baseline: Met    -tolerating oral intake to maintain hydration: Met     -adequate pain control on oral analgesics: Met w/ scheduled tylenol      -safe disposition plan has been identified: Not met    Nurse to notify provider when observation goals have been met and patient is ready for discharge.

## 2022-03-02 ENCOUNTER — TRANSITIONAL CARE UNIT VISIT (OUTPATIENT)
Dept: GERIATRICS | Facility: CLINIC | Age: 87
End: 2022-03-02
Payer: COMMERCIAL

## 2022-03-02 VITALS
RESPIRATION RATE: 16 BRPM | SYSTOLIC BLOOD PRESSURE: 131 MMHG | WEIGHT: 171 LBS | TEMPERATURE: 97.9 F | OXYGEN SATURATION: 92 % | HEIGHT: 73 IN | BODY MASS INDEX: 22.66 KG/M2 | DIASTOLIC BLOOD PRESSURE: 84 MMHG | HEART RATE: 70 BPM

## 2022-03-02 DIAGNOSIS — D64.9 ANEMIA, UNSPECIFIED TYPE: ICD-10-CM

## 2022-03-02 DIAGNOSIS — I10 ESSENTIAL HYPERTENSION: ICD-10-CM

## 2022-03-02 DIAGNOSIS — I34.0 MITRAL VALVE INSUFFICIENCY, UNSPECIFIED ETIOLOGY: ICD-10-CM

## 2022-03-02 DIAGNOSIS — I48.20 CHRONIC ATRIAL FIBRILLATION (H): ICD-10-CM

## 2022-03-02 DIAGNOSIS — M85.88 OSTEOPENIA OF SPINE: ICD-10-CM

## 2022-03-02 DIAGNOSIS — R33.9 URINARY RETENTION: ICD-10-CM

## 2022-03-02 DIAGNOSIS — Z79.01 LONG TERM CURRENT USE OF ANTICOAGULANT THERAPY: ICD-10-CM

## 2022-03-02 DIAGNOSIS — M62.81 GENERALIZED MUSCLE WEAKNESS: ICD-10-CM

## 2022-03-02 DIAGNOSIS — S22.080D CLOSED WEDGE COMPRESSION FRACTURE OF T11 VERTEBRA WITH ROUTINE HEALING, SUBSEQUENT ENCOUNTER: Primary | ICD-10-CM

## 2022-03-02 PROCEDURE — 99310 SBSQ NF CARE HIGH MDM 45: CPT | Performed by: NURSE PRACTITIONER

## 2022-03-02 NOTE — LETTER
3/2/2022        RE: Ayaan Herrera  [address] [facility] [id number]        [facility] GERIATRICS    PRIMARY CARE PROVIDER AND CLINIC:  Physician No Ref-Primary, No address on file  Chief Complaint   Patient presents with     Hospital F/U      [place] Medical Record Number:  [id number]  Place of Service where encounter took place:  [facility]) [25]    Ayaan Herrera  is a 89 year old  (10/31/1932), admitted to the above facility from  [facility]. Hospital stay 2/28/22 through 3/1/22..   HPI:    PMH of prostate cancer, atrial fib, sinus node dysfunction s/p PPM, ventricular tachycardia, osteoporosis who presented after multiple recent falls.   T11 compression Fx: ortho consulted, non surgical, avoid strenuous bending/lifting/twisting  Renal mass: incidental finding on CT scan, concerning for possible malignancy, Hx of prostate CA, followed by Dr. Russell, renal US does not show right renal lesion found on CT, urology recommend f/u in one year with Dr. Way  Urinary retention: Hx of prostate cancer, eagle catheter placed in ED continued at DC, TOV in TCU  Anemia: Hgb 12.9 2/21 current Hgb 10.4, follow  Chronic atrial fib: SSS, PPM 2007, NSVT 2019 started on amiodarone, most recent TTE 2021 LVEF 45-50% several wall motion abnormalities, moderated to severe mitral regurgitation.   ON exam today : patient sitting up in w/c, pleasant, denies pain in back at time of exam, states back only painful when he twists/bends a certain way, painful when he gets out of bed, no pain with ambulation. Patient denies CP, palpitations, SOB, N/V/D states he had a BM yesterday, patient has a eagle catheter in place.   CODE STATUS/ADVANCE DIRECTIVES DISCUSSION:  Full Code  CPR/Full code   ALLERGIES:   Allergies   Allergen Reactions     Zithromax [Azithromycin] Diarrhea            Amoxicillin Diarrhea     Augmentin GI Disturbance      PAST MEDICAL HISTORY:   Past Medical History:    Diagnosis Date     Atrial fibrillation (H)      BPH (benign prostatic hyperplasia)      Dyslipidemia      History of basal cell carcinoma      HTN (hypertension)      Nonsenile cataract      NSVT (nonsustained ventricular tachycardia) (H)      POAG (primary open-angle glaucoma)     Advanced      Prostate cancer (H)      Psoriasis      Sinus node dysfunction (H)       PAST SURGICAL HISTORY:   has a past surgical history that includes Glaucoma surgery (01/01/1996); laser surgery of eye (10/14/04 & 2/17/05); cataract iol, rt/lt (1985/1996); Glaucoma surgery (01/01/1997); Punctal Closure, Plugs (01/01/2000); turp (01/01/2003); and Electrophysiology Pacemaker (2007).  FAMILY HISTORY: family history includes C.A.D. in an other family member; Glaucoma in his father; Hypertension in an other family member; Macular Degeneration in his mother.  SOCIAL HISTORY:   reports that he quit smoking about 42 years ago. He started smoking about 66 years ago. He has a 5.00 pack-year smoking history. He has quit using smokeless tobacco. He reports current alcohol use. He reports that he does not use drugs.  Patient's living condition: lives alone    Post Discharge Medication Reconciliation Status: discharge medications reconciled, continue medications without change  Current Outpatient Medications   Medication Sig     acetaminophen (TYLENOL) 325 MG tablet Take 3 tablets (975 mg) by mouth every 8 hours for 5 days     alendronate (FOSAMAX) 70 MG tablet TAKE 1 TAB WITH 8OZ OF WATER EVERY 7 DAYS 30 MIN BEFORE BREAKFAST & REMAIN UPRIGHT DURING THIS TIME     amiodarone (PACERONE) 200 MG tablet Take 1 tablet (200 mg) by mouth daily     brimonidine (ALPHAGAN P) 0.15 % ophthalmic solution Place 1 drop into both eyes 2 times daily     COSOPT 2-0.5 % OP SOLN Place 1 Dose into both eyes 2 times daily Both eyes.     furosemide (LASIX) 20 MG tablet Take 1 tablet (20 mg) by mouth daily     ketotifen (ZADITOR) 0.025 % SOLN Place 1 drop into both eyes  "as needed     latanoprost (XALATAN) 0.005 % ophthalmic solution Place 1 drop into both eyes At Bedtime     netarsudil (RHOPRESSA) 0.02 % ophthalmic solution 1 drop At Bedtime     potassium chloride ER (KLOR-CON) 10 MEQ CR tablet TAKE ONE TABLET DAILY WITH EACH 20 MG DOSE OF FUROSEMIDE.     tamsulosin (FLOMAX) 0.4 MG capsule TAKE ONE CAPSULE BY MOUTH ONCE A DAY     Vitamin D, Cholecalciferol, 1000 UNITS TABS Take 1 tablet by mouth daily      warfarin ANTICOAGULANT (COUMADIN) 2.5 MG tablet Take 1.5 tablets (3.75 mg) on Sundays and take 1 tablet (2.5 mg) all other days or as directed by the INR clinic     No current facility-administered medications for this visit.       ROS:  10 point ROS of systems including Constitutional, Eyes, Respiratory, Cardiovascular, Gastroenterology, Genitourinary, Integumentary, Musculoskeletal, Psychiatric were all negative except for pertinent positives noted in my HPI.    Vitals:  /84   Pulse 70   Temp 97.9  F (36.6  C)   Resp 16   Ht 1.854 m (6' 1\")   Wt 77.6 kg (171 lb)   SpO2 92%   BMI 22.56 kg/m    Exam:  GENERAL APPEARANCE:  Alert, in no distress  ENT:  Mouth and posterior oropharynx normal, moist mucous membranes, [place]  EYES:  EOM, conjunctivae, lids, pupils and irises normal, PERRL  RESP:  respiratory effort and palpation of chest normal, lungs clear to auscultation , no respiratory distress  CV:  Palpation and auscultation of heart done , regular rate and rhythm, no murmur, rub, or gallop, no edema  ABDOMEN:  normal bowel sounds, soft, nontender, no hepatosplenomegaly or other masses  M/S:   patient sitting up in w/c, able to move all 4 extremities  SKIN:  Inspection of skin and subcutaneous tissue baseline  NEURO:   speech wnl  PSYCH:  affect and mood normal    Lab/Diagnostic data:  Recent labs in [facility] reviewed by me today.  and   Most Recent 3 CBC's:Recent Labs   Lab Test 03/01/22  0615 02/28/22  0105 04/16/21  1659   WBC 8.3 9.5 5.6   HGB 10.4* 10.6* 12.9*   MCV 85 " "87 88    176 161     Most Recent 3 BMP's:Recent Labs   Lab Test 03/01/22  0615 02/28/22  0105 09/02/21  1546    133 136   POTASSIUM 3.7 4.3 4.2   CHLORIDE 106 103 105   CO2 22 24 29   BUN 22 34* 23   CR 0.69 1.04 0.83   ANIONGAP 5 6 2*   BRENDAN 8.4* 9.2 9.3   GLC 99 126* 88       ASSESSMENT/PLAN:    (S22.080D) Closed wedge compression fracture of T11 vertebra with routine healing, subsequent encounter  (primary encounter diagnosis)  (M62.81) Generalized muscle weakness  (M85.88) Osteopenia of spine  Comment: acute/ongoing  Plan: PT and OT, tylenol 975mg q 8 hours, fosamax 70mg weekly, vitamin D 1000u QD    (D64.9) Anemia, unspecified type  Comment: ongoing  Plan: Hgb 10.2, Hgb on monday    (I10) Essential hypertension  (I48.20) Chronic atrial fibrillation (H)  (I34.0) Mitral valve insufficiency, unspecified etiology  (Z79.01) Long term current use of anticoagulant therapy  Comment: ongoing  Plan: vitals daily and prn, BMP on Monday, continue lasix 20mg QD, KCL 10meq QD, amiodarone 200mg QD coumadin as directed INR goal of 2-3    (R33.9) Urinary retention  Acute/ongoing: eagle catheter in place, will attempt TOV once patient ambulating >100feet  Continue flomax 0.4mg QD    Orders:  BMP and Hgb on Monday      Total time spent with patient visit at the skilled nursing facility was 35 min including patient visit and review of past records. Greater than 50% of total time spent with counseling and coordinating care due to discussed hospitalization, pain level and pain medications, discussed/education on use of ice prn, discussed eagle catheter will attempt TOV when patient walking, discussed patient living situation, he lives in a house alone, drives occasionally to \"[place]\" . .     Electronically signed by:  Tonya Lynn Haase, APRN CNP                     Sincerely,        Tonya Lynn Haase, APRN CNP    "

## 2022-03-02 NOTE — PROGRESS NOTES
[facility]    PRIMARY CARE PROVIDER AND CLINIC:  Physician No Ref-Primary, No address on file  Chief Complaint   Patient presents with     Hospital F/U      [place] Medical Record Number:  [id number]  Place of Service where encounter took place:  [facility]) [25]    Ayaan Herrera  is a 89 year old  (10/31/1932), admitted to the above facility from  [facility]. Hospital stay 2/28/22 through 3/1/22..   HPI:    PMH of prostate cancer, atrial fib, sinus node dysfunction s/p PPM, ventricular tachycardia, osteoporosis who presented after multiple recent falls.   T11 compression Fx: ortho consulted, non surgical, avoid strenuous bending/lifting/twisting  Renal mass: incidental finding on CT scan, concerning for possible malignancy, Hx of prostate CA, followed by Dr. Russell, renal US does not show right renal lesion found on CT, urology recommend f/u in one year with Dr. Way  Urinary retention: Hx of prostate cancer, eagle catheter placed in ED continued at ID, TOV in TCU  Anemia: Hgb 12.9 2/21 current Hgb 10.4, follow  Chronic atrial fib: SSS, PPM 2007, NSVT 2019 started on amiodarone, most recent TTE 2021 LVEF 45-50% several wall motion abnormalities, moderated to severe mitral regurgitation.   ON exam today : patient sitting up in w/c, pleasant, denies pain in back at time of exam, states back only painful when he twists/bends a certain way, painful when he gets out of bed, no pain with ambulation. Patient denies CP, palpitations, SOB, N/V/D states he had a BM yesterday, patient has a eagle catheter in place.   CODE STATUS/ADVANCE DIRECTIVES DISCUSSION:  Full Code  CPR/Full code   ALLERGIES:   Allergies   Allergen Reactions     Zithromax [Azithromycin] Diarrhea            Amoxicillin Diarrhea     Augmentin GI Disturbance      PAST MEDICAL HISTORY:   Past Medical History:   Diagnosis Date     Atrial fibrillation (H)      BPH (benign prostatic hyperplasia)       Dyslipidemia      History of basal cell carcinoma      HTN (hypertension)      Nonsenile cataract      NSVT (nonsustained ventricular tachycardia) (H)      POAG (primary open-angle glaucoma)     Advanced      Prostate cancer (H)      Psoriasis      Sinus node dysfunction (H)       PAST SURGICAL HISTORY:   has a past surgical history that includes Glaucoma surgery (01/01/1996); laser surgery of eye (10/14/04 & 2/17/05); cataract iol, rt/lt (1985/1996); Glaucoma surgery (01/01/1997); Punctal Closure, Plugs (01/01/2000); turp (01/01/2003); and Electrophysiology Pacemaker (2007).  FAMILY HISTORY: family history includes C.A.D. in an other family member; Glaucoma in his father; Hypertension in an other family member; Macular Degeneration in his mother.  SOCIAL HISTORY:   reports that he quit smoking about 42 years ago. He started smoking about 66 years ago. He has a 5.00 pack-year smoking history. He has quit using smokeless tobacco. He reports current alcohol use. He reports that he does not use drugs.  Patient's living condition: lives alone    Post Discharge Medication Reconciliation Status: discharge medications reconciled, continue medications without change  Current Outpatient Medications   Medication Sig     acetaminophen (TYLENOL) 325 MG tablet Take 3 tablets (975 mg) by mouth every 8 hours for 5 days     alendronate (FOSAMAX) 70 MG tablet TAKE 1 TAB WITH 8OZ OF WATER EVERY 7 DAYS 30 MIN BEFORE BREAKFAST & REMAIN UPRIGHT DURING THIS TIME     amiodarone (PACERONE) 200 MG tablet Take 1 tablet (200 mg) by mouth daily     brimonidine (ALPHAGAN P) 0.15 % ophthalmic solution Place 1 drop into both eyes 2 times daily     COSOPT 2-0.5 % OP SOLN Place 1 Dose into both eyes 2 times daily Both eyes.     furosemide (LASIX) 20 MG tablet Take 1 tablet (20 mg) by mouth daily     ketotifen (ZADITOR) 0.025 % SOLN Place 1 drop into both eyes as needed     latanoprost (XALATAN) 0.005 % ophthalmic solution Place 1 drop into both  "eyes At Bedtime     netarsudil (RHOPRESSA) 0.02 % ophthalmic solution 1 drop At Bedtime     potassium chloride ER (KLOR-CON) 10 MEQ CR tablet TAKE ONE TABLET DAILY WITH EACH 20 MG DOSE OF FUROSEMIDE.     tamsulosin (FLOMAX) 0.4 MG capsule TAKE ONE CAPSULE BY MOUTH ONCE A DAY     Vitamin D, Cholecalciferol, 1000 UNITS TABS Take 1 tablet by mouth daily      warfarin ANTICOAGULANT (COUMADIN) 2.5 MG tablet Take 1.5 tablets (3.75 mg) on Sundays and take 1 tablet (2.5 mg) all other days or as directed by the INR clinic     No current facility-administered medications for this visit.       ROS:  10 point ROS of systems including Constitutional, Eyes, Respiratory, Cardiovascular, Gastroenterology, Genitourinary, Integumentary, Musculoskeletal, Psychiatric were all negative except for pertinent positives noted in my HPI.    Vitals:  /84   Pulse 70   Temp 97.9  F (36.6  C)   Resp 16   Ht 1.854 m (6' 1\")   Wt 77.6 kg (171 lb)   SpO2 92%   BMI 22.56 kg/m    Exam:  GENERAL APPEARANCE:  Alert, in no distress  ENT:  Mouth and posterior oropharynx normal, moist mucous membranes, [place]  EYES:  EOM, conjunctivae, lids, pupils and irises normal, PERRL  RESP:  respiratory effort and palpation of chest normal, lungs clear to auscultation , no respiratory distress  CV:  Palpation and auscultation of heart done , regular rate and rhythm, no murmur, rub, or gallop, no edema  ABDOMEN:  normal bowel sounds, soft, nontender, no hepatosplenomegaly or other masses  M/S:   patient sitting up in w/c, able to move all 4 extremities  SKIN:  Inspection of skin and subcutaneous tissue baseline  NEURO:   speech wnl  PSYCH:  affect and mood normal    Lab/Diagnostic data:  Recent labs in [facility] reviewed by me today.  and   Most Recent 3 CBC's:Recent Labs   Lab Test 03/01/22  0615 02/28/22  0105 04/16/21  1659   WBC 8.3 9.5 5.6   HGB 10.4* 10.6* 12.9*   MCV 85 87 88    176 161     Most Recent 3 BMP's:Recent Labs   Lab Test 03/01/22  0615 " "02/28/22  0105 09/02/21  1546    133 136   POTASSIUM 3.7 4.3 4.2   CHLORIDE 106 103 105   CO2 22 24 29   BUN 22 34* 23   CR 0.69 1.04 0.83   ANIONGAP 5 6 2*   BRENDAN 8.4* 9.2 9.3   GLC 99 126* 88       ASSESSMENT/PLAN:    (S22.080D) Closed wedge compression fracture of T11 vertebra with routine healing, subsequent encounter  (primary encounter diagnosis)  (M62.81) Generalized muscle weakness  (M85.88) Osteopenia of spine  Comment: acute/ongoing  Plan: PT and OT, tylenol 975mg q 8 hours, fosamax 70mg weekly, vitamin D 1000u QD    (D64.9) Anemia, unspecified type  Comment: ongoing  Plan: Hgb 10.2, Hgb on monday    (I10) Essential hypertension  (I48.20) Chronic atrial fibrillation (H)  (I34.0) Mitral valve insufficiency, unspecified etiology  (Z79.01) Long term current use of anticoagulant therapy  Comment: ongoing  Plan: vitals daily and prn, BMP on Monday, continue lasix 20mg QD, KCL 10meq QD, amiodarone 200mg QD coumadin as directed INR goal of 2-3    (R33.9) Urinary retention  Acute/ongoing: eagle catheter in place, will attempt TOV once patient ambulating >100feet  Continue flomax 0.4mg QD    Orders:  BMP and Hgb on Monday      Total time spent with patient visit at the skilled nursing facility was 35 min including patient visit and review of past records. Greater than 50% of total time spent with counseling and coordinating care due to discussed hospitalization, pain level and pain medications, discussed/education on use of ice prn, discussed eagle catheter will attempt TOV when patient walking, discussed patient living situation, he lives in a house alone, drives occasionally to \"[place]\" . .     Electronically signed by:  Tonya Lynn Haase, APRN CNP               "

## 2022-03-04 ENCOUNTER — TRANSITIONAL CARE UNIT VISIT (OUTPATIENT)
Dept: GERIATRICS | Facility: CLINIC | Age: 87
End: 2022-03-04
Payer: COMMERCIAL

## 2022-03-04 VITALS
BODY MASS INDEX: 22.37 KG/M2 | WEIGHT: 168.8 LBS | SYSTOLIC BLOOD PRESSURE: 108 MMHG | HEIGHT: 73 IN | DIASTOLIC BLOOD PRESSURE: 62 MMHG | HEART RATE: 70 BPM | OXYGEN SATURATION: 93 % | TEMPERATURE: 97.1 F | RESPIRATION RATE: 16 BRPM

## 2022-03-04 DIAGNOSIS — I10 ESSENTIAL HYPERTENSION: ICD-10-CM

## 2022-03-04 DIAGNOSIS — Z79.01 LONG TERM CURRENT USE OF ANTICOAGULANT THERAPY: ICD-10-CM

## 2022-03-04 DIAGNOSIS — I34.0 MITRAL VALVE INSUFFICIENCY, UNSPECIFIED ETIOLOGY: ICD-10-CM

## 2022-03-04 DIAGNOSIS — I48.20 CHRONIC ATRIAL FIBRILLATION (H): ICD-10-CM

## 2022-03-04 DIAGNOSIS — M85.88 OSTEOPENIA OF SPINE: ICD-10-CM

## 2022-03-04 DIAGNOSIS — S22.080D CLOSED WEDGE COMPRESSION FRACTURE OF T11 VERTEBRA WITH ROUTINE HEALING, SUBSEQUENT ENCOUNTER: Primary | ICD-10-CM

## 2022-03-04 DIAGNOSIS — M62.81 GENERALIZED MUSCLE WEAKNESS: ICD-10-CM

## 2022-03-04 DIAGNOSIS — R33.9 URINARY RETENTION: ICD-10-CM

## 2022-03-04 DIAGNOSIS — D64.9 ANEMIA, UNSPECIFIED TYPE: ICD-10-CM

## 2022-03-04 PROCEDURE — 99309 SBSQ NF CARE MODERATE MDM 30: CPT | Performed by: NURSE PRACTITIONER

## 2022-03-04 NOTE — PROGRESS NOTES
"[facility] GERIATRICS    Chief Complaint   Patient presents with     Nursing Home Acute     HPI:  Ayaan Herrera is a 89 year old  (10/31/1932), who is being seen today for an episodic care visit at: [facility] ([facility]) [25]. Today's concern is:   Compression Fx T11: on exam today patient denies pain or discomfort to back at rest,  in therapy he is walking 175feet using a RW with SBA  Anemia: see labs  HTN/Atrial fib: /62, 134/74, 121/73 with HR 70 range, denies CP, palpitations, SOB  Urinary retention: plan TOV on Monday, eagle in place  Cognitive impairment: BIMS 11/15, SBT 6/28  Allergies, and PMH/PSH reviewed in [facility] today.  REVIEW OF SYSTEMS:  10 point ROS of systems including Constitutional, Eyes, Respiratory, Cardiovascular, Gastroenterology, Genitourinary, Integumentary, Musculoskeletal, Psychiatric were all negative except for pertinent positives noted in my HPI.    Objective:   /62   Pulse 70   Temp 97.1  F (36.2  C)   Resp 16   Ht 1.854 m (6' 1\")   Wt 76.6 kg (168 lb 12.8 oz)   SpO2 93%   BMI 22.27 kg/m    GENERAL APPEARANCE:  Alert, in no distress  ENT:  Mouth and posterior oropharynx normal, moist mucous membranes, [place]  EYES:  EOM, conjunctivae, lids, pupils and irises normal, PERRL  RESP:  respiratory effort and palpation of chest normal, lungs clear to auscultation , no respiratory distress  CV:  Palpation and auscultation of heart done , regular rate and rhythm, no murmur, rub, or gallop, no edema  ABDOMEN:  normal bowel sounds, soft, nontender, no hepatosplenomegaly or other masses  M/S:   patient resting in bed at time of exam  SKIN:  Inspection of skin and subcutaneous tissue baseline  NEURO:   speech wnl    Recent labs in [facility] reviewed by me today.     Assessment/Plan:  (S22.080D) Closed wedge compression fracture of T11 vertebra with routine healing, subsequent encounter  (primary encounter diagnosis)  (M62.81) Generalized muscle weakness  (M85.88) Osteopenia of " spine  Comment: acute/ongoing  Plan: PT and OT, tylenol 975mg q 8 hours, fosamax 70mg weekly, vitamin D 1000u QD     (D64.9) Anemia, unspecified type  Comment: ongoing  Plan: Hgb 10.2, Hgb on monday     (I10) Essential hypertension  (I48.20) Chronic atrial fibrillation (H)  (I34.0) Mitral valve insufficiency, unspecified etiology  (Z79.01) Long term current use of anticoagulant therapy  Comment: ongoing  Plan: vitals daily and prn, BMP on Monday, continue lasix 20mg QD, KCL 10meq QD, amiodarone 200mg QD coumadin as directed INR goal of 2-3     (R33.9) Urinary retention  Acute/ongoing: eagle catheter in place, will attempt TOV on Monday 3/7/22  Continue flomax 0.4mg QD    Orders:  No new orders    Electronically signed by: Tonya Lynn Haase, APRN CNP

## 2022-03-04 NOTE — LETTER
"    3/4/2022        RE: Ayaan Herrera  [address] [facility] [address]        [facility] GERIATRICS    Chief Complaint   Patient presents with     Nursing Home Acute     HPI:  Ayaan Herrera is a 89 year old  (10/31/1932), who is being seen today for an episodic care visit at: [facility]) [25]. Today's concern is:   Compression Fx T11: on exam today patient denies pain or discomfort to back at rest,  in therapy he is walking 175feet using a RW with SBA  Anemia: see labs  HTN/Atrial fib: /62, 134/74, 121/73 with HR 70 range, denies CP, palpitations, SOB  Urinary retention: plan TOV on Monday, eagle in place  Cognitive impairment: BIMS 11/15, SBT 6/28  Allergies, and PMH/PSH reviewed in [facility] today.  REVIEW OF SYSTEMS:  10 point ROS of systems including Constitutional, Eyes, Respiratory, Cardiovascular, Gastroenterology, Genitourinary, Integumentary, Musculoskeletal, Psychiatric were all negative except for pertinent positives noted in my HPI.    Objective:   /62   Pulse 70   Temp 97.1  F (36.2  C)   Resp 16   Ht 1.854 m (6' 1\")   Wt 76.6 kg (168 lb 12.8 oz)   SpO2 93%   BMI 22.27 kg/m    GENERAL APPEARANCE:  Alert, in no distress  ENT:  Mouth and posterior oropharynx normal, moist mucous membranes, [place]  EYES:  EOM, conjunctivae, lids, pupils and irises normal, PERRL  RESP:  respiratory effort and palpation of chest normal, lungs clear to auscultation , no respiratory distress  CV:  Palpation and auscultation of heart done , regular rate and rhythm, no murmur, rub, or gallop, no edema  ABDOMEN:  normal bowel sounds, soft, nontender, no hepatosplenomegaly or other masses  M/S:   patient resting in bed at time of exam  SKIN:  Inspection of skin and subcutaneous tissue baseline  NEURO:   speech wnl    Recent labs in [facility] reviewed by me today.     Assessment/Plan:  (U04.002Z) Closed wedge compression fracture of T11 vertebra with routine healing, subsequent encounter  " (primary encounter diagnosis)  (M62.81) Generalized muscle weakness  (M85.88) Osteopenia of spine  Comment: acute/ongoing  Plan: PT and OT, tylenol 975mg q 8 hours, fosamax 70mg weekly, vitamin D 1000u QD     (D64.9) Anemia, unspecified type  Comment: ongoing  Plan: Hgb 10.2, Hgb on monday     (I10) Essential hypertension  (I48.20) Chronic atrial fibrillation (H)  (I34.0) Mitral valve insufficiency, unspecified etiology  (Z79.01) Long term current use of anticoagulant therapy  Comment: ongoing  Plan: vitals daily and prn, BMP on Monday, continue lasix 20mg QD, KCL 10meq QD, amiodarone 200mg QD coumadin as directed INR goal of 2-3     (R33.9) Urinary retention  Acute/ongoing: eagle catheter in place, will attempt TOV on Monday 3/7/22  Continue flomax 0.4mg QD    Orders:  No new orders    Electronically signed by: Tonya Lynn Haase, APRN CNP           Sincerely,        Tonya Lynn Haase, APRN CNP

## 2022-03-07 ENCOUNTER — TRANSITIONAL CARE UNIT VISIT (OUTPATIENT)
Dept: GERIATRICS | Facility: CLINIC | Age: 87
End: 2022-03-07
Payer: COMMERCIAL

## 2022-03-07 VITALS
SYSTOLIC BLOOD PRESSURE: 126 MMHG | HEIGHT: 73 IN | BODY MASS INDEX: 22.77 KG/M2 | OXYGEN SATURATION: 94 % | DIASTOLIC BLOOD PRESSURE: 78 MMHG | HEART RATE: 74 BPM | WEIGHT: 171.8 LBS | RESPIRATION RATE: 18 BRPM | TEMPERATURE: 97.9 F

## 2022-03-07 DIAGNOSIS — D64.9 ANEMIA, UNSPECIFIED TYPE: ICD-10-CM

## 2022-03-07 DIAGNOSIS — Z79.01 LONG TERM CURRENT USE OF ANTICOAGULANT THERAPY: ICD-10-CM

## 2022-03-07 DIAGNOSIS — I48.20 CHRONIC ATRIAL FIBRILLATION (H): ICD-10-CM

## 2022-03-07 DIAGNOSIS — M85.88 OSTEOPENIA OF SPINE: ICD-10-CM

## 2022-03-07 DIAGNOSIS — S22.080D CLOSED WEDGE COMPRESSION FRACTURE OF T11 VERTEBRA WITH ROUTINE HEALING, SUBSEQUENT ENCOUNTER: Primary | ICD-10-CM

## 2022-03-07 DIAGNOSIS — I34.0 MITRAL VALVE INSUFFICIENCY, UNSPECIFIED ETIOLOGY: ICD-10-CM

## 2022-03-07 DIAGNOSIS — I10 ESSENTIAL HYPERTENSION: ICD-10-CM

## 2022-03-07 DIAGNOSIS — R33.9 URINARY RETENTION: ICD-10-CM

## 2022-03-07 DIAGNOSIS — M62.81 GENERALIZED MUSCLE WEAKNESS: ICD-10-CM

## 2022-03-07 PROCEDURE — 99309 SBSQ NF CARE MODERATE MDM 30: CPT | Performed by: NURSE PRACTITIONER

## 2022-03-07 NOTE — PROGRESS NOTES
"[facility] GERIATRICS    Chief Complaint   Patient presents with     Nursing Home Acute     HPI:  Ayaan Herrera is a 89 year old  (10/31/1932), who is being seen today for an episodic care visit at: [facility] ([facility]) [25]. Today's concern is:   Compression Fx T11: on exam today patient states he is having a little pain in his back because the aides twisted him wrong during the night, rates pain as 1/10 in low back,  in therapy he is walking 300 feet using a RW with SBA  Anemia: see labs  HTN/Atrial fib: /86, 126/78, 154/88  with HR 70 range, denies CP, palpitations, SOB  Urinary retention: eagle DC today, will monitor  Cognitive impairment: BIMS 11/15, SBT 6/28  Allergies, and PMH/PSH reviewed in [facility] today.  REVIEW OF SYSTEMS:  10 point ROS of systems including Constitutional, Eyes, Respiratory, Cardiovascular, Gastroenterology, Genitourinary, Integumentary, Musculoskeletal, Psychiatric were all negative except for pertinent positives noted in my HPI.    Objective:   /78   Pulse 74   Temp 97.9  F (36.6  C)   Resp 18   Ht 1.854 m (6' 1\")   Wt 77.9 kg (171 lb 12.8 oz)   SpO2 94%   BMI 22.67 kg/m    GENERAL APPEARANCE:  Alert, in no distress  ENT:  Mouth and posterior oropharynx normal, moist mucous membranes, [place]  EYES:  EOM, conjunctivae, lids, pupils and irises normal, PERRL  RESP:  respiratory effort and palpation of chest normal, lungs clear to auscultation , no respiratory distress  CV:  Palpation and auscultation of heart done , regular rate and rhythm, no murmur, rub, or gallop, no edema  ABDOMEN:  normal bowel sounds, soft, nontender, no hepatosplenomegaly or other masses  M/S:   patient resting in bed at time of exam  SKIN:  Inspection of skin and subcutaneous tissue baseline  NEURO:   speech wnl    Recent labs in [facility] reviewed by me today.     Assessment/Plan:  (R52.561F) Closed wedge compression fracture of T11 vertebra with routine healing, subsequent encounter  (primary " encounter diagnosis)  (M62.81) Generalized muscle weakness  (M85.88) Osteopenia of spine  Comment: acute/ongoing  Plan: PT and OT, tylenol 975mg q 8 hours, fosamax 70mg weekly, vitamin D 1000u QD     (D64.9) Anemia, unspecified type  Comment: ongoing  Plan: Hgb 10.2, Hgb on monday     (I10) Essential hypertension  (I48.20) Chronic atrial fibrillation (H)  (I34.0) Mitral valve insufficiency, unspecified etiology  (Z79.01) Long term current use of anticoagulant therapy  Comment: ongoing  Plan: vitals daily and prn, BMP on Monday, continue lasix 20mg QD, KCL 10meq QD, amiodarone 200mg QD coumadin as directed INR goal of 2-3     (R33.9) Urinary retention  Acute/ongoing: eagle catheter in place, TOV today 3/7/22, eagle out, will check PVR q shift, straight cath for PVR > 350cc  Continue flomax 0.4mg QD       Orders:  No new orders      Electronically signed by: Tonya Lynn Haase, APRN CNP

## 2022-03-07 NOTE — LETTER
"    3/7/2022        RE: Ayaan Herrera  [address] [facility] [address]        M [facility] GERIATRICS    Chief Complaint   Patient presents with     Nursing Home Acute     HPI:  Ayaan Herrera is a 89 year old  (10/31/1932), who is being seen today for an episodic care visit at: [facility]) [25]. Today's concern is:   Compression Fx T11: on exam today patient states he is having a little pain in his back because the aides twisted him wrong during the night, rates pain as 1/10 in low back,  in therapy he is walking 300 feet using a RW with SBA  Anemia: see labs  HTN/Atrial fib: /86, 126/78, 154/88  with HR 70 range, denies CP, palpitations, SOB  Urinary retention: eagle DC today, will monitor  Cognitive impairment: BIMS 11/15, SBT 6/28  Allergies, and PMH/PSH reviewed in [facility] today.  REVIEW OF SYSTEMS:  10 point ROS of systems including Constitutional, Eyes, Respiratory, Cardiovascular, Gastroenterology, Genitourinary, Integumentary, Musculoskeletal, Psychiatric were all negative except for pertinent positives noted in my HPI.    Objective:   /78   Pulse 74   Temp 97.9  F (36.6  C)   Resp 18   Ht 1.854 m (6' 1\")   Wt 77.9 kg (171 lb 12.8 oz)   SpO2 94%   BMI 22.67 kg/m    GENERAL APPEARANCE:  Alert, in no distress  ENT:  Mouth and posterior oropharynx normal, moist mucous membranes, [place]  EYES:  EOM, conjunctivae, lids, pupils and irises normal, PERRL  RESP:  respiratory effort and palpation of chest normal, lungs clear to auscultation , no respiratory distress  CV:  Palpation and auscultation of heart done , regular rate and rhythm, no murmur, rub, or gallop, no edema  ABDOMEN:  normal bowel sounds, soft, nontender, no hepatosplenomegaly or other masses  M/S:   patient resting in bed at time of exam  SKIN:  Inspection of skin and subcutaneous tissue baseline  NEURO:   speech wnl    Recent labs in [facility] reviewed by me today.     Assessment/Plan:  (D85.341K) Closed wedge " compression fracture of T11 vertebra with routine healing, subsequent encounter  (primary encounter diagnosis)  (M62.81) Generalized muscle weakness  (M85.88) Osteopenia of spine  Comment: acute/ongoing  Plan: PT and OT, tylenol 975mg q 8 hours, fosamax 70mg weekly, vitamin D 1000u QD     (D64.9) Anemia, unspecified type  Comment: ongoing  Plan: Hgb 10.2, Hgb on monday     (I10) Essential hypertension  (I48.20) Chronic atrial fibrillation (H)  (I34.0) Mitral valve insufficiency, unspecified etiology  (Z79.01) Long term current use of anticoagulant therapy  Comment: ongoing  Plan: vitals daily and prn, BMP on Monday, continue lasix 20mg QD, KCL 10meq QD, amiodarone 200mg QD coumadin as directed INR goal of 2-3     (R33.9) Urinary retention  Acute/ongoing: eagle catheter in place, TOV today 3/7/22, eagle out, will check PVR q shift, straight cath for PVR > 350cc  Continue flomax 0.4mg QD       Orders:  No new orders      Electronically signed by: Tonya Lynn Haase, APRN CNP           Sincerely,        Tonya Lynn Haase, APRN CNP

## 2022-03-09 NOTE — PROGRESS NOTES
"[facility] GERIATRICS    Chief Complaint   Patient presents with     Nursing Home Acute     HPI:  Ayaan Herrera is a 89 year old  (10/31/1932), who is being seen today for an episodic care visit at: [facility] ([facility]) [25]. Today's concern is:   Compression Fx T11: on exam today patient denies pain at rest, continue to have pain with twisting motion, especially when he is trying to get in and out of bed,   in therapy he is walking 350 feet using a RW with SBA  Anemia: see labs  HTN/Atrial fib: /82, 134/76, 143/78  with HR 70 range, denies CP, palpitations, SOB  Urinary retention: eagle is out, patient continues to have urinary retention with -500cc, patient refusing straight cath, states he follows with Dr. Russell urology, no c/o frequency, dysuria,hx of prostate cancer  Cognitive impairment: BIMS 11/15, SBT 6/28, CPT of 4.5/5.6, moderate cognitive impairement    Allergies, and PMH/PSH reviewed in [facility] today.  REVIEW OF SYSTEMS:  10 point ROS of systems including Constitutional, Eyes, Respiratory, Cardiovascular, Gastroenterology, Genitourinary, Integumentary, Musculoskeletal, Psychiatric were all negative except for pertinent positives noted in my HPI.    Objective:   BP (!) 143/78   Pulse 74   Temp 98.4  F (36.9  C)   Resp 18   Ht 1.854 m (6' 1\")   Wt 78.8 kg (173 lb 12.8 oz)   SpO2 92%   BMI 22.93 kg/m    GENERAL APPEARANCE:  Alert, in no distress  ENT:  Mouth and posterior oropharynx normal, moist mucous membranes, [place]  EYES:  EOM, conjunctivae, lids, pupils and irises normal, PERRL  RESP:  respiratory effort and palpation of chest normal, lungs clear to auscultation , no respiratory distress  CV:  Palpation and auscultation of heart done , regular rate and rhythm, no murmur, rub, or gallop, no edema  ABDOMEN:  normal bowel sounds, soft, nontender, no hepatoplenomegaly or other masses  M/S:   patient sitting up in w/c, able to move all 4 extremities  SKIN:  Inspection of skin and " subcutaneous tissue baseline  NEURO:   speech wnl    Recent labs in [facility] reviewed by me today.  and   Most Recent 3 CBC's:Recent Labs   Lab Test 03/01/22  0615 02/28/22  0105 04/16/21  1659   WBC 8.3 9.5 5.6   HGB 10.4* 10.6* 12.9*   MCV 85 87 88    176 161     Most Recent 3 BMP's:Recent Labs   Lab Test 03/01/22  0615 02/28/22  0105 09/02/21  1546    133 136   POTASSIUM 3.7 4.3 4.2   CHLORIDE 106 103 105   CO2 22 24 29   BUN 22 34* 23   CR 0.69 1.04 0.83   ANIONGAP 5 6 2*   BRENDAN 8.4* 9.2 9.3   GLC 99 126* 88       Assessment/Plan:  (S22.080D) Closed wedge compression fracture of T11 vertebra with routine healing, subsequent encounter  (primary encounter diagnosis)  (M62.81) Generalized muscle weakness  (M85.88) Osteopenia of spine  Comment: acute/ongoing  Plan: PT and OT, tylenol 975mg q 8 hours, fosamax 70mg weekly, vitamin D 1000u QD     (D64.9) Anemia, unspecified type  Comment: ongoing  Plan: Hgb 10.2, Hgb follow     (I10) Essential hypertension  (I48.20) Chronic atrial fibrillation (H)  (I34.0) Mitral valve insufficiency, unspecified etiology  (Z79.01) Long term current use of anticoagulant therapy  Comment: ongoing  Plan: vitals daily and prn, BMP follow, continue lasix 20mg QD, KCL 10meq QD, amiodarone 200mg QD coumadin as directed INR goal of 2-3     (R33.9) Urinary retention  Hx of prostate cancer  Acute/ongoing: eagle catheter in place, TOV  3/7/22, eagle out, will check PVR q shift, straight cath for PVR > 500cc  Patient continues to have urinary retention  UA/UC  Continue flomax 0.4mg QD  F/u with Dr. Russell urology       Orders:  UA/UC  Make appt with Dr. Russell, urology for f/u urinary retention  Increase PVR to 500cc before straight cath    Total time with patient visit: 40 minutes including discussions about the POC and care coordination with the patient. Greater than 50% of total time spent with counseling and coordinating care due to discussed urinary retention, Hx of prostate cancer, patient  follows with Dr. Russell urology, will check UA/UC, recommend f/u with urology, discussed patient at length with IDT team, regarding discharg disposition, concerns with DC to home if patient requires eagle reinsertion.      Electronically signed by: Tonya Lynn Haase, APRN CNP      -3

## 2022-03-10 ENCOUNTER — TRANSITIONAL CARE UNIT VISIT (OUTPATIENT)
Dept: GERIATRICS | Facility: CLINIC | Age: 87
End: 2022-03-10
Payer: COMMERCIAL

## 2022-03-10 VITALS
RESPIRATION RATE: 18 BRPM | HEIGHT: 73 IN | DIASTOLIC BLOOD PRESSURE: 78 MMHG | HEART RATE: 74 BPM | WEIGHT: 173.8 LBS | BODY MASS INDEX: 23.03 KG/M2 | TEMPERATURE: 98.4 F | OXYGEN SATURATION: 92 % | SYSTOLIC BLOOD PRESSURE: 143 MMHG

## 2022-03-10 DIAGNOSIS — R33.9 URINARY RETENTION: ICD-10-CM

## 2022-03-10 DIAGNOSIS — Z79.01 LONG TERM CURRENT USE OF ANTICOAGULANT THERAPY: ICD-10-CM

## 2022-03-10 DIAGNOSIS — I34.0 MITRAL VALVE INSUFFICIENCY, UNSPECIFIED ETIOLOGY: ICD-10-CM

## 2022-03-10 DIAGNOSIS — D64.9 ANEMIA, UNSPECIFIED TYPE: ICD-10-CM

## 2022-03-10 DIAGNOSIS — I10 ESSENTIAL HYPERTENSION: ICD-10-CM

## 2022-03-10 DIAGNOSIS — I48.20 CHRONIC ATRIAL FIBRILLATION (H): ICD-10-CM

## 2022-03-10 DIAGNOSIS — M62.81 GENERALIZED MUSCLE WEAKNESS: ICD-10-CM

## 2022-03-10 DIAGNOSIS — M85.88 OSTEOPENIA OF SPINE: ICD-10-CM

## 2022-03-10 DIAGNOSIS — S22.080D CLOSED WEDGE COMPRESSION FRACTURE OF T11 VERTEBRA WITH ROUTINE HEALING, SUBSEQUENT ENCOUNTER: Primary | ICD-10-CM

## 2022-03-10 PROCEDURE — 99310 SBSQ NF CARE HIGH MDM 45: CPT | Performed by: NURSE PRACTITIONER

## 2022-03-10 NOTE — LETTER
"    3/10/2022        RE: Ayaan Herrera  [address] [facility] [address]        M [facility] GERIATRICS    Chief Complaint   Patient presents with     Nursing Home Acute     HPI:  Ayaan Herrera is a 89 year old  (10/31/1932), who is being seen today for an episodic care visit at: [facility]) [25]. Today's concern is:   Compression Fx T11: on exam today patient denies pain at rest, continue to have pain with twisting motion, especially when he is trying to get in and out of bed,   in therapy he is walking 350 feet using a RW with SBA  Anemia: see labs  HTN/Atrial fib: /82, 134/76, 143/78  with HR 70 range, denies CP, palpitations, SOB  Urinary retention: eagle is out, patient continues to have urinary retention with -500cc, patient refusing straight cath, states he follows with Dr. Russell urology, no c/o frequency, dysuria,hx of prostate cancer  Cognitive impairment: BIMS 11/15, SBT 6/28, CPT of 4.5/5.6, moderate cognitive impairement    Allergies, and PMH/PSH reviewed in [facility] today.  REVIEW OF SYSTEMS:  10 point ROS of systems including Constitutional, Eyes, Respiratory, Cardiovascular, Gastroenterology, Genitourinary, Integumentary, Musculoskeletal, Psychiatric were all negative except for pertinent positives noted in my HPI.    Objective:   BP (!) 143/78   Pulse 74   Temp 98.4  F (36.9  C)   Resp 18   Ht 1.854 m (6' 1\")   Wt 78.8 kg (173 lb 12.8 oz)   SpO2 92%   BMI 22.93 kg/m    GENERAL APPEARANCE:  Alert, in no distress  ENT:  Mouth and posterior oropharynx normal, moist mucous membranes, [place]  EYES:  EOM, conjunctivae, lids, pupils and irises normal, PERRL  RESP:  respiratory effort and palpation of chest normal, lungs clear to auscultation , no respiratory distress  CV:  Palpation and auscultation of heart done , regular rate and rhythm, no murmur, rub, or gallop, no edema  ABDOMEN:  normal bowel sounds, soft, nontender, no hepatoplenomegaly or other " masses  M/S:   patient sitting up in w/c, able to move all 4 extremities  SKIN:  Inspection of skin and subcutaneous tissue baseline  NEURO:   speech wnl    Recent labs in [facility] reviewed by me today.  and   Most Recent 3 CBC's:Recent Labs   Lab Test 03/01/22  0615 02/28/22  0105 04/16/21  1659   WBC 8.3 9.5 5.6   HGB 10.4* 10.6* 12.9*   MCV 85 87 88    176 161     Most Recent 3 BMP's:Recent Labs   Lab Test 03/01/22  0615 02/28/22  0105 09/02/21  1546    133 136   POTASSIUM 3.7 4.3 4.2   CHLORIDE 106 103 105   CO2 22 24 29   BUN 22 34* 23   CR 0.69 1.04 0.83   ANIONGAP 5 6 2*   BRENDAN 8.4* 9.2 9.3   GLC 99 126* 88       Assessment/Plan:  (S22.080D) Closed wedge compression fracture of T11 vertebra with routine healing, subsequent encounter  (primary encounter diagnosis)  (M62.81) Generalized muscle weakness  (M85.88) Osteopenia of spine  Comment: acute/ongoing  Plan: PT and OT, tylenol 975mg q 8 hours, fosamax 70mg weekly, vitamin D 1000u QD     (D64.9) Anemia, unspecified type  Comment: ongoing  Plan: Hgb 10.2, Hgb follow     (I10) Essential hypertension  (I48.20) Chronic atrial fibrillation (H)  (I34.0) Mitral valve insufficiency, unspecified etiology  (Z79.01) Long term current use of anticoagulant therapy  Comment: ongoing  Plan: vitals daily and prn, BMP follow, continue lasix 20mg QD, KCL 10meq QD, amiodarone 200mg QD coumadin as directed INR goal of 2-3     (R33.9) Urinary retention  Hx of prostate cancer  Acute/ongoing: eagle catheter in place, TOV  3/7/22, eagle out, will check PVR q shift, straight cath for PVR > 500cc  Patient continues to have urinary retention  UA/UC  Continue flomax 0.4mg QD  F/u with Dr. Russell urology       Orders:  UA/UC  Make appt with Dr. Russell, urology for f/u urinary retention  Increase PVR to 500cc before straight cath    Total time with patient visit: 40 minutes including discussions about the POC and care coordination with the patient. Greater than 50% of total time spent  with counseling and coordinating care due to discussed urinary retention, Hx of prostate cancer, patient follows with Dr. Russell urology, will check UA/UC, recommend f/u with urology, discussed patient at length with IDT team, regarding discharg disposition, concerns with DC to home if patient requires eagle reinsertion.      Electronically signed by: Tonya Lynn Haase, APRN CNP           Sincerely,        Tonya Lynn Haase, APRN CNP

## 2022-03-12 ENCOUNTER — NURSING HOME VISIT (OUTPATIENT)
Dept: GERIATRICS | Facility: CLINIC | Age: 87
End: 2022-03-12
Payer: COMMERCIAL

## 2022-03-12 ENCOUNTER — TELEPHONE (OUTPATIENT)
Dept: GERIATRICS | Facility: CLINIC | Age: 87
End: 2022-03-12
Payer: COMMERCIAL

## 2022-03-12 DIAGNOSIS — I48.20 CHRONIC ATRIAL FIBRILLATION (H): ICD-10-CM

## 2022-03-12 DIAGNOSIS — R33.9 URINARY RETENTION: ICD-10-CM

## 2022-03-12 DIAGNOSIS — S22.080D CLOSED WEDGE COMPRESSION FRACTURE OF T11 VERTEBRA WITH ROUTINE HEALING, SUBSEQUENT ENCOUNTER: Primary | ICD-10-CM

## 2022-03-12 DIAGNOSIS — I10 ESSENTIAL HYPERTENSION: ICD-10-CM

## 2022-03-12 PROCEDURE — 99305 1ST NF CARE MODERATE MDM 35: CPT | Performed by: INTERNAL MEDICINE

## 2022-03-13 NOTE — TELEPHONE ENCOUNTER
Staff called due to no BM since the 8th.    Refusing the prn suppository.      Is on Miralax and SEnna regularly.  Nursing asking for 1x prn dose of Miralax.  Easier to give a daily PRN dose since he does not want certain things.    Orders:  Miralax 17gm po in fluids daily prn.      Electronically signed by Aminta Wilhelm RN, CNP

## 2022-03-14 NOTE — PROGRESS NOTES
[facility] GERIATRICS    PRIMARY CARE PROVIDER AND CLINIC:  Physician No Ref-Primary, No address on file      Pt was seen by Dr Augustin on 3/13/22 at the [facility] for an initial TCU visit.    Pt is a 89 year old  (10/31/1932), admitted to the above facility from  [facility]. Hospital stay 2/28/22 through 3/1/22..       PMH of prostate cancer, atrial fib, sinus node dysfunction s/p PPM, ventricular tachycardia,  Mod to severe MR with EF 45-50%.osteoporosis who presented after multiple recent falls. Pt was found to have a T 11 compression fracture, to be treated conservatively.  Pt was found to have a renal mass on CT; on a follow up renal US, no masss was not visualized     Course complicated by urinary retention, necessitating a short term Eagle cath (has since been removed at TCU), f/u -500 ml   BIMS 11/15    Pt states he is comfortable, primarily experiences back pain when getting out of bed.  He is walking in therapy with walker  He denies cough, chest pain, SOB, dizziness. He is having regular bowel movements      Renal mass: incidental finding on CT scan, concerning for possible malignancy, Hx of prostate CA, followed by Dr. Russell, renal US does not show right renal lesion found on CT, urology recommend f/u in one year with Dr. Way  Urinary retention: Hx of prostate cancer, eagle catheter placed in ED continued at DC, TOV in TCU  Anemia: Hgb 12.9 2/21 current Hgb 10.4, follow  Chronic atrial fib: SSS, PPM 2007, NSVT 2019 started on amiodarone, most recent TTE 2021 LVEF 45-50% several wall motion abnormalities, moderated to severe mitral regurgitation.       CODE STATUS/ADVANCE DIRECTIVES DISCUSSION:  Full Code  CPR/Full code   ALLERGIES:   Allergies   Allergen Reactions     Zithromax [Azithromycin] Diarrhea            Amoxicillin Diarrhea     Augmentin GI Disturbance      PAST MEDICAL HISTORY:   Past Medical History:   Diagnosis Date     Atrial fibrillation  (H)      BPH (benign prostatic hyperplasia)      Dyslipidemia      History of basal cell carcinoma      HTN (hypertension)      Nonsenile cataract      NSVT (nonsustained ventricular tachycardia) (H)      POAG (primary open-angle glaucoma)     Advanced      Prostate cancer (H)      Psoriasis      Sinus node dysfunction (H)       PAST SURGICAL HISTORY:   has a past surgical history that includes Glaucoma surgery (01/01/1996); laser surgery of eye (10/14/04 & 2/17/05); cataract iol, rt/lt (1985/1996); Glaucoma surgery (01/01/1997); Punctal Closure, Plugs (01/01/2000); turp (01/01/2003); and Electrophysiology Pacemaker (2007).  FAMILY HISTORY: family history includes C.A.D. in an other family member; Glaucoma in his father; Hypertension in an other family member; Macular Degeneration in his mother.  SOCIAL HISTORY:   reports that he quit smoking about 42 years ago. He started smoking about 66 years ago. He has a 5.00 pack-year smoking history. He has quit using smokeless tobacco. He reports current alcohol use. He reports that he does not use drugs.  Patient's living condition: lives alone        Current Outpatient Medications   Medication Sig     alendronate (FOSAMAX) 70 MG tablet TAKE 1 TAB WITH 8OZ OF WATER EVERY 7 DAYS 30 MIN BEFORE BREAKFAST & REMAIN UPRIGHT DURING THIS TIME     amiodarone (PACERONE) 200 MG tablet Take 1 tablet (200 mg) by mouth daily     brimonidine (ALPHAGAN P) 0.15 % ophthalmic solution Place 1 drop into both eyes 2 times daily     COSOPT 2-0.5 % OP SOLN Place 1 Dose into both eyes 2 times daily Both eyes.     furosemide (LASIX) 20 MG tablet Take 1 tablet (20 mg) by mouth daily     ketotifen (ZADITOR) 0.025 % SOLN Place 1 drop into both eyes as needed     latanoprost (XALATAN) 0.005 % ophthalmic solution Place 1 drop into both eyes At Bedtime     netarsudil (RHOPRESSA) 0.02 % ophthalmic solution 1 drop At Bedtime     potassium chloride ER (KLOR-CON) 10 MEQ CR tablet TAKE ONE TABLET DAILY WITH  EACH 20 MG DOSE OF FUROSEMIDE.     tamsulosin (FLOMAX) 0.4 MG capsule TAKE ONE CAPSULE BY MOUTH ONCE A DAY     Vitamin D, Cholecalciferol, 1000 UNITS TABS Take 1 tablet by mouth daily      warfarin ANTICOAGULANT (COUMADIN) 2.5 MG tablet Take 1.5 tablets (3.75 mg) on Sundays and take 1 tablet (2.5 mg) all other days or as directed by the INR clinic     No current facility-administered medications for this visit.       ROS:  10 point ROS of systems including Constitutional, Eyes, Respiratory, Cardiovascular, Gastroenterology, Genitourinary, Integumentary, Musculoskeletal, Psychiatric were all negative except for pertinent positives noted in my HPI.          Exam:  GENERAL APPEARANCE:  Alert, in no distress, lying in bed  ENT:  Mouth and posterior oropharynx normal, moist mucous membranes, [place]  EYES:  No eye redness or drainage  RESP:  RR 12, lungs clear  CV: RRR  ABDOMEN: soft, non-distended  M/S:  No LE edema  SKIN: no rash  NEURO: alert, oriented to person, place  CN intact  No LE weakness  Gait was not assessed      Lab/Diagnostic data:  Recent labs in [facility] reviewed by me today.  and   Most Recent 3 CBC's:  Recent Labs   Lab Test 03/01/22  0615 02/28/22  0105 04/16/21  1659   WBC 8.3 9.5 5.6   HGB 10.4* 10.6* 12.9*   MCV 85 87 88    176 161     Most Recent 3 BMP's:  Recent Labs   Lab Test 03/01/22  0615 02/28/22  0105 09/02/21  1546    133 136   POTASSIUM 3.7 4.3 4.2   CHLORIDE 106 103 105   CO2 22 24 29   BUN 22 34* 23   CR 0.69 1.04 0.83   ANIONGAP 5 6 2*   BRENDAN 8.4* 9.2 9.3   GLC 99 126* 88       ASSESSMENT/PLAN:    (S22.080D) Closed wedge compression fracture of T11 vertebra with routine healing, subsequent encounter  (primary encounter diagnosis)  (M62.81) Generalized muscle weakness  (M85.88) Osteopenia of spine  Comment: acute/ongoing  Pain control improved  Plan: PT and OT, tylenol 975mg q 8 hours, fosamax 70mg weekly, vitamin D 1000u QD    (D64.9) Anemia, unspecified type  Comment:  ongoing  Plan: Hgb 10.2, Hgb on monday    (I10) Essential hypertension  (I48.20) Chronic atrial fibrillation (H), s/p pacemaker placement  (I34.0) Mitral valve insufficiency, unspecified etiology  (Z79.01) Long term current use of anticoagulant therapy  Comment: ongoing  Plan: continue lasix 20mg QD, KCL 10meq QD, amiodarone 200mg QD coumadin as directed INR goal of 2-3  Monitor vitals, BMP, wt    (R33.9) Urinary retention  Possible renal mass (however not seen on US)  Acute/ongoing:   Chirinos cath out.  PVR mod elevated, ? baseline  Continue flomax 0.4mg every day  Urology f/u      Justo Augustin MD

## 2022-03-15 NOTE — PROGRESS NOTES
"[facility] GERIATRICS    Chief Complaint   Patient presents with     Nursing Home Acute     HPI:  Ayaan Herrera is a 89 year old  (10/31/1932), who is being seen today for an episodic care visit at: [facility] ([facility]) [25]. Today's concern is:   Compression Fx T11: on exam today patient denies pain at rest, continue to have pain with twisting motion, especially when he is trying to get in and out of bed,   in therapy he is walking 350 feet using a RW with SBA  Anemia: see labs  HTN/Atrial fib: /80, 156/88, 125/76  with HR 70 range, denies CP, palpitations, SOB  Urinary retention/UTI: UTI, UC grew > 100,000 E coli, on Abx, had a urology f/u yesterday and they recommended to finish abx and then return if further retention,  no c/o frequency, dysuria,hx of prostate cancer, patient has PVR up to 800cc noted in nurses notes, has been refusing straight cath.  Cognitive impairment: BIMS 11/15, SBT 6/28, CPT of 4.5/5.6, moderate cognitive impairement    Allergies, and PMH/PSH reviewed in [facility] today.  REVIEW OF SYSTEMS:  10 point ROS of systems including Constitutional, Eyes, Respiratory, Cardiovascular, Gastroenterology, Genitourinary, Integumentary, Musculoskeletal, Psychiatric were all negative except for pertinent positives noted in my HPI.    Objective:   /69   Pulse 74   Temp 97  F (36.1  C)   Resp 17   Ht 1.854 m (6' 1\")   Wt 80.5 kg (177 lb 6.4 oz)   SpO2 95%   BMI 23.41 kg/m    GENERAL APPEARANCE:  Alert, in no distress  ENT:  Mouth and posterior oropharynx normal, moist mucous membranes, [place]  EYES:  EOM, conjunctivae, lids, pupils and irises normal, PERRL  RESP:  respiratory effort and palpation of chest normal, lungs clear to auscultation , no respiratory distress  CV:  Palpation and auscultation of heart done , regular rate and rhythm, no murmur, rub, or gallop, no edema  ABDOMEN:  normal bowel sounds, soft, nontender, no hepatoplenomegaly or other masses  M/S:   patient sitting " up in w/c, able to move all 4 extremities  SKIN:  Inspection of skin and subcutaneous tissue baseline  NEURO:   speech wnl    Recent labs in [facility] reviewed by me today.     Assessment/Plan:  (S22.080D) Closed wedge compression fracture of T11 vertebra with routine healing, subsequent encounter  (primary encounter diagnosis)  (M62.81) Generalized muscle weakness  (M85.88) Osteopenia of spine  Comment: acute/ongoing  Plan: PT and OT, tylenol 975mg q 8 hours, fosamax 70mg weekly, vitamin D 1000u QD     (D64.9) Anemia, unspecified type  Comment: ongoing  Plan: Hgb 10.2, Hgb follow     (I10) Essential hypertension  (I48.20) Chronic atrial fibrillation (H)  (I34.0) Mitral valve insufficiency, unspecified etiology  (Z79.01) Long term current use of anticoagulant therapy  Comment: ongoing  Plan: vitals daily and prn, BMP follow, continue lasix 20mg QD, KCL 10meq QD, amiodarone 200mg QD coumadin as directed INR goal of 2-3     (R33.9) Urinary retention  (N39.0) UTI  Hx of prostate cancer  Acute/ongoing: eagle out, will check PVR q shift, straight cath for PVR > 500cc  Patient continues to have urinary retention  Continue flomax 0.4mg QD  F/u with Dr. Russell urology    Orders:  Continue cipro 250mg BID for 7 days total      Electronically signed by: Tonya Lynn Haase, APRN CNP

## 2022-03-16 ENCOUNTER — TRANSITIONAL CARE UNIT VISIT (OUTPATIENT)
Dept: GERIATRICS | Facility: CLINIC | Age: 87
End: 2022-03-16
Payer: COMMERCIAL

## 2022-03-16 VITALS
HEIGHT: 73 IN | OXYGEN SATURATION: 95 % | BODY MASS INDEX: 23.51 KG/M2 | SYSTOLIC BLOOD PRESSURE: 131 MMHG | HEART RATE: 74 BPM | DIASTOLIC BLOOD PRESSURE: 69 MMHG | TEMPERATURE: 97 F | WEIGHT: 177.4 LBS | RESPIRATION RATE: 17 BRPM

## 2022-03-16 DIAGNOSIS — I48.20 CHRONIC ATRIAL FIBRILLATION (H): ICD-10-CM

## 2022-03-16 DIAGNOSIS — R33.9 URINARY RETENTION: ICD-10-CM

## 2022-03-16 DIAGNOSIS — M85.88 OSTEOPENIA OF SPINE: ICD-10-CM

## 2022-03-16 DIAGNOSIS — D64.9 ANEMIA, UNSPECIFIED TYPE: ICD-10-CM

## 2022-03-16 DIAGNOSIS — S22.080D CLOSED WEDGE COMPRESSION FRACTURE OF T11 VERTEBRA WITH ROUTINE HEALING, SUBSEQUENT ENCOUNTER: Primary | ICD-10-CM

## 2022-03-16 DIAGNOSIS — N39.0 URINARY TRACT INFECTION WITHOUT HEMATURIA, SITE UNSPECIFIED: ICD-10-CM

## 2022-03-16 DIAGNOSIS — M62.81 GENERALIZED MUSCLE WEAKNESS: ICD-10-CM

## 2022-03-16 DIAGNOSIS — Z79.01 LONG TERM CURRENT USE OF ANTICOAGULANT THERAPY: ICD-10-CM

## 2022-03-16 DIAGNOSIS — I10 ESSENTIAL HYPERTENSION: ICD-10-CM

## 2022-03-16 PROCEDURE — 99309 SBSQ NF CARE MODERATE MDM 30: CPT | Performed by: NURSE PRACTITIONER

## 2022-03-16 NOTE — LETTER
"    3/16/2022        RE: Ayaan Herrera  [address] [facility] [address]        [facility] GERIATRICS    Chief Complaint   Patient presents with     Nursing Home Acute     HPI:  Ayaan Herrera is a 89 year old  (10/31/1932), who is being seen today for an episodic care visit at: [facility]) [25]. Today's concern is:   Compression Fx T11: on exam today patient denies pain at rest, continue to have pain with twisting motion, especially when he is trying to get in and out of bed,   in therapy he is walking 350 feet using a RW with SBA  Anemia: see labs  HTN/Atrial fib: /80, 156/88, 125/76  with HR 70 range, denies CP, palpitations, SOB  Urinary retention/UTI: UTI, UC grew > 100,000 E coli, on Abx, had a urology f/u yesterday and they recommended to finish abx and then return if further retention,  no c/o frequency, dysuria,hx of prostate cancer, patient has PVR up to 800cc noted in nurses notes, has been refusing straight cath.  Cognitive impairment: BIMS 11/15, SBT 6/28, CPT of 4.5/5.6, moderate cognitive impairement    Allergies, and PMH/PSH reviewed in [facility] today.  REVIEW OF SYSTEMS:  10 point ROS of systems including Constitutional, Eyes, Respiratory, Cardiovascular, Gastroenterology, Genitourinary, Integumentary, Musculoskeletal, Psychiatric were all negative except for pertinent positives noted in my HPI.    Objective:   /69   Pulse 74   Temp 97  F (36.1  C)   Resp 17   Ht 1.854 m (6' 1\")   Wt 80.5 kg (177 lb 6.4 oz)   SpO2 95%   BMI 23.41 kg/m    GENERAL APPEARANCE:  Alert, in no distress  ENT:  Mouth and posterior oropharynx normal, moist mucous membranes, [place]  EYES:  EOM, conjunctivae, lids, pupils and irises normal, PERRL  RESP:  respiratory effort and palpation of chest normal, lungs clear to auscultation , no respiratory distress  CV:  Palpation and auscultation of heart done , regular rate and rhythm, no murmur, rub, or gallop, no edema  ABDOMEN:  normal " bowel sounds, soft, nontender, no hepatoplenomegaly or other masses  M/S:   patient sitting up in w/c, able to move all 4 extremities  SKIN:  Inspection of skin and subcutaneous tissue baseline  NEURO:   speech wnl    Recent labs in [facility] reviewed by me today.     Assessment/Plan:  (S22.080D) Closed wedge compression fracture of T11 vertebra with routine healing, subsequent encounter  (primary encounter diagnosis)  (M62.81) Generalized muscle weakness  (M85.88) Osteopenia of spine  Comment: acute/ongoing  Plan: PT and OT, tylenol 975mg q 8 hours, fosamax 70mg weekly, vitamin D 1000u QD     (D64.9) Anemia, unspecified type  Comment: ongoing  Plan: Hgb 10.2, Hgb follow     (I10) Essential hypertension  (I48.20) Chronic atrial fibrillation (H)  (I34.0) Mitral valve insufficiency, unspecified etiology  (Z79.01) Long term current use of anticoagulant therapy  Comment: ongoing  Plan: vitals daily and prn, BMP follow, continue lasix 20mg QD, KCL 10meq QD, amiodarone 200mg QD coumadin as directed INR goal of 2-3     (R33.9) Urinary retention  (N39.0) UTI  Hx of prostate cancer  Acute/ongoing: eagle out, will check PVR q shift, straight cath for PVR > 500cc  Patient continues to have urinary retention  Continue flomax 0.4mg QD  F/u with Dr. Russell urology    Orders:  Continue cipro 250mg BID for 7 days total      Electronically signed by: Tonya Lynn Haase, APRN CNP           Sincerely,        Tonya Lynn Haase, APRN CNP

## 2022-03-19 ENCOUNTER — TELEPHONE (OUTPATIENT)
Dept: GERIATRICS | Facility: CLINIC | Age: 87
End: 2022-03-19
Payer: COMMERCIAL

## 2022-03-19 NOTE — TELEPHONE ENCOUNTER
INR = 3.1    Was on hold last night as yesterday he was at 4.0.  The dose before that was 0.5mg    Staff report his INR goes up and down.    Orders:  Hold again tonight   INR tomorrow    Electronically signed by Aminta Wilhelm RN, CNP

## 2022-03-20 NOTE — TELEPHONE ENCOUNTER
Nursing called asking for PT orders for lymphedema wraps    Gave ok for the PT to do lymphedema wraps    Electronically signed by Aminta Wilhelm RN, CNP

## 2022-03-22 ENCOUNTER — TRANSITIONAL CARE UNIT VISIT (OUTPATIENT)
Dept: GERIATRICS | Facility: CLINIC | Age: 87
End: 2022-03-22
Payer: COMMERCIAL

## 2022-03-22 VITALS
OXYGEN SATURATION: 92 % | RESPIRATION RATE: 18 BRPM | DIASTOLIC BLOOD PRESSURE: 74 MMHG | BODY MASS INDEX: 23.99 KG/M2 | SYSTOLIC BLOOD PRESSURE: 130 MMHG | HEART RATE: 72 BPM | WEIGHT: 181 LBS | TEMPERATURE: 97.6 F | HEIGHT: 73 IN

## 2022-03-22 DIAGNOSIS — I89.0 LYMPHEDEMA OF BOTH LOWER EXTREMITIES: ICD-10-CM

## 2022-03-22 DIAGNOSIS — I48.20 CHRONIC ATRIAL FIBRILLATION (H): Primary | ICD-10-CM

## 2022-03-22 DIAGNOSIS — R33.9 URINE RETENTION: ICD-10-CM

## 2022-03-22 PROCEDURE — 99309 SBSQ NF CARE MODERATE MDM 30: CPT | Performed by: NURSE PRACTITIONER

## 2022-03-22 NOTE — LETTER
"    3/22/2022        RE: Ayaan Herrera  [address] [facility] [address]        M [facility] GERIATRICS    Chief Complaint   Patient presents with     Nursing Home Acute     HPI:  Ayaan Herrera is a 89 year old  (10/31/1932), who is being seen today for an episodic care visit at: [facility]) [25]. Today's concern is:   1. Chronic atrial fibrillation (H)    2. Urine retention    3. Lymphedema of both lower extremities      Patient seen for follow up, INR was 2.2, RRR and denies CP/palpitations, bilateral lower leg edema controlled with wraps, does have blister like ulcer on top of R great toe where 2nd toe curls up and on top, had eagle on hospital discharge, Hx prostate cancer/BPH and is on flomax 0.4mg, already completed course of cipro for UTI, PVR's Qshift 400-800 and has been straight cathed numerous times, may return home soon, goal is to decide if he needs eagle installed for longer term or is OK without as he was prior to hospitalization.    Allergies, and PMH/PSH reviewed in [facility] today.  REVIEW OF SYSTEMS:  4 point ROS including Respiratory, CV, GI and , other than that noted in the HPI,  is negative    Objective:   /74   Pulse 72   Temp 97.6  F (36.4  C)   Resp 18   Ht 1.854 m (6' 1\")   Wt 82.1 kg (181 lb)   SpO2 92%   BMI 23.88 kg/m    GENERAL APPEARANCE:  in no distress, appears healthy  ENT:  Mouth and posterior oropharynx normal, moist mucous membranes  RESP:  lungs clear to auscultation   CV:  peripheral edema 1-2+ in lower legs, rate-normal  ABDOMEN:  bowel sounds normal  M/S:   Gait and station abnormal unsteady gait  SKIN:  Inspection of skin and subcutaneous tissue baseline  NEURO:   Examination of sensation by touch normal  PSYCH:  oriented X 3, memory impaired     Labs done in SNF are in [facility]. Please refer to them using [employer]Care Everywhere.    Assessment/Plan:  (I48.20) Chronic atrial fibrillation (H)  (primary encounter diagnosis)  Comment: " INR 2.2  Plan: warfarin 1mg Mon, 0.5mg Tues  -recheck INR on 3/23    (R33.9) Urine retention  Comment: had eagle, Tx for UTI complete, attempting to find out if he can return home without eagle  Plan: discontinue PVR's Qshift and Scath >400mL  -patient to report if having abdominal pain/distrension  -if needing bladder scans and straight caths then plan to replace eagle    (I89.0) Lymphedema of both lower extremities  Comment: bilateral lower legs 1-2+  Plan: continue lymph wraps  -has lasix and K supplement   -periodic BMP's      Electronically signed by: NUNO Estrada CNP           Sincerely,        NUNO Estrada CNP

## 2022-03-22 NOTE — PROGRESS NOTES
"[facility]ERIATRICS    Chief Complaint   Patient presents with     Nursing Home Acute     HPI:  Ayaan Herrera is a 89 year old  (10/31/1932), who is being seen today for an episodic care visit at: [facility] ([facility]) [25]. Today's concern is:   1. Chronic atrial fibrillation (H)    2. Urine retention    3. Lymphedema of both lower extremities      Patient seen for follow up, INR was 2.2, RRR and denies CP/palpitations, bilateral lower leg edema controlled with wraps, does have blister like ulcer on top of R great toe where 2nd toe curls up and on top, had eagle on hospital discharge, Hx prostate cancer/BPH and is on flomax 0.4mg, already completed course of cipro for UTI, PVR's Qshift 400-800 and has been straight cathed numerous times, may return home soon, goal is to decide if he needs eagle installed for longer term or is OK without as he was prior to hospitalization.    Allergies, and PMH/PSH reviewed in [facility] today.  REVIEW OF SYSTEMS:  4 point ROS including Respiratory, CV, GI and , other than that noted in the HPI,  is negative    Objective:   /74   Pulse 72   Temp 97.6  F (36.4  C)   Resp 18   Ht 1.854 m (6' 1\")   Wt 82.1 kg (181 lb)   SpO2 92%   BMI 23.88 kg/m    GENERAL APPEARANCE:  in no distress, appears healthy  ENT:  Mouth and posterior oropharynx normal, moist mucous membranes  RESP:  lungs clear to auscultation   CV:  peripheral edema 1-2+ in lower legs, rate-normal  ABDOMEN:  bowel sounds normal  M/S:   Gait and station abnormal unsteady gait  SKIN:  Inspection of skin and subcutaneous tissue baseline  NEURO:   Examination of sensation by touch normal  PSYCH:  oriented X 3, memory impaired     Labs done in SNF are in [facility]. Please refer to them using [employer]/Care Everywhere.    Assessment/Plan:  (I48.20) Chronic atrial fibrillation (H)  (primary encounter diagnosis)  Comment: INR 2.2  Plan: warfarin 1mg Mon, 0.5mg Tues  -recheck INR on 3/23    (R33.9) Urine " retention  Comment: had eagle, Tx for UTI complete, attempting to find out if he can return home without eagle  Plan: discontinue PVR's Qshift and Scath >400mL  -patient to report if having abdominal pain/distrension  -if needing bladder scans and straight caths then plan to replace eagle    (I89.0) Lymphedema of both lower extremities  Comment: bilateral lower legs 1-2+  Plan: continue lymph wraps  -has lasix and K supplement   -periodic BMP's      Electronically signed by: NUNO Estrada CNP

## 2022-03-25 ENCOUNTER — TELEPHONE (OUTPATIENT)
Dept: GERIATRICS | Facility: CLINIC | Age: 87
End: 2022-03-25
Payer: COMMERCIAL

## 2022-03-25 DIAGNOSIS — S22.080A CLOSED WEDGE COMPRESSION FRACTURE OF T11 VERTEBRA, INITIAL ENCOUNTER (H): Primary | ICD-10-CM

## 2022-03-25 RX ORDER — TRAMADOL HYDROCHLORIDE 50 MG/1
25 TABLET ORAL EVERY 6 HOURS PRN
Qty: 20 TABLET | Refills: 0 | Status: SHIPPED | OUTPATIENT
Start: 2022-03-25 | End: 2023-06-11

## 2022-03-25 NOTE — TELEPHONE ENCOUNTER
[facility] Nurse Triage Telephone Note    Provider: Tonya Haase, APRN CNP  Facility: [facility]   Facility Type:  TCU    Caller: Yamini  Call Back Number: [phone]    Allergies   Allergen Reactions     Zithromax [Azithromycin] Diarrhea            Amoxicillin Diarrhea     Augmentin GI Disturbance       Reason for call: Pt c/o increased back pain 6:10, more painful in the morning. Tylenol 1000mg TID and daily PRN not effective per pt. Has Fx of T11 vertebrae. Also receives a Lidocaine patch 4% daily.    Verbal Order/Direction given by Provider: Tramadol 50mg Tablet Take 0.5 tab (25mg) PO q6hrs PRN for pain    Provider giving Order:  NUNO Garcia CNP     Verbal Order given to: Tayo Pederson RN

## 2022-03-28 ENCOUNTER — TRANSITIONAL CARE UNIT VISIT (OUTPATIENT)
Dept: GERIATRICS | Facility: CLINIC | Age: 87
End: 2022-03-28
Payer: COMMERCIAL

## 2022-03-28 VITALS
TEMPERATURE: 97.2 F | WEIGHT: 177.3 LBS | HEART RATE: 72 BPM | OXYGEN SATURATION: 95 % | SYSTOLIC BLOOD PRESSURE: 168 MMHG | DIASTOLIC BLOOD PRESSURE: 88 MMHG | HEIGHT: 73 IN | RESPIRATION RATE: 20 BRPM | BODY MASS INDEX: 23.5 KG/M2

## 2022-03-28 DIAGNOSIS — N39.0 URINARY TRACT INFECTION WITHOUT HEMATURIA, SITE UNSPECIFIED: ICD-10-CM

## 2022-03-28 DIAGNOSIS — I10 ESSENTIAL HYPERTENSION: ICD-10-CM

## 2022-03-28 DIAGNOSIS — I48.20 CHRONIC ATRIAL FIBRILLATION (H): ICD-10-CM

## 2022-03-28 DIAGNOSIS — S22.080D CLOSED WEDGE COMPRESSION FRACTURE OF T11 VERTEBRA WITH ROUTINE HEALING, SUBSEQUENT ENCOUNTER: Primary | ICD-10-CM

## 2022-03-28 DIAGNOSIS — R33.9 URINARY RETENTION: ICD-10-CM

## 2022-03-28 DIAGNOSIS — M62.81 GENERALIZED MUSCLE WEAKNESS: ICD-10-CM

## 2022-03-28 PROCEDURE — 99309 SBSQ NF CARE MODERATE MDM 30: CPT | Performed by: NURSE PRACTITIONER

## 2022-03-28 NOTE — LETTER
"    3/28/2022        RE: Ayaan Herrera  [address] [facility] [address]        M [facility] GERIATRICS    Chief Complaint   Patient presents with     Nursing Home Acute     HPI:  Ayaan Herrera is a 89 year old  (10/31/1932), who is being seen today for an episodic care visit at: [facility]) [25]. Today's concern is:   Compression Fx T11: on exam today patient denies pain at rest, continue to have pain with twisting motion, especially when he is trying to get in and out of bed,   in therapy he is walking 350 feet using a RW with SBA, therapy state patient c/o pain after therapy  Anemia: pending for tomorrow  HTN/Atrial fib: /94, 168/88, 152/93 with HR 70 range, denies CP, palpitations, SOB  Urinary retention/UTI:no c/o frequency, dysuria, patient finished Tx for UTI  Cognitive impairment: BIMS 11/15, SBT 6/28, CPT of 4.5/5.6, moderate cognitive impairement       Allergies, and PMH/PSH reviewed in [facility] today.  REVIEW OF SYSTEMS:  10 point ROS of systems including Constitutional, Eyes, Respiratory, Cardiovascular, Gastroenterology, Genitourinary, Integumentary, Musculoskeletal, Psychiatric were all negative except for pertinent positives noted in my HPI.    Objective:   BP (!) 168/88   Pulse 72   Temp 97.2  F (36.2  C)   Resp 20   Ht 1.854 m (6' 1\")   Wt 80.4 kg (177 lb 4.8 oz)   SpO2 95%   BMI 23.39 kg/m    GENERAL APPEARANCE:  Alert, in no distress  ENT:  Mouth and posterior oropharynx normal, moist mucous membranes, [place]  EYES:  EOM, conjunctivae, lids, pupils and irises normal, PERRL  RESP:  respiratory effort and palpation of chest normal, lungs clear to auscultation , no respiratory distress  CV:  Palpation and auscultation of heart done , regular rate and rhythm, no murmur, rub, or gallop, no edema  ABDOMEN:  normal bowel sounds, soft, nontender, no hepatoplenomegaly or other masses  M/S:   patient sitting up in w/c, able to move all 4 extremities  SKIN:  Inspection of " skin and subcutaneous tissue baseline  NEURO:   speech wnl    Recent labs in [facility] reviewed by me today.     Assessment/Plan:  (S22.080D) Closed wedge compression fracture of T11 vertebra with routine healing, subsequent encounter  (primary encounter diagnosis)  (M62.81) Generalized muscle weakness  (M85.88) Osteopenia of spine  Comment: acute/ongoing  Plan: PT and OT, tylenol 975mg q 8 hours, fosamax 70mg weekly, vitamin D 1000u QD     (D64.9) Anemia, unspecified type  Comment: ongoing  Plan: Hgb 10.2, Hgb follow     (I10) Essential hypertension  (I48.20) Chronic atrial fibrillation (H)  (I34.0) Mitral valve insufficiency, unspecified etiology  (Z79.01) Long term current use of anticoagulant therapy  Comment: ongoing  Plan: vitals daily and prn, BMP follow, continue lasix 20mg QD, KCL 10meq QD, amiodarone 200mg QD coumadin as directed INR goal of 2-3     (R33.9) Urinary retention  (N39.0) UTI  Hx of prostate cancer  Acute/ongoing: eagle out, will check PVR q shift, straight cath for PVR > 500cc  Patient continues to have urinary retention  Finished Abx for UTI  Continue flomax 0.4mg QD  F/u with Dr. Russell urology       Orders:  No new orders    Electronically signed by: Tonya Lynn Haase, APRN CNP          Sincerely,        Tonya Lynn Haase, APRN CNP

## 2022-03-28 NOTE — PROGRESS NOTES
"[facility] GERIATRICS    Chief Complaint   Patient presents with     Nursing Home Acute     HPI:  Ayaan Herrera is a 89 year old  (10/31/1932), who is being seen today for an episodic care visit at: [facility] ([facility]) [25]. Today's concern is:   Compression Fx T11: on exam today patient denies pain at rest, continue to have pain with twisting motion, especially when he is trying to get in and out of bed,   in therapy he is walking 350 feet using a RW with SBA, therapy state patient c/o pain after therapy  Anemia: pending for tomorrow  HTN/Atrial fib: /94, 168/88, 152/93 with HR 70 range, denies CP, palpitations, SOB  Urinary retention/UTI:no c/o frequency, dysuria, patient finished Tx for UTI  Cognitive impairment: BIMS 11/15, SBT 6/28, CPT of 4.5/5.6, moderate cognitive impairement       Allergies, and PMH/PSH reviewed in [facility] today.  REVIEW OF SYSTEMS:  10 point ROS of systems including Constitutional, Eyes, Respiratory, Cardiovascular, Gastroenterology, Genitourinary, Integumentary, Musculoskeletal, Psychiatric were all negative except for pertinent positives noted in my HPI.    Objective:   BP (!) 168/88   Pulse 72   Temp 97.2  F (36.2  C)   Resp 20   Ht 1.854 m (6' 1\")   Wt 80.4 kg (177 lb 4.8 oz)   SpO2 95%   BMI 23.39 kg/m    GENERAL APPEARANCE:  Alert, in no distress  ENT:  Mouth and posterior oropharynx normal, moist mucous membranes, [place]  EYES:  EOM, conjunctivae, lids, pupils and irises normal, PERRL  RESP:  respiratory effort and palpation of chest normal, lungs clear to auscultation , no respiratory distress  CV:  Palpation and auscultation of heart done , regular rate and rhythm, no murmur, rub, or gallop, no edema  ABDOMEN:  normal bowel sounds, soft, nontender, no hepatoplenomegaly or other masses  M/S:   patient sitting up in w/c, able to move all 4 extremities  SKIN:  Inspection of skin and subcutaneous tissue baseline  NEURO:   speech wnl    Recent labs in [facility] reviewed " by me today.     Assessment/Plan:  (S22.080D) Closed wedge compression fracture of T11 vertebra with routine healing, subsequent encounter  (primary encounter diagnosis)  (M62.81) Generalized muscle weakness  (M85.88) Osteopenia of spine  Comment: acute/ongoing  Plan: PT and OT, tylenol 975mg q 8 hours, fosamax 70mg weekly, vitamin D 1000u QD     (D64.9) Anemia, unspecified type  Comment: ongoing  Plan: Hgb 10.2, Hgb follow     (I10) Essential hypertension  (I48.20) Chronic atrial fibrillation (H)  (I34.0) Mitral valve insufficiency, unspecified etiology  (Z79.01) Long term current use of anticoagulant therapy  Comment: ongoing  Plan: vitals daily and prn, BMP follow, continue lasix 20mg QD, KCL 10meq QD, amiodarone 200mg QD coumadin as directed INR goal of 2-3     (R33.9) Urinary retention  (N39.0) UTI  Hx of prostate cancer  Acute/ongoing: eagle out, will check PVR q shift, straight cath for PVR > 500cc  Patient continues to have urinary retention  Finished Abx for UTI  Continue flomax 0.4mg QD  F/u with Dr. Russell urology       Orders:  No new orders    Electronically signed by: Tonya Lynn Haase, APRN CNP

## 2022-03-31 ENCOUNTER — TRANSITIONAL CARE UNIT VISIT (OUTPATIENT)
Dept: GERIATRICS | Facility: CLINIC | Age: 87
End: 2022-03-31
Payer: COMMERCIAL

## 2022-03-31 VITALS
WEIGHT: 179.5 LBS | HEIGHT: 73 IN | DIASTOLIC BLOOD PRESSURE: 89 MMHG | TEMPERATURE: 97.6 F | HEART RATE: 84 BPM | BODY MASS INDEX: 23.79 KG/M2 | SYSTOLIC BLOOD PRESSURE: 161 MMHG | OXYGEN SATURATION: 94 % | RESPIRATION RATE: 20 BRPM

## 2022-03-31 DIAGNOSIS — R33.9 URINARY RETENTION: ICD-10-CM

## 2022-03-31 DIAGNOSIS — I48.20 CHRONIC ATRIAL FIBRILLATION (H): ICD-10-CM

## 2022-03-31 DIAGNOSIS — M62.81 GENERALIZED MUSCLE WEAKNESS: ICD-10-CM

## 2022-03-31 DIAGNOSIS — I10 ESSENTIAL HYPERTENSION: ICD-10-CM

## 2022-03-31 DIAGNOSIS — S22.080D CLOSED WEDGE COMPRESSION FRACTURE OF T11 VERTEBRA WITH ROUTINE HEALING, SUBSEQUENT ENCOUNTER: Primary | ICD-10-CM

## 2022-03-31 DIAGNOSIS — K59.03 DRUG-INDUCED CONSTIPATION: ICD-10-CM

## 2022-03-31 DIAGNOSIS — D64.9 ANEMIA, UNSPECIFIED TYPE: ICD-10-CM

## 2022-03-31 DIAGNOSIS — M85.88 OSTEOPENIA OF SPINE: ICD-10-CM

## 2022-03-31 DIAGNOSIS — R41.89 COGNITIVE IMPAIRMENT: ICD-10-CM

## 2022-03-31 PROCEDURE — 99309 SBSQ NF CARE MODERATE MDM 30: CPT | Performed by: NURSE PRACTITIONER

## 2022-03-31 NOTE — PROGRESS NOTES
"[facility] GERIATRICS    Chief Complaint   Patient presents with     Nursing Home Acute     HPI:  Ayaan Herrera is a 89 year old  (10/31/1932), who is being seen today for an episodic care visit at: [facility] ([facility]) [25]. Today's concern is:   Compression Fx T11: on exam today patient denies pain at rest, continue to have pain with twisting motion,  in therapy he is walking 175 feet using a RW with SBA to CGA, therapy unable to set patient independent with ambulation due to patient needing cues for safety and poor balance.   Anemia: follow  HTN/Atrial fib: /89, 128/74, 137/80 with HR 70 range, denies CP, palpitations, SOB  Urinary retention/UTI:no c/o frequency, dysuria, patient finished Tx for UTI  Constipation: patient required some bowel medication has not had BM in a few days abdomen distended  Cognitive impairment: BIMS 11/15, SBT 6/28, CPT of 4.5/5.6, moderate cognitive impairement    Allergies, and PMH/PSH reviewed in [facility] today.  REVIEW OF SYSTEMS:  10 point ROS of systems including Constitutional, Eyes, Respiratory, Cardiovascular, Gastroenterology, Genitourinary, Integumentary, Musculoskeletal, Psychiatric were all negative except for pertinent positives noted in my HPI.    Objective:   BP (!) 161/89   Pulse 84   Temp 97.6  F (36.4  C)   Resp 20   Ht 1.854 m (6' 1\")   Wt 81.4 kg (179 lb 8 oz)   SpO2 94%   BMI 23.68 kg/m    GENERAL APPEARANCE:  Alert, in no distress  ENT:  Mouth and posterior oropharynx normal, moist mucous membranes, [place]  EYES:  EOM, conjunctivae, lids, pupils and irises normal, PERRL  RESP:  respiratory effort and palpation of chest normal, lungs clear to auscultation , no respiratory distress  CV:  Palpation and auscultation of heart done , regular rate and rhythm, no murmur, rub, or gallop, no edema  ABDOMEN:  normal bowel sounds, soft, nontender, no hepatoplenomegaly or other masses  M/S:   patient sitting up in w/c, able to move all 4 " extremities  SKIN:  Inspection of skin and subcutaneous tissue baseline  NEURO:   speech wnl    Recent labs in [facility] reviewed by me today.     Assessment/Plan:  (S22.080D) Closed wedge compression fracture of T11 vertebra with routine healing, subsequent encounter  (primary encounter diagnosis)  (M62.81) Generalized muscle weakness  (M85.88) Osteopenia of spine  Comment: acute/ongoing  Plan: PT and OT, tylenol 975mg q 8 hours, fosamax 70mg weekly, vitamin D 1000u QD     (D64.9) Anemia, unspecified type  Comment: ongoing  Plan: Hgb 10.2, Hgb follow     (I10) Essential hypertension  (I48.20) Chronic atrial fibrillation (H)  (I34.0) Mitral valve insufficiency, unspecified etiology  (Z79.01) Long term current use of anticoagulant therapy  Comment: ongoing  Plan: vitals daily and prn, BMP follow, continue lasix 20mg QD, KCL 10meq QD, amiodarone 200mg QD coumadin as directed INR goal of 2-3    (K59.03) constipation  Acute/ongoing  Plan: continue miralax 17gm QD scheduled and 17gm QD prn,   Add senna s 1 PO QD    (R41.89) cognitive impairment  Ongoing SBT 6/28, MOCHA 23/30 moderate impairment  Plan patient will discharge to new living environment [facility], was living at home alone PTA     (R33.9) Urinary retention  Hx of prostate cancer  Acute/ongoing: eagle out, will check PVR q shift, straight cath for PVR > 500cc  Finished Abx for UTI  Continue flomax 0.4mg QD  F/u with Dr. Russell urology    Orders:  No new orders      Wheelchair Documentation  Size: 18 x 16  Corresponding cushion: Yes: comfort curve  Standard foot rests: Yes  Elevating leg rests: No  Arm rests: Yes: full length  Lap tray: No  Dose the patient use oxygen? No   Is the patient able to propel wheelchair? Yes If no why not? n/a And is there someone who can?yes  1. The patient has mobility limitations that impairs their ability to participate in one or more mobility related activities: Toileting, Feeding and Grooming.  The wheelchair is suitable and necessary  for use in the patient's home.  2. The patient's mobility limitations cannot be safely resolved by using a cane/walker:Yes    Reason why a cane or walker will not meet the patient's needs. (ie: balance, tolerance, level of assistance) poor balance, cognitive impairment safety issues  3. The patients home has adequate access to use a manual wheelchair:Yes  4. The use of a manual wheelchair on a regular basis will improve the patients ability to participate in mobility related ADL's at home:Yes  5. The patient is willing to use a manual wheelchair at home:Yes  6. The patient has adequate upper body strength and the mental capability to safely use a manual wheelchair and/or has a caregiver that is able to assist: Yes  7. Does the patient have a lower extremity injury or edema?Yes  Reason for Type of Wheelchair  Patient weight: 0 lbs 0 oz      Electronically signed by: Tonya Lynn Haase, APRN CNP

## 2022-03-31 NOTE — LETTER
"    3/31/2022        RE: Ayaan Herrera  [address] [facility] [address]        [facility] GERIATRICS    Chief Complaint   Patient presents with     Nursing Home Acute     HPI:  Ayaan Herrera is a 89 year old  (10/31/1932), who is being seen today for an episodic care visit at: [facility]) [25]. Today's concern is:   Compression Fx T11: on exam today patient denies pain at rest, continue to have pain with twisting motion,  in therapy he is walking 175 feet using a RW with SBA to CGA, therapy unable to set patient independent with ambulation due to patient needing cues for safety and poor balance.   Anemia: follow  HTN/Atrial fib: /89, 128/74, 137/80 with HR 70 range, denies CP, palpitations, SOB  Urinary retention/UTI:no c/o frequency, dysuria, patient finished Tx for UTI  Constipation: patient required some bowel medication has not had BM in a few days abdomen distended  Cognitive impairment: BIMS 11/15, SBT 6/28, CPT of 4.5/5.6, moderate cognitive impairement    Allergies, and PMH/PSH reviewed in EPIC today.  REVIEW OF SYSTEMS:  10 point ROS of systems including Constitutional, Eyes, Respiratory, Cardiovascular, Gastroenterology, Genitourinary, Integumentary, Musculoskeletal, Psychiatric were all negative except for pertinent positives noted in my HPI.    Objective:   BP (!) 161/89   Pulse 84   Temp 97.6  F (36.4  C)   Resp 20   Ht 1.854 m (6' 1\")   Wt 81.4 kg (179 lb 8 oz)   SpO2 94%   BMI 23.68 kg/m    GENERAL APPEARANCE:  Alert, in no distress  ENT:  Mouth and posterior oropharynx normal, moist mucous membranes, [place]  EYES:  EOM, conjunctivae, lids, pupils and irises normal, PERRL  RESP:  respiratory effort and palpation of chest normal, lungs clear to auscultation , no respiratory distress  CV:  Palpation and auscultation of heart done , regular rate and rhythm, no murmur, rub, or gallop, no edema  ABDOMEN:  normal bowel sounds, soft, nontender, no hepatoplenomegaly " or other masses  M/S:   patient sitting up in w/c, able to move all 4 extremities  SKIN:  Inspection of skin and subcutaneous tissue baseline  NEURO:   speech wnl    Recent labs in [facility] reviewed by me today.     Assessment/Plan:  (S22.080D) Closed wedge compression fracture of T11 vertebra with routine healing, subsequent encounter  (primary encounter diagnosis)  (M62.81) Generalized muscle weakness  (M85.88) Osteopenia of spine  Comment: acute/ongoing  Plan: PT and OT, tylenol 975mg q 8 hours, fosamax 70mg weekly, vitamin D 1000u QD     (D64.9) Anemia, unspecified type  Comment: ongoing  Plan: Hgb 10.2, Hgb follow     (I10) Essential hypertension  (I48.20) Chronic atrial fibrillation (H)  (I34.0) Mitral valve insufficiency, unspecified etiology  (Z79.01) Long term current use of anticoagulant therapy  Comment: ongoing  Plan: vitals daily and prn, BMP follow, continue lasix 20mg QD, KCL 10meq QD, amiodarone 200mg QD coumadin as directed INR goal of 2-3    (K59.03) constipation  Acute/ongoing  Plan: continue miralax 17gm QD scheduled and 17gm QD prn,   Add senna s 1 PO QD    (R41.89) cognitive impairment  Ongoing SBT 6/28, MOCHA 23/30 moderate impairment  Plan patient will discharge to new living environment [facility], was living at home alone PTA     (R33.9) Urinary retention  Hx of prostate cancer  Acute/ongoing: eagle out, will check PVR q shift, straight cath for PVR > 500cc  Finished Abx for UTI  Continue flomax 0.4mg QD  F/u with Dr. Russell urology    Orders:  No new orders      Wheelchair Documentation  Size: 18 x 16  Corresponding cushion: Yes: comfort curve  Standard foot rests: Yes  Elevating leg rests: No  Arm rests: Yes: full length  Lap tray: No  Dose the patient use oxygen? No   Is the patient able to propel wheelchair? Yes If no why not? n/a And is there someone who can?yes  1. The patient has mobility limitations that impairs their ability to participate in one or more mobility related activities:  Toileting, Feeding and Grooming.  The wheelchair is suitable and necessary for use in the patient's home.  2. The patient's mobility limitations cannot be safely resolved by using a cane/walker:Yes    Reason why a cane or walker will not meet the patient's needs. (ie: balance, tolerance, level of assistance) poor balance, cognitive impairment safety issues  3. The patients home has adequate access to use a manual wheelchair:Yes  4. The use of a manual wheelchair on a regular basis will improve the patients ability to participate in mobility related ADL's at home:Yes  5. The patient is willing to use a manual wheelchair at home:Yes  6. The patient has adequate upper body strength and the mental capability to safely use a manual wheelchair and/or has a caregiver that is able to assist: Yes  7. Does the patient have a lower extremity injury or edema?Yes  Reason for Type of Wheelchair  Patient weight: 0 lbs 0 oz      Electronically signed by: Tonya Lynn Haase, APRN CNP           Sincerely,        Tonya Lynn Haase, APRN CNP

## 2022-04-01 ENCOUNTER — DISCHARGE SUMMARY NURSING HOME (OUTPATIENT)
Dept: GERIATRICS | Facility: CLINIC | Age: 87
End: 2022-04-01
Payer: COMMERCIAL

## 2022-04-01 VITALS
WEIGHT: 179.5 LBS | HEART RATE: 74 BPM | BODY MASS INDEX: 23.79 KG/M2 | OXYGEN SATURATION: 93 % | DIASTOLIC BLOOD PRESSURE: 89 MMHG | RESPIRATION RATE: 17 BRPM | TEMPERATURE: 97.4 F | SYSTOLIC BLOOD PRESSURE: 161 MMHG | HEIGHT: 73 IN

## 2022-04-01 DIAGNOSIS — R33.9 URINARY RETENTION: ICD-10-CM

## 2022-04-01 DIAGNOSIS — I10 ESSENTIAL HYPERTENSION: ICD-10-CM

## 2022-04-01 DIAGNOSIS — K59.03 DRUG-INDUCED CONSTIPATION: ICD-10-CM

## 2022-04-01 DIAGNOSIS — I48.20 CHRONIC ATRIAL FIBRILLATION (H): ICD-10-CM

## 2022-04-01 DIAGNOSIS — R41.89 COGNITIVE IMPAIRMENT: ICD-10-CM

## 2022-04-01 DIAGNOSIS — M85.88 OSTEOPENIA OF SPINE: ICD-10-CM

## 2022-04-01 DIAGNOSIS — M62.81 GENERALIZED MUSCLE WEAKNESS: ICD-10-CM

## 2022-04-01 DIAGNOSIS — D64.9 ANEMIA, UNSPECIFIED TYPE: ICD-10-CM

## 2022-04-01 DIAGNOSIS — S22.080D CLOSED WEDGE COMPRESSION FRACTURE OF T11 VERTEBRA WITH ROUTINE HEALING, SUBSEQUENT ENCOUNTER: Primary | ICD-10-CM

## 2022-04-01 PROCEDURE — 99316 NF DSCHRG MGMT 30 MIN+: CPT | Performed by: NURSE PRACTITIONER

## 2022-04-01 RX ORDER — AMOXICILLIN 250 MG
1 CAPSULE ORAL 2 TIMES DAILY
COMMUNITY

## 2022-04-01 RX ORDER — ACETAMINOPHEN 500 MG
1000 TABLET ORAL
COMMUNITY

## 2022-04-01 RX ORDER — POLYETHYLENE GLYCOL 3350 17 G/17G
17 POWDER, FOR SOLUTION ORAL DAILY
COMMUNITY

## 2022-04-01 RX ORDER — LIDOCAINE 4 G/G
1 PATCH TOPICAL EVERY 24 HOURS
COMMUNITY
End: 2022-07-15

## 2022-04-01 NOTE — LETTER
4/1/2022        RE: Ayaan Herrera  [address]  [place] [address]        [facility] GERIATRICS DISCHARGE SUMMARY  PATIENT'S NAME: Ayaan Herrera  YOB: 1932  MEDICAL RECORD NUMBER:  [id number]  Place of Service where encounter took place:  [facility]) [25]    PRIMARY CARE PROVIDER AND CLINIC RESPONSIBLE AFTER TRANSFER:   Tonya Lynn Haase, APRN CNP, [address] [place] [address] / [facility] [id number]    [facility] [place]     Transferring providers: Tonya Lynn Haase, APRN CNP, Dr. Justo Augustin MD  Recent Hospitalization/ED:  [facility] Hospital stay 2/28/22 to 3/1/22.  Date of SNF Admission: March 01, 2022  Date of SNF (anticipated) Discharge: April 04, 2022  Discharged to: previous independent home  Cognitive Scores: BIMS: 11/15 and Short blessed: 6/28  Physical Function: Ambulating 175 ft with RW  DME: Wheelchair and Walker    CODE STATUS/ADVANCE DIRECTIVES DISCUSSION:  Full Code   ALLERGIES: Zithromax [azithromycin], Amoxicillin, and Augmentin    NURSING FACILITY COURSE   Medication Changes/Rationale:     See assessment and plan    Summary of nursing facility stay:   Patient progressed to walking up to 175 feet using a RW with SBA, unable to ambulate independently due to ongoing pain, poor balance and concerns for safety due to cognitive impairment with BIMS 11/15 and SBT 6/28. Patient needing assist with ADL's and toileting. Will DC [facility] with home PT, OT, RN and HHA through [facility].     Discharge Medications:    Current Outpatient Medications   Medication Sig Dispense Refill     acetaminophen (TYLENOL) 500 MG tablet Take 1,000 mg by mouth nightly as needed for mild pain       alendronate (FOSAMAX) 70 MG tablet TAKE 1 TAB WITH 8OZ OF WATER EVERY 7 DAYS 30 MIN BEFORE BREAKFAST & REMAIN UPRIGHT DURING THIS TIME 12 tablet 3     amiodarone (PACERONE) 200 MG tablet Take 1 tablet (200 mg) by mouth daily        brimonidine (ALPHAGAN P) 0.15 % ophthalmic solution Place 1 drop into both eyes 2 times daily 1 Bottle 11     COSOPT 2-0.5 % OP SOLN Place 1 Dose into both eyes 2 times daily Both eyes.       furosemide (LASIX) 20 MG tablet Take 1 tablet (20 mg) by mouth daily 90 tablet 3     ketotifen (ZADITOR) 0.025 % SOLN Place 1 drop into both eyes as needed       latanoprost (XALATAN) 0.005 % ophthalmic solution Place 1 drop into both eyes At Bedtime 3 Bottle 4     Lidocaine (LIDOCARE) 4 % Patch Place 1 patch onto the skin every 24 hours Apply to back topically To prevent lidocaine toxicity, patient should be patch free for 12 hrs daily.       melatonin 5 MG tablet Take 5 mg by mouth At Bedtime       netarsudil (RHOPRESSA) 0.02 % ophthalmic solution 1 drop At Bedtime       polyethylene glycol (MIRALAX) 17 g packet Take 17 g by mouth daily       potassium chloride ER (KLOR-CON) 10 MEQ CR tablet TAKE ONE TABLET DAILY WITH EACH 20 MG DOSE OF FUROSEMIDE. 90 tablet 1     senna-docusate (SENOKOT-S/PERICOLACE) 8.6-50 MG tablet Take 1 tablet by mouth daily       tamsulosin (FLOMAX) 0.4 MG capsule Take 0.4 mg by mouth daily   3     traMADol (ULTRAM) 50 MG tablet Take 0.5 tablets (25 mg) by mouth every 6 hours as needed for severe pain 20 tablet 0     Vitamin D, Cholecalciferol, 1000 UNITS TABS Take 1 tablet by mouth daily        warfarin ANTICOAGULANT (COUMADIN) 2.5 MG tablet Take 1.5 tablets (3.75 mg) on Sundays and take 1 tablet (2.5 mg) all other days or as directed by the INR clinic 135 tablet 0          Controlled medications:   Medication: tramadol 25mg q 6 hours , 25 tabs given to patient at the time of discharge to take home     Past Medical History:   Past Medical History:   Diagnosis Date     Atrial fibrillation (H)      BPH (benign prostatic hyperplasia)      Dyslipidemia      History of basal cell carcinoma      HTN (hypertension)      Nonsenile cataract      NSVT (nonsustained ventricular tachycardia) (H)      POAG  "(primary open-angle glaucoma)     Advanced      Prostate cancer (H)      Psoriasis      Sinus node dysfunction (H)      Physical Exam:   Vitals: BP (!) 161/89   Pulse 74   Temp 97.4  F (36.3  C)   Resp 17   Ht 1.854 m (6' 1\")   Wt 81.4 kg (179 lb 8 oz)   SpO2 93%   BMI 23.68 kg/m    BMI: Body mass index is 23.68 kg/m .  GENERAL APPEARANCE:  Alert, in no distress  ENT:  Mouth and posterior oropharynx normal, moist mucous membranes, [place]  EYES:  EOM, conjunctivae, lids, pupils and irises normal, PERRL  RESP:  respiratory effort and palpation of chest normal, lungs clear to auscultation , no respiratory distress  CV:  Palpation and auscultation of heart done , regular rate and rhythm, no murmur, rub, or gallop, no edema  ABDOMEN:  normal bowel sounds, soft, nontender, no hepatosplenomegaly or other masses  M/S:   patient sitting up in w/c  SKIN:  Inspection of skin and subcutaneous tissue baseline  NEURO:   speech wnl  PSYCH:  affect and mood normal     SNF labs: Recent labs in [facility] reviewed by me today.      Assessment/Plan:  (S22.080D) Closed wedge compression fracture of T11 vertebra with routine healing, subsequent encounter  (primary encounter diagnosis)  (M62.81) Generalized muscle weakness  (M85.88) Osteopenia of spine  Comment: acute/ongoing  Plan: DC to [facility] ([facility] living environment) with home PT, OT, RN and HHA through [facility], continue tylenol 975mg q 8 hours, fosamax 70mg weekly, vitamin D 1000u QD  Tramadol 25mg q 6 hours prn for mod-severe pain     (D64.9) Anemia, unspecified type  Comment: ongoing  Plan: Hgb 10.2, f.u with PCP     (I10) Essential hypertension  (I48.20) Chronic atrial fibrillation (H)  (I34.0) Mitral valve insufficiency, unspecified etiology  (Z79.01) Long term current use of anticoagulant therapy  Comment: ongoing  Plan:  continue lasix 20mg QD, KCL 10meq QD, amiodarone 200mg QD coumadin as directed INR goal of 2-3  Last INR 2.7, stable     (K59.03) " constipation  Acute/ongoing  Plan: continue miralax 17gm QD scheduled and 17gm QD prn,    senna s 1 PO QD     (R41.89) cognitive impairment  Ongoing SBT 6/28, MOCHA 23/30 moderate impairment  Plan patient will discharge to [facility], was living at home alone PTA     (R33.9) Urinary retention  Hx of prostate cancer  Acute/ongoing: eagle out, patient did have urinary retention, Tx for UTI, improved  Finished Abx for UTI  Continue flomax 0.4mg QD  F/u with Dr. [facility] urology    DISCHARGE PLAN:    Follow up labs: No labs orders/due    Medical Follow Up:      Follow up with primary care provider in 1 weeks    Current [place] scheduled appointments:     Future Appointments   Date Time Provider Department Center   4/7/2022 11:30 AM [place] [facility]   4/19/2022  9:30 AM Chance Morrell MD [facility]         Discharge Services: Home Care:  Occupational Therapy, Physical Therapy, Registered Nurse and Home Health Aide    Discharge Instructions Verbalized to Patient at Discharge:     None    TOTAL DISCHARGE TIME:   Greater than 30 minutes  Electronically signed by:  Tonya Lynn Haase, APRN CNP     Home care Face to Face documentation done in EPIC attached to Home care orders for [facility]. , Documentation of Face to Face and Certification for Home Health Services    I certify that patient: Ayaan Herrera is under my care and that I, or a nurse practitioner or physician's assistant working with me, had a face-to-face encounter that meets the physician face-to-face encounter requirements with this patient on: 4/1/2022.    This encounter with the patient was in whole, or in part, for the following medical condition, which is the primary reason for home health care: T11 compression fx, cognitive impairment    I certify that, based on my findings, the following services are medically necessary home health services: Nursing, Occupational Therapy and Physical Therapy.    My clinical findings  support the need for the above services because: Nurse is needed: To assess INR, goal 2-3, next INR check with first RN visit, report to PCP, vitals and medication management after changes in medications or other medical regimen.., Occupational Therapy Services are needed to assess and treat cognitive ability and address ADL safety due to impairment in ADL training, home safety. and Physical Therapy Services are needed to assess and treat the following functional impairments: strengthening, endurance, home safety.    Further, I certify that my clinical findings support that this patient is homebound (i.e. absences from home require considerable and taxing effort and are for medical reasons or Spiritism services or infrequently or of short duration when for other reasons) because: Requires assistance of another person or specialized equipment to access medical services because patient: Requires supervision of another for safe transfer...    Based on the above findings. I certify that this patient is confined to the home and needs intermittent skilled nursing care, physical therapy and/or speech therapy.  The patient is under my care, and I have initiated the establishment of the plan of care.  This patient will be followed by a physician who will periodically review the plan of care.  Physician/Provider to provide follow up care: Haase, Tonya Lynn    Attending hospital physician (the Medicare certified PECOS provider): Tonya Lynn Haase, APRN CNP  Physician Signature: See electronic signature associated with these discharge orders.  Date: 4/1/2022                Sincerely,        Tonya Lynn Haase, APRN CNP

## 2022-04-01 NOTE — PROGRESS NOTES
[facility] GERIATRICS DISCHARGE SUMMARY  PATIENT'S NAME: Ayaan Herrera  YOB: 1932  MEDICAL RECORD NUMBER:  [id number]  Place of Service where encounter took place:  [facility]) [25]    PRIMARY CARE PROVIDER AND CLINIC RESPONSIBLE AFTER TRANSFER:   Tonya Lynn Haase, APRN CNP, [address] [facility] [address] / [facility] [address]    [facility] Provider     Transferring providers: Tonya Lynn Haase, APRN CNP, Dr. Justo Augustin MD  Recent Hospitalization/ED:  [facility] Hospital stay 2/28/22 to 3/1/22.  Date of SNF Admission: March 01, 2022  Date of SNF (anticipated) Discharge: April 04, 2022  Discharged to: previous independent home  Cognitive Scores: BIMS: 11/15 and Short blessed: 6/28  Physical Function: Ambulating 175 ft with RW  DME: Wheelchair and Walker    CODE STATUS/ADVANCE DIRECTIVES DISCUSSION:  Full Code   ALLERGIES: Zithromax [azithromycin], Amoxicillin, and Augmentin    NURSING FACILITY COURSE   Medication Changes/Rationale:     See assessment and plan    Summary of nursing facility stay:   Patient progressed to walking up to 175 feet using a RW with SBA, unable to ambulate independently due to ongoing pain, poor balance and concerns for safety due to cognitive impairment with BIMS 11/15 and SBT 6/28. Patient needing assist with ADL's and toileting. Will DC to [facility] with home PT, OT, RN and HHA through [facility].     Discharge Medications:    Current Outpatient Medications   Medication Sig Dispense Refill     acetaminophen (TYLENOL) 500 MG tablet Take 1,000 mg by mouth nightly as needed for mild pain       alendronate (FOSAMAX) 70 MG tablet TAKE 1 TAB WITH 8OZ OF WATER EVERY 7 DAYS 30 MIN BEFORE BREAKFAST & REMAIN UPRIGHT DURING THIS TIME 12 tablet 3     amiodarone (PACERONE) 200 MG tablet Take 1 tablet (200 mg) by mouth daily       brimonidine (ALPHAGAN P) 0.15 % ophthalmic solution Place 1 drop into both eyes 2  times daily 1 Bottle 11     COSOPT 2-0.5 % OP SOLN Place 1 Dose into both eyes 2 times daily Both eyes.       furosemide (LASIX) 20 MG tablet Take 1 tablet (20 mg) by mouth daily 90 tablet 3     ketotifen (ZADITOR) 0.025 % SOLN Place 1 drop into both eyes as needed       latanoprost (XALATAN) 0.005 % ophthalmic solution Place 1 drop into both eyes At Bedtime 3 Bottle 4     Lidocaine (LIDOCARE) 4 % Patch Place 1 patch onto the skin every 24 hours Apply to back topically To prevent lidocaine toxicity, patient should be patch free for 12 hrs daily.       melatonin 5 MG tablet Take 5 mg by mouth At Bedtime       netarsudil (RHOPRESSA) 0.02 % ophthalmic solution 1 drop At Bedtime       polyethylene glycol (MIRALAX) 17 g packet Take 17 g by mouth daily       potassium chloride ER (KLOR-CON) 10 MEQ CR tablet TAKE ONE TABLET DAILY WITH EACH 20 MG DOSE OF FUROSEMIDE. 90 tablet 1     senna-docusate (SENOKOT-S/PERICOLACE) 8.6-50 MG tablet Take 1 tablet by mouth daily       tamsulosin (FLOMAX) 0.4 MG capsule Take 0.4 mg by mouth daily   3     traMADol (ULTRAM) 50 MG tablet Take 0.5 tablets (25 mg) by mouth every 6 hours as needed for severe pain 20 tablet 0     Vitamin D, Cholecalciferol, 1000 UNITS TABS Take 1 tablet by mouth daily        warfarin ANTICOAGULANT (COUMADIN) 2.5 MG tablet Take 1.5 tablets (3.75 mg) on Sundays and take 1 tablet (2.5 mg) all other days or as directed by the INR clinic 135 tablet 0          Controlled medications:   Medication: tramadol 25mg q 6 hours , 25 tabs given to patient at the time of discharge to take home     Past Medical History:   Past Medical History:   Diagnosis Date     Atrial fibrillation (H)      BPH (benign prostatic hyperplasia)      Dyslipidemia      History of basal cell carcinoma      HTN (hypertension)      Nonsenile cataract      NSVT (nonsustained ventricular tachycardia) (H)      POAG (primary open-angle glaucoma)     Advanced      Prostate cancer (H)      Psoriasis       "Sinus node dysfunction (H)      Physical Exam:   Vitals: BP (!) 161/89   Pulse 74   Temp 97.4  F (36.3  C)   Resp 17   Ht 1.854 m (6' 1\")   Wt 81.4 kg (179 lb 8 oz)   SpO2 93%   BMI 23.68 kg/m    BMI: Body mass index is 23.68 kg/m .  GENERAL APPEARANCE:  Alert, in no distress  ENT:  Mouth and posterior oropharynx normal, moist mucous membranes, Tribe  EYES:  EOM, conjunctivae, lids, pupils and irises normal, PERRL  RESP:  respiratory effort and palpation of chest normal, lungs clear to auscultation , no respiratory distress  CV:  Palpation and auscultation of heart done , regular rate and rhythm, no murmur, rub, or gallop, no edema  ABDOMEN:  normal bowel sounds, soft, nontender, no hepatosplenomegaly or other masses  M/S:   patient sitting up in w/c  SKIN:  Inspection of skin and subcutaneous tissue baseline  NEURO:   speech wnl  PSYCH:  affect and mood normal     SNF labs: Recent labs in [facility] reviewed by me today.      Assessment/Plan:  (S22.080D) Closed wedge compression fracture of T11 vertebra with routine healing, subsequent encounter  (primary encounter diagnosis)  (M62.81) Generalized muscle weakness  (M85.88) Osteopenia of spine  Comment: acute/ongoing  Plan: DC to [place] (new living environment) with home PT, OT, RN and HHA through [facility] medical, continue tylenol 975mg q 8 hours, fosamax 70mg weekly, vitamin D 1000u QD  Tramadol 25mg q 6 hours prn for mod-severe pain     (D64.9) Anemia, unspecified type  Comment: ongoing  Plan: Hgb 10.2, f.u with PCP     (I10) Essential hypertension  (I48.20) Chronic atrial fibrillation (H)  (I34.0) Mitral valve insufficiency, unspecified etiology  (Z79.01) Long term current use of anticoagulant therapy  Comment: ongoing  Plan:  continue lasix 20mg QD, KCL 10meq QD, amiodarone 200mg QD coumadin as directed INR goal of 2-3  Last INR 2.7, stable     (K59.03) constipation  Acute/ongoing  Plan: continue miralax 17gm QD scheduled and 17gm QD prn,    senna " s 1 PO QD     (R41.89) cognitive impairment  Ongoing SBT 6/28, MOCHA 23/30 moderate impairment  Plan patient will discharge to new living environment  [facility], was living at home alone PTA     (R33.9) Urinary retention  Hx of prostate cancer  Acute/ongoing: [facility] out, patient did have urinary retention, Tx for UTI, improved  Finished Abx for UTI  Continue flomax 0.4mg QD  F/u with Dr. [facility] urology    DISCHARGE PLAN:    Follow up labs: No labs orders/due    Medical Follow Up:      Follow up with primary care provider in 1 weeks    Current [place] scheduled appointments:     Future Appointments   Date Time Provider Department Center   4/7/2022 11:30 AM [facility]   4/19/2022  9:30 AM Chance Morrell MD [facility]         Discharge Services: Home Care:  Occupational Therapy, Physical Therapy, Registered Nurse and Home Health Aide    Discharge Instructions Verbalized to Patient at Discharge:     None    TOTAL DISCHARGE TIME:   Greater than 30 minutes  Electronically signed by:  Tonya Lynn Haase, APRN CNP     Home care Face to Face documentation done in EPIC attached to Home care orders for [facility]. , Documentation of Face to Face and Certification for Home Health Services    I certify that patient: Ayaan Herrera is under my care and that I, or a nurse practitioner or physician's assistant working with me, had a face-to-face encounter that meets the physician face-to-face encounter requirements with this patient on: 4/1/2022.    This encounter with the patient was in whole, or in part, for the following medical condition, which is the primary reason for home health care: T11 compression fx, cognitive impairment    I certify that, based on my findings, the following services are medically necessary home health services: Nursing, Occupational Therapy and Physical Therapy.    My clinical findings support the need for the above services because: Nurse is needed: To assess INR, goal 2-3, next  INR check with first RN visit, report to PCP, vitals and medication management after changes in medications or other medical regimen.., Occupational Therapy Services are needed to assess and treat cognitive ability and address ADL safety due to impairment in ADL training, home safety. and Physical Therapy Services are needed to assess and treat the following functional impairments: strengthening, endurance, home safety.    Further, I certify that my clinical findings support that this patient is homebound (i.e. absences from home require considerable and taxing effort and are for medical reasons or Yazidi services or infrequently or of short duration when for other reasons) because: Requires assistance of another person or specialized equipment to access medical services because patient: Requires supervision of another for safe transfer...    Based on the above findings. I certify that this patient is confined to the home and needs intermittent skilled nursing care, physical therapy and/or speech therapy.  The patient is under my care, and I have initiated the establishment of the plan of care.  This patient will be followed by a physician who will periodically review the plan of care.  Physician/Provider to provide follow up care: Haase, Tonya Lynn    Attending hospital physician (the Medicare certified PECOS provider): Tonya Lynn Haase, APRN CNP  Physician Signature: See electronic signature associated with these discharge orders.  Date: 4/1/2022

## 2022-04-22 ENCOUNTER — TELEPHONE (OUTPATIENT)
Dept: INTERNAL MEDICINE | Facility: CLINIC | Age: 87
End: 2022-04-22
Payer: COMMERCIAL

## 2022-04-22 NOTE — TELEPHONE ENCOUNTER
ANTICOAGULATION     Ayaan Herrera is overdue for INR check.      Left message for patient to call and schedule lab appointment as soon as possible. If returning call, please schedule.     Vale Morales RN

## 2022-05-02 ENCOUNTER — ANTICOAGULATION THERAPY VISIT (OUTPATIENT)
Dept: ANTICOAGULATION | Facility: CLINIC | Age: 87
End: 2022-05-02
Payer: COMMERCIAL

## 2022-05-02 ENCOUNTER — TRANSFERRED RECORDS (OUTPATIENT)
Dept: HEALTH INFORMATION MANAGEMENT | Facility: CLINIC | Age: 87
End: 2022-05-02
Payer: COMMERCIAL

## 2022-05-02 DIAGNOSIS — I48.20 CHRONIC ATRIAL FIBRILLATION (H): Primary | ICD-10-CM

## 2022-05-02 LAB — INR (EXTERNAL): 1.8 (ref 0.9–1.1)

## 2022-05-02 RX ORDER — WARFARIN SODIUM 1 MG/1
TABLET ORAL
Qty: 30 TABLET | Refills: 1 | Status: SHIPPED | OUTPATIENT
Start: 2022-05-02 | End: 2022-05-13

## 2022-05-02 NOTE — PROGRESS NOTES
ANTICOAGULATION MANAGEMENT     Ayaan Herrera 89 year old male is on warfarin with subtherapeutic INR result. (Goal INR 2.0-3.0)    Recent labs: (last 7 days)     05/02/22  1203   INR 1.8*       ASSESSMENT       Source(s): Chart Review and Patient/Caregiver Call       Warfarin doses taken: Warfarin taken as instructed    Diet: eating poorly because of the pain    New illness, injury, or hospitalization: Yes:  Fell and has compression fracture in back and also pain in left arm  In wheel chair    Medication/supplement changes: On tramadol 25 mg for pain    Signs or symptoms of bleeding or clotting: No    Previous INR: just got out of [facility]    Additional findings: Will probably be going back to a [facility] center as soon as they can get it arranged.  Needs extra help with care       PLAN     Recommended plan for ongoing change(s) affecting INR  See note abve    Dosing Instructions: dosing with help from Lor Medina [facility].  1.25 mg QD with next INR in 4  days   For 10 mg q week  Down 46 %    Summary  As of 5/2/2022    Full warfarin instructions:  2.5 mg every Fri; 1.25 mg all other days   Next INR check:  5/6/2022             Telephone call with Michael who verbalizes understanding and agrees to plan    Lab visit scheduled with [facility] Home [facility]    Education provided: Please call back if any changes to your diet, medications or how you've been taking warfarin    Plan made with [facility] Pharmacist Lor Smith, RN  Anticoagulation Clinic  5/2/2022    _______________________________________________________________________     Anticoagulation Episode Summary     Current INR goal:  2.0-3.0   TTR:  67.4 % (1 y)   Target end date:  Indefinite   [facility]    Indications    Chronic atrial fibrillation (H) [I48.20]           Comments:           Anticoagulation Care Providers     Provider Role Specialty Phone number    Dean Rider MD Referring Internal  Medicine [phone]

## 2022-05-06 ENCOUNTER — TRANSFERRED RECORDS (OUTPATIENT)
Dept: HEALTH INFORMATION MANAGEMENT | Facility: CLINIC | Age: 87
End: 2022-05-06
Payer: COMMERCIAL

## 2022-05-06 ENCOUNTER — ANTICOAGULATION THERAPY VISIT (OUTPATIENT)
Dept: ANTICOAGULATION | Facility: CLINIC | Age: 87
End: 2022-05-06
Payer: COMMERCIAL

## 2022-05-06 DIAGNOSIS — I48.20 CHRONIC ATRIAL FIBRILLATION (H): Primary | ICD-10-CM

## 2022-05-06 LAB — INR (EXTERNAL): 1.5 (ref 0.9–1.1)

## 2022-05-06 NOTE — PROGRESS NOTES
Incoming fax    Receieved on 05/06/22    From [facility]   INR 1.5       Jazmine Burton RN, BSN, PHN

## 2022-05-06 NOTE — PROGRESS NOTES
ANTICOAGULATION MANAGEMENT     Ayaan Hererra 89 year old male is on warfarin with subtherapeutic INR result. (Goal INR 2.0-3.0)    Recent labs: (last 7 days)     05/06/22  0000   INR 1.5*       ASSESSMENT       Source(s): Chart Review and Patient/Caregiver Call       Warfarin doses taken: Warfarin taken as instructed    Diet: No new diet changes identified    New illness, injury, or hospitalization:Recent discharge from [facility]. Hx fall compression fx in back. Pt is in wheelchair     Medication/supplement changes: New warfarin tablet strength from 2.5 mg to 1 mg tablets    Signs or symptoms of bleeding or clotting: No    Previous INR: Subtherapeutic    Additional findings: Spoke to pt and his friend Omar regarding dosing. Pt will start using 1 mg tablets of warfarin today. Maintenance dose increased from 10 mg a week to 11 mg a week.       PLAN     Recommended plan for ongoing change(s) affecting INR     Dosing Instructions: booster dose then Increase your warfarin dose (10% change) with next INR in 6 days       Summary  As of 5/6/2022    Full warfarin instructions:  5/6: 3 mg; Otherwise 1 mg every Mon, Wed, Fri; 2 mg all other days   Next INR check:  5/12/2022             Telephone call with  Jennifer and Omar who agrees to plan and repeated back plan correctly   Omar's cell# [phone]    Orders given to [facility] ([phone])    Education provided: Importance of notifying clinic for changes in medications; a sooner lab recheck maybe needed. and Contact [phone]  with any changes, questions or concerns.     Plan made per [facility] anticoagulation protocol    Luisa Matthews RN  Anticoagulation Clinic  5/6/2022    _______________________________________________________________________     Anticoagulation Episode Summary     Current INR goal:  2.0-3.0   TTR:  66.4 % (1 y)   Target end date:  Indefinite   [facility]    Indications    Chronic atrial  fibrillation (H) [I48.20]           Comments:           Anticoagulation Care Providers     Provider Role Specialty Phone number    Dean Rider MD Referring Internal Medicine [phone]

## 2022-05-09 ENCOUNTER — OFFICE VISIT (OUTPATIENT)
Dept: INTERNAL MEDICINE | Facility: CLINIC | Age: 87
End: 2022-05-09
Payer: COMMERCIAL

## 2022-05-09 VITALS
TEMPERATURE: 97 F | DIASTOLIC BLOOD PRESSURE: 66 MMHG | WEIGHT: 160 LBS | BODY MASS INDEX: 21.11 KG/M2 | HEART RATE: 66 BPM | SYSTOLIC BLOOD PRESSURE: 108 MMHG

## 2022-05-09 DIAGNOSIS — R29.6 FALLS FREQUENTLY: ICD-10-CM

## 2022-05-09 DIAGNOSIS — I48.20 CHRONIC ATRIAL FIBRILLATION (H): ICD-10-CM

## 2022-05-09 DIAGNOSIS — S22.080A CLOSED WEDGE COMPRESSION FRACTURE OF T11 VERTEBRA, INITIAL ENCOUNTER (H): Primary | ICD-10-CM

## 2022-05-09 DIAGNOSIS — Z23 HIGH PRIORITY FOR 2019-NCOV VACCINE: ICD-10-CM

## 2022-05-09 DIAGNOSIS — C61 MALIGNANT NEOPLASM OF PROSTATE (H): Chronic | ICD-10-CM

## 2022-05-09 DIAGNOSIS — M81.0 SENILE OSTEOPOROSIS: ICD-10-CM

## 2022-05-09 DIAGNOSIS — I42.9 CARDIOMYOPATHY, UNSPECIFIED TYPE (H): ICD-10-CM

## 2022-05-09 DIAGNOSIS — I10 ESSENTIAL HYPERTENSION: ICD-10-CM

## 2022-05-09 DIAGNOSIS — N28.89 RIGHT RENAL MASS: ICD-10-CM

## 2022-05-09 DIAGNOSIS — R60.0 BILATERAL LEG EDEMA: ICD-10-CM

## 2022-05-09 DIAGNOSIS — H40.1113 PRIMARY OPEN ANGLE GLAUCOMA OF RIGHT EYE, SEVERE STAGE: ICD-10-CM

## 2022-05-09 DIAGNOSIS — I73.9 CLAUDICATION OF RIGHT LOWER EXTREMITY (H): ICD-10-CM

## 2022-05-09 LAB
ANION GAP SERPL CALCULATED.3IONS-SCNC: 5 MMOL/L (ref 3–14)
BUN SERPL-MCNC: 29 MG/DL (ref 7–30)
CALCIUM SERPL-MCNC: 8.7 MG/DL (ref 8.5–10.1)
CHLORIDE BLD-SCNC: 99 MMOL/L (ref 94–109)
CO2 SERPL-SCNC: 26 MMOL/L (ref 20–32)
CREAT SERPL-MCNC: 0.73 MG/DL (ref 0.66–1.25)
ERYTHROCYTE [DISTWIDTH] IN BLOOD BY AUTOMATED COUNT: 16 % (ref 10–15)
GFR SERPL CREATININE-BSD FRML MDRD: 87 ML/MIN/1.73M2
GLUCOSE BLD-MCNC: 104 MG/DL (ref 70–99)
HCT VFR BLD AUTO: 35 % (ref 40–53)
HGB BLD-MCNC: 11 G/DL (ref 13.3–17.7)
MCH RBC QN AUTO: 27.2 PG (ref 26.5–33)
MCHC RBC AUTO-ENTMCNC: 31.4 G/DL (ref 31.5–36.5)
MCV RBC AUTO: 87 FL (ref 78–100)
PLATELET # BLD AUTO: 314 10E3/UL (ref 150–450)
POTASSIUM BLD-SCNC: 4.2 MMOL/L (ref 3.4–5.3)
RBC # BLD AUTO: 4.04 10E6/UL (ref 4.4–5.9)
SODIUM SERPL-SCNC: 130 MMOL/L (ref 133–144)
TSH SERPL DL<=0.005 MIU/L-ACNC: 3.11 MU/L (ref 0.4–4)
WBC # BLD AUTO: 14.3 10E3/UL (ref 4–11)

## 2022-05-09 PROCEDURE — 80048 BASIC METABOLIC PNL TOTAL CA: CPT | Performed by: INTERNAL MEDICINE

## 2022-05-09 PROCEDURE — 36415 COLL VENOUS BLD VENIPUNCTURE: CPT | Performed by: INTERNAL MEDICINE

## 2022-05-09 PROCEDURE — 91305 COVID-19,PF,PFIZER (12+ YRS): CPT | Performed by: INTERNAL MEDICINE

## 2022-05-09 PROCEDURE — 85027 COMPLETE CBC AUTOMATED: CPT | Performed by: INTERNAL MEDICINE

## 2022-05-09 PROCEDURE — 99214 OFFICE O/P EST MOD 30 MIN: CPT | Mod: 25 | Performed by: INTERNAL MEDICINE

## 2022-05-09 PROCEDURE — 0054A COVID-19,PF,PFIZER (12+ YRS): CPT | Performed by: INTERNAL MEDICINE

## 2022-05-09 PROCEDURE — 84443 ASSAY THYROID STIM HORMONE: CPT | Performed by: INTERNAL MEDICINE

## 2022-05-09 NOTE — PROGRESS NOTES
Assessment & Plan     (S22.080A) Closed wedge compression fracture of T11 vertebra, initial encounter (H)  (primary encounter diagnosis)  Comment: slow improvement, pain better, still not walkign well.   Plan: REVIEW OF HEALTH MAINTENANCE PROTOCOL ORDERS,         CBC with platelets, Basic metabolic panel            (I48.20) Chronic atrial fibrillation (H)  Comment: This condition is currently controlled on the current medical regimen.  Continue current therapy.   Plan: REVIEW OF HEALTH MAINTENANCE PROTOCOL ORDERS,         TSH with free T4 reflex            (C61) Malignant neoplasm of prostate (H)  Comment: no change,s follow up with Urology.   Plan: REVIEW OF HEALTH MAINTENANCE PROTOCOL ORDERS            (H40.1113) Primary open angle glaucoma of right eye, severe stage  Comment: continue regular follow up with ophthomology.   Plan:     (M81.0) Senile osteoporosis  Comment: This condition is currently controlled on the current medical regimen.  Continue current therapy.   Plan: REVIEW OF HEALTH MAINTENANCE PROTOCOL ORDERS            (R60.0) Bilateral leg edema  Comment: This condition is currently controlled on the current medical regimen.  Continue current therapy.   Plan: REVIEW OF HEALTH MAINTENANCE PROTOCOL ORDERS            (R29.6) Falls frequently  Comment: remains ewak, huge fall risk.  Already has had vertebral compression fracture  Plan: REVIEW OF HEALTH MAINTENANCE PROTOCOL ORDERS            (Z23) High priority for 2019-nCoV vaccine  Comment:   Plan: REVIEW OF HEALTH MAINTENANCE PROTOCOL ORDERS            (N28.89) Right renal mass  Comment: continue to work with urology   Plan: REVIEW OF HEALTH MAINTENANCE PROTOCOL ORDERS            (I10) Essential hypertension  Comment: This condition is currently controlled on the current medical regimen.  Continue current therapy.   Plan: REVIEW OF HEALTH MAINTENANCE PROTOCOL ORDERS,         CBC with platelets, Basic metabolic panel            (I42.9) Cardiomyopathy,  "unspecified type (H)  Comment: This condition is currently controlled on the current medical regimen.  Continue current therapy.   Plan:     (I73.9) Claudication of right lower extremity (H)  Comment: This condition is currently controlled on the current medical regimen.  Continue current therapy.   Plan:           Continue all medications at the same doses.  Contact your usual pharmacy if you need refills.     *  I told the patient and his daughter that given his current state of frailty, it is my opinion that he will NOT be very likely to be able to live independently.  It will be only a matter of time before he has another fall or major medical incident.  He is in obvious need for assistance in performing ADLs.   He can still fall in a care center, but he would be found much quicker.       You absolutely should begin making arrangements for moving into an assisted living facility, I am very concerned about your ability to live independently due to you many medical problems and overall worsening weakness.        Return to see an ophthomologist (eye specialist) to recheck the eyes.        Return to see the Urology later in 2022 for recheck of the prostate, bladder emptying, and the renal mass.        Follow up with the Cardiology Clinic in Fall 2022.        Return to see me in approximately 3-4 months, sooner if needed.  Please get non-fasting labs done at the [facility] or any other [facility] Lab lab 1-2 days before this appointment (schedule a \"lab appointment\").  If you get the labs done at another clinic, make arrangements with them directly.  The orders will be in place.  Use [employer] or Call [phone] to schedule the appointment with me and lab appointment.              Return in about 3 months (around 8/9/2022) for Routine Visit.    Chance Morrell MD  [facility]          Matteo Kuo is a 89 year old who presents for the following health " issues  accompanied by his daughter.          Hospital Follow-up Visit:    Hospital/Nursing Home/IP Rehab Facility: [facility]  Date of Admission: 3/1/22  Date of Discharge: 4/4/22  Reason(s) for Admission: Fall, fx       Was your hospitalization related to COVID-19? No   Problems taking medications regularly:  None  Medication changes since discharge: None  Problems adhering to non-medication therapy:  None    Summary of hospitalization:  [facility] discharge summary reviewed  Diagnostic Tests/Treatments reviewed.  Follow up needed: none  Other Healthcare Providers Involved in Patient s Care:         None  Update since discharge: improved. Post Discharge Medication Reconciliation: discharge medications reconciled, continue medications without change.  Plan of care communicated with patient       Sent to TCU after hospital discharge.   Discharged from TCU to assisted living facility early April, but he did not like it and went home shortly afterwards.   Living along in his home with family memebrs looking in on him.   Still unstable, does not walk far, requiring great assistance for most of his ADL's from his family.   Energy level and stamina are still quite poor, but very slowly improving.    Appetite and intake are slowly improving.   No fevers, no chills  No shortness of breath.   No abdominal pain.   They have not returned to prior routine and activities.        Reviewed discharge from TCU stay (per TCU discharge summary):      Summary of nursing facility stay:   Patient progressed to walking up to 175 feet using a RW with SBA, unable to ambulate independently due to ongoing pain, poor balance and concerns for safety due to cognitive impairment with BIMS 11/15 and SBT 6/28. Patient needing assist with ADL's and toileting. Will DC to new living environment [place] [facility] with home PT, OT, RN and HHA through [facility].     S22.080D) Closed wedge compression fracture  of T11 vertebra with routine healing, subsequent encounter  (primary encounter diagnosis)  (M62.81) Generalized muscle weakness  (M85.88) Osteopenia of spine  Comment: acute/ongoing  Plan: DC to [place] [facility] with home PT, OT, RN and HHA through [facility], continue tylenol 975mg q 8 hours, fosamax 70mg weekly, vitamin D 1000u QD  Tramadol 25mg q 6 hours prn for mod-severe pain     (D64.9) Anemia, unspecified type  Comment: ongoing  Plan: Hgb 10.2, f.u with PCP     (I10) Essential hypertension  (I48.20) Chronic atrial fibrillation (H)  (I34.0) Mitral valve insufficiency, unspecified etiology  (Z79.01) Long term current use of anticoagulant therapy  Comment: ongoing  Plan:  continue lasix 20mg QD, KCL 10meq QD, amiodarone 200mg QD coumadin as directed INR goal of 2-3  Last INR 2.7, stable     (K59.03) constipation  Acute/ongoing  Plan: continue miralax 17gm QD scheduled and 17gm QD prn,    senna s 1 PO QD     (R41.89) cognitive impairment  Ongoing SBT 6/28, MOCHA 23/30 moderate impairment  Plan patient will discharge to [facility], was living at home alone PTA     (R33.9) Urinary retention  Hx of prostate cancer  Acute/ongoing: [facility] out, patient did have urinary retention, Tx for UTI, improved  Finished Abx for UTI  Continue flomax 0.4mg QD  F/u with Dr. Russell urology    Current Outpatient Medications   Medication Sig Dispense Refill     acetaminophen (TYLENOL) 500 MG tablet Take 1,000 mg by mouth nightly as needed for mild pain       amiodarone (PACERONE) 200 MG tablet Take 1 tablet (200 mg) by mouth daily       brimonidine (ALPHAGAN P) 0.15 % ophthalmic solution Place 1 drop into both eyes 2 times daily 1 Bottle 11     COSOPT 2-0.5 % OP SOLN Place 1 Dose into both eyes 2 times daily Both eyes.       furosemide (LASIX) 20 MG tablet Take 1 tablet (20 mg) by mouth daily 90 tablet 3     ketotifen (ZADITOR) 0.025 % ophthalmic solution Place 1 drop into both  eyes as needed       latanoprost (XALATAN) 0.005 % ophthalmic solution Place 1 drop into both eyes At Bedtime 3 Bottle 4     Lidocaine (LIDOCARE) 4 % Patch Place 1 patch onto the skin every 24 hours Apply to back topically To prevent lidocaine toxicity, patient should be patch free for 12 hrs daily.       melatonin 5 MG tablet Take 5 mg by mouth At Bedtime       netarsudil (RHOPRESSA) 0.02 % ophthalmic solution 1 drop At Bedtime       polyethylene glycol (MIRALAX) 17 g packet Take 17 g by mouth daily       potassium chloride ER (KLOR-CON) 10 MEQ CR tablet TAKE ONE TABLET DAILY WITH EACH 20 MG DOSE OF FUROSEMIDE. 90 tablet 1     senna-docusate (SENOKOT-S/PERICOLACE) 8.6-50 MG tablet Take 1 tablet by mouth daily       tamsulosin (FLOMAX) 0.4 MG capsule Take 0.4 mg by mouth daily   3     traMADol (ULTRAM) 50 MG tablet Take 0.5 tablets (25 mg) by mouth every 6 hours as needed for severe pain 20 tablet 0     Vitamin D, Cholecalciferol, 1000 UNITS TABS Take 1 tablet by mouth daily        warfarin ANTICOAGULANT (COUMADIN) 2.5 MG tablet Take 1.5 tablets (3.75 mg) on Sundays and take 1 tablet (2.5 mg) all other days or as directed by the INR clinic 135 tablet 0     alendronate (FOSAMAX) 70 MG tablet Take 1 tablet (70 mg) by mouth every 7 days 12 tablet 0     warfarin ANTICOAGULANT (COUMADIN) 1 MG tablet 1 mg Monday & Friday and 2 mg Rest of Week 150 tablet 0             **I reviewed the information recorded in the patient's EPIC chart (including but not limited to medical history, surgical history, family history, problem list, medication list, and allergy list) and updated the information as indicated based on the patients reported information.         Review of Systems   Constitutional, HEENT, cardiovascular, pulmonary, gi and gu systems are negative, except as otherwise noted.      Objective    /66   Pulse 66   Temp 97  F (36.1  C) (Oral)   Wt 72.6 kg (160 lb)   BMI 21.11 kg/m    Body mass index is 21.11  kg/m .  Physical Exam   GENERAL alert and no distress, frail, weak, sitting in wheelchair.   EYES:  Normal sclera,conjunctiva, EOMI  HENT: oral and posterior pharynx without lesions or erythema, facies symmetric  NECK: Neck supple. No LAD, without thyroidmegaly.  RESP: Clear to ausculation bilaterally without wheezes or crackles. Normal BS in all fields.  CV: Irregular rhythm ( consistent with known atrial fibrillation), regular rate; normal S1S2 without murmurs, rubs or gallops   LYMPH: no cervical lymph adenopathy appreciated  MS: extremities- no gross deformities of the visible extremities noted,   EXT:  1+ bilateral lower extremity edema      Results for orders placed or performed in visit on 05/09/22   CBC with platelets     Status: Abnormal   Result Value Ref Range    WBC Count 14.3 (H) 4.0 - 11.0 10e3/uL    RBC Count 4.04 (L) 4.40 - 5.90 10e6/uL    Hemoglobin 11.0 (L) 13.3 - 17.7 g/dL    Hematocrit 35.0 (L) 40.0 - 53.0 %    MCV 87 78 - 100 fL    MCH 27.2 26.5 - 33.0 pg    MCHC 31.4 (L) 31.5 - 36.5 g/dL    RDW 16.0 (H) 10.0 - 15.0 %    Platelet Count 314 150 - 450 10e3/uL   Basic metabolic panel     Status: Abnormal   Result Value Ref Range    Sodium 130 (L) 133 - 144 mmol/L    Potassium 4.2 3.4 - 5.3 mmol/L    Chloride 99 94 - 109 mmol/L    Carbon Dioxide (CO2) 26 20 - 32 mmol/L    Anion Gap 5 3 - 14 mmol/L    Urea Nitrogen 29 7 - 30 mg/dL    Creatinine 0.73 0.66 - 1.25 mg/dL    Calcium 8.7 8.5 - 10.1 mg/dL    Glucose 104 (H) 70 - 99 mg/dL    GFR Estimate 87 >60 mL/min/1.73m2   TSH with free T4 reflex     Status: Normal   Result Value Ref Range    TSH 3.11 0.40 - 4.00 mU/L

## 2022-05-09 NOTE — PATIENT INSTRUCTIONS
" Continue all medications at the same doses.  Contact your usual pharmacy if you need refills.      You absolutely should begin making arrangements for moving into an assisted living facility, I am very concerned about your ability to live independently due to you many medical problems and overall worsening weakness.      Return to see an ophthomologist (eye specialist) to recheck the eyes.      Return to see the Urology later in 2022 for recheck of the prostate, bladder emptying, and the renal mass.      Follow up with the Cardiology Clinic in Fall 2022.      Return to see me in approximately 3-4 months, sooner if needed.  Please get non-fasting labs done at the [facility] or any other [facility] Lab lab 1-2 days before this appointment (schedule a \"lab appointment\").  If you get the labs done at another clinic, make arrangements with them directly.  The orders will be in place.  Use [employer] or Call [phone] to schedule the appointment with me and lab appointment.            "

## 2022-05-12 ENCOUNTER — TRANSFERRED RECORDS (OUTPATIENT)
Dept: HEALTH INFORMATION MANAGEMENT | Facility: CLINIC | Age: 87
End: 2022-05-12
Payer: COMMERCIAL

## 2022-05-12 ENCOUNTER — ANTICOAGULATION THERAPY VISIT (OUTPATIENT)
Dept: ANTICOAGULATION | Facility: CLINIC | Age: 87
End: 2022-05-12
Payer: COMMERCIAL

## 2022-05-12 DIAGNOSIS — I48.20 CHRONIC ATRIAL FIBRILLATION (H): Primary | ICD-10-CM

## 2022-05-12 LAB — INR (EXTERNAL): 1.7 (ref 0.9–1.1)

## 2022-05-13 DIAGNOSIS — R60.0 BILATERAL LEG EDEMA: ICD-10-CM

## 2022-05-13 DIAGNOSIS — M81.0 SENILE OSTEOPOROSIS: ICD-10-CM

## 2022-05-13 DIAGNOSIS — I48.20 CHRONIC ATRIAL FIBRILLATION (H): ICD-10-CM

## 2022-05-13 RX ORDER — WARFARIN SODIUM 1 MG/1
TABLET ORAL
Qty: 150 TABLET | Refills: 0 | Status: SHIPPED | OUTPATIENT
Start: 2022-05-13 | End: 2022-05-31

## 2022-05-17 RX ORDER — ALENDRONATE SODIUM 70 MG/1
TABLET ORAL
Qty: 12 TABLET | Refills: 0 | Status: CANCELLED | OUTPATIENT
Start: 2022-05-17

## 2022-05-17 NOTE — TELEPHONE ENCOUNTER
"Routing refill request to provider for review/approval because:  Failed protocol due to:    Dexa on file within past 2 years (Fosamax)    Potassium chloride and furosemide per discharge from hospital & TCU in March & April:  \"Plan:  continue lasix 20mg QD, KCL 10meq QD, amiodarone 200mg QD coumadin as directed INR goal of 2-3.\"    Patient scheduled for an establish care visit 7/14/22 with Dr. Kahn (previous Dr. Rider patient).    Gabrielle Guerrero RN      "

## 2022-05-18 DIAGNOSIS — M81.0 SENILE OSTEOPOROSIS: ICD-10-CM

## 2022-05-19 RX ORDER — ALENDRONATE SODIUM 70 MG/1
TABLET ORAL
Qty: 12 TABLET | Refills: 3 | OUTPATIENT
Start: 2022-05-19

## 2022-05-19 NOTE — TELEPHONE ENCOUNTER
According to last prescription patient should have refills available.    alendronate (FOSAMAX) 70 MG tablet 12 tablet 3 8/20/2021  No   Sig: TAKE 1 TAB WITH 8OZ OF WATER EVERY 7 DAYS 30 MIN BEFORE BREAKFAST & REMAIN UPRIGHT DURING THIS TIME   Sent to pharmacy as: Alendronate Sodium 70 MG Oral Tablet (FOSAMAX)   Class: E-Prescribe   Order: [id number]   E-Prescribing Status: Receipt confirmed by pharmacy (8/20/2021 10:56 AM CDT)

## 2022-05-19 NOTE — TELEPHONE ENCOUNTER
Routing refill request to provider for review/approval because:  No Dexa on file within the past 2 years .  ** prescription was last written by Dr. Rider. Please review and advise.

## 2022-05-20 ENCOUNTER — TELEPHONE (OUTPATIENT)
Dept: INTERNAL MEDICINE | Facility: CLINIC | Age: 87
End: 2022-05-20
Payer: COMMERCIAL

## 2022-05-20 NOTE — TELEPHONE ENCOUNTER
Received a call from the patient wondering when his next INR draw will occur. Patient states he normally has a phlebotomist come out to his house to draw his INR. Per note from anticoagulation, patient's next INR draw is due 5/23/22. Called scheduling line and they stated patient's phlebotomist will call the patient the night before to figure out the time.    Relayed information to the patient. Patient expressed understanding and had no additional questions at this time.    Gabrielle Guerrero RN

## 2022-05-23 ENCOUNTER — TRANSFERRED RECORDS (OUTPATIENT)
Dept: HEALTH INFORMATION MANAGEMENT | Facility: CLINIC | Age: 87
End: 2022-05-23

## 2022-05-23 ENCOUNTER — DOCUMENTATION ONLY (OUTPATIENT)
Dept: OTHER | Facility: CLINIC | Age: 87
End: 2022-05-23
Payer: COMMERCIAL

## 2022-05-23 ENCOUNTER — ANTICOAGULATION THERAPY VISIT (OUTPATIENT)
Dept: ANTICOAGULATION | Facility: CLINIC | Age: 87
End: 2022-05-23
Payer: COMMERCIAL

## 2022-05-23 ENCOUNTER — MYC MEDICAL ADVICE (OUTPATIENT)
Dept: CARDIOLOGY | Facility: CLINIC | Age: 87
End: 2022-05-23
Payer: COMMERCIAL

## 2022-05-23 DIAGNOSIS — I48.20 CHRONIC ATRIAL FIBRILLATION (H): Primary | ICD-10-CM

## 2022-05-23 LAB — INR (EXTERNAL): 2 (ref 0.9–1.1)

## 2022-05-23 NOTE — PROGRESS NOTES
ANTICOAGULATION MANAGEMENT     Ayaan Herrera 89 year old male is on warfarin with therapeutic INR result. (Goal INR 2.0-3.0)    Recent labs: (last 7 days)     05/23/22  1015   INR 2.0*       ASSESSMENT       Source(s): Chart Review and Patient/Caregiver Call       Warfarin doses taken: Warfarin taken as instructed    Diet: No new diet changes identified    New illness, injury, or hospitalization: No    Medication/supplement changes: Tylenol ~1,500mg/day.    Signs or symptoms of bleeding or clotting: No    Previous INR: Subtherapeutic    Additional findings: None       PLAN     Recommended plan for no diet, medication or health factor changes affecting INR     Dosing Instructions: continue your current warfarin dose with next INR in 2 weeks       Summary  As of 5/23/2022    Full warfarin instructions:  1 mg every Mon, Fri; 2 mg all other days   Next INR check:  6/2/2022             Telephone call with  Omar who verbalizes understanding and agrees to plan    Orders given to [facility] ([phone]). Orders to [facility].    Education provided: Goal range and significance of current result    Plan made per [facility] anticoagulation protocol    Quan Molina RN  Anticoagulation Clinic  5/23/2022    _______________________________________________________________________     Anticoagulation Episode Summary     Current INR goal:  2.0-3.0   TTR:  61.7 % (1 y)   Target end date:  Indefinite   Send INR reminders to:  ORLIN RUIZ    Indications    Chronic atrial fibrillation (H) [I48.20]           Comments:           Anticoagulation Care Providers     Provider Role Specialty Phone number    Dean Rider MD Referring Internal Medicine [phone]

## 2022-05-24 ENCOUNTER — TELEPHONE (OUTPATIENT)
Dept: INTERNAL MEDICINE | Facility: CLINIC | Age: 87
End: 2022-05-24
Payer: COMMERCIAL

## 2022-05-24 DIAGNOSIS — S22.080A CLOSED WEDGE COMPRESSION FRACTURE OF T11 VERTEBRA, INITIAL ENCOUNTER (H): Primary | ICD-10-CM

## 2022-05-24 DIAGNOSIS — R26.2 UNABLE TO AMBULATE: ICD-10-CM

## 2022-05-24 DIAGNOSIS — I48.20 CHRONIC ATRIAL FIBRILLATION (H): ICD-10-CM

## 2022-05-24 DIAGNOSIS — Z91.81 AT HIGH RISK FOR INJURY RELATED TO FALL: ICD-10-CM

## 2022-05-24 DIAGNOSIS — R26.89 POOR BALANCE: ICD-10-CM

## 2022-05-24 RX ORDER — ALENDRONATE SODIUM 70 MG/1
70 TABLET ORAL
Qty: 12 TABLET | Refills: 0 | Status: SHIPPED | OUTPATIENT
Start: 2022-05-24 | End: 2022-11-18

## 2022-05-24 NOTE — TELEPHONE ENCOUNTER
Forwarding to provider with whom patient has now apparently established care, so correct provider name attached to the order.  This provider is out of town today, will be back tomorrow.

## 2022-05-24 NOTE — TELEPHONE ENCOUNTER
Pt called     Was going through home care PT - stopped for 2 weeks due to he hurt himself     Wanted to resume, but cannot without an order requesting a new order to resume (referral to home care)     [facility]   Ph: [phone]    Georgette MADDOX, Triage RN  [facility] Internal Medicine Clinic

## 2022-05-25 ENCOUNTER — MEDICAL CORRESPONDENCE (OUTPATIENT)
Dept: HEALTH INFORMATION MANAGEMENT | Facility: CLINIC | Age: 87
End: 2022-05-25

## 2022-05-25 NOTE — TELEPHONE ENCOUNTER
Received a call back from the patient wondering about the status of getting physical therapy ordered. Informed patient a message has been sent to his PCP.    Patient is requesting that the order get mailed to his home address and then he will hand deliver the order to [facility]. Patient would also like a call back once the order has been placed.    Can we leave a detailed message on this number? YES  Phone number patient can be reached at: Home number on file [phone] (home)    Gabrielle Guerrero RN  [facility] Triage

## 2022-05-26 ENCOUNTER — TELEPHONE (OUTPATIENT)
Dept: INTERNAL MEDICINE | Facility: CLINIC | Age: 87
End: 2022-05-26
Payer: COMMERCIAL

## 2022-05-26 DIAGNOSIS — R60.0 BILATERAL LEG EDEMA: ICD-10-CM

## 2022-05-26 DIAGNOSIS — R26.89 POOR BALANCE: ICD-10-CM

## 2022-05-26 DIAGNOSIS — M62.81 GENERALIZED MUSCLE WEAKNESS: ICD-10-CM

## 2022-05-26 DIAGNOSIS — S22.080A CLOSED WEDGE COMPRESSION FRACTURE OF T11 VERTEBRA, INITIAL ENCOUNTER (H): ICD-10-CM

## 2022-05-26 DIAGNOSIS — I42.9 CARDIOMYOPATHY, UNSPECIFIED TYPE (H): Primary | ICD-10-CM

## 2022-05-26 NOTE — TELEPHONE ENCOUNTER
Pt called to check on this     Needs new home care referral for resuming PT     Requesting ASAP     He has called 3x for [facility]     States to call him at home phone     Georgette MADDOX, Triage RN  [facility] Internal Medicine Clinic

## 2022-05-26 NOTE — TELEPHONE ENCOUNTER
Home care advising home care referral was declined due to staffing     Please advise - would you recommend care coordination referral to assist with finding different agency?     Georgette MADDOX, Triage RN  [facility] Internal Medicine Clinic

## 2022-05-26 NOTE — TELEPHONE ENCOUNTER
I just returned back into the clinic yesterday after being gone for 2 weeks.     Order for home care placed.     However, I also received forms to be filled out for admission to [facility].   I completed those forms and sent them back.    If he is moving into [facility], then he will not need home care ordered.

## 2022-05-27 ENCOUNTER — PATIENT OUTREACH (OUTPATIENT)
Dept: NURSING | Facility: CLINIC | Age: 87
End: 2022-05-27
Attending: INTERNAL MEDICINE
Payer: COMMERCIAL

## 2022-05-27 NOTE — PROGRESS NOTES
Clinic Care Coordination Contact  Care Team Conversations    Request received from Patient's Care Team to assist with finding an alternate home care agency, as [facility] is unable to accept due to being at capacity. [facility] with finding an alternate [facility] care agency.     MARILY Domínguez  Clinic Care Coordinator  Ph. [phone]  [email or web address]

## 2022-05-27 NOTE — TELEPHONE ENCOUNTER
Care coordination referral placed to see what can be done.     Is the patient moving into assisted living at [facility] sometime soon?    I just signed admission orders and sent them to [facility].      If he is moving into assisted living sometime soon, then home care may be unnecessary.

## 2022-05-28 DIAGNOSIS — I48.20 CHRONIC ATRIAL FIBRILLATION (H): ICD-10-CM

## 2022-05-31 RX ORDER — FUROSEMIDE 20 MG
20 TABLET ORAL DAILY
Qty: 90 TABLET | Refills: 0 | Status: SHIPPED | OUTPATIENT
Start: 2022-05-31 | End: 2022-07-25

## 2022-05-31 RX ORDER — WARFARIN SODIUM 1 MG/1
TABLET ORAL
Qty: 165 TABLET | Refills: 1 | Status: SHIPPED | OUTPATIENT
Start: 2022-05-31 | End: 2023-06-06

## 2022-05-31 RX ORDER — POTASSIUM CHLORIDE 750 MG/1
TABLET, EXTENDED RELEASE ORAL
Qty: 90 TABLET | Refills: 0 | Status: SHIPPED | OUTPATIENT
Start: 2022-05-31 | End: 2023-06-06

## 2022-05-31 NOTE — TELEPHONE ENCOUNTER
ANTICOAGULATION MANAGEMENT:  Medication Refill    Anticoagulation Summary  As of 5/23/2022    Warfarin maintenance plan:  1 mg (1 mg x 1) every Mon, Fri; 2 mg (1 mg x 2) all other days   Next INR check:  6/2/2022   Target end date:  Indefinite    Indications    Chronic atrial fibrillation (H) [I48.20]             Anticoagulation Care Providers     Provider Role Specialty Phone number    Dean Rider MD Referring Internal Medicine [phone]          Visit with referring provider/group within last year: Yes    [facility] referral signed within last year: Yes    Ayaan meets all criteria for refill (current [facility] referral, office visit with referring provider/group in last year, lab monitoring up to date or not exceeding 2 weeks overdue). Rx instructions and quantity supplied updated to match patient's current dosing plan. Warfarin 90 day supply with 1 refill granted per [facility] protocol     Vale Morales RN  Anticoagulation Clinic

## 2022-05-31 NOTE — TELEPHONE ENCOUNTER
Patient Contact    Attempt # 2    Was call answered?  Yes, triage nurse spoke to the patient's home health aid. Patient was currently in the bathroom. Home health aid wrote down the phone number for the [facility] clinic and will have the patient call the clinic back.    Gabrielle Guerrero RN

## 2022-05-31 NOTE — TELEPHONE ENCOUNTER
Pt calling back, he is moving to [facility], tomorrow.   No homecare needed.

## 2022-05-31 NOTE — TELEPHONE ENCOUNTER
Prescription(s) sent electronically to specified pharmacy.    I only sent 3-month prescription, further dosing depend on clinical status and lab results.    Has follow-up appointment Dr. Kahn in 1 month.

## 2022-06-01 ENCOUNTER — LAB REQUISITION (OUTPATIENT)
Dept: LAB | Facility: CLINIC | Age: 87
End: 2022-06-01
Payer: COMMERCIAL

## 2022-06-01 ENCOUNTER — TELEPHONE (OUTPATIENT)
Dept: INTERNAL MEDICINE | Facility: CLINIC | Age: 87
End: 2022-06-01

## 2022-06-01 ENCOUNTER — MEDICAL CORRESPONDENCE (OUTPATIENT)
Dept: HEALTH INFORMATION MANAGEMENT | Facility: CLINIC | Age: 87
End: 2022-06-01

## 2022-06-01 ENCOUNTER — TELEPHONE (OUTPATIENT)
Dept: INTERNAL MEDICINE | Facility: CLINIC | Age: 87
End: 2022-06-01
Payer: COMMERCIAL

## 2022-06-01 DIAGNOSIS — I48.20 CHRONIC ATRIAL FIBRILLATION, UNSPECIFIED (H): ICD-10-CM

## 2022-06-01 NOTE — TELEPHONE ENCOUNTER
Called and spoke with Moncho at [facility]. The patient is moving in today and he was calling to get information on INR. Last 3 INR's given along with current dosing. Last AVS sent to the facility at fax [phone].    Moncho notes that [facility] will manage INR that will be done on 6/2/2022 but after that will most likely be managed by an in house provider. Did ask for a call to verify when that occurs.    Sridevi Brito RN    [facility] Anticoagulation Clinic

## 2022-06-02 ENCOUNTER — ANTICOAGULATION THERAPY VISIT (OUTPATIENT)
Dept: ANTICOAGULATION | Facility: CLINIC | Age: 87
End: 2022-06-02

## 2022-06-02 DIAGNOSIS — I48.20 CHRONIC ATRIAL FIBRILLATION (H): Primary | ICD-10-CM

## 2022-06-02 LAB — INR PPP: 1.75 (ref 0.85–1.15)

## 2022-06-02 PROCEDURE — 36415 COLL VENOUS BLD VENIPUNCTURE: CPT | Mod: ORL | Performed by: INTERNAL MEDICINE

## 2022-06-02 PROCEDURE — 85610 PROTHROMBIN TIME: CPT | Mod: ORL

## 2022-06-02 PROCEDURE — 85610 PROTHROMBIN TIME: CPT | Mod: ORL | Performed by: INTERNAL MEDICINE

## 2022-06-02 PROCEDURE — P9604 ONE-WAY ALLOW PRORATED TRIP: HCPCS | Mod: ORL | Performed by: INTERNAL MEDICINE

## 2022-06-02 PROCEDURE — 36415 COLL VENOUS BLD VENIPUNCTURE: CPT | Mod: ORL

## 2022-06-02 PROCEDURE — P9604 ONE-WAY ALLOW PRORATED TRIP: HCPCS | Mod: ORL

## 2022-06-02 NOTE — PROGRESS NOTES
ANTICOAGULATION MANAGEMENT     Ayaan Herrera 89 year old male is on warfarin with subtherapeutic INR result. (Goal INR 2.0-3.0)    Recent labs: (last 7 days)     06/02/22  1000   INR 1.75*       ASSESSMENT       Source(s): Chart Review and Home Care/Facility Nurse       Warfarin doses taken: Warfarin taken as instructed    Diet: Moved to assisted living yesterday, appetite may not be the same as normal.    New illness, injury, or hospitalization: Yes: Fall on 6/1/22 at facility, small skin tear, no other injuries, did not hit head.    Medication/supplement changes: None noted    Signs or symptoms of bleeding or clotting: No    Previous INR: Therapeutic last visit; previously outside of goal range    Additional findings: None       PLAN     Recommended plan for temporary change(s) affecting INR     Dosing Instructions: booster dose then continue your current warfarin dose with next INR in 1 week       Summary  As of 6/2/2022    Full warfarin instructions:  6/2: 3 mg; Otherwise 1 mg every Mon, Fri; 2 mg all other days   Next INR check:  6/9/2022             Telephone call with [facility] care[facility]facility nurse who verbalizes understanding and agrees to plan and who agrees to plan and repeated back plan correctly     Faxing AVS to [phone]    Orders given to  Homecare nurse/facility to recheck    Education provided: Please call back if any changes to your diet, medications or how you've been taking warfarin    Plan made per [facility] anticoagulation protocol    Temi Johnson RN  Anticoagulation Clinic  6/2/2022    _______________________________________________________________________     Anticoagulation Episode Summary     Current INR goal:  2.0-3.0   TTR:  59.0 % (1 y)   Target end date:  Indefinite   Send INR reminders to:  ORLIN RUIZ    Indications    Chronic atrial fibrillation (H) [I48.20]           Comments:           Anticoagulation Care Providers     Provider Role Specialty Phone number    Dean Rider,  MD [facility] Internal Medicine [phone]

## 2022-06-02 NOTE — PROGRESS NOTES
Call to [facility]    LEFT VOICE MESSAGE to call back regarding INR result and warfarin dosing today.  Patient moved into facility yesterday; on-site provider may be taking over warfarin dosing.    Waiting for a call back.

## 2022-06-03 NOTE — PROGRESS NOTES
Re faxed per voicemail to facility.    Sridevi Brito RN    [facility] Anticoagulation [facility]

## 2022-06-06 ENCOUNTER — DOCUMENTATION ONLY (OUTPATIENT)
Dept: ANTICOAGULATION | Facility: CLINIC | Age: 87
End: 2022-06-06
Payer: COMMERCIAL

## 2022-06-06 ENCOUNTER — APPOINTMENT (OUTPATIENT)
Dept: CT IMAGING | Facility: CLINIC | Age: 87
End: 2022-06-06
Attending: EMERGENCY MEDICINE
Payer: COMMERCIAL

## 2022-06-06 ENCOUNTER — HOSPITAL ENCOUNTER (EMERGENCY)
Facility: CLINIC | Age: 87
Discharge: HOME | End: 2022-06-06
Attending: EMERGENCY MEDICINE | Admitting: EMERGENCY MEDICINE
Payer: COMMERCIAL

## 2022-06-06 VITALS
SYSTOLIC BLOOD PRESSURE: 121 MMHG | HEART RATE: 70 BPM | TEMPERATURE: 98 F | RESPIRATION RATE: 17 BRPM | OXYGEN SATURATION: 95 % | DIASTOLIC BLOOD PRESSURE: 75 MMHG

## 2022-06-06 DIAGNOSIS — Z79.01 LONG TERM CURRENT USE OF ANTICOAGULANT THERAPY: ICD-10-CM

## 2022-06-06 DIAGNOSIS — S09.90XA CLOSED HEAD INJURY, INITIAL ENCOUNTER: ICD-10-CM

## 2022-06-06 DIAGNOSIS — I48.20 CHRONIC ATRIAL FIBRILLATION (H): Primary | ICD-10-CM

## 2022-06-06 PROCEDURE — 70450 CT HEAD/BRAIN W/O DYE: CPT

## 2022-06-06 PROCEDURE — 99284 EMERGENCY DEPT VISIT MOD MDM: CPT | Mod: 25

## 2022-06-06 ASSESSMENT — ENCOUNTER SYMPTOMS
HEADACHES: 1
BACK PAIN: 0
ABDOMINAL PAIN: 0
CHEST TIGHTNESS: 0
NECK PAIN: 0

## 2022-06-06 NOTE — ED PROVIDER NOTES
History   Chief Complaint:  Head Injury     The history is provided by the patient.      Ayaan Herrera is a 89 year old male on Warfarin presents after a mechanical fall. Per the patient, today he fell in the bathroom while standing up from a stool because the floor was slippery. He fell backwards, hitting his head on the bath tub. He denies syncope or pain in his neck, chest, abdomin or extremities or any other injury. Patient reports he had back surgery in January but denies any back pain.     Review of Systems   Respiratory: Negative for chest tightness.    Gastrointestinal: Negative for abdominal pain.   Musculoskeletal: Negative for back pain and neck pain.   Neurological: Positive for headaches (Hit head). Negative for syncope.   All other systems reviewed and are negative.    Allergies:  Zithromax  Amoxicillin  Augmentin    Medications:  Alendronate  Amiodarone  Furosemide  Polyethylene Glycol  Potassium Chloride ER  Senna-Docusate  Tamsulosin  Tramadol  Warfarin    Past Medical History:     Atrial Fibrillation  Basal Cell Cancer and Squamous   BPH  Colonic Polyps  Dyslipidemia  ED  History of Basal Cell Carcinoma  Hyperlipidemia  Hypertension  Hypertrophy of Prostate  Mitral Valve Insufficiency  Non-senile Cataract  NSVT  Osteoporosis   POAG  Prostate Cancer  Psoriasis  Sinus Node Dysfunction     Past Surgical History:    Bilateral Inguinal Hernia  Cataract Removal  EP Pacemaker  Glaucoma Surgery  Laser Surgery of Left Eye  Punctal Closure  Tonsils and Anoidectomy  TURP    Family History:    Glaucoma, Cancer - Father  Macular Degeneration, Heart Disease - Mother  Cancer, Hypertension - Sister    Social History:  Patient is unaccompanied.   Patient is a former smoker.     Physical Exam     Patient Vitals for the past 24 hrs:   BP Temp Temp src Pulse SpO2   06/06/22 1441 124/78 98  F (36.7  C) Temporal 70 97 %       Physical Exam   Eye:  Pupils are equal, round, and reactive.  Extraocular movements  intact.    ENT:  No rhinorrhea.  Moist mucus membranes.  Normal tongue and tonsil.    Cardiac:  Regular rate and rhythm.  No murmurs, gallops, or rubs.    Pulmonary:  Clear to auscultation bilaterally.  No wheezes, rales, or rhonchi.    Abdomen:  Positive bowel sounds.  Abdomen is soft and non-distended, without focal tenderness.    Musculoskeletal:  Normal movement of all extremities without evidence for deficit. No midline tenderness to the neck or lumbar spine. Diffuse chronic discomfort through thoracic spine.    Skin:  Warm and dry without rashes. Hematoma noted to the right parietal scalp.     Neurologic:  Cranial Nerves II - XII. Intact. Non-focal exam without asymmetric weakness or numbness.     Psychiatric:  Normal affect with appropriate interaction with examiner.    Emergency Department Course   Imaging:  Head CT w/o contrast   Preliminary Result   IMPRESSION: No acute intracranial abnormality.        Report per radiology    Emergency Department Course:    Reviewed:  I reviewed nursing notes, vitals and past medical history    Assessments:  1443 I obtained history and examined the patient as noted above.     Disposition:  The patient was discharged to home.     Impression & Plan   Medical Decision Making:  This delightful 89-year-old man presents to us after suffering a fall today.  He states that he was getting off the toilet, lost his balance, fell backwards, striking back of head on the edge of the tub.  There was no loss of consciousness but he is anticoagulated.  Head CT is negative.  Otherwise, head to toe trauma exam is reassuring.  There is no midline tenderness to the neck.  There is no tenderness to the low back.  The patient does have a history of a somewhat recent compression fracture of T11 he notes soreness to this area, but denies it hurting any more than typical.  I personally got him up and ambulated him and he states that he feels that he is at his baseline.  He has no chest or abdominal  pain.  His vital signs are reassuring.  At this juncture, I feel he is safe for discharge home and he is in support of this as well.  Otherwise, he was invited back to our facility at any point for worsening of condition or other emergent concerns.    Diagnosis:    ICD-10-CM    1. Closed head injury, initial encounter  S09.90XA    2. Long term current use of anticoagulant therapy  Z79.01        Scribe Disclosure:  I, Prabhu Waters & He Sequeira, am serving as a scribe at 2:37 PM on 6/6/2022 to document services personally performed by Trierweiler, Chad A, MD based on my observations and the provider's statements to me.          Trierweiler, Chad A, MD  06/06/22 2599

## 2022-06-06 NOTE — PROGRESS NOTES
ANTICOAGULATION  MANAGEMENT: Discharge Review    Ayaan Herrera chart reviewed for anticoagulation continuity of care    Emergency room visit on 6/6/22 for Head Injury.    Discharge disposition: Home    Results:    Recent labs: (last 7 days)     06/02/22  1000   INR 1.75*     Anticoagulation inpatient management:     not applicable     Anticoagulation discharge instructions:     Warfarin dosing: home regimen continued   Bridging: No   INR goal change: No      Medication changes affecting anticoagulation: No    Additional factors affecting anticoagulation: No    Plan     No adjustment to anticoagulation plan needed    Recommended follow up is scheduled on 6/9    No adjustment to Anticoagulation Calendar was required    Macrina Hogue RN

## 2022-06-06 NOTE — ED NOTES
Bed: ED06  Expected date:   Expected time:   Means of arrival:   Comments:  [facility] - 89 M fall head injury eta 1438

## 2022-06-09 ENCOUNTER — MEDICAL CORRESPONDENCE (OUTPATIENT)
Dept: HEALTH INFORMATION MANAGEMENT | Facility: CLINIC | Age: 87
End: 2022-06-09

## 2022-06-13 ENCOUNTER — TELEPHONE (OUTPATIENT)
Dept: INTERNAL MEDICINE | Facility: CLINIC | Age: 87
End: 2022-06-13
Payer: COMMERCIAL

## 2022-06-13 NOTE — TELEPHONE ENCOUNTER
Spoke to Intake at [facility] to relay providers approval for delay of cares. Orders will be faxed to clinic for signature.

## 2022-06-13 NOTE — TELEPHONE ENCOUNTER
The Home Care/Assisted Living/Nursing Facility is calling regarding an established patient.  Has the patient seen Home Care in the past or is currently residing in Assisted Living or Nursing Facility? Yes.     Lauren,  calling from [employer] requesting the following orders that are within the Home Care, Assisted Living or Nursing Home Eval and Treatment standing order and can be signed as standing order signature required by RN.    Requesting delay of start for home PT/OT. Order dated 5/25    Preferred Call Back Number: [phone], ok to leave detailed message.     PT/OT/Speech Therapy. Requesting delay of start for initial evaluation and treatment PT/OT.    Any additional Orders:  Are there any orders requested, not stated above, that are outside of the standing order and must be routed to a licensed practitioner for approval?    No    Writer has verified [employer] will send fax to have orders signed.  Meredith Awan RN

## 2022-06-16 ENCOUNTER — DOCUMENTATION ONLY (OUTPATIENT)
Dept: ANTICOAGULATION | Facility: CLINIC | Age: 87
End: 2022-06-16
Payer: COMMERCIAL

## 2022-06-16 NOTE — PROGRESS NOTES
ANTICOAGULATION     Ayaan SOUSA Heavenlyboom is overdue for INR check.      Called [phone] [facility] and left message to  call back to discuss INR recheck.    Macrina Hogue RN

## 2022-06-17 ENCOUNTER — TELEPHONE (OUTPATIENT)
Dept: INTERNAL MEDICINE | Facility: CLINIC | Age: 87
End: 2022-06-17
Payer: COMMERCIAL

## 2022-06-17 NOTE — TELEPHONE ENCOUNTER
Emily with [facility] calling to request Speech therapy visits 1x/week for 3 weeks to work on Swallowing and cognitive linguistics skill building.     Verbal approval given per protocol.

## 2022-06-17 NOTE — TELEPHONE ENCOUNTER
The Home Care/Assisted Living/Nursing Facility is calling regarding an established patient.  Has the patient seen Home Care in the past or is currently residing in Assisted Living or Nursing Facility? Yes.     Cari, clinical coordinator  calling from [employer] requesting the following orders that are within the Home Care, Assisted Living or Nursing Home Eval and Treatment standing order and can be signed as standing order signature required by RN.    Preferred Call Back Number: [phone]    PT/OT/Speech Therapy OT once weekly x 4 weeks for w/c mgmt, seated/standing balance,safety transfers, and ADLs    Any additional Orders:  Are there any orders requested, not stated above, that are outside of the standing order and must be routed to a licensed practitioner for approval?    No    Writer has verified [employer] will send fax to have orders signed.      Rylee HERMAN RN  EP Triage

## 2022-06-23 ENCOUNTER — LAB REQUISITION (OUTPATIENT)
Dept: LAB | Facility: CLINIC | Age: 87
End: 2022-06-23
Payer: COMMERCIAL

## 2022-06-23 ENCOUNTER — MEDICAL CORRESPONDENCE (OUTPATIENT)
Dept: HEALTH INFORMATION MANAGEMENT | Facility: CLINIC | Age: 87
End: 2022-06-23

## 2022-06-23 ENCOUNTER — ANTICOAGULATION THERAPY VISIT (OUTPATIENT)
Dept: ANTICOAGULATION | Facility: CLINIC | Age: 87
End: 2022-06-23

## 2022-06-23 ENCOUNTER — TELEPHONE (OUTPATIENT)
Dept: INTERNAL MEDICINE | Facility: CLINIC | Age: 87
End: 2022-06-23

## 2022-06-23 DIAGNOSIS — I48.20 CHRONIC ATRIAL FIBRILLATION, UNSPECIFIED (H): ICD-10-CM

## 2022-06-23 DIAGNOSIS — I48.20 CHRONIC ATRIAL FIBRILLATION (H): ICD-10-CM

## 2022-06-23 DIAGNOSIS — I47.29 PAROXYSMAL VENTRICULAR TACHYCARDIA (H): Primary | ICD-10-CM

## 2022-06-23 LAB — INR PPP: 1.11 (ref 0.85–1.15)

## 2022-06-23 PROCEDURE — 85610 PROTHROMBIN TIME: CPT | Mod: ORL | Performed by: INTERNAL MEDICINE

## 2022-06-23 PROCEDURE — P9604 ONE-WAY ALLOW PRORATED TRIP: HCPCS | Mod: ORL | Performed by: INTERNAL MEDICINE

## 2022-06-23 PROCEDURE — 36415 COLL VENOUS BLD VENIPUNCTURE: CPT | Mod: ORL | Performed by: INTERNAL MEDICINE

## 2022-06-23 PROCEDURE — 36415 COLL VENOUS BLD VENIPUNCTURE: CPT | Mod: ORL | Performed by: FAMILY MEDICINE

## 2022-06-23 PROCEDURE — P9604 ONE-WAY ALLOW PRORATED TRIP: HCPCS | Mod: ORL | Performed by: FAMILY MEDICINE

## 2022-06-23 PROCEDURE — 85610 PROTHROMBIN TIME: CPT | Mod: ORL | Performed by: FAMILY MEDICINE

## 2022-06-23 NOTE — TELEPHONE ENCOUNTER
INR order faxed INR clinic as reached out to [facility] in 6/23 encounter     Froylan De La Paz RN

## 2022-06-23 NOTE — PROGRESS NOTES
Gabby from [facility] LVM requesting information on patient INR. Gabby asking if patient still supposed to be taking warfarin and getting INR checks. [facility] LVM that patient is on warfarin therapy and is overdue for INR check. Writer requested INR be checked and called to INR team.  INR orders faxed to [phone]      Thanks! Mally Galdamez RN

## 2022-06-23 NOTE — PROGRESS NOTES
ANTICOAGULATION MANAGEMENT     Ayaan Herrera 89 year old male is on warfarin with subtherapeutic INR result. (Goal INR 2.0-3.0)    Recent labs: (last 7 days)     06/23/22  1036   INR 1.11       ASSESSMENT       Source(s): Chart Review and Home Care/Facility Nurse       Warfarin doses taken: Missed dose(s) may be affecting INR-patient has not had any warfarin for the past two weeks per facility nurse because the INR order was not processed correctly so he didn't have warfarin    Diet: No new diet changes identified    New illness, injury, or hospitalization: Yes: recent fall on 6/6, negative CT scan    Medication/supplement changes: None noted    Signs or symptoms of bleeding or clotting: No    Previous INR: Subtherapeutic    Additional findings: None       PLAN     Recommended plan for temporary change(s) affecting INR     Dosing Instructions: continue your current warfarin dose with next INR in 5 days       Summary  As of 6/23/2022    Full warfarin instructions:  6/23: 4 mg; Otherwise 1 mg every Mon, Fri; 2 mg all other days   Next INR check:  6/28/2022             Telephone call with [facility] home care/facility nurse who agrees to plan and repeated back plan correctly    Orders given to  Homecare nurse/facility to recheck    Education provided: Goal range and significance of current result, Importance of therapeutic range, Importance of following up at instructed interval, Importance of taking warfarin as instructed, Monitoring for clotting signs and symptoms and When to seek medical attention/emergency care    Plan made per [facility] anticoagulation protocol    Lorelei Griffith, RN  Anticoagulation Clinic  6/23/2022    _______________________________________________________________________     Anticoagulation Episode Summary     Current INR goal:  2.0-3.0   TTR:  53.2 % (1 y)   Target end date:  Indefinite   Send INR reminders to:  ORLIN RUIZ    Indications    Chronic atrial fibrillation (H) [I48.20]            Comments:           Anticoagulation Care Providers     Provider Role Specialty Phone number    Dean Rider MD Referring Internal Medicine [phone]

## 2022-06-23 NOTE — TELEPHONE ENCOUNTER
Erika from [facility] where patient lives called to report an error, INR check on 6/9 was missed and coumadin has not been given to patient since 6/9     Faxed order for stat INR placed on Dr. Clifton desk also routing to [facility] as erika RN would like a call back from them as far as dosing and plan    Froylan De La Paz RN

## 2022-06-24 ENCOUNTER — TELEPHONE (OUTPATIENT)
Dept: INTERNAL MEDICINE | Facility: CLINIC | Age: 87
End: 2022-06-24

## 2022-06-24 NOTE — TELEPHONE ENCOUNTER
Reason for Call:  Other order    Detailed comments: Cari from [facility] called today.  Needsd verbal orders from Dr Morrell at  INTERNAL MEDICINE.    1)  Skilled Nursing for eval and treat  2)  Wound Care  3)  OT Lympdema and treat    Please contact her back.  Thank you.    Phone Number Patient can be reached at: Other phone number:  [phone]*    Best Time: any Monday    Can we leave a detailed message on this number? YES    Call taken on 6/24/2022 at 3:00 PM by Jazmin Cleary

## 2022-06-28 ENCOUNTER — TELEPHONE (OUTPATIENT)
Dept: ANTICOAGULATION | Facility: CLINIC | Age: 87
End: 2022-06-28

## 2022-06-28 DIAGNOSIS — H40.9 GLAUCOMA, UNSPECIFIED GLAUCOMA TYPE, UNSPECIFIED LATERALITY: Primary | ICD-10-CM

## 2022-06-28 DIAGNOSIS — I48.20 CHRONIC ATRIAL FIBRILLATION (H): Primary | ICD-10-CM

## 2022-06-28 NOTE — TELEPHONE ENCOUNTER
Cari from [facility] called to follow up on the orders they are requesting for the pt. OT Lymph is seeing pt 1400 today so Cari was hoping to have the orders before then.         Can we leave a detailed message on this number? YES  Phone number patient can be reached at: [phone]     Felicia Samuels RN  [facility] Triage

## 2022-06-28 NOTE — TELEPHONE ENCOUNTER
OK for all of these orders.     Let me know if anything more than a verbal order is required.   Send me any papers to sign if needed.   (although I think I already signed all of these already earlier this month)    Close encounter when done.

## 2022-06-28 NOTE — TELEPHONE ENCOUNTER
Received a VM from [facility]  with Surjit stating INR unable to be done today by lab.  They are requesting an order for 1 day coumadin.    Informed Surjit TALAMANTES to have patient continue coumadin dose of 2 mg tonight with recheck of INR tomorrow.    Precious Flowers, RN  Anticoagulation Nurse - [facility] [place]

## 2022-06-29 ENCOUNTER — DOCUMENTATION ONLY (OUTPATIENT)
Dept: ANTICOAGULATION | Facility: CLINIC | Age: 87
End: 2022-06-29

## 2022-06-29 ENCOUNTER — MEDICAL CORRESPONDENCE (OUTPATIENT)
Dept: HEALTH INFORMATION MANAGEMENT | Facility: CLINIC | Age: 87
End: 2022-06-29

## 2022-06-29 ENCOUNTER — LAB REQUISITION (OUTPATIENT)
Dept: LAB | Facility: CLINIC | Age: 87
End: 2022-06-29
Payer: COMMERCIAL

## 2022-06-29 ENCOUNTER — TELEPHONE (OUTPATIENT)
Dept: OTHER | Facility: CLINIC | Age: 87
End: 2022-06-29

## 2022-06-29 DIAGNOSIS — Z53.9 DIAGNOSIS NOT YET DEFINED: Primary | ICD-10-CM

## 2022-06-29 DIAGNOSIS — I48.20 CHRONIC ATRIAL FIBRILLATION (H): Primary | ICD-10-CM

## 2022-06-29 PROCEDURE — G0180 MD CERTIFICATION HHA PATIENT: HCPCS | Performed by: INTERNAL MEDICINE

## 2022-06-29 RX ORDER — BRIMONIDINE TARTRATE 1.5 MG/ML
SOLUTION/ DROPS OPHTHALMIC
Qty: 5 ML | Refills: 0 | OUTPATIENT
Start: 2022-06-29

## 2022-06-29 NOTE — PROGRESS NOTES
Received a VM from dragan Pierre, who reports that lab cannot come out until 6/30. Advised to take 2 mg wed 6/29/thur 6/30 and recheck INR on Thursday 6/30.    Macrina Hogue RN

## 2022-06-29 NOTE — TELEPHONE ENCOUNTER
[facility]    Who is the name of the provider?:  Lex      What is the location you see this provider at?: [place]    Reason for call:  Are there any compression restrictions or VICENTE values?  Verbal orders ok.    Can we leave a detailed message on this number?  YES

## 2022-06-29 NOTE — TELEPHONE ENCOUNTER
He has been receiving this prescription from an ophthalmologist, please contact whoever is prescribing medicine most recently and have them renew the prescription.  More importantly, he needs to have ongoing follow-up with the ophthalmologist regarding glaucoma.

## 2022-06-29 NOTE — TELEPHONE ENCOUNTER
Routing refill request to provider for review/approval because:  Drug not on the [facility] refill protocol, also filled 7 years ago my opthalmology. PCP to review and refill if appropriate.     Appointments in Next Year      Jul 14, 2022  2:30 PM  (Arrive by 2:10 PM)  Provider Visit with Charli Kahn MD  [facility] [phone]

## 2022-06-30 ENCOUNTER — TELEPHONE (OUTPATIENT)
Dept: INTERNAL MEDICINE | Facility: CLINIC | Age: 87
End: 2022-06-30

## 2022-06-30 ENCOUNTER — ANTICOAGULATION THERAPY VISIT (OUTPATIENT)
Dept: ANTICOAGULATION | Facility: CLINIC | Age: 87
End: 2022-06-30

## 2022-06-30 DIAGNOSIS — I48.20 CHRONIC ATRIAL FIBRILLATION (H): Primary | ICD-10-CM

## 2022-06-30 LAB — INR PPP: 1.14 (ref 0.85–1.15)

## 2022-06-30 PROCEDURE — 85610 PROTHROMBIN TIME: CPT | Mod: ORL | Performed by: RADIOLOGY

## 2022-06-30 PROCEDURE — 36415 COLL VENOUS BLD VENIPUNCTURE: CPT | Mod: ORL | Performed by: RADIOLOGY

## 2022-06-30 PROCEDURE — P9604 ONE-WAY ALLOW PRORATED TRIP: HCPCS | Mod: ORL | Performed by: RADIOLOGY

## 2022-06-30 NOTE — PROGRESS NOTES
Fax came in at 013  Result continues to be critical. Spoke with Kristina, nurse at [facility] who stated she had no information for INR nurse regarding patient and she would need to call INR clinic back tomorrow , 7/1.   Writer emphasized importance of discussion tomorrow and gave dosing of 4 mg for tonight, 6/30.    Thanks! Mally Galdamez RN

## 2022-06-30 NOTE — TELEPHONE ENCOUNTER
I returned Helen, OT - [facility]  [phone] and shared we have not seen patient since November 2021 and he will need to come in and establish care with new vascular provider to be able to advise if it is safe to do lymphedema wraps with his popliteal and iliac aneurysms. She verbalized understanding.     Hawa MEDEROS, RN    [facility]  Office: [phone]  Fax: [phone]

## 2022-06-30 NOTE — TELEPHONE ENCOUNTER
Care PT from [facility] calling to verbal orders:     PT 2x a week for 4 weeks    Writer gave verbal approval for homecare orders    Callback: [phone]- ok to leave detailed VM.   Christi Ashton RN  [facility]

## 2022-07-01 ENCOUNTER — MEDICAL CORRESPONDENCE (OUTPATIENT)
Dept: HEALTH INFORMATION MANAGEMENT | Facility: CLINIC | Age: 87
End: 2022-07-01

## 2022-07-01 NOTE — PROGRESS NOTES
"ANTICOAGULATION MANAGEMENT     Ayaan Herrera 89 year old male is on warfarin with subtherapeutic INR result. (Goal INR 2.0-3.0)    Recent labs: (last 7 days)     06/30/22  1009   INR 1.14       ASSESSMENT       Source(s): Chart Review and Home Care/Facility Nurse       Warfarin doses taken: Warfarin taken as instructed. Per RN, MAR says all doses were given. ALBIN to start using \"bubble pack\" from pharmacy.    Diet: No new diet changes identified. Pt moved to [facility] 06/01 and is eating food provided by the [facility]. RN states they sometimes serve broccoli but not a lot of other high vit K foods.    New illness, injury, or hospitalization: Has felt tired of late.    Medication/supplement changes: None noted    Signs or symptoms of bleeding or clotting: No    Previous INR: Subtherapeutic    Additional findings: Prior to February compression fracture, pt was on ~18.5mg/week       PLAN     Recommended plan for no diet, medication or health factor changes affecting INR     Dosing Instructions: booster dose then Increase your warfarin dose (42% change) with next INR in 5 days       Summary  As of 6/30/2022    Full warfarin instructions:  6/30: 4 mg; Otherwise 3 mg every Mon, Wed, Fri; 2 mg all other days   Next INR check:  7/5/2022             Telephone call with [facility] home care/facility nurse who verbalizes understanding and agrees to plan    Orders given to  Homecare nurse/facility to recheck    Education provided: Goal range and significance of current result    Plan made with [facility] Pharmacist Elizabeth Molina, RN  Anticoagulation Clinic  7/1/2022    _______________________________________________________________________     Anticoagulation Episode Summary     Current INR goal:  2.0-3.0   TTR:  51.1 % (1 y)   Target end date:  Indefinite   Send INR reminders to:  ORLIN RUIZ    Indications    Chronic atrial fibrillation (H) [I48.20]           Comments:           Anticoagulation Care Providers     Provider " Role Specialty Phone number    Dean Rider MD Referring Internal Medicine [phone]

## 2022-07-04 DIAGNOSIS — J01.00 ACUTE MAXILLARY SINUSITIS: ICD-10-CM

## 2022-07-04 RX ORDER — BRIMONIDINE TARTRATE 1.5 MG/ML
1 SOLUTION/ DROPS OPHTHALMIC 2 TIMES DAILY
Status: CANCELLED | OUTPATIENT
Start: 2022-07-04

## 2022-07-05 ENCOUNTER — TELEPHONE (OUTPATIENT)
Dept: INTERNAL MEDICINE | Facility: CLINIC | Age: 87
End: 2022-07-05

## 2022-07-05 DIAGNOSIS — I48.20 CHRONIC ATRIAL FIBRILLATION (H): Primary | ICD-10-CM

## 2022-07-05 RX ORDER — AMIODARONE HYDROCHLORIDE 200 MG/1
TABLET ORAL
Qty: 30 TABLET | Refills: 0 | OUTPATIENT
Start: 2022-07-05

## 2022-07-05 NOTE — TELEPHONE ENCOUNTER
Routing refill request to provider for review/approval because:  Drug not on the [facility] refill protocol      ANGELA Mccarthy  [facility]

## 2022-07-05 NOTE — TELEPHONE ENCOUNTER
Chart reviewed with [facility] RN at time of encounter.    Advise continue set dosing on calendar and check Thursday.    Elizabeth Perez, PharmD BCACP  Anticoagulation Clinical Pharmacist

## 2022-07-05 NOTE — TELEPHONE ENCOUNTER
prescription managed by his cardiologist Dr. Stevens at \[facility]\"" [facility] [place], will defer this RX to him

## 2022-07-05 NOTE — TELEPHONE ENCOUNTER
Voicemail left stating that the facility can not get lab out today to do any INR because of lab staffing. Gabby is wanting an order for warfarin till Thursday when lab will be out. She wants it called in and faxed over to ([phone] fax).    Sending to [facility] for review as last INR was 1.14.    Sridevi Brito RN    [facility] Anticoagulation Clinic

## 2022-07-05 NOTE — TELEPHONE ENCOUNTER
Left voicemail for facility with dosing instructions and faxed over calendar to the fax number that was left.    Sridevi Brito RN    [facility] Anticoagulation [facility]

## 2022-07-06 ENCOUNTER — LAB REQUISITION (OUTPATIENT)
Dept: LAB | Facility: CLINIC | Age: 87
End: 2022-07-06
Payer: COMMERCIAL

## 2022-07-06 DIAGNOSIS — I48.20 CHRONIC ATRIAL FIBRILLATION, UNSPECIFIED (H): ICD-10-CM

## 2022-07-06 NOTE — TELEPHONE ENCOUNTER
Routing refill request to provider for review/approval because:  Drug not on the [facility] refill protocol   A break in medication    Jennifer Rhodes RN

## 2022-07-07 ENCOUNTER — ANTICOAGULATION THERAPY VISIT (OUTPATIENT)
Dept: ANTICOAGULATION | Facility: CLINIC | Age: 87
End: 2022-07-07

## 2022-07-07 ENCOUNTER — TELEPHONE (OUTPATIENT)
Dept: INTERNAL MEDICINE | Facility: CLINIC | Age: 87
End: 2022-07-07

## 2022-07-07 DIAGNOSIS — J01.00 ACUTE MAXILLARY SINUSITIS: ICD-10-CM

## 2022-07-07 DIAGNOSIS — H40.89 OTHER GLAUCOMA OF BOTH EYES: Primary | ICD-10-CM

## 2022-07-07 DIAGNOSIS — I48.20 CHRONIC ATRIAL FIBRILLATION (H): Primary | ICD-10-CM

## 2022-07-07 LAB — INR PPP: 1.66 (ref 0.85–1.15)

## 2022-07-07 PROCEDURE — 36415 COLL VENOUS BLD VENIPUNCTURE: CPT | Mod: ORL | Performed by: INTERNAL MEDICINE

## 2022-07-07 PROCEDURE — P9604 ONE-WAY ALLOW PRORATED TRIP: HCPCS | Mod: ORL | Performed by: INTERNAL MEDICINE

## 2022-07-07 PROCEDURE — 85610 PROTHROMBIN TIME: CPT | Mod: ORL | Performed by: INTERNAL MEDICINE

## 2022-07-07 RX ORDER — BRIMONIDINE TARTRATE 1.5 MG/ML
1 SOLUTION/ DROPS OPHTHALMIC 2 TIMES DAILY
Qty: 5 ML | Refills: 0 | Status: SHIPPED | OUTPATIENT
Start: 2022-07-07 | End: 2022-07-21

## 2022-07-07 NOTE — TELEPHONE ENCOUNTER
The Home Care/Assisted Living/Nursing Facility is calling regarding an established patient.  Has the patient seen Home Care in the past or is currently residing in Assisted Living or Nursing Facility? No.     Helen  calling from  [employer]  requesting the following orders that are NOT within the Home Care, Assisted Living or Nursing Home Eval and Treatment standing order and must be ordered by a Licensed Practitioner.    Preferred Call Back Number: [phone]    Home Care Visits Continuation    Routing to Licensed Practitioner (Provider) to review request and provide approval or recommendation.    Writer has verified [employer] will send fax to have orders signed.    Deisi Jernigan RN  -[facility]

## 2022-07-07 NOTE — TELEPHONE ENCOUNTER
Routing refill request to provider for review/approval because:  Drug not on the [facility] refill protocol   Last ordered by Lissa Berry MD. Routing to PCP to review and advise.

## 2022-07-07 NOTE — PROGRESS NOTES
ANTICOAGULATION MANAGEMENT     Ayaan Herrera 89 year old male is on warfarin with subtherapeutic INR result. (Goal INR 2.0-3.0)    Recent labs: (last 7 days)     07/07/22  0845   INR 1.66*       ASSESSMENT       Source(s): Chart Review and Home Care/Facility Nurse       Warfarin doses taken: Warfarin taken as instructed    Diet: No new diet changes identified    New illness, injury, or hospitalization: No    Medication/supplement changes: None noted    Signs or symptoms of bleeding or clotting: No    Previous INR: Subtherapeutic    Additional findings: No reason for continued subtherapeutic INR       PLAN     Recommended plan for no diet, medication or health factor changes affecting INR     Dosing Instructions: booster dose then Increase your warfarin dose (11.8% change) with next INR in 1 week       Summary  As of 7/7/2022    Full warfarin instructions:  2 mg every Sun, Wed; 3 mg all other days   Next INR check:  7/14/2022             Telephone call with [facility] home care/facility nurse who verbalizes understanding and agrees to plan and who agrees to plan and repeated back plan correctly    Orders given to  Homecare nurse/facility to recheck    Education provided: Please call back if any changes to your diet, medications or how you've been taking warfarin    Plan made per [facility] anticoagulation protocol    Mally Galdamez, RN  Anticoagulation Clinic  7/7/2022    _______________________________________________________________________     Anticoagulation Episode Summary     Current INR goal:  2.0-3.0   TTR:  49.4 % (1 y)   Target end date:  Indefinite   Send INR reminders to:  [facility]    Indications    Chronic atrial fibrillation (H) [I48.20]           Comments:           Anticoagulation Care Providers     Provider Role Specialty Phone number    Dean Rider MD Referring Internal Medicine [phone]

## 2022-07-11 ENCOUNTER — TELEPHONE (OUTPATIENT)
Dept: INTERNAL MEDICINE | Facility: CLINIC | Age: 87
End: 2022-07-11

## 2022-07-11 DIAGNOSIS — S22.080A CLOSED WEDGE COMPRESSION FRACTURE OF T11 VERTEBRA, INITIAL ENCOUNTER (H): Primary | ICD-10-CM

## 2022-07-11 NOTE — TELEPHONE ENCOUNTER
Cari (clinical admin) from [facility] called the clinic requesting-     OT for 1x/wk for 3 weeks for working on ADLs and wheelchair mobility. Verbal okay given per policy.     Routing to PCP to review and advise if appropriate.     Can we leave a detailed message on this number? YES  Phone number patient can be reached at: [phone]    Felicia Samuels RN  [facility] Triage

## 2022-07-12 ENCOUNTER — LAB REQUISITION (OUTPATIENT)
Dept: LAB | Facility: CLINIC | Age: 87
End: 2022-07-12
Payer: COMMERCIAL

## 2022-07-12 ENCOUNTER — TELEPHONE (OUTPATIENT)
Dept: INTERNAL MEDICINE | Facility: CLINIC | Age: 87
End: 2022-07-12

## 2022-07-12 ENCOUNTER — MEDICAL CORRESPONDENCE (OUTPATIENT)
Dept: URGENT CARE | Facility: URGENT CARE | Age: 87
End: 2022-07-12

## 2022-07-12 DIAGNOSIS — I48.20 CHRONIC ATRIAL FIBRILLATION, UNSPECIFIED (H): ICD-10-CM

## 2022-07-12 NOTE — TELEPHONE ENCOUNTER
Called and left voicemail for Michelle with message from the provider.     Gabrielle Guerrero RN

## 2022-07-12 NOTE — TELEPHONE ENCOUNTER
Received a call from ANGEAL [facility], requesting verbal order for skilled RN visits. Frequency: 2 times a week for 3 weeks and 1 time a week for 2 weeks. Reason: wound care, general health assessment, disease management, and fall prevention. Verbal order given.    Michelle also requesting verbal orders for wound care. Patient has skin tears on bilateral shins:   Wound Care Orders: cleanse wound with wound cleanser/normal saline, pat dry, Xeroform to open areas, cover with foam dressing. Change dressing twice per week and as needed. Routing to PCP for approval for wound care orders.     Can we leave a detailed message on this number? YES  Phone number patient can be reached at: Other phone number:  [phone]    Gabrielle Geurrero RN  [facility] Triage

## 2022-07-13 ENCOUNTER — MEDICAL CORRESPONDENCE (OUTPATIENT)
Dept: HEALTH INFORMATION MANAGEMENT | Facility: CLINIC | Age: 87
End: 2022-07-13

## 2022-07-13 DIAGNOSIS — Z53.9 DIAGNOSIS NOT YET DEFINED: Primary | ICD-10-CM

## 2022-07-13 DIAGNOSIS — H40.10X0 OPEN-ANGLE GLAUCOMA OF BOTH EYES, UNSPECIFIED GLAUCOMA STAGE, UNSPECIFIED OPEN-ANGLE GLAUCOMA TYPE: Primary | ICD-10-CM

## 2022-07-13 PROCEDURE — G0180 MD CERTIFICATION HHA PATIENT: HCPCS | Performed by: INTERNAL MEDICINE

## 2022-07-13 RX ORDER — LATANOPROST 50 UG/ML
1 SOLUTION/ DROPS OPHTHALMIC AT BEDTIME
Qty: 2.5 ML | Refills: 5 | Status: SHIPPED | OUTPATIENT
Start: 2022-07-13 | End: 2023-01-27

## 2022-07-13 NOTE — TELEPHONE ENCOUNTER
Routing refill request to provider for review/approval because:  Drug not on the [facility] refill protocol   Medication last ordered by Dr. Berry 6 years ago. PCP to review and advise.

## 2022-07-14 ENCOUNTER — ANTICOAGULATION THERAPY VISIT (OUTPATIENT)
Dept: ANTICOAGULATION | Facility: CLINIC | Age: 87
End: 2022-07-14

## 2022-07-14 ENCOUNTER — TELEPHONE (OUTPATIENT)
Dept: INTERNAL MEDICINE | Facility: CLINIC | Age: 87
End: 2022-07-14

## 2022-07-14 ENCOUNTER — MEDICAL CORRESPONDENCE (OUTPATIENT)
Dept: HEALTH INFORMATION MANAGEMENT | Facility: CLINIC | Age: 87
End: 2022-07-14

## 2022-07-14 DIAGNOSIS — I48.20 CHRONIC ATRIAL FIBRILLATION (H): Primary | ICD-10-CM

## 2022-07-14 LAB — INR PPP: 2.6 (ref 0.85–1.15)

## 2022-07-14 PROCEDURE — 85610 PROTHROMBIN TIME: CPT | Mod: ORL | Performed by: INTERNAL MEDICINE

## 2022-07-14 PROCEDURE — P9604 ONE-WAY ALLOW PRORATED TRIP: HCPCS | Mod: ORL | Performed by: INTERNAL MEDICINE

## 2022-07-14 PROCEDURE — 36415 COLL VENOUS BLD VENIPUNCTURE: CPT | Mod: ORL | Performed by: INTERNAL MEDICINE

## 2022-07-14 NOTE — TELEPHONE ENCOUNTER
Homecare called with 2 things.    1. Pt is having a wheeze at rest, and some noticed increase in SOB with activity. 99%O2 on RA.  .  Did get a reading today of 99% on RA today.Lungs clear, denies other concerns. Has bilateral LE Edema: , 2+, OT LT is seeing pt.  Takes 20mg lasix daily   Weights on pt that are documented:   6/1/22 147.8 lbs  6/15/22 138.4 lbs  6/22/22 144.6 lbs  Please advise follow up?home care asking if he should have additional lasix?    2. Need wound order approved  Would care Bilat lower extremities  2 areas dry and scabbed over  Cleanse with NS or wound cleanse and pat dry, apply xeroform to wound bed, cover with ABD, wrap with kerlix and tape.  Home Health Nurse to change twice a week, and ALS nurse to change PRN       The Home Care/Assisted Living/Nursing Facility is calling regarding an established patient.  Has the patient seen Home Care in the past or is currently residing in Assisted Living or Nursing Facility? Yes.     Anusha calling from [employer] requesting the following orders that are within the Home Care, Assisted Living or Nursing Home Eval and Treatment standing order and can be signed as standing order signature required by RN.    Preferred Call Back Number: [phone]      Any additional Orders:  Are there any orders requested, not stated above, that are outside of the standing order and must be routed to a licensed practitioner for approval?      Writer has verified [employer] will send fax to have orders signed.

## 2022-07-14 NOTE — PROGRESS NOTES
ANTICOAGULATION MANAGEMENT     Ayaan Herrera 89 year old male is on warfarin with therapeutic INR result. (Goal INR 2.0-3.0)    Recent labs: (last 7 days)     07/14/22  0930   INR 2.60*       ASSESSMENT       Source(s): Chart Review and Home Care/Facility Nurse              PLAN     Recommended plan for no diet, medication or health factor changes affecting INR     Dosing Instructions: continue your current warfarin dose with next INR in 2 weeks       Summary  As of 7/14/2022    Full warfarin instructions:  2 mg every Sun, Wed; 3 mg all other days   Next INR check:  7/28/2022             Detailed voice message left for [facility] home care/facility nurse with dosing instructions and follow up date.     Orders given to  Homecare nurse/facility to recheck    Education provided: Please call back if any changes to your diet, medications or how you've been taking warfarin    Plan made per [facility] anticoagulation protocol    Macrina Hogue RN  Anticoagulation Clinic  7/14/2022    _______________________________________________________________________     Anticoagulation Episode Summary     Current INR goal:  2.0-3.0   TTR:  48.7 % (1 y)   Target end date:  Indefinite   Send INR reminders to:  ORLIN RUIZ    Indications    Chronic atrial fibrillation (H) [I48.20]           Comments:           Anticoagulation Care Providers     Provider Role Specialty Phone number    Dean Rider MD Referring Internal Medicine [phone]

## 2022-07-14 NOTE — TELEPHONE ENCOUNTER
Routing refill request to provider for review/approval because:  Medication is reported/historical  Monika Francois RN

## 2022-07-15 ENCOUNTER — MEDICAL CORRESPONDENCE (OUTPATIENT)
Dept: HEALTH INFORMATION MANAGEMENT | Facility: CLINIC | Age: 87
End: 2022-07-15

## 2022-07-15 RX ORDER — LIDOCAINE 4 G/G
PATCH TOPICAL
Qty: 60 PATCH | Refills: 2 | Status: ON HOLD | OUTPATIENT
Start: 2022-07-15 | End: 2023-06-09

## 2022-07-20 ENCOUNTER — MEDICAL CORRESPONDENCE (OUTPATIENT)
Dept: PEDIATRICS | Facility: CLINIC | Age: 87
End: 2022-07-20

## 2022-07-20 ENCOUNTER — NURSE TRIAGE (OUTPATIENT)
Dept: INTERNAL MEDICINE | Facility: CLINIC | Age: 87
End: 2022-07-20

## 2022-07-20 DIAGNOSIS — H40.89 OTHER GLAUCOMA OF BOTH EYES: ICD-10-CM

## 2022-07-20 DIAGNOSIS — L03.115 CELLULITIS OF RIGHT LOWER LEG: Primary | ICD-10-CM

## 2022-07-20 RX ORDER — CEPHALEXIN 500 MG/1
500 CAPSULE ORAL 4 TIMES DAILY
Qty: 28 CAPSULE | Refills: 0 | Status: SHIPPED | OUTPATIENT
Start: 2022-07-20 | End: 2022-07-27

## 2022-07-20 NOTE — TELEPHONE ENCOUNTER
Spoke with ANGELA Crawley and relayed provider information. Pt lives in Assisted Living, will update care team with changes.

## 2022-07-20 NOTE — TELEPHONE ENCOUNTER
Provider Response to 2nd Level Triage Request    I have reviewed the RN documentation. My recommendation is:    Trial of oral antibiotics and hope it gets better.   cephalexin 500 mg four times per day for 7 days.   outline the area of redness with a sharpie for reference.     If the redness and pain worsens, then he should go to Urgent Care.   If he develops any serious worsening symptoms, such severely worsening redness, knee pains, and fevers, then go to ER.     Also confirm where the patient is currently located.   If he is in a long term care facility, I recommend that he have his primary care from the in house care team, luann Novak.  They usually take over care for patient who are in long term care and efrain be able to see patients in their room     .

## 2022-07-20 NOTE — TELEPHONE ENCOUNTER
"Gabby RN at facility called in due to patient having 2+ pitting edema of right knee with redness and pain 5/10 they are concerned it is cellulitis. No injury to the knee no calf pain or swelling     Disposition is to see HCP within 4 hours, no appointments and facilty RN states patient can not drive unsure if friend can bring to     Routing to PCP to see if patient should go to UC or if trial of antibiotics are appropriate          Reason for Disposition    [1] Redness AND [2] painful when touched AND [3] no fever    Additional Information    Negative: Followed a knee injury    Negative: Thigh or calf swelling is main symptom    Negative: Knee pain is main symptom    Negative: Fever    Negative: Patient sounds very sick or weak to the triager    Negative: [1] Can't move swollen joint at all AND [2] no fever    Negative: [1] SEVERE pain (e.g., excruciating, unable to walk) AND [2] not improved after 2 hours of pain medicine    Answer Assessment - Initial Assessment Questions  1. LOCATION: \"Where is the swelling located?\"  (e.g., left, right, both knees)      Right knee suddenly  2. SIZE and DESCRIPTION: \"What does the swelling look like?\"  (e.g., entire knee, localized)      Red localized edema   3. ONSET: \"When did the swelling start?\" \"Does it come and go, or is it there all the time?\"      All the time  4. PAIN: \"Is there any pain?\" If so, ask: \"How bad is it?\" (Scale 1-10; or mild, moderate, severe)      Yes painful 5/10  5. SETTING: \"Has there been any recent work, exercise or other activity that involved that part of the body?\"       no  6. AGGRAVATING FACTORS: \"What makes the knee swelling worse?\" (e.g., walking, climbing stairs, running)      NA  7. ASSOCIATED SYMPTOMS: \"Is there any pain or redness?\"      Pain and redness  8. OTHER SYMPTOMS: \"Do you have any other symptoms?\" (e.g., chest pain, difficulty breathing, fever, calf pain)      No   9. PREGNANCY: \"Is there any chance you are pregnant?\" \"When was " "your last menstrual period?\"      *No Answer*    Protocols used: KNEE SWELLING-A-AH      "

## 2022-07-21 RX ORDER — BRIMONIDINE TARTRATE 1.5 MG/ML
SOLUTION/ DROPS OPHTHALMIC
Qty: 5 ML | Refills: 0 | Status: SHIPPED | OUTPATIENT
Start: 2022-07-21 | End: 2022-08-30

## 2022-07-22 ENCOUNTER — MEDICAL CORRESPONDENCE (OUTPATIENT)
Dept: HEALTH INFORMATION MANAGEMENT | Facility: CLINIC | Age: 87
End: 2022-07-22

## 2022-07-25 DIAGNOSIS — N40.1 BPH WITH OBSTRUCTION/LOWER URINARY TRACT SYMPTOMS: Primary | ICD-10-CM

## 2022-07-25 DIAGNOSIS — N13.8 BPH WITH OBSTRUCTION/LOWER URINARY TRACT SYMPTOMS: Primary | ICD-10-CM

## 2022-07-25 DIAGNOSIS — R60.0 BILATERAL LEG EDEMA: ICD-10-CM

## 2022-07-25 RX ORDER — FUROSEMIDE 20 MG
TABLET ORAL
Qty: 30 TABLET | Refills: 0 | Status: SHIPPED | OUTPATIENT
Start: 2022-07-25 | End: 2022-09-21

## 2022-07-25 RX ORDER — POTASSIUM CHLORIDE 750 MG/1
TABLET, EXTENDED RELEASE ORAL
Qty: 30 TABLET | Refills: 0 | Status: SHIPPED | OUTPATIENT
Start: 2022-07-25 | End: 2022-09-21

## 2022-07-25 RX ORDER — TAMSULOSIN HYDROCHLORIDE 0.4 MG/1
CAPSULE ORAL
Qty: 30 CAPSULE | Refills: 0 | Status: SHIPPED | OUTPATIENT
Start: 2022-07-25 | End: 2022-09-21

## 2022-07-26 ENCOUNTER — MEDICAL CORRESPONDENCE (OUTPATIENT)
Dept: ADMINISTRATIVE | Facility: CLINIC | Age: 87
End: 2022-07-26

## 2022-07-26 DIAGNOSIS — J01.00 ACUTE MAXILLARY SINUSITIS: ICD-10-CM

## 2022-07-26 RX ORDER — DORZOLAMIDE HYDROCHLORIDE AND TIMOLOL MALEATE 20; 5 MG/ML; MG/ML
SOLUTION/ DROPS OPHTHALMIC
Qty: 10 ML | Refills: 0 | Status: SHIPPED | OUTPATIENT
Start: 2022-07-26 | End: 2022-08-29

## 2022-07-26 NOTE — TELEPHONE ENCOUNTER
Prescription(s) sent electronically to specified pharmacy.    I am not sure iuf he is still in a extended care facility.     If he is to remain in an ECF, then he needs to have the local care team assume his care, such as [facility], etc.

## 2022-07-27 ENCOUNTER — LAB REQUISITION (OUTPATIENT)
Dept: LAB | Facility: CLINIC | Age: 87
End: 2022-07-27
Payer: COMMERCIAL

## 2022-07-27 DIAGNOSIS — Z53.9 DIAGNOSIS NOT YET DEFINED: Primary | ICD-10-CM

## 2022-07-27 PROCEDURE — G0180 MD CERTIFICATION HHA PATIENT: HCPCS | Performed by: INTERNAL MEDICINE

## 2022-07-28 ENCOUNTER — TELEPHONE (OUTPATIENT)
Dept: ANTICOAGULATION | Facility: CLINIC | Age: 87
End: 2022-07-28

## 2022-07-28 DIAGNOSIS — I48.20 CHRONIC ATRIAL FIBRILLATION (H): Primary | ICD-10-CM

## 2022-07-28 PROCEDURE — P9604 ONE-WAY ALLOW PRORATED TRIP: HCPCS | Mod: ORL | Performed by: INTERNAL MEDICINE

## 2022-07-28 NOTE — TELEPHONE ENCOUNTER
Received VM from Gabby at [facility] who states patient is switching providers through [facility] and INR will be managed through them. Writer called Gabby back to conform message was received.   Writer will resolve INR episode.    Thanks! Mally Galdamez RN

## 2022-08-01 ENCOUNTER — MEDICAL CORRESPONDENCE (OUTPATIENT)
Dept: HEALTH INFORMATION MANAGEMENT | Facility: CLINIC | Age: 87
End: 2022-08-01

## 2022-08-01 ENCOUNTER — LAB REQUISITION (OUTPATIENT)
Dept: LAB | Facility: CLINIC | Age: 87
End: 2022-08-01
Payer: COMMERCIAL

## 2022-08-01 DIAGNOSIS — Z79.01 LONG TERM (CURRENT) USE OF ANTICOAGULANTS: ICD-10-CM

## 2022-08-01 LAB — INR PPP: 2.61 (ref 0.85–1.15)

## 2022-08-01 PROCEDURE — 85610 PROTHROMBIN TIME: CPT | Mod: ORL | Performed by: NURSE PRACTITIONER

## 2022-08-01 PROCEDURE — P9603 ONE-WAY ALLOW PRORATED MILES: HCPCS | Mod: ORL | Performed by: NURSE PRACTITIONER

## 2022-08-01 PROCEDURE — 36415 COLL VENOUS BLD VENIPUNCTURE: CPT | Mod: ORL | Performed by: NURSE PRACTITIONER

## 2022-08-03 ENCOUNTER — LAB REQUISITION (OUTPATIENT)
Dept: LAB | Facility: CLINIC | Age: 87
End: 2022-08-03
Payer: COMMERCIAL

## 2022-08-03 DIAGNOSIS — I48.20 CHRONIC ATRIAL FIBRILLATION, UNSPECIFIED (H): ICD-10-CM

## 2022-08-04 DIAGNOSIS — I48.20 CHRONIC ATRIAL FIBRILLATION (H): ICD-10-CM

## 2022-08-04 LAB — INR PPP: 2.6 (ref 0.85–1.15)

## 2022-08-04 PROCEDURE — P9604 ONE-WAY ALLOW PRORATED TRIP: HCPCS | Mod: ORL | Performed by: INTERNAL MEDICINE

## 2022-08-04 PROCEDURE — 85610 PROTHROMBIN TIME: CPT | Mod: ORL | Performed by: INTERNAL MEDICINE

## 2022-08-04 PROCEDURE — 36415 COLL VENOUS BLD VENIPUNCTURE: CPT | Mod: ORL | Performed by: INTERNAL MEDICINE

## 2022-08-04 PROCEDURE — 85610 PROTHROMBIN TIME: CPT | Mod: ORL

## 2022-08-04 PROCEDURE — 36415 COLL VENOUS BLD VENIPUNCTURE: CPT | Mod: ORL

## 2022-08-04 PROCEDURE — P9604 ONE-WAY ALLOW PRORATED TRIP: HCPCS | Mod: ORL

## 2022-08-05 RX ORDER — WARFARIN SODIUM 1 MG/1
TABLET ORAL
Qty: 150 TABLET | OUTPATIENT
Start: 2022-08-05

## 2022-08-05 NOTE — TELEPHONE ENCOUNTER
INRs are now managed by [facility] InHouse Provider.  Precious Flowers, RN  Anticoagulation Nurse - [facility], [place]

## 2022-08-05 NOTE — TELEPHONE ENCOUNTER
Routing refill request to provider for review/approval because:  INR is not within goal in the past 6 months

## 2022-08-10 ENCOUNTER — LAB REQUISITION (OUTPATIENT)
Dept: LAB | Facility: CLINIC | Age: 87
End: 2022-08-10
Payer: COMMERCIAL

## 2022-08-10 DIAGNOSIS — I48.20 CHRONIC ATRIAL FIBRILLATION, UNSPECIFIED (H): ICD-10-CM

## 2022-08-11 LAB — INR PPP: 3.08 (ref 0.85–1.15)

## 2022-08-11 PROCEDURE — 36415 COLL VENOUS BLD VENIPUNCTURE: CPT | Mod: ORL | Performed by: INTERNAL MEDICINE

## 2022-08-11 PROCEDURE — P9603 ONE-WAY ALLOW PRORATED MILES: HCPCS | Mod: ORL | Performed by: INTERNAL MEDICINE

## 2022-08-11 PROCEDURE — 85610 PROTHROMBIN TIME: CPT | Mod: ORL | Performed by: INTERNAL MEDICINE

## 2022-08-16 ENCOUNTER — MEDICAL CORRESPONDENCE (OUTPATIENT)
Dept: HEALTH INFORMATION MANAGEMENT | Facility: CLINIC | Age: 87
End: 2022-08-16

## 2022-08-17 ENCOUNTER — LAB REQUISITION (OUTPATIENT)
Dept: LAB | Facility: CLINIC | Age: 87
End: 2022-08-17
Payer: COMMERCIAL

## 2022-08-18 LAB — INR PPP: 2.51 (ref 0.85–1.15)

## 2022-08-18 PROCEDURE — 36415 COLL VENOUS BLD VENIPUNCTURE: CPT | Mod: ORL | Performed by: INTERNAL MEDICINE

## 2022-08-18 PROCEDURE — P9603 ONE-WAY ALLOW PRORATED MILES: HCPCS | Mod: ORL | Performed by: INTERNAL MEDICINE

## 2022-08-18 PROCEDURE — 85610 PROTHROMBIN TIME: CPT | Mod: ORL | Performed by: INTERNAL MEDICINE

## 2022-08-28 ENCOUNTER — LAB REQUISITION (OUTPATIENT)
Dept: LAB | Facility: CLINIC | Age: 87
End: 2022-08-28
Payer: COMMERCIAL

## 2022-08-28 DIAGNOSIS — I48.20 CHRONIC ATRIAL FIBRILLATION, UNSPECIFIED (H): ICD-10-CM

## 2022-08-29 DIAGNOSIS — J01.00 ACUTE MAXILLARY SINUSITIS: ICD-10-CM

## 2022-08-29 RX ORDER — DORZOLAMIDE HYDROCHLORIDE AND TIMOLOL MALEATE 20; 5 MG/ML; MG/ML
SOLUTION/ DROPS OPHTHALMIC
Qty: 10 ML | Refills: 0 | Status: SHIPPED | OUTPATIENT
Start: 2022-08-29

## 2022-08-30 DIAGNOSIS — H40.89 OTHER GLAUCOMA OF BOTH EYES: ICD-10-CM

## 2022-08-30 RX ORDER — BRIMONIDINE TARTRATE 1.5 MG/ML
1 SOLUTION/ DROPS OPHTHALMIC 2 TIMES DAILY
Qty: 5 ML | Refills: 3 | Status: ON HOLD | OUTPATIENT
Start: 2022-08-30 | End: 2023-06-09

## 2022-09-01 LAB — INR PPP: 2.14 (ref 0.85–1.15)

## 2022-09-01 PROCEDURE — 85610 PROTHROMBIN TIME: CPT | Mod: ORL | Performed by: INTERNAL MEDICINE

## 2022-09-01 PROCEDURE — 36415 COLL VENOUS BLD VENIPUNCTURE: CPT | Mod: ORL | Performed by: INTERNAL MEDICINE

## 2022-09-01 PROCEDURE — P9604 ONE-WAY ALLOW PRORATED TRIP: HCPCS | Mod: ORL | Performed by: INTERNAL MEDICINE

## 2022-09-07 ENCOUNTER — LAB REQUISITION (OUTPATIENT)
Dept: LAB | Facility: CLINIC | Age: 87
End: 2022-09-07
Payer: COMMERCIAL

## 2022-09-07 DIAGNOSIS — I48.20 CHRONIC ATRIAL FIBRILLATION, UNSPECIFIED (H): ICD-10-CM

## 2022-09-08 LAB — INR PPP: 1.94 (ref 0.85–1.15)

## 2022-09-08 PROCEDURE — 36415 COLL VENOUS BLD VENIPUNCTURE: CPT | Mod: ORL | Performed by: NURSE PRACTITIONER

## 2022-09-08 PROCEDURE — 85610 PROTHROMBIN TIME: CPT | Mod: ORL | Performed by: NURSE PRACTITIONER

## 2022-09-08 PROCEDURE — P9604 ONE-WAY ALLOW PRORATED TRIP: HCPCS | Mod: ORL | Performed by: NURSE PRACTITIONER

## 2022-09-14 ENCOUNTER — MEDICAL CORRESPONDENCE (OUTPATIENT)
Dept: HEALTH INFORMATION MANAGEMENT | Facility: CLINIC | Age: 87
End: 2022-09-14

## 2022-09-14 ENCOUNTER — LAB REQUISITION (OUTPATIENT)
Dept: LAB | Facility: CLINIC | Age: 87
End: 2022-09-14
Payer: COMMERCIAL

## 2022-09-14 DIAGNOSIS — I48.20 CHRONIC ATRIAL FIBRILLATION, UNSPECIFIED (H): ICD-10-CM

## 2022-09-15 LAB — INR PPP: 1.85 (ref 0.85–1.15)

## 2022-09-15 PROCEDURE — 85610 PROTHROMBIN TIME: CPT | Mod: ORL | Performed by: NURSE PRACTITIONER

## 2022-09-15 PROCEDURE — P9604 ONE-WAY ALLOW PRORATED TRIP: HCPCS | Mod: ORL | Performed by: NURSE PRACTITIONER

## 2022-09-15 PROCEDURE — 36415 COLL VENOUS BLD VENIPUNCTURE: CPT | Mod: ORL | Performed by: NURSE PRACTITIONER

## 2022-09-19 ENCOUNTER — LAB REQUISITION (OUTPATIENT)
Dept: LAB | Facility: CLINIC | Age: 87
End: 2022-09-19
Payer: COMMERCIAL

## 2022-09-19 DIAGNOSIS — R60.0 BILATERAL LEG EDEMA: ICD-10-CM

## 2022-09-19 DIAGNOSIS — I48.20 CHRONIC ATRIAL FIBRILLATION, UNSPECIFIED (H): ICD-10-CM

## 2022-09-19 DIAGNOSIS — N13.8 BPH WITH OBSTRUCTION/LOWER URINARY TRACT SYMPTOMS: ICD-10-CM

## 2022-09-19 DIAGNOSIS — N40.1 BPH WITH OBSTRUCTION/LOWER URINARY TRACT SYMPTOMS: ICD-10-CM

## 2022-09-21 RX ORDER — POTASSIUM CHLORIDE 750 MG/1
TABLET, EXTENDED RELEASE ORAL
Qty: 30 TABLET | Refills: 5 | Status: SHIPPED | OUTPATIENT
Start: 2022-09-21

## 2022-09-21 RX ORDER — TAMSULOSIN HYDROCHLORIDE 0.4 MG/1
CAPSULE ORAL
Qty: 30 CAPSULE | Refills: 5 | Status: SHIPPED | OUTPATIENT
Start: 2022-09-21

## 2022-09-21 RX ORDER — FUROSEMIDE 20 MG
TABLET ORAL
Qty: 30 TABLET | Refills: 0 | Status: SHIPPED | OUTPATIENT
Start: 2022-09-21 | End: 2022-11-15

## 2022-09-21 NOTE — TELEPHONE ENCOUNTER
Routing refill request to provider for review/approval because:  Labs out of range:    Sodium   Date Value Ref Range Status   05/09/2022 130 (L) 133 - 144 mmol/L Final   04/16/2021 133 133 - 144 mmol/L Final

## 2022-09-22 ENCOUNTER — MEDICAL CORRESPONDENCE (OUTPATIENT)
Dept: HEALTH INFORMATION MANAGEMENT | Facility: CLINIC | Age: 87
End: 2022-09-22

## 2022-09-22 ENCOUNTER — LAB REQUISITION (OUTPATIENT)
Dept: LAB | Facility: CLINIC | Age: 87
End: 2022-09-22
Payer: COMMERCIAL

## 2022-09-22 DIAGNOSIS — I48.20 CHRONIC ATRIAL FIBRILLATION, UNSPECIFIED (H): ICD-10-CM

## 2022-09-22 LAB — INR PPP: 1.84 (ref 0.85–1.15)

## 2022-09-22 PROCEDURE — P9604 ONE-WAY ALLOW PRORATED TRIP: HCPCS | Mod: ORL | Performed by: NURSE PRACTITIONER

## 2022-09-22 PROCEDURE — 85610 PROTHROMBIN TIME: CPT | Mod: ORL | Performed by: NURSE PRACTITIONER

## 2022-09-22 PROCEDURE — 36415 COLL VENOUS BLD VENIPUNCTURE: CPT | Mod: ORL | Performed by: NURSE PRACTITIONER

## 2022-09-27 ENCOUNTER — LAB REQUISITION (OUTPATIENT)
Dept: LAB | Facility: CLINIC | Age: 87
End: 2022-09-27
Payer: COMMERCIAL

## 2022-09-27 DIAGNOSIS — I48.20 CHRONIC ATRIAL FIBRILLATION, UNSPECIFIED (H): ICD-10-CM

## 2022-09-29 LAB — INR PPP: 2.36 (ref 0.85–1.15)

## 2022-09-29 PROCEDURE — 85610 PROTHROMBIN TIME: CPT | Mod: ORL | Performed by: NURSE PRACTITIONER

## 2022-09-29 PROCEDURE — 36415 COLL VENOUS BLD VENIPUNCTURE: CPT | Mod: ORL | Performed by: NURSE PRACTITIONER

## 2022-09-29 PROCEDURE — P9604 ONE-WAY ALLOW PRORATED TRIP: HCPCS | Mod: ORL | Performed by: NURSE PRACTITIONER

## 2022-10-09 ENCOUNTER — HEALTH MAINTENANCE LETTER (OUTPATIENT)
Age: 87
End: 2022-10-09

## 2022-10-10 ENCOUNTER — LAB REQUISITION (OUTPATIENT)
Dept: LAB | Facility: CLINIC | Age: 87
End: 2022-10-10
Payer: COMMERCIAL

## 2022-10-10 DIAGNOSIS — I48.20 CHRONIC ATRIAL FIBRILLATION, UNSPECIFIED (H): ICD-10-CM

## 2022-10-14 ENCOUNTER — LAB REQUISITION (OUTPATIENT)
Dept: LAB | Facility: CLINIC | Age: 87
End: 2022-10-14
Payer: COMMERCIAL

## 2022-10-14 DIAGNOSIS — I48.20 CHRONIC ATRIAL FIBRILLATION, UNSPECIFIED (H): ICD-10-CM

## 2022-10-14 LAB — INR PPP: 3.17 (ref 0.85–1.15)

## 2022-10-14 PROCEDURE — 85610 PROTHROMBIN TIME: CPT | Mod: ORL | Performed by: NURSE PRACTITIONER

## 2022-10-14 PROCEDURE — P9603 ONE-WAY ALLOW PRORATED MILES: HCPCS | Mod: ORL | Performed by: NURSE PRACTITIONER

## 2022-10-14 PROCEDURE — 36415 COLL VENOUS BLD VENIPUNCTURE: CPT | Mod: ORL | Performed by: NURSE PRACTITIONER

## 2022-10-18 ENCOUNTER — LAB REQUISITION (OUTPATIENT)
Dept: LAB | Facility: CLINIC | Age: 87
End: 2022-10-18
Payer: COMMERCIAL

## 2022-10-18 DIAGNOSIS — I48.20 CHRONIC ATRIAL FIBRILLATION, UNSPECIFIED (H): ICD-10-CM

## 2022-10-20 LAB — INR PPP: 3.38 (ref 0.85–1.15)

## 2022-10-20 PROCEDURE — 85610 PROTHROMBIN TIME: CPT | Mod: ORL | Performed by: NURSE PRACTITIONER

## 2022-10-20 PROCEDURE — P9604 ONE-WAY ALLOW PRORATED TRIP: HCPCS | Mod: ORL | Performed by: NURSE PRACTITIONER

## 2022-10-20 PROCEDURE — 36415 COLL VENOUS BLD VENIPUNCTURE: CPT | Mod: ORL | Performed by: NURSE PRACTITIONER

## 2022-10-24 ENCOUNTER — LAB REQUISITION (OUTPATIENT)
Dept: LAB | Facility: CLINIC | Age: 87
End: 2022-10-24
Payer: COMMERCIAL

## 2022-10-24 DIAGNOSIS — I48.20 CHRONIC ATRIAL FIBRILLATION, UNSPECIFIED (H): ICD-10-CM

## 2022-10-27 LAB — INR PPP: 2.95 (ref 0.85–1.15)

## 2022-10-27 PROCEDURE — 85610 PROTHROMBIN TIME: CPT | Mod: ORL | Performed by: NURSE PRACTITIONER

## 2022-10-27 PROCEDURE — P9604 ONE-WAY ALLOW PRORATED TRIP: HCPCS | Mod: ORL | Performed by: NURSE PRACTITIONER

## 2022-10-27 PROCEDURE — 36415 COLL VENOUS BLD VENIPUNCTURE: CPT | Mod: ORL | Performed by: NURSE PRACTITIONER

## 2022-10-30 ENCOUNTER — LAB REQUISITION (OUTPATIENT)
Dept: LAB | Facility: CLINIC | Age: 87
End: 2022-10-30
Payer: COMMERCIAL

## 2022-10-30 DIAGNOSIS — I48.20 CHRONIC ATRIAL FIBRILLATION, UNSPECIFIED (H): ICD-10-CM

## 2022-11-03 LAB — INR PPP: 2.37 (ref 0.85–1.15)

## 2022-11-03 PROCEDURE — P9604 ONE-WAY ALLOW PRORATED TRIP: HCPCS | Mod: ORL | Performed by: NURSE PRACTITIONER

## 2022-11-03 PROCEDURE — 36415 COLL VENOUS BLD VENIPUNCTURE: CPT | Mod: ORL | Performed by: NURSE PRACTITIONER

## 2022-11-03 PROCEDURE — 85610 PROTHROMBIN TIME: CPT | Mod: ORL | Performed by: NURSE PRACTITIONER

## 2022-11-11 DIAGNOSIS — Z53.9 DIAGNOSIS NOT YET DEFINED: Primary | ICD-10-CM

## 2022-11-11 PROCEDURE — G0180 MD CERTIFICATION HHA PATIENT: HCPCS | Performed by: INTERNAL MEDICINE

## 2022-11-14 DIAGNOSIS — R60.0 BILATERAL LEG EDEMA: ICD-10-CM

## 2022-11-15 RX ORDER — FUROSEMIDE 20 MG
TABLET ORAL
Qty: 30 TABLET | Refills: 0 | Status: SHIPPED | OUTPATIENT
Start: 2022-11-15

## 2022-11-15 NOTE — TELEPHONE ENCOUNTER
Routing refill request to provider for review/approval because:  Labs out of range:    Sodium   Date Value Ref Range Status   05/09/2022 130 (L) 133 - 144 mmol/L Final   04/16/2021 133 133 - 144 mmol/L Final     Justice L. Phoenix, RN

## 2022-11-16 ENCOUNTER — LAB REQUISITION (OUTPATIENT)
Dept: LAB | Facility: CLINIC | Age: 87
End: 2022-11-16
Payer: COMMERCIAL

## 2022-11-16 DIAGNOSIS — I48.20 CHRONIC ATRIAL FIBRILLATION, UNSPECIFIED (H): ICD-10-CM

## 2022-11-17 DIAGNOSIS — M81.0 SENILE OSTEOPOROSIS: ICD-10-CM

## 2022-11-17 LAB — INR PPP: 1.96 (ref 0.85–1.15)

## 2022-11-17 PROCEDURE — P9604 ONE-WAY ALLOW PRORATED TRIP: HCPCS | Mod: ORL | Performed by: NURSE PRACTITIONER

## 2022-11-17 PROCEDURE — 85610 PROTHROMBIN TIME: CPT | Mod: ORL | Performed by: NURSE PRACTITIONER

## 2022-11-17 PROCEDURE — 36415 COLL VENOUS BLD VENIPUNCTURE: CPT | Mod: ORL | Performed by: NURSE PRACTITIONER

## 2022-11-18 RX ORDER — ALENDRONATE SODIUM 70 MG/1
TABLET ORAL
Qty: 4 TABLET | Refills: 0 | Status: SHIPPED | OUTPATIENT
Start: 2022-11-18 | End: 2022-12-21

## 2022-11-18 NOTE — TELEPHONE ENCOUNTER
Routing refill request to provider for review/approval because:  Dexa on file within past 2 years  Monika Francois RN

## 2022-11-22 ENCOUNTER — LAB REQUISITION (OUTPATIENT)
Dept: LAB | Facility: CLINIC | Age: 87
End: 2022-11-22
Payer: COMMERCIAL

## 2022-11-22 DIAGNOSIS — I48.20 CHRONIC ATRIAL FIBRILLATION, UNSPECIFIED (H): ICD-10-CM

## 2022-11-23 LAB — INR PPP: 2.28 (ref 0.85–1.15)

## 2022-11-23 PROCEDURE — 85610 PROTHROMBIN TIME: CPT | Mod: ORL | Performed by: NURSE PRACTITIONER

## 2022-11-23 PROCEDURE — 36415 COLL VENOUS BLD VENIPUNCTURE: CPT | Mod: ORL | Performed by: NURSE PRACTITIONER

## 2022-11-23 PROCEDURE — P9604 ONE-WAY ALLOW PRORATED TRIP: HCPCS | Mod: ORL | Performed by: NURSE PRACTITIONER

## 2022-11-26 ENCOUNTER — HEALTH MAINTENANCE LETTER (OUTPATIENT)
Age: 87
End: 2022-11-26

## 2022-11-30 ENCOUNTER — LAB REQUISITION (OUTPATIENT)
Dept: LAB | Facility: CLINIC | Age: 87
End: 2022-11-30
Payer: COMMERCIAL

## 2022-11-30 DIAGNOSIS — I48.20 CHRONIC ATRIAL FIBRILLATION, UNSPECIFIED (H): ICD-10-CM

## 2022-12-01 LAB — INR PPP: 2.37 (ref 0.85–1.15)

## 2022-12-01 PROCEDURE — P9603 ONE-WAY ALLOW PRORATED MILES: HCPCS | Mod: ORL | Performed by: NURSE PRACTITIONER

## 2022-12-01 PROCEDURE — 36415 COLL VENOUS BLD VENIPUNCTURE: CPT | Mod: ORL | Performed by: NURSE PRACTITIONER

## 2022-12-01 PROCEDURE — 85610 PROTHROMBIN TIME: CPT | Mod: ORL | Performed by: NURSE PRACTITIONER

## 2022-12-14 ENCOUNTER — LAB REQUISITION (OUTPATIENT)
Dept: LAB | Facility: CLINIC | Age: 87
End: 2022-12-14
Payer: COMMERCIAL

## 2022-12-14 DIAGNOSIS — I48.20 CHRONIC ATRIAL FIBRILLATION, UNSPECIFIED (H): ICD-10-CM

## 2022-12-16 LAB — INR PPP: 1.87 (ref 0.85–1.15)

## 2022-12-16 PROCEDURE — 85610 PROTHROMBIN TIME: CPT | Mod: ORL | Performed by: NURSE PRACTITIONER

## 2022-12-16 PROCEDURE — 36415 COLL VENOUS BLD VENIPUNCTURE: CPT | Mod: ORL | Performed by: NURSE PRACTITIONER

## 2022-12-16 PROCEDURE — P9604 ONE-WAY ALLOW PRORATED TRIP: HCPCS | Mod: ORL | Performed by: NURSE PRACTITIONER

## 2022-12-21 ENCOUNTER — LAB REQUISITION (OUTPATIENT)
Dept: LAB | Facility: CLINIC | Age: 87
End: 2022-12-21
Payer: COMMERCIAL

## 2022-12-21 DIAGNOSIS — R06.00 DYSPNEA, UNSPECIFIED: ICD-10-CM

## 2022-12-21 DIAGNOSIS — I50.9 HEART FAILURE, UNSPECIFIED (H): ICD-10-CM

## 2022-12-21 DIAGNOSIS — M81.0 SENILE OSTEOPOROSIS: ICD-10-CM

## 2022-12-21 DIAGNOSIS — I48.20 CHRONIC ATRIAL FIBRILLATION, UNSPECIFIED (H): ICD-10-CM

## 2022-12-21 RX ORDER — ALENDRONATE SODIUM 70 MG/1
70 TABLET ORAL
Qty: 4 TABLET | Refills: 11 | Status: SHIPPED | OUTPATIENT
Start: 2022-12-21

## 2022-12-22 LAB
ANION GAP SERPL CALCULATED.3IONS-SCNC: 11 MMOL/L (ref 7–15)
BUN SERPL-MCNC: 23.9 MG/DL (ref 8–23)
CALCIUM SERPL-MCNC: 9.6 MG/DL (ref 8.2–9.6)
CHLORIDE SERPL-SCNC: 101 MMOL/L (ref 98–107)
CREAT SERPL-MCNC: 0.78 MG/DL (ref 0.67–1.17)
DEPRECATED HCO3 PLAS-SCNC: 26 MMOL/L (ref 22–29)
ERYTHROCYTE [DISTWIDTH] IN BLOOD BY AUTOMATED COUNT: 18 % (ref 10–15)
GFR SERPL CREATININE-BSD FRML MDRD: 85 ML/MIN/1.73M2
GLUCOSE SERPL-MCNC: 84 MG/DL (ref 70–99)
HCT VFR BLD AUTO: 37 % (ref 40–53)
HGB BLD-MCNC: 10.6 G/DL (ref 13.3–17.7)
INR PPP: 2.35 (ref 0.85–1.15)
MCH RBC QN AUTO: 22.9 PG (ref 26.5–33)
MCHC RBC AUTO-ENTMCNC: 28.6 G/DL (ref 31.5–36.5)
MCV RBC AUTO: 80 FL (ref 78–100)
PLATELET # BLD AUTO: 239 10E3/UL (ref 150–450)
POTASSIUM SERPL-SCNC: 4.6 MMOL/L (ref 3.4–5.3)
RBC # BLD AUTO: 4.63 10E6/UL (ref 4.4–5.9)
SODIUM SERPL-SCNC: 138 MMOL/L (ref 136–145)
WBC # BLD AUTO: 6.1 10E3/UL (ref 4–11)

## 2022-12-22 PROCEDURE — 85610 PROTHROMBIN TIME: CPT | Mod: ORL | Performed by: NURSE PRACTITIONER

## 2022-12-22 PROCEDURE — 85027 COMPLETE CBC AUTOMATED: CPT | Mod: ORL | Performed by: NURSE PRACTITIONER

## 2022-12-22 PROCEDURE — P9604 ONE-WAY ALLOW PRORATED TRIP: HCPCS | Mod: ORL | Performed by: NURSE PRACTITIONER

## 2022-12-22 PROCEDURE — 80048 BASIC METABOLIC PNL TOTAL CA: CPT | Mod: ORL | Performed by: NURSE PRACTITIONER

## 2022-12-22 PROCEDURE — 36415 COLL VENOUS BLD VENIPUNCTURE: CPT | Mod: ORL | Performed by: NURSE PRACTITIONER

## 2023-01-16 ENCOUNTER — LAB REQUISITION (OUTPATIENT)
Dept: LAB | Facility: CLINIC | Age: 88
End: 2023-01-16
Payer: COMMERCIAL

## 2023-01-16 DIAGNOSIS — I48.20 CHRONIC ATRIAL FIBRILLATION, UNSPECIFIED (H): ICD-10-CM

## 2023-01-19 LAB — INR PPP: 1.61 (ref 0.85–1.15)

## 2023-01-19 PROCEDURE — P9604 ONE-WAY ALLOW PRORATED TRIP: HCPCS | Mod: ORL | Performed by: NURSE PRACTITIONER

## 2023-01-19 PROCEDURE — 36415 COLL VENOUS BLD VENIPUNCTURE: CPT | Mod: ORL | Performed by: NURSE PRACTITIONER

## 2023-01-19 PROCEDURE — 85610 PROTHROMBIN TIME: CPT | Mod: ORL | Performed by: NURSE PRACTITIONER

## 2023-01-25 ENCOUNTER — LAB REQUISITION (OUTPATIENT)
Dept: LAB | Facility: CLINIC | Age: 88
End: 2023-01-25
Payer: COMMERCIAL

## 2023-01-25 DIAGNOSIS — H40.10X0 OPEN-ANGLE GLAUCOMA OF BOTH EYES, UNSPECIFIED GLAUCOMA STAGE, UNSPECIFIED OPEN-ANGLE GLAUCOMA TYPE: ICD-10-CM

## 2023-01-25 DIAGNOSIS — I48.20 CHRONIC ATRIAL FIBRILLATION, UNSPECIFIED (H): ICD-10-CM

## 2023-01-26 LAB — INR PPP: 1.27 (ref 0.85–1.15)

## 2023-01-26 PROCEDURE — P9604 ONE-WAY ALLOW PRORATED TRIP: HCPCS | Mod: ORL | Performed by: NURSE PRACTITIONER

## 2023-01-26 PROCEDURE — 85610 PROTHROMBIN TIME: CPT | Mod: ORL | Performed by: NURSE PRACTITIONER

## 2023-01-26 PROCEDURE — 36415 COLL VENOUS BLD VENIPUNCTURE: CPT | Mod: ORL | Performed by: NURSE PRACTITIONER

## 2023-01-27 RX ORDER — LATANOPROST 50 UG/ML
SOLUTION/ DROPS OPHTHALMIC
Qty: 2.5 ML | Refills: 5 | Status: SHIPPED | OUTPATIENT
Start: 2023-01-27

## 2023-02-01 ENCOUNTER — LAB REQUISITION (OUTPATIENT)
Dept: LAB | Facility: CLINIC | Age: 88
End: 2023-02-01
Payer: COMMERCIAL

## 2023-02-01 DIAGNOSIS — I48.20 CHRONIC ATRIAL FIBRILLATION, UNSPECIFIED (H): ICD-10-CM

## 2023-02-02 LAB — INR PPP: 1.28 (ref 0.85–1.15)

## 2023-02-02 PROCEDURE — 36415 COLL VENOUS BLD VENIPUNCTURE: CPT | Mod: ORL | Performed by: NURSE PRACTITIONER

## 2023-02-02 PROCEDURE — 85610 PROTHROMBIN TIME: CPT | Mod: ORL | Performed by: NURSE PRACTITIONER

## 2023-02-22 ENCOUNTER — LAB REQUISITION (OUTPATIENT)
Dept: LAB | Facility: CLINIC | Age: 88
End: 2023-02-22
Payer: COMMERCIAL

## 2023-02-22 DIAGNOSIS — I48.20 CHRONIC ATRIAL FIBRILLATION, UNSPECIFIED (H): ICD-10-CM

## 2023-02-22 LAB — INR PPP: 2.99 (ref 0.85–1.15)

## 2023-02-22 PROCEDURE — 85610 PROTHROMBIN TIME: CPT | Mod: ORL | Performed by: NURSE PRACTITIONER

## 2023-02-22 PROCEDURE — 36415 COLL VENOUS BLD VENIPUNCTURE: CPT | Mod: ORL | Performed by: NURSE PRACTITIONER

## 2023-02-22 PROCEDURE — P9603 ONE-WAY ALLOW PRORATED MILES: HCPCS | Mod: ORL | Performed by: NURSE PRACTITIONER

## 2023-03-01 ENCOUNTER — LAB REQUISITION (OUTPATIENT)
Dept: LAB | Facility: CLINIC | Age: 88
End: 2023-03-01
Payer: COMMERCIAL

## 2023-03-01 DIAGNOSIS — I48.20 CHRONIC ATRIAL FIBRILLATION, UNSPECIFIED (H): ICD-10-CM

## 2023-03-02 LAB — INR PPP: 3.08 (ref 0.85–1.15)

## 2023-03-02 PROCEDURE — 85610 PROTHROMBIN TIME: CPT | Mod: ORL | Performed by: NURSE PRACTITIONER

## 2023-03-02 PROCEDURE — 36415 COLL VENOUS BLD VENIPUNCTURE: CPT | Mod: ORL | Performed by: NURSE PRACTITIONER

## 2023-03-02 PROCEDURE — P9604 ONE-WAY ALLOW PRORATED TRIP: HCPCS | Mod: ORL | Performed by: NURSE PRACTITIONER

## 2023-03-07 ENCOUNTER — LAB REQUISITION (OUTPATIENT)
Dept: LAB | Facility: CLINIC | Age: 88
End: 2023-03-07
Payer: COMMERCIAL

## 2023-03-07 DIAGNOSIS — I48.20 CHRONIC ATRIAL FIBRILLATION, UNSPECIFIED (H): ICD-10-CM

## 2023-03-09 LAB — INR PPP: 2.86 (ref 0.85–1.15)

## 2023-03-09 PROCEDURE — 36415 COLL VENOUS BLD VENIPUNCTURE: CPT | Mod: ORL | Performed by: NURSE PRACTITIONER

## 2023-03-09 PROCEDURE — P9604 ONE-WAY ALLOW PRORATED TRIP: HCPCS | Mod: ORL | Performed by: NURSE PRACTITIONER

## 2023-03-09 PROCEDURE — 85610 PROTHROMBIN TIME: CPT | Mod: ORL | Performed by: NURSE PRACTITIONER

## 2023-03-15 ENCOUNTER — LAB REQUISITION (OUTPATIENT)
Dept: LAB | Facility: CLINIC | Age: 88
End: 2023-03-15
Payer: COMMERCIAL

## 2023-03-15 DIAGNOSIS — I48.20 CHRONIC ATRIAL FIBRILLATION, UNSPECIFIED (H): ICD-10-CM

## 2023-03-16 LAB — INR PPP: 2.32 (ref 0.85–1.15)

## 2023-03-16 PROCEDURE — P9604 ONE-WAY ALLOW PRORATED TRIP: HCPCS | Mod: ORL | Performed by: NURSE PRACTITIONER

## 2023-03-16 PROCEDURE — 85610 PROTHROMBIN TIME: CPT | Mod: ORL | Performed by: NURSE PRACTITIONER

## 2023-03-16 PROCEDURE — 36415 COLL VENOUS BLD VENIPUNCTURE: CPT | Mod: ORL | Performed by: NURSE PRACTITIONER

## 2023-03-22 ENCOUNTER — LAB REQUISITION (OUTPATIENT)
Dept: LAB | Facility: CLINIC | Age: 88
End: 2023-03-22
Payer: COMMERCIAL

## 2023-03-22 DIAGNOSIS — I48.20 CHRONIC ATRIAL FIBRILLATION, UNSPECIFIED (H): ICD-10-CM

## 2023-03-23 LAB — INR PPP: 2.07 (ref 0.85–1.15)

## 2023-03-23 PROCEDURE — 85610 PROTHROMBIN TIME: CPT | Mod: ORL | Performed by: NURSE PRACTITIONER

## 2023-03-23 PROCEDURE — P9604 ONE-WAY ALLOW PRORATED TRIP: HCPCS | Mod: ORL | Performed by: NURSE PRACTITIONER

## 2023-03-23 PROCEDURE — 36415 COLL VENOUS BLD VENIPUNCTURE: CPT | Mod: ORL | Performed by: NURSE PRACTITIONER

## 2023-04-04 ENCOUNTER — LAB REQUISITION (OUTPATIENT)
Dept: LAB | Facility: CLINIC | Age: 88
End: 2023-04-04
Payer: COMMERCIAL

## 2023-04-04 DIAGNOSIS — I48.20 CHRONIC ATRIAL FIBRILLATION, UNSPECIFIED (H): ICD-10-CM

## 2023-04-06 LAB — INR PPP: 2.35 (ref 0.85–1.15)

## 2023-04-06 PROCEDURE — P9604 ONE-WAY ALLOW PRORATED TRIP: HCPCS | Mod: ORL | Performed by: NURSE PRACTITIONER

## 2023-04-06 PROCEDURE — 85610 PROTHROMBIN TIME: CPT | Mod: ORL | Performed by: NURSE PRACTITIONER

## 2023-04-06 PROCEDURE — 36415 COLL VENOUS BLD VENIPUNCTURE: CPT | Mod: ORL | Performed by: NURSE PRACTITIONER

## 2023-04-19 ENCOUNTER — LAB REQUISITION (OUTPATIENT)
Dept: LAB | Facility: CLINIC | Age: 88
End: 2023-04-19
Payer: COMMERCIAL

## 2023-04-19 DIAGNOSIS — I50.9 HEART FAILURE, UNSPECIFIED (H): ICD-10-CM

## 2023-04-21 LAB
ANION GAP SERPL CALCULATED.3IONS-SCNC: 13 MMOL/L (ref 7–15)
BUN SERPL-MCNC: 26.8 MG/DL (ref 8–23)
CALCIUM SERPL-MCNC: 9.2 MG/DL (ref 8.2–9.6)
CHLORIDE SERPL-SCNC: 102 MMOL/L (ref 98–107)
CREAT SERPL-MCNC: 1.13 MG/DL (ref 0.67–1.17)
DEPRECATED HCO3 PLAS-SCNC: 25 MMOL/L (ref 22–29)
ERYTHROCYTE [DISTWIDTH] IN BLOOD BY AUTOMATED COUNT: 16.5 % (ref 10–15)
GFR SERPL CREATININE-BSD FRML MDRD: 62 ML/MIN/1.73M2
GLUCOSE SERPL-MCNC: 118 MG/DL (ref 70–99)
HCT VFR BLD AUTO: 34.9 % (ref 40–53)
HGB BLD-MCNC: 10.2 G/DL (ref 13.3–17.7)
MCH RBC QN AUTO: 24.9 PG (ref 26.5–33)
MCHC RBC AUTO-ENTMCNC: 29.2 G/DL (ref 31.5–36.5)
MCV RBC AUTO: 85 FL (ref 78–100)
PLATELET # BLD AUTO: 190 10E3/UL (ref 150–450)
POTASSIUM SERPL-SCNC: 4 MMOL/L (ref 3.4–5.3)
RBC # BLD AUTO: 4.09 10E6/UL (ref 4.4–5.9)
SODIUM SERPL-SCNC: 140 MMOL/L (ref 136–145)
WBC # BLD AUTO: 4.9 10E3/UL (ref 4–11)

## 2023-04-21 PROCEDURE — 85027 COMPLETE CBC AUTOMATED: CPT | Mod: ORL | Performed by: NURSE PRACTITIONER

## 2023-04-21 PROCEDURE — 80048 BASIC METABOLIC PNL TOTAL CA: CPT | Mod: ORL | Performed by: NURSE PRACTITIONER

## 2023-04-21 PROCEDURE — P9603 ONE-WAY ALLOW PRORATED MILES: HCPCS | Mod: ORL | Performed by: NURSE PRACTITIONER

## 2023-04-21 PROCEDURE — 36415 COLL VENOUS BLD VENIPUNCTURE: CPT | Mod: ORL | Performed by: NURSE PRACTITIONER

## 2023-04-26 ENCOUNTER — LAB REQUISITION (OUTPATIENT)
Dept: LAB | Facility: CLINIC | Age: 88
End: 2023-04-26
Payer: COMMERCIAL

## 2023-04-26 DIAGNOSIS — I48.20 CHRONIC ATRIAL FIBRILLATION, UNSPECIFIED (H): ICD-10-CM

## 2023-04-26 DIAGNOSIS — D64.9 ANEMIA, UNSPECIFIED: ICD-10-CM

## 2023-04-27 LAB
ERYTHROCYTE [DISTWIDTH] IN BLOOD BY AUTOMATED COUNT: 16.8 % (ref 10–15)
HCT VFR BLD AUTO: 35.2 % (ref 40–53)
HGB BLD-MCNC: 10.5 G/DL (ref 13.3–17.7)
INR PPP: 3.1 (ref 0.85–1.15)
MCH RBC QN AUTO: 25.4 PG (ref 26.5–33)
MCHC RBC AUTO-ENTMCNC: 29.8 G/DL (ref 31.5–36.5)
MCV RBC AUTO: 85 FL (ref 78–100)
PLATELET # BLD AUTO: 211 10E3/UL (ref 150–450)
RBC # BLD AUTO: 4.13 10E6/UL (ref 4.4–5.9)
WBC # BLD AUTO: 5 10E3/UL (ref 4–11)

## 2023-04-27 PROCEDURE — 83550 IRON BINDING TEST: CPT | Mod: ORL | Performed by: NURSE PRACTITIONER

## 2023-04-27 PROCEDURE — P9604 ONE-WAY ALLOW PRORATED TRIP: HCPCS | Mod: ORL

## 2023-04-27 PROCEDURE — P9604 ONE-WAY ALLOW PRORATED TRIP: HCPCS | Mod: ORL | Performed by: NURSE PRACTITIONER

## 2023-04-27 PROCEDURE — 36415 COLL VENOUS BLD VENIPUNCTURE: CPT | Mod: ORL

## 2023-04-27 PROCEDURE — 85027 COMPLETE CBC AUTOMATED: CPT | Mod: ORL | Performed by: NURSE PRACTITIONER

## 2023-04-27 PROCEDURE — 85610 PROTHROMBIN TIME: CPT | Mod: ORL

## 2023-04-28 LAB
IRON BINDING CAPACITY (ROCHE): 322 UG/DL (ref 240–430)
IRON SATN MFR SERPL: 11 % (ref 15–46)
IRON SERPL-MCNC: 35 UG/DL (ref 61–157)

## 2023-05-03 ENCOUNTER — LAB REQUISITION (OUTPATIENT)
Dept: LAB | Facility: CLINIC | Age: 88
End: 2023-05-03
Payer: COMMERCIAL

## 2023-05-03 DIAGNOSIS — I48.20 CHRONIC ATRIAL FIBRILLATION, UNSPECIFIED (H): ICD-10-CM

## 2023-05-04 LAB — INR PPP: 2.6 (ref 0.85–1.15)

## 2023-05-04 PROCEDURE — P9604 ONE-WAY ALLOW PRORATED TRIP: HCPCS | Mod: ORL | Performed by: NURSE PRACTITIONER

## 2023-05-04 PROCEDURE — 36415 COLL VENOUS BLD VENIPUNCTURE: CPT | Mod: ORL | Performed by: NURSE PRACTITIONER

## 2023-05-04 PROCEDURE — 85610 PROTHROMBIN TIME: CPT | Mod: ORL | Performed by: NURSE PRACTITIONER

## 2023-05-10 ENCOUNTER — LAB REQUISITION (OUTPATIENT)
Dept: LAB | Facility: CLINIC | Age: 88
End: 2023-05-10
Payer: COMMERCIAL

## 2023-05-11 LAB — INR PPP: 2.09 (ref 0.85–1.15)

## 2023-05-11 PROCEDURE — 85610 PROTHROMBIN TIME: CPT | Mod: ORL | Performed by: NURSE PRACTITIONER

## 2023-05-11 PROCEDURE — 36415 COLL VENOUS BLD VENIPUNCTURE: CPT | Mod: ORL | Performed by: NURSE PRACTITIONER

## 2023-05-11 PROCEDURE — P9604 ONE-WAY ALLOW PRORATED TRIP: HCPCS | Mod: ORL | Performed by: NURSE PRACTITIONER

## 2023-05-17 ENCOUNTER — LAB REQUISITION (OUTPATIENT)
Dept: LAB | Facility: CLINIC | Age: 88
End: 2023-05-17
Payer: COMMERCIAL

## 2023-05-17 DIAGNOSIS — I48.20 CHRONIC ATRIAL FIBRILLATION, UNSPECIFIED (H): ICD-10-CM

## 2023-05-18 LAB — INR PPP: 2.39 (ref 0.85–1.15)

## 2023-05-18 PROCEDURE — P9604 ONE-WAY ALLOW PRORATED TRIP: HCPCS | Mod: ORL | Performed by: NURSE PRACTITIONER

## 2023-05-18 PROCEDURE — 36415 COLL VENOUS BLD VENIPUNCTURE: CPT | Mod: ORL | Performed by: NURSE PRACTITIONER

## 2023-05-18 PROCEDURE — 85610 PROTHROMBIN TIME: CPT | Mod: ORL | Performed by: NURSE PRACTITIONER

## 2023-05-31 ENCOUNTER — LAB REQUISITION (OUTPATIENT)
Dept: LAB | Facility: CLINIC | Age: 88
End: 2023-05-31
Payer: COMMERCIAL

## 2023-05-31 DIAGNOSIS — I48.20 CHRONIC ATRIAL FIBRILLATION, UNSPECIFIED (H): ICD-10-CM

## 2023-06-01 LAB — INR PPP: 2.57 (ref 0.85–1.15)

## 2023-06-01 PROCEDURE — 36415 COLL VENOUS BLD VENIPUNCTURE: CPT | Mod: ORL | Performed by: NURSE PRACTITIONER

## 2023-06-01 PROCEDURE — 85610 PROTHROMBIN TIME: CPT | Mod: ORL | Performed by: NURSE PRACTITIONER

## 2023-06-01 PROCEDURE — P9604 ONE-WAY ALLOW PRORATED TRIP: HCPCS | Mod: ORL | Performed by: NURSE PRACTITIONER

## 2023-06-06 ENCOUNTER — APPOINTMENT (OUTPATIENT)
Dept: GENERAL RADIOLOGY | Facility: CLINIC | Age: 88
DRG: 193 | End: 2023-06-06
Attending: EMERGENCY MEDICINE
Payer: COMMERCIAL

## 2023-06-06 ENCOUNTER — MEDICAL CORRESPONDENCE (OUTPATIENT)
Dept: HEALTH INFORMATION MANAGEMENT | Facility: CLINIC | Age: 88
End: 2023-06-06

## 2023-06-06 ENCOUNTER — HOSPITAL ENCOUNTER (INPATIENT)
Facility: CLINIC | Age: 88
LOS: 1 days | Discharge: NURSING, REHAB OR LONG-TERM CARE | DRG: 193 | End: 2023-06-09
Attending: EMERGENCY MEDICINE | Admitting: HOSPITALIST
Payer: COMMERCIAL

## 2023-06-06 DIAGNOSIS — N39.0 URINARY TRACT INFECTION WITHOUT HEMATURIA, SITE UNSPECIFIED: ICD-10-CM

## 2023-06-06 DIAGNOSIS — E53.8 VITAMIN B12 DEFICIENCY (NON ANEMIC): Primary | ICD-10-CM

## 2023-06-06 DIAGNOSIS — J18.9 PNEUMONIA DUE TO INFECTIOUS ORGANISM, UNSPECIFIED LATERALITY, UNSPECIFIED PART OF LUNG: ICD-10-CM

## 2023-06-06 DIAGNOSIS — R53.1 GENERALIZED WEAKNESS: ICD-10-CM

## 2023-06-06 DIAGNOSIS — N28.89 RENAL MASS: ICD-10-CM

## 2023-06-06 LAB
ALBUMIN SERPL BCG-MCNC: 4.4 G/DL (ref 3.5–5.2)
ALBUMIN UR-MCNC: NEGATIVE MG/DL
ALP SERPL-CCNC: 63 U/L (ref 40–129)
ALT SERPL W P-5'-P-CCNC: 14 U/L (ref 10–50)
ANION GAP SERPL CALCULATED.3IONS-SCNC: 10 MMOL/L (ref 7–15)
APPEARANCE UR: ABNORMAL
AST SERPL W P-5'-P-CCNC: 21 U/L (ref 10–50)
ATRIAL RATE - MUSE: 65 BPM
BACTERIA #/AREA URNS HPF: ABNORMAL /HPF
BASOPHILS # BLD AUTO: 0 10E3/UL (ref 0–0.2)
BASOPHILS NFR BLD AUTO: 0 %
BILIRUB SERPL-MCNC: 0.6 MG/DL
BILIRUB UR QL STRIP: NEGATIVE
BUN SERPL-MCNC: 22.5 MG/DL (ref 8–23)
CALCIUM SERPL-MCNC: 9.8 MG/DL (ref 8.2–9.6)
CHLORIDE SERPL-SCNC: 99 MMOL/L (ref 98–107)
COLOR UR AUTO: ABNORMAL
CREAT SERPL-MCNC: 0.88 MG/DL (ref 0.67–1.17)
DEPRECATED HCO3 PLAS-SCNC: 28 MMOL/L (ref 22–29)
DIASTOLIC BLOOD PRESSURE - MUSE: NORMAL MMHG
EOSINOPHIL # BLD AUTO: 0 10E3/UL (ref 0–0.7)
EOSINOPHIL NFR BLD AUTO: 0 %
ERYTHROCYTE [DISTWIDTH] IN BLOOD BY AUTOMATED COUNT: 16.5 % (ref 10–15)
FLUAV RNA SPEC QL NAA+PROBE: NEGATIVE
FLUBV RNA RESP QL NAA+PROBE: NEGATIVE
GFR SERPL CREATININE-BSD FRML MDRD: 82 ML/MIN/1.73M2
GLUCOSE SERPL-MCNC: 120 MG/DL (ref 70–99)
GLUCOSE UR STRIP-MCNC: NEGATIVE MG/DL
HCT VFR BLD AUTO: 35.5 % (ref 40–53)
HGB BLD-MCNC: 11.1 G/DL (ref 13.3–17.7)
HGB UR QL STRIP: NEGATIVE
HOLD SPECIMEN: NORMAL
HYALINE CASTS: 6 /LPF
IMM GRANULOCYTES # BLD: 0.1 10E3/UL
IMM GRANULOCYTES NFR BLD: 1 %
INR PPP: 2.51 (ref 0.85–1.15)
INTERPRETATION ECG - MUSE: NORMAL
KETONES UR STRIP-MCNC: NEGATIVE MG/DL
LACTATE SERPL-SCNC: 0.9 MMOL/L (ref 0.7–2)
LEUKOCYTE ESTERASE UR QL STRIP: ABNORMAL
LYMPHOCYTES # BLD AUTO: 0.5 10E3/UL (ref 0.8–5.3)
LYMPHOCYTES NFR BLD AUTO: 3 %
MAGNESIUM SERPL-MCNC: 2 MG/DL (ref 1.7–2.3)
MCH RBC QN AUTO: 26.2 PG (ref 26.5–33)
MCHC RBC AUTO-ENTMCNC: 31.3 G/DL (ref 31.5–36.5)
MCV RBC AUTO: 84 FL (ref 78–100)
MONOCYTES # BLD AUTO: 1.3 10E3/UL (ref 0–1.3)
MONOCYTES NFR BLD AUTO: 9 %
MUCOUS THREADS #/AREA URNS LPF: PRESENT /LPF
NEUTROPHILS # BLD AUTO: 12.7 10E3/UL (ref 1.6–8.3)
NEUTROPHILS NFR BLD AUTO: 87 %
NITRATE UR QL: NEGATIVE
NRBC # BLD AUTO: 0 10E3/UL
NRBC BLD AUTO-RTO: 0 /100
NT-PROBNP SERPL-MCNC: 3826 PG/ML (ref 0–1800)
P AXIS - MUSE: NORMAL DEGREES
PH UR STRIP: 6 [PH] (ref 5–7)
PHOSPHATE SERPL-MCNC: 3.5 MG/DL (ref 2.5–4.5)
PLATELET # BLD AUTO: 200 10E3/UL (ref 150–450)
POTASSIUM SERPL-SCNC: 4.3 MMOL/L (ref 3.4–5.3)
PR INTERVAL - MUSE: NORMAL MS
PROCALCITONIN SERPL IA-MCNC: 0.18 NG/ML
PROT SERPL-MCNC: 7.1 G/DL (ref 6.4–8.3)
QRS DURATION - MUSE: 190 MS
QT - MUSE: 486 MS
QTC - MUSE: 535 MS
R AXIS - MUSE: -48 DEGREES
RBC # BLD AUTO: 4.23 10E6/UL (ref 4.4–5.9)
RBC URINE: 25 /HPF
RSV RNA SPEC NAA+PROBE: NEGATIVE
SARS-COV-2 RNA RESP QL NAA+PROBE: NEGATIVE
SODIUM SERPL-SCNC: 137 MMOL/L (ref 136–145)
SP GR UR STRIP: 1.01 (ref 1–1.03)
SQUAMOUS EPITHELIAL: 2 /HPF
SYSTOLIC BLOOD PRESSURE - MUSE: NORMAL MMHG
T AXIS - MUSE: 118 DEGREES
TSH SERPL DL<=0.005 MIU/L-ACNC: 2.39 UIU/ML (ref 0.3–4.2)
UROBILINOGEN UR STRIP-MCNC: NORMAL MG/DL
VENTRICULAR RATE- MUSE: 73 BPM
WBC # BLD AUTO: 14.5 10E3/UL (ref 4–11)
WBC URINE: 114 /HPF

## 2023-06-06 PROCEDURE — G0378 HOSPITAL OBSERVATION PER HR: HCPCS

## 2023-06-06 PROCEDURE — 96366 THER/PROPH/DIAG IV INF ADDON: CPT

## 2023-06-06 PROCEDURE — 83735 ASSAY OF MAGNESIUM: CPT | Performed by: HOSPITALIST

## 2023-06-06 PROCEDURE — 36415 COLL VENOUS BLD VENIPUNCTURE: CPT | Performed by: EMERGENCY MEDICINE

## 2023-06-06 PROCEDURE — 80053 COMPREHEN METABOLIC PANEL: CPT | Performed by: EMERGENCY MEDICINE

## 2023-06-06 PROCEDURE — 258N000003 HC RX IP 258 OP 636: Performed by: EMERGENCY MEDICINE

## 2023-06-06 PROCEDURE — 84443 ASSAY THYROID STIM HORMONE: CPT | Performed by: EMERGENCY MEDICINE

## 2023-06-06 PROCEDURE — 99222 1ST HOSP IP/OBS MODERATE 55: CPT | Performed by: HOSPITALIST

## 2023-06-06 PROCEDURE — 250N000011 HC RX IP 250 OP 636: Performed by: EMERGENCY MEDICINE

## 2023-06-06 PROCEDURE — 250N000011 HC RX IP 250 OP 636: Performed by: HOSPITALIST

## 2023-06-06 PROCEDURE — 96365 THER/PROPH/DIAG IV INF INIT: CPT

## 2023-06-06 PROCEDURE — 250N000013 HC RX MED GY IP 250 OP 250 PS 637: Performed by: EMERGENCY MEDICINE

## 2023-06-06 PROCEDURE — 93005 ELECTROCARDIOGRAM TRACING: CPT

## 2023-06-06 PROCEDURE — 81003 URINALYSIS AUTO W/O SCOPE: CPT | Performed by: EMERGENCY MEDICINE

## 2023-06-06 PROCEDURE — 82728 ASSAY OF FERRITIN: CPT | Performed by: HOSPITALIST

## 2023-06-06 PROCEDURE — 84145 PROCALCITONIN (PCT): CPT | Performed by: HOSPITALIST

## 2023-06-06 PROCEDURE — 83880 ASSAY OF NATRIURETIC PEPTIDE: CPT | Performed by: HOSPITALIST

## 2023-06-06 PROCEDURE — 99285 EMERGENCY DEPT VISIT HI MDM: CPT | Mod: 25

## 2023-06-06 PROCEDURE — 82607 VITAMIN B-12: CPT | Performed by: HOSPITALIST

## 2023-06-06 PROCEDURE — 85025 COMPLETE CBC W/AUTO DIFF WBC: CPT | Performed by: EMERGENCY MEDICINE

## 2023-06-06 PROCEDURE — 85610 PROTHROMBIN TIME: CPT | Performed by: EMERGENCY MEDICINE

## 2023-06-06 PROCEDURE — 87086 URINE CULTURE/COLONY COUNT: CPT | Performed by: EMERGENCY MEDICINE

## 2023-06-06 PROCEDURE — 83605 ASSAY OF LACTIC ACID: CPT | Performed by: HOSPITALIST

## 2023-06-06 PROCEDURE — 83550 IRON BINDING TEST: CPT | Performed by: HOSPITALIST

## 2023-06-06 PROCEDURE — 87637 SARSCOV2&INF A&B&RSV AMP PRB: CPT | Performed by: EMERGENCY MEDICINE

## 2023-06-06 PROCEDURE — 71046 X-RAY EXAM CHEST 2 VIEWS: CPT

## 2023-06-06 PROCEDURE — 36415 COLL VENOUS BLD VENIPUNCTURE: CPT | Performed by: HOSPITALIST

## 2023-06-06 PROCEDURE — 84100 ASSAY OF PHOSPHORUS: CPT | Performed by: HOSPITALIST

## 2023-06-06 PROCEDURE — 96361 HYDRATE IV INFUSION ADD-ON: CPT

## 2023-06-06 RX ORDER — DOXYCYCLINE 100 MG/1
100 CAPSULE ORAL EVERY 12 HOURS SCHEDULED
Status: DISCONTINUED | OUTPATIENT
Start: 2023-06-07 | End: 2023-06-09 | Stop reason: HOSPADM

## 2023-06-06 RX ORDER — DOXYCYCLINE 100 MG/1
100 CAPSULE ORAL ONCE
Status: COMPLETED | OUTPATIENT
Start: 2023-06-06 | End: 2023-06-06

## 2023-06-06 RX ORDER — AMIODARONE HYDROCHLORIDE 200 MG/1
200 TABLET ORAL DAILY
Status: DISCONTINUED | OUTPATIENT
Start: 2023-06-07 | End: 2023-06-09 | Stop reason: HOSPADM

## 2023-06-06 RX ORDER — FERROUS SULFATE 325(65) MG
325 TABLET, DELAYED RELEASE (ENTERIC COATED) ORAL
COMMUNITY

## 2023-06-06 RX ORDER — CALCIUM CARBONATE 500 MG/1
1 TABLET, CHEWABLE ORAL 4 TIMES DAILY PRN
COMMUNITY
End: 2023-07-10

## 2023-06-06 RX ORDER — CEFTRIAXONE 2 G/1
2 INJECTION, POWDER, FOR SOLUTION INTRAMUSCULAR; INTRAVENOUS EVERY 24 HOURS
Status: DISCONTINUED | OUTPATIENT
Start: 2023-06-07 | End: 2023-06-09 | Stop reason: HOSPADM

## 2023-06-06 RX ORDER — FUROSEMIDE 40 MG
40 TABLET ORAL
COMMUNITY

## 2023-06-06 RX ORDER — TAMSULOSIN HYDROCHLORIDE 0.4 MG/1
0.4 CAPSULE ORAL DAILY
Status: DISCONTINUED | OUTPATIENT
Start: 2023-06-07 | End: 2023-06-09 | Stop reason: HOSPADM

## 2023-06-06 RX ORDER — ACETAMINOPHEN 500 MG
1000 TABLET ORAL 3 TIMES DAILY
COMMUNITY

## 2023-06-06 RX ORDER — CEFTRIAXONE 1 G/1
1 INJECTION, POWDER, FOR SOLUTION INTRAMUSCULAR; INTRAVENOUS ONCE
Status: COMPLETED | OUTPATIENT
Start: 2023-06-06 | End: 2023-06-06

## 2023-06-06 RX ORDER — BRIMONIDINE TARTRATE 2 MG/ML
1 SOLUTION/ DROPS OPHTHALMIC 2 TIMES DAILY
COMMUNITY

## 2023-06-06 RX ORDER — SIMETHICONE 80 MG
80 TABLET,CHEWABLE ORAL DAILY PRN
COMMUNITY
End: 2023-07-10

## 2023-06-06 RX ORDER — LATANOPROST 50 UG/ML
1 SOLUTION/ DROPS OPHTHALMIC AT BEDTIME
Status: DISCONTINUED | OUTPATIENT
Start: 2023-06-06 | End: 2023-06-09 | Stop reason: HOSPADM

## 2023-06-06 RX ORDER — BISACODYL 10 MG
10 SUPPOSITORY, RECTAL RECTAL DAILY PRN
COMMUNITY

## 2023-06-06 RX ORDER — BRIMONIDINE TARTRATE 2 MG/ML
1 SOLUTION/ DROPS OPHTHALMIC 2 TIMES DAILY
Status: DISCONTINUED | OUTPATIENT
Start: 2023-06-06 | End: 2023-06-09 | Stop reason: HOSPADM

## 2023-06-06 RX ORDER — ACETAMINOPHEN 500 MG
1000 TABLET ORAL
Status: DISCONTINUED | OUTPATIENT
Start: 2023-06-06 | End: 2023-06-09 | Stop reason: HOSPADM

## 2023-06-06 RX ORDER — NYSTATIN 100000 [USP'U]/G
POWDER TOPICAL 2 TIMES DAILY
COMMUNITY

## 2023-06-06 RX ORDER — WARFARIN SODIUM 3 MG/1
3 TABLET ORAL DAILY
Status: ON HOLD | COMMUNITY
Start: 2023-06-10 | End: 2023-06-20

## 2023-06-06 RX ORDER — WARFARIN SODIUM 2.5 MG/1
2.5 TABLET ORAL
Status: ON HOLD | COMMUNITY
End: 2023-06-20

## 2023-06-06 RX ORDER — DORZOLAMIDE HYDROCHLORIDE AND TIMOLOL MALEATE 20; 5 MG/ML; MG/ML
1 SOLUTION/ DROPS OPHTHALMIC 2 TIMES DAILY
Status: DISCONTINUED | OUTPATIENT
Start: 2023-06-06 | End: 2023-06-09 | Stop reason: HOSPADM

## 2023-06-06 RX ORDER — WARFARIN SODIUM 3 MG/1
3 TABLET ORAL ONCE
Status: COMPLETED | OUTPATIENT
Start: 2023-06-06 | End: 2023-06-07

## 2023-06-06 RX ORDER — ONDANSETRON 4 MG/1
4 TABLET, ORALLY DISINTEGRATING ORAL EVERY 6 HOURS PRN
Status: DISCONTINUED | OUTPATIENT
Start: 2023-06-06 | End: 2023-06-09 | Stop reason: HOSPADM

## 2023-06-06 RX ORDER — ONDANSETRON 2 MG/ML
4 INJECTION INTRAMUSCULAR; INTRAVENOUS EVERY 6 HOURS PRN
Status: DISCONTINUED | OUTPATIENT
Start: 2023-06-06 | End: 2023-06-09 | Stop reason: HOSPADM

## 2023-06-06 RX ADMIN — CEFTRIAXONE SODIUM 1 G: 1 INJECTION, POWDER, FOR SOLUTION INTRAMUSCULAR; INTRAVENOUS at 19:12

## 2023-06-06 RX ADMIN — CEFTRIAXONE SODIUM 1 G: 1 INJECTION, POWDER, FOR SOLUTION INTRAMUSCULAR; INTRAVENOUS at 15:33

## 2023-06-06 RX ADMIN — DOXYCYCLINE HYCLATE 100 MG: 100 CAPSULE ORAL at 15:36

## 2023-06-06 RX ADMIN — SODIUM CHLORIDE 500 ML: 9 INJECTION, SOLUTION INTRAVENOUS at 14:47

## 2023-06-06 ASSESSMENT — ACTIVITIES OF DAILY LIVING (ADL)
ADLS_ACUITY_SCORE: 35

## 2023-06-06 NOTE — ED NOTES
Yanira [phone] and pt's son, Kain, updated on pt condition. Family is requesting that they are called with updates and they want to be involved in medical decisions as pt can be confused at times.

## 2023-06-06 NOTE — PHARMACY-ADMISSION MEDICATION HISTORY
Pharmacy Intern Admission Medication History    Admission medication history is complete. The information provided in this note is only as accurate as the sources available at the time of the update.    Medication reconciliation/reorder completed by provider prior to medication history? Yes    Information Source(s): Facility (U/NH/) medication list/MAR and CareEverywhere/SureScripts via N/A    Pertinent Information: Patient is from [facility], information was obtained from their MAR.     Changes made to PTA medication list:    Added: Diclofenac 1%, Ferrous Sulfate 325 mg, Nystatin 100,000 unit/g PWD, dimethicone-zinc oxide    Deleted: None    Changed: Warfarin dosage, brimonidine ophthalmic solution strength, furosemide strength.    Medication Affordability: unknown        Allergies reviewed with patient and updates made in EHR: Reviewed by ED nurse 6/6 at 1337    Medication History Completed By: Rain Lund 6/6/2023 6:00 PM    Prior to Admission medications    Medication Sig Last Dose Taking? Auth Provider Long Term End Date   acetaminophen (TYLENOL) 500 MG tablet Take 1,000 mg by mouth 2 times daily as needed for mild pain prn at prn Yes Unknown, Entered By History     acetaminophen (TYLENOL) 500 MG tablet Take 1,000 mg by mouth nightly as needed for mild pain 6/5/2023 at PM Yes Reported, Patient     alendronate (FOSAMAX) 70 MG tablet Take 1 tablet (70 mg) by mouth every 7 days TAKE ON EMPTY STOMACH, REMAIN UPRIGHT FOR ONE HOUR AFTER TAKING 5/31/2023 at AM Yes Chance Morrell MD Yes    amiodarone (PACERONE) 200 MG tablet Take 1 tablet (200 mg) by mouth daily 6/6/2023 at AM Yes Dean Rider MD Yes    bisacodyl (DULCOLAX) 10 MG suppository Place 10 mg rectally daily as needed for constipation prn at prn Yes Unknown, Entered By History     brimonidine (ALPHAGAN) 0.2 % ophthalmic solution Place 1 drop into both eyes 2 times daily 6/6/2023 at 0830 Yes Unknown, Entered By History      calcium carbonate (TUMS) 500 MG chewable tablet Take 1 chew tab by mouth 4 times daily as needed for heartburn prn at prn Yes Unknown, Entered By History     diclofenac (VOLTAREN) 1 % topical gel Apply 2 g topically 2 times daily To both shoulders.  And twice daily as needed. 6/6/2023 at 0830 Yes Unknown, Entered By History     dimethicone-zinc oxide (DONTE PROTECT) external cream Apply topically daily Apply to upper inner thighs, groin, and scrotum. 6/6/2023 at AM Yes Unknown, Entered By History     dorzolamide-timolol (COSOPT) 2-0.5 % ophthalmic solution INSTILL 1 DROP IN BOTH EYES TWICE A DAY 6/6/2023 at AM Yes Chance Morrell MD     ferrous sulfate (FE TABS) 325 (65 Fe) MG EC tablet Take 325 mg by mouth Take 1 tablet by mouth twice weekly on Monday and Friday at bedtime. 6/5/2023 at HS Yes Unknown, Entered By History     furosemide (LASIX) 20 MG tablet TAKE 1 TAB BY MOUTH ONCE DAILY  Patient taking differently: Take 20 mg by mouth On Mondays and Wednesdays 6/5/2023 Yes Chance Morrell MD Yes    furosemide (LASIX) 40 MG tablet Take 40 mg by mouth Take 1 tab by mouth on Tues, Thurs, Sat, and Sun. 6/6/2023 at 0830 Yes Unknown, Entered By History Yes    hypromellose (ARTIFICIAL TEARS) 0.5 % SOLN ophthalmic solution Place 1 drop into both eyes 4 times daily as needed for dry eyes prn at prn Yes Unknown, Entered By History     ketotifen (ZADITOR) 0.025 % ophthalmic solution Place 1 drop into both eyes as needed PRN Yes Reported, Patient Yes    latanoprost (XALATAN) 0.005 % ophthalmic solution INSTILL 1 DROP INTO BOTH EYES AT BEDTIME 6/5/2023 at HS Yes Chance Morrell MD Yes    melatonin 5 MG tablet Take 5 mg by mouth nightly as needed PRN Yes Reported, Patient     netarsudil (RHOPRESSA) 0.02 % ophthalmic solution 1 drop At Bedtime 6/5/2023 at PM Yes Reported, Patient     nystatin (MYCOSTATIN) 511282 UNIT/GM external powder Apply topically 2 times daily Apply to abdominal folds 6/6/2023  Yes Unknown, Entered By History     polyethylene glycol (MIRALAX) 17 g packet Take 8.5 g by mouth daily 6/6/2023 at 0830 Yes Reported, Patient     potassium chloride ER (K-TAB/KLOR-CON) 10 MEQ CR tablet TAKE 1 TAB BY MOUTH DAILY WITH EACH FUROSEMIDE DOSE. 6/6/2023 at 0830 Yes Chance Morrell MD     senna-docusate (SENOKOT-S/PERICOLACE) 8.6-50 MG tablet Take 1 tablet by mouth daily as needed for constipation 2/21/23 Yes Reported, Patient     simethicone (MYLICON) 80 MG chewable tablet Take 80 mg by mouth daily as needed for flatulence or cramping prn at prn Yes Unknown, Entered By History     tamsulosin (FLOMAX) 0.4 MG capsule TAKE 1 CAP BY MOUTH ONCE DAILY 6/5/2023 at 0830 Yes Chance Morrell MD     traMADol (ULTRAM) 50 MG tablet Take 0.5 tablets (25 mg) by mouth every 6 hours as needed for severe pain 6/2/2023 at 1605 Yes Carrington Sims APRN CNP     Vitamin D, Cholecalciferol, 1000 UNITS TABS Take 1 tablet by mouth daily  6/6/2023 at 0830 Yes Dean Rider MD     warfarin ANTICOAGULANT (COUMADIN) 2.5 MG tablet Take 2.5 mg by mouth Take 1 tab by mouth on Fridays   INR 6/23 6/2/2023 at PM Yes Unknown, Entered By History     warfarin ANTICOAGULANT (COUMADIN) 3 MG tablet Take 3 mg by mouth Take 1 tab by mouth on Mon, Tues, Wed, Thurs, Sat, Sun  INR 6/23 6/5/2023 at PM Yes Unknown, Entered By History     brimonidine (ALPHAGAN-P) 0.15 % ophthalmic solution Place 1 drop into both eyes 2 times daily  Patient not taking: Reported on 6/6/2023 Not Taking  Chance Morrell MD     Lidocaine (LIDOCARE) 4 % Patch APPLY 1 PATCH TO BACK ONCE DAILY; 12 HOURS ON 12 HOURSOFF  Patient not taking: Reported on 6/6/2023 Not Taking  Chance Morrell MD

## 2023-06-06 NOTE — H&P
[facility] [place]    History and Physical  Hospitalist       Date of Admission:  6/6/2023    Assessment & Plan   Ayaan Herrera is a 89 year old male with a history of chronic atrial fibrillation on warfarin, HTN, HLD, sinus node dysfunction s/p pacemaker placement, prostate cancer, glaucoma, cardiomyopathy, insomnia, multinodular goiter, BPH who is sent to the ED from his care facility due to weakness.    Generalized weakness  Possible pneumonia versus UTI  Leukocytosis with lymphopenia  Patient himself does not really offer any complaints, states that he was sent here from the facility as he was feeling weak over the last few days.  Apparently today he was not able to stand due to the weakness which is not normal for him.  Patient denies any recent fevers, chills, sweats, shortness of breath, cough, chest pain, abdominal pain, nausea, vomiting, leg swelling or skin rash, urinary symptoms.  He notes having some chronic issues with loose stools/frequent small stools.  No abdominal pain.  No blood in stools.  Apparently is on iron supplement and he was noted to have a dark stool.  In the ED, noted to be afebrile with stable vitals.  EKG showed paced rhythm.  CXR with some patchy opacities at right lung base which could be atelectasis versus pneumonia.  UA showed moderate LE, moderate bacteria, 114 WBC, negative nitrates.  Other labs showed normal renal function, normal liver function, WBC 14.5, Hb 11.1, .  INR 2.5, TSH 2.3.  COVID PCR negative.  Procalcitonin 0.18.  Head CT with no acute abnormality.  Mag and Phos normal.  Patient was given IV ceftriaxone and p.o. doxycycline for empiric treatment of possible pneumonia/UTI and admitted for further management.  Lymphopenia could indicate possible viral infection.  Other than weakness patient does not really endorse any other symptoms.  -Registered to observation status  -Follow urine cultures.  No blood cultures were sent.  Continue empiric  IV ceftriaxone and p.o. doxycycline that was started in the ED for possible pneumonia and UTI.  Recheck WBC tomorrow.  -PT/SW consult    Chronic atrial fibrillation  Cardiomyopathy  HTN  HLD  S/p pacemaker placement  Last TTE was in November 2022 that showed EF 40 to 45%, mid to apical anteroseptal wall hypokinesis, moderate AAS, severe biatrial enlargement, mild TR and moderate MR.  Patient has some chronic lower extremity edema for which she is wearing wraps, does not think this has worsened recently.  -Continue PTA amiodarone  -Continue PTA warfarin dosed per pharmacy  -Hold PTA Lasix while treating for infection    Prostate cancer  -Noted  BPH  -Continue PTA tamsulosin    ? Chronic diarrhea  -Hold PTA bowel meds [laxative/stool softeners].  -Monitor and depending on course may need further work-up    Chronic right shoulder pain  -Continue PTA Tylenol, tramadol    Glaucoma  -Continue PTA eyedrops    DVT Prophylaxis: Warfarin  Code Status: Full Code     Expected Discharge Date: 06/07/2023               Sarah Carrillo MD    Medical Decision Making       Please see A&P for additional details of medical decision making.         Clinically Significant Risk Factors Present on Admission               # Drug Induced Coagulation Defect: home medication list includes an anticoagulant medication    # Hypertension: Noted on problem list                 Primary Care Physician   Chance Morrell    Chief Complaint   Weakness    History is obtained from the patient, ED provider and chart review    History of Present Illness   Ayaan Herrera is a 89 year old male with a history of chronic atrial fibrillation on warfarin, HTN, HLD, sinus node dysfunction s/p pacemaker placement, prostate cancer, glaucoma, cardiomyopathy, insomnia, multinodular goiter, BPH who is sent to the ED from his care facility due to weakness.  Patient himself does not really offer any complaints, states that he was sent here from the facility as  he was feeling weak over the last few days.  Apparently today he was not able to stand due to the weakness which is not normal for him.  Patient denies any recent fevers, chills, sweats, shortness of breath, cough, chest pain, abdominal pain, nausea, vomiting, leg swelling or skin rash, urinary symptoms.  He notes having some chronic issues with loose stools/frequent small stools.  No abdominal pain.  No blood in stools.  Apparently is on iron supplement and he was noted to have a dark stool.  In the ED, noted to be afebrile with stable vitals.  EKG showed paced rhythm.  CXR with some patchy opacities at right lung base which could be atelectasis versus pneumonia.  UA showed moderate LE, moderate bacteria, 114 WBC, negative nitrates.  Other labs showed normal renal function, normal liver function, WBC 14.5, Hb 11.1, .  INR 2.5, TSH 2.3.  COVID PCR negative.  Procalcitonin 0.18.  Head CT with no acute abnormality.  Mag and Phos normal.  Patient was given IV ceftriaxone and p.o. doxycycline for empiric treatment of possible pneumonia/UTI and admitted for further management.    Past Medical History    I have reviewed this patient's medical history and updated it with pertinent information if needed.   Past Medical History:   Diagnosis Date     Atrial fibrillation (H)      BPH (benign prostatic hyperplasia)      Dyslipidemia      History of basal cell carcinoma      HTN (hypertension)      Nonsenile cataract      NSVT (nonsustained ventricular tachycardia) (H)      POAG (primary open-angle glaucoma)     Advanced      Prostate cancer (H)      Psoriasis      Sinus node dysfunction (H)        Past Surgical History   I have reviewed this patient's surgical history and updated it with pertinent information if needed.  Past Surgical History:   Procedure Laterality Date     CATARACT IOL, RT/LT  1985/1996    RE/LE     EP PACEMAKER  2007    [employer]     GLAUCOMA SURGERY  01/01/1996    LE     GLAUCOMA SURGERY  01/01/1997     Bleb revision LE     LASER SURGERY OF EYE  10/14/04 & 2/17/05    SLT RE     PUNCTAL CLOSURE, PLUGS  01/01/2000    BE     TURP  01/01/2003       Prior to Admission Medications   Prior to Admission Medications   Prescriptions Last Dose Informant Patient Reported? Taking?   Lidocaine (LIDOCARE) 4 % Patch Not Taking Nursing Home No No   Sig: APPLY 1 PATCH TO BACK ONCE DAILY; 12 HOURS ON 12 HOURSOFF   Patient not taking: Reported on 6/6/2023   Vitamin D, Cholecalciferol, 1000 UNITS TABS 6/6/2023 at 08 Nursing Home Yes Yes   Sig: Take 1 tablet by mouth daily    acetaminophen (TYLENOL) 500 MG tablet 6/5/2023 at PM Nursing Home Yes Yes   Sig: Take 1,000 mg by mouth nightly as needed for mild pain   acetaminophen (TYLENOL) 500 MG tablet prn at prn Nursing Home Yes Yes   Sig: Take 1,000 mg by mouth 2 times daily as needed for mild pain   alendronate (FOSAMAX) 70 MG tablet 5/31/2023 at AM Nursing Home No Yes   Sig: Take 1 tablet (70 mg) by mouth every 7 days TAKE ON EMPTY STOMACH, REMAIN UPRIGHT FOR ONE HOUR AFTER TAKING   amiodarone (PACERONE) 200 MG tablet 6/6/2023 at AM Nursing Home Yes Yes   Sig: Take 1 tablet (200 mg) by mouth daily   bisacodyl (DULCOLAX) 10 MG suppository prn at prn Nursing Home Yes Yes   Sig: Place 10 mg rectally daily as needed for constipation   brimonidine (ALPHAGAN) 0.2 % ophthalmic solution 6/6/2023 at [facility] Nursing Home Yes Yes   Sig: Place 1 drop into both eyes 2 times daily   brimonidine (ALPHAGAN-P) 0.15 % ophthalmic solution Not Taking Nursing Home No No   Sig: Place 1 drop into both eyes 2 times daily   Patient not taking: Reported on 6/6/2023   calcium carbonate (TUMS) 500 MG chewable tablet prn at prn Nursing Home Yes Yes   Sig: Take 1 chew tab by mouth 4 times daily as needed for heartburn   diclofenac (VOLTAREN) 1 % topical gel 6/6/2023 at 08 Nursing Home Yes Yes   Sig: Apply 2 g topically 2 times daily To both shoulders.  And twice daily as needed.   dimethicone-zinc oxide (DONTE  PROTECT) external cream 2023 at AM Nursing Home Yes Yes   Sig: Apply topically daily Apply to upper inner thighs, groin, and scrotum.   dorzolamide-timolol (COSOPT) 2-0.5 % ophthalmic solution 2023 at AM Nursing Home No Yes   Sig: INSTILL 1 DROP IN BOTH EYES TWICE A DAY   ferrous sulfate (FE TABS) 325 (65 Fe) MG EC tablet 2023 at  Nursing Home Yes Yes   Sig: Take 325 mg by mouth Take 1 tablet by mouth twice weekly on Monday and Friday at bedtime.   furosemide (LASIX) 20 MG tablet 2023 Nursing Home No Yes   Sig: TAKE 1 TAB BY MOUTH ONCE DAILY   Patient taking differently: Take 20 mg by mouth On  and    furosemide (LASIX) 40 MG tablet 2023 at 08 Nursing [place] Yes Yes   Sig: Take 40 mg by mouth Take 1 tab by mouth on Tues, Thurs, Sat, and Sun.   hypromellose (ARTIFICIAL TEARS) 0.5 % SOLN ophthalmic solution prn at prn Nursing Home Yes Yes   Sig: Place 1 drop into both eyes 4 times daily as needed for dry eyes   ketotifen (ZADITOR) 0.025 % ophthalmic solution PRN Nursing Home Yes Yes   Sig: Place 1 drop into both eyes as needed   latanoprost (XALATAN) 0.005 % ophthalmic solution 2023 at  Nursing [place] No Yes   Sig: INSTILL 1 DROP INTO BOTH EYES AT BEDTIME   melatonin 5 MG tablet PRN Nursing Home Yes Yes   Sig: Take 5 mg by mouth nightly as needed   netarsudil (RHOPRESSA) 0.02 % ophthalmic solution 2023 at PM Nursing Home Yes Yes   Si drop At Bedtime   nystatin (MYCOSTATIN) 170773 UNIT/GM external powder 2023 Nursing Home Yes Yes   Sig: Apply topically 2 times daily Apply to abdominal folds   polyethylene glycol (MIRALAX) 17 g packet 2023 at [address] [place] [address] Yes Yes   Sig: Take 8.5 g by mouth daily   potassium chloride ER (K-TAB/KLOR-CON) 10 MEQ CR tablet 2023 at [address] [place] [address] No Yes   Sig: TAKE 1 TAB BY MOUTH DAILY WITH EACH FUROSEMIDE DOSE.   senna-docusate (SENOKOT-S/PERICOLACE) 8.6-50 MG tablet 23 Nursing Home Yes Yes   Sig: Take 1 tablet by  mouth daily as needed for constipation   simethicone (MYLICON) 80 MG chewable tablet prn at prn Nursing Home Yes Yes   Sig: Take 80 mg by mouth daily as needed for flatulence or cramping   tamsulosin (FLOMAX) 0.4 MG capsule 6/5/2023 at 0830 Nursing Home No Yes   Sig: TAKE 1 CAP BY MOUTH ONCE DAILY   traMADol (ULTRAM) 50 MG tablet 6/2/2023 at 1605 Nursing Home No Yes   Sig: Take 0.5 tablets (25 mg) by mouth every 6 hours as needed for severe pain   warfarin ANTICOAGULANT (COUMADIN) 2.5 MG tablet 6/2/2023 at PM Nursing Home Yes Yes   Sig: Take 2.5 mg by mouth Take 1 tab by mouth on Fridays   INR 6/23   warfarin ANTICOAGULANT (COUMADIN) 3 MG tablet 6/5/2023 at PM Nursing Home Yes Yes   Sig: Take 3 mg by mouth Take 1 tab by mouth on Mon, Tues, Wed, Thurs, Sat, Sun  INR 6/23      Facility-Administered Medications: None     Allergies   Allergies   Allergen Reactions     Zithromax [Azithromycin] Diarrhea            Amoxicillin Diarrhea     Amoxicillin-Pot Clavulanate GI Disturbance       Social History   I have reviewed this patient's social history and updated it with pertinent information if needed. Ayaan Herrera  reports that he quit smoking about 43 years ago. He started smoking about 67 years ago. He has a 5.00 pack-year smoking history. He has quit using smokeless tobacco. He reports current alcohol use. He reports that he does not use drugs.    Family History   I have reviewed this patient's family history and updated it with pertinent information if needed.   Family History   Problem Relation Age of Onset     Glaucoma Father      Hypertension Other      C.A.D. Other      Macular Degeneration Mother        Review of Systems   The 10 point Review of Systems is negative other than noted in the HPI or here.     Physical Exam   Temp: 97.8  F (36.6  C) Temp src: Oral BP: 129/83 Pulse: 70   Resp: 18 SpO2: 93 % O2 Device: None (Room air)    Vital Signs with Ranges  Temp:  [97.8  F (36.6  C)] 97.8  F (36.6  C)  Pulse:   [70-78] 70  Resp:  [18] 18  BP: (122-129)/(70-83) 129/83  SpO2:  [93 %-94 %] 93 %  0 lbs 0 oz    Constitutional: Awake, alert, cooperative, no apparent distress.  Appears younger than stated age  Eyes: no icterus, EOMs intact  HEENT: Moist mucous membranes  Respiratory: Nonlabored breathing, few crackles at bases  Cardiovascular: Mostly regular, normal S1 and S2, and no murmur noted.  GI: Soft,  mildly distended, non-tender, normal bowel sounds.  Skin: No rashes, no cyanosis, bilateral lower extremity edema noted, wearing wraps  Musculoskeletal: No joint swelling, erythema or tenderness.  Neurologic: Alert, oriented and engages in appropriate conversation, no facial asymmetry, moving all extremities, fluent speech  Psychiatric: Calm and pleasant, no obvious anxiety or depression.     Data   Data reviewed today:  I personally reviewed EKG with result as noted above and CXR with result as noted above  Recent Labs   Lab 06/06/23  1533 06/06/23  1346 06/01/23  1051   WBC  --  14.5*  --    HGB  --  11.1*  --    MCV  --  84  --    PLT  --  200  --    INR 2.51*  --  2.57*   NA  --  137  --    POTASSIUM  --  4.3  --    CHLORIDE  --  99  --    CO2  --  28  --    BUN  --  22.5  --    CR  --  0.88  --    ANIONGAP  --  10  --    BRENDAN  --  9.8*  --    GLC  --  120*  --    ALBUMIN  --  4.4  --    PROTTOTAL  --  7.1  --    BILITOTAL  --  0.6  --    ALKPHOS  --  63  --    ALT  --  14  --    AST  --  21  --        Recent Results (from the past 24 hour(s))   Chest XR,  PA & LAT    Narrative    CHEST TWO VIEWS June 6, 2023 2:58 PM     HISTORY: Generalized weakness.    COMPARISON: 7/3/2018.      Impression    IMPRESSION: Some patchy opacities at the right lung base could just be  new atelectasis but new airspace disease is possible. Mild  cardiomegaly. Stable left chest pacer.    UGO REESE MD         SYSTEM ID:  [id number]

## 2023-06-06 NOTE — ED NOTES
[facility]  ED Nurse Handoff Report    ED Chief complaint: Generalized Weakness      ED Diagnosis:   Final diagnoses:   Urinary tract infection without hematuria, site unspecified   Generalized weakness       Code Status: hospital MD to address     Allergies:   Allergies   Allergen Reactions    Zithromax [Azithromycin] Diarrhea           Amoxicillin Diarrhea    Amoxicillin-Pot Clavulanate GI Disturbance       Patient Story: Pt presents from care facility that is supposed to be assisted but reports it is mostly independent. States increased weakness and some SOB the past few days. Here, pt found to have UTI and R lower lobe PNA. Pt started on antibiotics. Pt up to bedside assist with strong assist of 2 and needed a lot of assistance back into bed. Pt does get a little SOB with activity but spo2 stable. Pt does have dark colored stools but recently started on iron- MD made aware. Hbg 11.   Focused Assessment:  alert, seems slightly confused at times, assist x2, weak    Treatments and/or interventions provided: blood work, cxr, ua  Patient's response to treatments and/or interventions: tolerating well     To be done/followed up on inpatient unit:  inpt orders, pt/ot?    Does this patient have any cognitive concerns?: Forgetful    Activity level - Baseline/Home:  Independent  Activity Level - Current:   Stand with assist x2    Patient's Preferred language: English   Needed?: No    Isolation: None  Infection: Not Applicable  Patient tested for COVID 19 prior to admission: NO  Bariatric?: No    Vital Signs:   Vitals:    06/06/23 1337 06/06/23 1509 06/06/23 1630   BP: 122/70  129/83   Pulse: 78  70   Resp: 18     Temp: 97.8  F (36.6  C)     TempSrc: Oral     SpO2: 93% 94% 93%       Cardiac Rhythm:     Was the PSS-3 completed:   Yes  What interventions are required if any?               Family Comments: updated by phone  OBS brochure/video discussed/provided to patient/family: yes               Name of person given brochure if not patient: n/a              Relationship to patient: N/A    For the majority of the shift this patient's behavior was Green.   Behavioral interventions performed were none.    ED NURSE PHONE NUMBER: [phone]

## 2023-06-06 NOTE — ED PROVIDER NOTES
History     Chief Complaint:  Generalized Weakness       HPI   Ayaan Herrera is a 89 year old male, s/p EP pacemaker insertion, with history of chronic atrial fibrillation anticoagulated on Coumadin, HTN, hyperlipidemia, and prostate cancer who presents to the ED via EMS with generalized weakness. Patient states that he had been feeling fine until yesterday, when he started feeling acutely weak. He was unable to stand this morning because of this weakness, which is not normal for him. Denies recent fevers, chills, headaches, chest pain, nausea, vomiting, and urinary symptoms. No new redness to the skin. Patient has been eating and drinking okay. Ayaan is chronically short of breath and says this has not worsened recently. He also notes feeling intermittently lightheaded when laying in bed, though says this is very mild and typically resolves after he gets up. En route to the ED the patient had a loose black stool. He says he usually has three bowel movements per day, though denies any recent bloody stools. Of note, patient started taking an iron supplement yesterday and says his stools were not dark before starting this. Patient lives in a facility in which staff administer his medications and check his vitals daily.       Independent Historian:   None - Patient Only    Review of External Notes:   See MDM      Medications:    Fosamax  Pacerone   Lasix  Coumadin  Flomax  Senokot   Ultram   Cordarone   Alphagan   Cosopt   Rhopressa   Xalatan     Past Medical History:    Chronic atrial fibrillation   BPH  Hyperlipidemia   Basal cell carcinoma   HTN  Psoriasis   Prostate cancer  Sinus node dysfunction   Primary open-angle glaucoma of both eyes   Senile osteoporosis   Paroxysmal VT  Osteopenia of spine   AAA without rupture  Cardiomyopathy   Anemia   Disturbances of sulphur-bearing amino-acid metabolism  PVCs  Insomnia   ED  Mitral valve insufficiency  Multinodular goiter  Subdural hematoma, acute     Past Surgical  History:    Cataract with IOL implant, bilateral  Medtronic EP pacemaker implant   Tonsillectomy & adenoidectomy  Glaucoma surgery, left x2  TURP  SLT, right  Punctal closure plugs, bilateral   Inguinal hernia repair, bilateral   Surgery for basal cell carcinoma on neck   Colonoscopy x2    Physical Exam     Patient Vitals for the past 24 hrs:   BP Temp Temp src Pulse Resp SpO2   06/06/23 1509 -- -- -- -- -- 94 %   06/06/23 1337 122/70 97.8  F (36.6  C) Oral 78 18 93 %        Physical Exam  Constitutional: Well developed, nontox appearance  Head: Atraumatic.   Mouth/Throat: Oropharynx is clear and moist.   Neck:  no stridor  Eyes: no scleral icterus  Cardiovascular: RRR, 2+ bilat radial pulses  Pulmonary/Chest: nml resp effort  Abdominal: ND, soft, NT, no rebound or guarding   : no CVA tenderness bilat  Ext: Warm, well perfused, no edema  Neurological: A&O, symmetric facies, moves ext x4  Skin: Skin is warm and dry.   Psychiatric: Behavior is normal. Thought content normal.   Nursing note and vitals reviewed.    Emergency Department Course   ECG  ECG taken at 1356, ECG read at 1400  Ventricular-paced rhythm   Abnormal ECG   No significant change as compared to prior, dated 2/28/22.  Rate 73 bpm. IL interval * ms. QRS duration 190 ms. QT/QTc 486/535 ms. P-R-T axes * -48 118.     Imaging:  Chest XR,  PA & LAT   Final Result   IMPRESSION: Some patchy opacities at the right lung base could just be   new atelectasis but new airspace disease is possible. Mild   cardiomegaly. Stable left chest pacer.      UGO REESE MD            SYSTEM ID:  [id number]         Report per radiology    Laboratory:  Labs Ordered and Resulted from Time of ED Arrival to Time of ED Departure   ROUTINE UA WITH MICROSCOPIC REFLEX TO CULTURE - Abnormal       Result Value    Color Urine Straw      Appearance Urine Slightly Cloudy (*)     Glucose Urine Negative      Bilirubin Urine Negative      Ketones Urine Negative      Specific Gravity Urine  1.011      Blood Urine Negative      pH Urine 6.0      Protein Albumin Urine Negative      Urobilinogen Urine Normal      Nitrite Urine Negative      Leukocyte Esterase Urine Moderate (*)     Bacteria Urine Moderate (*)     Mucus Urine Present (*)     RBC Urine 25 (*)     WBC Urine 114 (*)     Squamous Epithelials Urine 2 (*)     Hyaline Casts Urine 6 (*)    COMPREHENSIVE METABOLIC PANEL - Abnormal    Sodium 137      Potassium 4.3      Chloride 99      Carbon Dioxide (CO2) 28      Anion Gap 10      Urea Nitrogen 22.5      Creatinine 0.88      Calcium 9.8 (*)     Glucose 120 (*)     Alkaline Phosphatase 63      AST 21      ALT 14      Protein Total 7.1      Albumin 4.4      Bilirubin Total 0.6      GFR Estimate 82     CBC WITH PLATELETS AND DIFFERENTIAL - Abnormal    WBC Count 14.5 (*)     RBC Count 4.23 (*)     Hemoglobin 11.1 (*)     Hematocrit 35.5 (*)     MCV 84      MCH 26.2 (*)     MCHC 31.3 (*)     RDW 16.5 (*)     Platelet Count 200      % Neutrophils 87      % Lymphocytes 3      % Monocytes 9      % Eosinophils 0      % Basophils 0      % Immature Granulocytes 1      NRBCs per 100 WBC 0      Absolute Neutrophils 12.7 (*)     Absolute Lymphocytes 0.5 (*)     Absolute Monocytes 1.3      Absolute Eosinophils 0.0      Absolute Basophils 0.0      Absolute Immature Granulocytes 0.1      Absolute NRBCs 0.0     INR - Abnormal    INR 2.51 (*)    TSH WITH FREE T4 REFLEX - Normal    TSH 2.39     INFLUENZA A/B, RSV, & SARS-COV2 PCR - Normal    Influenza A PCR Negative      Influenza B PCR Negative      RSV PCR Negative      SARS CoV2 PCR Negative     URINE CULTURE        Emergency Department Course & Assessments:    Interventions:  Medications   0.9% sodium chloride BOLUS (0 mLs Intravenous Stopped 6/6/23 1617)   cefTRIAXone (ROCEPHIN) 1 g vial to attach to  mL bag for ADULTS or NS 50 mL bag for PEDS (0 g Intravenous Stopped 6/6/23 1611)   doxycycline hyclate (VIBRAMYCIN) capsule 100 mg (100 mg Oral $Given 6/6/23  1536)        Assessments:  1343 I obtained history and examined the patient as noted above.  1624 I rechecked the patient and explained findings. He is comfortable with admission at this time.     Independent Interpretation (X-rays, CTs, rhythm strip):  See MDM    Consultations/Discussion of Management or Tests:  1630 I spoke with Dr. Carrillo, hospitalist, who accepts the patient.     Social Determinants of Health affecting care:   See MDM    Disposition:  The patient was admitted to the hospital under the care of Dr. Carrillo.     Impression & Plan    CMS Diagnoses: None    Medical Decision Makin year old male presenting w/ generalized weakness     Social determinants affecting patient's health include:  Age potentially increasing risk for presentation to the emergency department     [facility]     DDx includes viral syndrome NOS, influenza-like illness, influenza, COVID-19, UTI, generalized weakness NOS, lecture abnormality, dehydration, pneumonia, atypical ACS.  EKG interpretation as noted above without acute ischemic findings on my independent interpretation.  Less likely CVA given description of symptoms.  Labs significant for UA concerning for infection, mild leukocytosis, mild anemia, COVID-19 negative.  Imaging sig for no pneumothorax my independent interpretation, radiology read as noted above.  Less likely pneumonia given lack of respiratory symptoms, given leukocytosis and patient's age, doxycycline was added to ceftriaxone for treatment of UTI and possible CAP.  Upon rediscussion with the patient and with nursing report, the patient appears to be too weak to return to his current living situation.  He was subsequently admitted to the hospitalist service under observation status after I discussed the patient case with admitting hospitalist. Pt counseled on all results, disposition and diagnosis.  They are understanding and agreeable to plan. Patient  admitted in stable condition.        Diagnosis:    ICD-10-CM    1. Urinary tract infection without hematuria, site unspecified  N39.0       2. Generalized weakness  R53.1            Scribe Disclosure:  I, Kerrie Gray, am serving as a scribe at 1:43 PM on 6/6/2023 to document services personally performed by Robles Bangura MD based on my observations and the provider's statements to me.   6/6/2023   Robles Bangura MD Vaughn, Christopher E, MD  06/06/23 6433

## 2023-06-06 NOTE — ED NOTES
Pt up to bedside commode for BM and was extremely weak- pt was assist of 2 and then helped back into bed

## 2023-06-07 ENCOUNTER — APPOINTMENT (OUTPATIENT)
Dept: PHYSICAL THERAPY | Facility: CLINIC | Age: 88
DRG: 193 | End: 2023-06-07
Attending: HOSPITALIST
Payer: COMMERCIAL

## 2023-06-07 ENCOUNTER — APPOINTMENT (OUTPATIENT)
Dept: CARDIOLOGY | Facility: CLINIC | Age: 88
DRG: 193 | End: 2023-06-07
Attending: HOSPITALIST
Payer: COMMERCIAL

## 2023-06-07 ENCOUNTER — APPOINTMENT (OUTPATIENT)
Dept: CT IMAGING | Facility: CLINIC | Age: 88
DRG: 193 | End: 2023-06-07
Attending: HOSPITALIST
Payer: COMMERCIAL

## 2023-06-07 LAB
D DIMER PPP FEU-MCNC: 0.32 UG/ML FEU (ref 0–0.5)
ERYTHROCYTE [DISTWIDTH] IN BLOOD BY AUTOMATED COUNT: 16.3 % (ref 10–15)
FERRITIN SERPL-MCNC: 73 NG/ML (ref 31–409)
HCT VFR BLD AUTO: 31.1 % (ref 40–53)
HGB BLD-MCNC: 9.9 G/DL (ref 13.3–17.7)
INR PPP: 2.4 (ref 0.85–1.15)
IRON BINDING CAPACITY (ROCHE): 312 UG/DL (ref 240–430)
IRON SATN MFR SERPL: 6 % (ref 15–46)
IRON SERPL-MCNC: 20 UG/DL (ref 61–157)
LACTATE SERPL-SCNC: 1.8 MMOL/L (ref 0.7–2)
LVEF ECHO: NORMAL
MCH RBC QN AUTO: 26.4 PG (ref 26.5–33)
MCHC RBC AUTO-ENTMCNC: 31.8 G/DL (ref 31.5–36.5)
MCV RBC AUTO: 83 FL (ref 78–100)
PLATELET # BLD AUTO: 146 10E3/UL (ref 150–450)
RBC # BLD AUTO: 3.75 10E6/UL (ref 4.4–5.9)
VIT B12 SERPL-MCNC: 215 PG/ML (ref 232–1245)
WBC # BLD AUTO: 8.7 10E3/UL (ref 4–11)

## 2023-06-07 PROCEDURE — 250N000013 HC RX MED GY IP 250 OP 250 PS 637: Performed by: HOSPITALIST

## 2023-06-07 PROCEDURE — 85027 COMPLETE CBC AUTOMATED: CPT | Performed by: HOSPITALIST

## 2023-06-07 PROCEDURE — 250N000009 HC RX 250: Performed by: HOSPITALIST

## 2023-06-07 PROCEDURE — 250N000013 HC RX MED GY IP 250 OP 250 PS 637

## 2023-06-07 PROCEDURE — G0378 HOSPITAL OBSERVATION PER HR: HCPCS

## 2023-06-07 PROCEDURE — 74177 CT ABD & PELVIS W/CONTRAST: CPT

## 2023-06-07 PROCEDURE — 97116 GAIT TRAINING THERAPY: CPT | Mod: GP

## 2023-06-07 PROCEDURE — 36415 COLL VENOUS BLD VENIPUNCTURE: CPT | Performed by: HOSPITALIST

## 2023-06-07 PROCEDURE — 96376 TX/PRO/DX INJ SAME DRUG ADON: CPT

## 2023-06-07 PROCEDURE — 83605 ASSAY OF LACTIC ACID: CPT | Performed by: HOSPITALIST

## 2023-06-07 PROCEDURE — 93306 TTE W/DOPPLER COMPLETE: CPT

## 2023-06-07 PROCEDURE — 250N000011 HC RX IP 250 OP 636: Performed by: HOSPITALIST

## 2023-06-07 PROCEDURE — 93306 TTE W/DOPPLER COMPLETE: CPT | Mod: 26 | Performed by: INTERNAL MEDICINE

## 2023-06-07 PROCEDURE — 99232 SBSQ HOSP IP/OBS MODERATE 35: CPT | Performed by: HOSPITALIST

## 2023-06-07 PROCEDURE — 85610 PROTHROMBIN TIME: CPT | Performed by: HOSPITALIST

## 2023-06-07 PROCEDURE — 85379 FIBRIN DEGRADATION QUANT: CPT | Performed by: HOSPITALIST

## 2023-06-07 PROCEDURE — 97161 PT EVAL LOW COMPLEX 20 MIN: CPT | Mod: GP

## 2023-06-07 PROCEDURE — 97530 THERAPEUTIC ACTIVITIES: CPT | Mod: GP

## 2023-06-07 RX ORDER — WARFARIN SODIUM 3 MG/1
3 TABLET ORAL
Status: COMPLETED | OUTPATIENT
Start: 2023-06-07 | End: 2023-06-07

## 2023-06-07 RX ORDER — IOPAMIDOL 755 MG/ML
81 INJECTION, SOLUTION INTRAVASCULAR ONCE
Status: COMPLETED | OUTPATIENT
Start: 2023-06-07 | End: 2023-06-07

## 2023-06-07 RX ADMIN — BRIMONIDINE TARTRATE 1 DROP: 2 SOLUTION OPHTHALMIC at 09:31

## 2023-06-07 RX ADMIN — DOXYCYCLINE HYCLATE 100 MG: 100 CAPSULE ORAL at 21:05

## 2023-06-07 RX ADMIN — DICLOFENAC SODIUM 2 G: 10 GEL TOPICAL at 21:07

## 2023-06-07 RX ADMIN — SODIUM CHLORIDE 65 ML: 9 INJECTION, SOLUTION INTRAVENOUS at 22:00

## 2023-06-07 RX ADMIN — WARFARIN SODIUM 3 MG: 3 TABLET ORAL at 00:25

## 2023-06-07 RX ADMIN — WARFARIN SODIUM 3 MG: 3 TABLET ORAL at 17:15

## 2023-06-07 RX ADMIN — TAMSULOSIN HYDROCHLORIDE 0.4 MG: 0.4 CAPSULE ORAL at 09:30

## 2023-06-07 RX ADMIN — DOXYCYCLINE HYCLATE 100 MG: 100 CAPSULE ORAL at 09:30

## 2023-06-07 RX ADMIN — NETARSUDIL 1 DROP: 0.2 SOLUTION/ DROPS OPHTHALMIC; TOPICAL at 21:28

## 2023-06-07 RX ADMIN — ACETAMINOPHEN 1000 MG: 500 TABLET ORAL at 21:06

## 2023-06-07 RX ADMIN — CEFTRIAXONE SODIUM 2 G: 2 INJECTION, POWDER, FOR SOLUTION INTRAMUSCULAR; INTRAVENOUS at 17:15

## 2023-06-07 RX ADMIN — AMIODARONE HYDROCHLORIDE 200 MG: 200 TABLET ORAL at 09:30

## 2023-06-07 RX ADMIN — DORZOLAMIDE HYDROCHLORIDE AND TIMOLOL MALEATE 1 DROP: 20; 5 SOLUTION/ DROPS OPHTHALMIC at 21:11

## 2023-06-07 RX ADMIN — DICLOFENAC SODIUM 2 G: 10 GEL TOPICAL at 00:47

## 2023-06-07 RX ADMIN — IOPAMIDOL 81 ML: 755 INJECTION, SOLUTION INTRAVENOUS at 21:59

## 2023-06-07 RX ADMIN — LATANOPROST 1 DROP: 50 SOLUTION/ DROPS OPHTHALMIC at 21:23

## 2023-06-07 RX ADMIN — DORZOLAMIDE HYDROCHLORIDE AND TIMOLOL MALEATE 1 DROP: 20; 5 SOLUTION/ DROPS OPHTHALMIC at 00:47

## 2023-06-07 RX ADMIN — BRIMONIDINE TARTRATE 1 DROP: 2 SOLUTION OPHTHALMIC at 00:47

## 2023-06-07 RX ADMIN — DICLOFENAC SODIUM 2 G: 10 GEL TOPICAL at 09:31

## 2023-06-07 RX ADMIN — DORZOLAMIDE HYDROCHLORIDE AND TIMOLOL MALEATE 1 DROP: 20; 5 SOLUTION/ DROPS OPHTHALMIC at 09:31

## 2023-06-07 RX ADMIN — BRIMONIDINE TARTRATE 1 DROP: 2 SOLUTION OPHTHALMIC at 21:07

## 2023-06-07 RX ADMIN — NETARSUDIL 1 DROP: 0.2 SOLUTION/ DROPS OPHTHALMIC; TOPICAL at 00:47

## 2023-06-07 RX ADMIN — LATANOPROST 1 DROP: 50 SOLUTION/ DROPS OPHTHALMIC at 00:47

## 2023-06-07 ASSESSMENT — ACTIVITIES OF DAILY LIVING (ADL)
DEPENDENT_IADLS:: CLEANING;COOKING;LAUNDRY;SHOPPING;MEAL PREPARATION;MEDICATION MANAGEMENT;MONEY MANAGEMENT;TRANSPORTATION
ADLS_ACUITY_SCORE: 50
ADLS_ACUITY_SCORE: 46
ADLS_ACUITY_SCORE: 50
ADLS_ACUITY_SCORE: 46
ADLS_ACUITY_SCORE: 43
ADLS_ACUITY_SCORE: 43
ADLS_ACUITY_SCORE: 50
ADLS_ACUITY_SCORE: 41
ADLS_ACUITY_SCORE: 43
ADLS_ACUITY_SCORE: 45
ADLS_ACUITY_SCORE: 45
ADLS_ACUITY_SCORE: 43

## 2023-06-07 NOTE — PLAN OF CARE
Summary:    Care Plan Summary Note:  Orientation: A&OX4  Observation Goals (met & not met): NOT Net  Activity Level: A2/gb  Fall Risk: y  Behavior & Aggression Tool Color: Green  Pain Management: no  ABNL VS/O2: VSS/RA  ABNL Lab/BG: See Chart  Diet: Regular  Bowel/Bladder: Inconti at times  Drains/Devices: PIV/SL  Tests/Procedures for next shift: None  Anticipated DC date: TBD      Observation goals  PRIOR TO DISCHARGE        Comments: -diagnostic tests and consults completed and resulted - Not Met  -vital signs normal or at patient baseline -Not Met  Nurse to notify provider when observation goals have been met and patient is ready for discharge.

## 2023-06-07 NOTE — PROGRESS NOTES
RECEIVING UNIT ED HANDOFF REVIEW    ED Nurse Handoff Report was reviewed by: Wilson Johnson RN on June 6, 2023 at 10:28 PM

## 2023-06-07 NOTE — CONSULTS
Care Management Initial Consult    General Information  Assessment completed with: Patient, Friend, Michael(friend and ex-wife)  Type of CM/SW Visit: Initial Assessment    Primary Care Provider verified and updated as needed: Yes   Readmission within the last 30 days: no previous admission in last 30 days      Reason for Consult: discharge planning  Advance Care Planning:   None noted         Communication Assessment  Patient's communication style: spoken language (English or Bilingual)    Hearing Difficulty or Deaf: no        Cognitive  Cognitive/Neuro/Behavioral: WDL        Orientation: oriented x 4             Living Environment:   People in home: facility resident     Current living Arrangements: assisted living, extended care facility  Name of Facility: [facility]   Able to return to prior arrangements: other (see comments)       Family/Social Support:  Care provided by: self, other (see comments)  Provides care for:    Marital Status:              Description of Support System:           Current Resources:   Patient receiving home care services: No     Community Resources: None  Equipment currently used at home: cane, straight, walker, rolling, wheelchair, manual, walker, standard  Supplies currently used at home: Incontinence Supplies    Employment/Financial:  Employment Status: retired        Financial Concerns: No concerns identified           Does the patient's insurance plan have a 3 day qualifying hospital stay waiver?  Yes   Will the waiver be used for post-acute placement? Yes    Lifestyle & Psychosocial Needs:  Social Determinants of Health     Tobacco Use: Medium Risk (5/9/2022)    Patient History      Smoking Tobacco Use: Former      Smokeless Tobacco Use: Former      Passive Exposure: Not on file   Alcohol Use: Not on file   Financial Resource Strain: Not on file   Food Insecurity: Not on file   Transportation Needs: Not on file   Physical Activity: Not on file   Stress: Not  on file   Social Connections: Not on file   Intimate Partner Violence: Not on file   Depression: Not at risk (5/9/2022)    PHQ-2      PHQ-2 Score: 0   Housing Stability: Not on file       Functional Status:  Prior to admission patient needed assistance:   Dependent ADLs:: Bathing, Dressing, Grooming, Incontinence, Transfers, Wheelchair-independent  Dependent IADLs:: Cleaning, Cooking, Laundry, Shopping, Meal Preparation, Medication Management, Money Management, Transportation       Mental Health Status:  Mental Health Status: No Current Concerns       Chemical Dependency Status:      unknown          Values/Beliefs:  Spiritual, Cultural Beliefs, Lutheran Practices, Values that affect care: no               Additional Information:  Met with patient, ex-spouse/Friend Omar and patient at bedside. Patient is currently under Observation Status in the hospital, wrier went over Medicare Outpatient Observation status. Patient does have [facility] Medicare and PT is recommending TCU. [employer] would waive the 3 day stay. Omar, patient's friend told writer that patient has been to [facility] before and would like him to go there. Writer updated  who will send referral.      -Patient came from [facility] where patient was able to get in and out of the bathroom in his wheelchair. He was needing assistance with medications, AM and PM cares with added assistance lately.  Verónica Ricci RN

## 2023-06-07 NOTE — PLAN OF CARE
Goal Outcome Evaluation:         Orientation: A&OX4  Observation Goals (met & not met): NOT Net  Activity Level: A2/gb to commode  Fall Risk: y  Behavior & Aggression Tool Color: Green  Pain Management: no  ABNL VS/O2: VSS/RA  ABNL Lab/BG: See Chart  Diet: Regular  Bowel/Bladder: Inconti at times  Drains/Devices: PIV/SL  Tests/Procedures for next shift: CT scan to be done.  Anticipated DC date: Probably TCU    ECHO done

## 2023-06-07 NOTE — PROGRESS NOTES
Care Management Follow Up    Length of Stay (days): 0    Expected Discharge Date: 06/08/2023     Concerns to be Addressed:       Patient plan of care discussed at interdisciplinary rounds: Yes    Anticipated Discharge Disposition: Skilled Nursing Facility, Transitional Care     Anticipated Discharge Services: Other (see comment)  Anticipated Discharge DME:      Patient/family educated on Medicare website which has current facility and service quality ratings: yes  Education Provided on the Discharge Plan: Yes  Patient/Family in Agreement with the Plan: yes    Referrals Placed by CM/SW:    Private pay costs discussed: Not applicable    Additional Information:  CM sent in referral to [facility] TCU per CM RN.    Kvng Richardson [facility]

## 2023-06-07 NOTE — PROGRESS NOTES
Observation goals  PRIOR TO DISCHARGE       Comments: -diagnostic tests and consults completed and resulted - Not Met  -vital signs normal or at patient baseline -Not Met  Nurse to notify provider when observation goals have been met and patient is ready for discharge.

## 2023-06-07 NOTE — PROGRESS NOTES
06/07/23 1017   Appointment Info   Signing Clinician's Name / Credentials (PT) Sagar Mackey DPT   Quick Adds   Quick Adds Certification   Living Environment   People in Home facility resident   Current Living Arrangements assisted living   Home Accessibility no concerns   Transportation Anticipated health plan transportation   Living Environment Comments Reports that he has been staying at [facility] since september to rehab in hopes of going. Reports that prior he was living in a house alone w/ 2 steps to enter.   Self-Care   Usual Activity Tolerance moderate   Current Activity Tolerance fair   Equipment Currently Used at Home cane, straight;walker, rolling   Fall history within last six months no   Activity/Exercise/Self-Care Comment Reports that he typically uses a 2WW vs SEC for mobility. Gets assistance at [facility] for meals, cleaning, and showers. Reports sometimes assistance w/ dressing himself. Reports that he typically can walk to the bathroom independently w/ a FWW   General Information   Onset of Illness/Injury or Date of Surgery 06/06/23   Referring Physician Sarah Carrillo MD   Patient/Family Therapy Goals Statement (PT) Get stronger   Pertinent History of Current Problem (include personal factors and/or comorbidities that impact the POC) Ayaan Herrera is a 89 year old male with a history of chronic atrial fibrillation on warfarin, HTN, HLD, sinus node dysfunction s/p pacemaker placement, prostate cancer, glaucoma, cardiomyopathy, insomnia, multinodular goiter, BPH who is sent to the ED from his care facility due to weakness.   Existing Precautions/Restrictions fall   Cognition   Affect/Mental Status (Cognition) WFL   Orientation Status (Cognition) oriented x 4   Follows Commands (Cognition) WFL   Pain Assessment   Patient Currently in Pain No   Integumentary/Edema   Integumentary/Edema Comments Redness to mid back 2 quarter size   Range of Motion (ROM)   ROM Comment WFLs for mobility and  transfers no formal testing completed   Strength (Manual Muscle Testing)   Strength Comments Generalized weakness throughout extremities   Bed Mobility   Comment, (Bed Mobility) Supine to sit w/ SBA   Transfers   Comment, (Transfers) Sit to stand w/ Min A x1   Gait/Stairs (Locomotion)   Comment, (Gait/Stairs) 5 ft w/ FWW and Min to Mod A x1   Balance   Balance Comments Poor standing balance leaning to R   Clinical Impression   Criteria for Skilled Therapeutic Intervention Yes, treatment indicated   PT Diagnosis (PT) Impaired ambulation   Influenced by the following impairments Impaired strength, balance and activity tolerance   Functional limitations due to impairments Impaired ADLs, IADLs and functional mobility   Clinical Presentation (PT Evaluation Complexity) Stable/Uncomplicated   Clinical Presentation Rationale Clinical judgment   Clinical Decision Making (Complexity) low complexity   Planned Therapy Interventions (PT) balance training;bed mobility training;gait training;patient/family education;stair training;strengthening;transfer training;progressive activity/exercise   Risk & Benefits of therapy have been explained evaluation/treatment results reviewed;care plan/treatment goals reviewed;risks/benefits reviewed;current/potential barriers reviewed;participants voiced agreement with care plan;participants included;patient   PT Total Evaluation Time   PT Eval, Low Complexity Minutes (48795) 10   Therapy Certification   Start of care date 06/07/23   Certification date from 06/07/23   Certification date to 06/14/23   Medical Diagnosis Pneumonia   Physical Therapy Goals   PT Frequency 3x/week   PT Predicted Duration/Target Date for Goal Attainment 06/14/23   PT Goals Bed Mobility;Transfers;Gait   PT: Bed Mobility Independent;Supine to/from sit   PT: Transfers Modified independent;Sit to/from stand;Assistive device   PT: Gait Modified independent;100 feet;Rolling walker   Interventions   Interventions Quick Adds  Gait Training;Therapeutic Activity   Therapeutic Activity   Therapeutic Activities: dynamic activities to improve functional performance Minutes (28249) 25   Symptoms Noted During/After Treatment Fatigue   Treatment Detail/Skilled Intervention Pt supine in bed at start of session. Agreeable to PT. Able to independently scoot towards EOB. Sating in the low 90s on 1 L of O2 via NC. Bad read noted and needing to take on ear w/ finger probe. Pt completing sit to stand x2 from chair height w/ FWW and Min A x1. Stand to sit from bed height w/ Min A x1 and FWW. On arrival back to EOB Pt reports that he needs to take a BM. Pt sit to stand from bed height w/ FWW and Mod A x1. Reports increased weakness after ambulation. Difficulty standing tall. Transfering to bedside commode w/ Mod A x1. Pt taking prolonged period on commode. Pt standing from commode w/ Min A x1 and FWW. Needing assistance w/ pericares. On transfer back to EOB noticed depends needed changing and transfering w/ Min A x1. Completed sit to stand from EOB to change depends w/ Min A x1. Pt passing gas on standing and having more BM to clean up, and chucks pad changed. Pt taking ~3 side steps up to EOB w/ Min A x1. Sit to supine w/ Min Ax1. Pt low in bed and needing a total A x1 to scoot higher in bed w/ HOB tipped down. All needs met at end of session w/ call light in place and bed alarm on.   Gait Training   Gait Training Minutes (12576) 20   Symptoms Noted During/After Treatment (Gait Training) fatigue;shortness of breath   Treatment Detail/Skilled Intervention Pt ambulating ~100 ft x2 w/ FWW and Min to Mod A x1 w/ close WC follow. Pt frequently leaning to R and walking on R side of walker. Unable to correct w/ cues. Kyphotic posture and downward gaze. Slow mike. poor balance noted.   PT Discharge Planning   PT Plan Transfers, bed mobility, ambulation w/ WC follow   PT Discharge Recommendation (DC Rec) Transitional Care Facility   PT Rationale for DC Rec Pt  below baseline mobility. Has been staying at [facility] since September. Has been able to ambulate to bathroom independently w/ FWW. Currently a heavy A x1 w/ mobility and close WC follow for gait. Anticipate will need TCU at [place] to improve upon functional mobility and independence.   PT Brief overview of current status Min A for bed mobility, Min to mod for transfers w/ FWW, Min to mod for ambulation w/ FWW   Total Session Time   Timed Code Treatment Minutes 45   Total Session Time (sum of timed and untimed services) 55   [facility]  OUTPATIENT PHYSICAL THERAPY EVALUATION  PLAN OF TREATMENT FOR OUTPATIENT REHABILITATION  (COMPLETE FOR INITIAL CLAIMS ONLY)  Patient's Last Name, First Name, M.I.  YOB: 1932  Ayaan Herrera                        Provider's Name  [facility] Medical Record No.  [id number]                             Onset Date:  06/06/23   Start of Care Date:  06/07/23   Type:     _X_PT   ___OT   ___SLP Medical Diagnosis:  Pneumonia              PT Diagnosis:  Impaired ambulation Visits from SOC:  1     See note for plan of treatment, functional goals and certification details    I CERTIFY THE NEED FOR THESE SERVICES FURNISHED UNDER        THIS PLAN OF TREATMENT AND WHILE UNDER MY CARE     (Physician co-signature of this document indicates review and certification of the therapy plan).

## 2023-06-07 NOTE — PHARMACY-ANTICOAGULATION SERVICE
Clinical Pharmacy - Warfarin Dosing Consult     Pharmacy has been consulted to manage this patient s warfarin therapy.  Indication: Atrial Fibrillation  Therapy Goal: INR 2-3  Warfarin Prior to Admission: Yes  Warfarin PTA Regimen: 2.5 mg on Fridays, 3 mg on all other days    INR   Date Value Ref Range Status   06/06/2023 2.51 (H) 0.85 - 1.15 Final   06/01/2023 2.57 (H) 0.85 - 1.15 Final       Recommend warfarin 3 mg today.  Pharmacy will monitor Ayaan Herrera daily and order warfarin doses to achieve specified goal.      Please contact pharmacy as soon as possible if the warfarin needs to be held for a procedure or if the warfarin goals change.        Candido BarretoD

## 2023-06-07 NOTE — PLAN OF CARE
Goal Outcome Evaluation:         Observation goals  PRIOR TO DISCHARGE       Comments: -diagnostic tests and consults completed and resulted - Not Met  -vital signs normal or at patient baseline -Not Met

## 2023-06-07 NOTE — PROGRESS NOTES
[facility]    Medicine Progress Note - Hospitalist Service    Date of Admission:  6/6/2023    Assessment & Plan   Ayaan Herrera is a 89 year old male with a history of chronic atrial fibrillation on warfarin, HTN, HLD, sinus node dysfunction s/p pacemaker placement, prostate cancer, glaucoma, cardiomyopathy, insomnia, multinodular goiter, BPH who is sent to the ED from his care facility due to weakness.     Generalized weakness  Acute respiratory failure and possible pneumonia on chest x-ray  Patient himself does not really offer any complaints, states that he was sent here from the facility as he was feeling weak over the last few days.  Apparently today he was not able to stand due to the weakness which is not normal for him.  Patient denies any recent fevers, chills, sweats, shortness of breath, cough, chest pain, abdominal pain, nausea, vomiting, leg swelling or skin rash, urinary symptoms.  He notes having some chronic issues with loose stools/frequent small stools.  No abdominal pain.  No blood in stools.  Apparently is on iron supplement and he was noted to have a dark stool.  In the ED, noted to be afebrile with stable vitals.  EKG showed paced rhythm.  CXR with some patchy opacities at right lung base which could be atelectasis versus pneumonia.  UA showed moderate LE, moderate bacteria, 114 WBC, negative nitrates.  Other labs showed normal renal function, normal liver function, WBC 14.5, Hb 11.1, .  INR 2.5, TSH 2.3.  COVID PCR negative.  Procalcitonin 0.18.  Head CT with no acute abnormality.  Mag and Phos normal.  Patient was given IV ceftriaxone and p.o. doxycycline for empiric treatment of possible pneumonia/UTI and admitted for further management.  Lymphopenia could indicate possible viral infection.  Other than weakness patient does not really endorse any other symptoms.    Continue ceftriaxone and doxycycline    Continue oxygen by nasal cannula as needed    Given  known renal mass we will obtain a CT chest abdomen pelvis    Incidental right exophytic renal mass on CT scan in February 2022  Pyuria   Benign prostatic hypertrophy   Prostate cancer     Continue antibiotics for possible urinary tract infection and follow-up urine culture    Repeat CT scan to reevaluate renal mass    Continue PTA tamsulosin    Leukocytosis with leukopenia on CBC in the setting of 2 possible infections  Normocytic anemia    Repeat white blood cell count with differential tomorrow    Check iron studies B12 and folate levels     Chronic atrial fibrillation  Cardiomyopathy  Hypertension   Hyperlipidemia   S/p pacemaker placement  Last TTE was in November 2022 that showed EF 40 to 45%, mid to apical anteroseptal wall hypokinesis, moderate AAS, severe biatrial enlargement, mild TR and moderate MR.  Patient has some chronic lower extremity edema for which she is wearing wraps, does not think this has worsened recently.    Continue PTA amiodarone    Continue PTA warfarin dosed per pharmacy    Hold PTA Lasix for now      ?Chronic diarrhea    Hold PTA bowel meds [laxative/stool softeners].    Monitor and depending on course may need further work-up     Chronic right shoulder pain    Continue PTA Tylenol, tramadol     Glaucoma    Continue PTA eyedrops     Diet: Regular Diet Adult  Room Service    DVT Prophylaxis: Warfarin  Chirinos Catheter: Not present  Lines: None     Cardiac Monitoring: None  Code Status: Full Code      Clinically Significant Risk Factors Present on Admission               # Drug Induced Coagulation Defect: home medication list includes an anticoagulant medication    # Hypertension: Noted on problem list             Disposition Plan      Expected Discharge Date: 06/08/2023        Discharge Comments: PT consult,  From [facility],  Needs Echo.          Robles Valdez MD  Hospitalist Service  [facility]  Securely message with [employer] (more info)  Text page  via [employer] Paging/Directory   ______________________________________________________________________    Interval History   He was feeling a little short of breath this morning and is still coughing.    Physical Exam   Vital Signs: Temp: 98  F (36.7  C) Temp src: Oral BP: 132/82 Pulse: 69   Resp: 18 SpO2: 95 % O2 Device: Nasal cannula Oxygen Delivery: 1 LPM  Weight: 0 lbs 0 oz    Constitutional: awake, alert, cooperative, no apparent distress  Respiratory: No increased work of breathing, good air exchange, clear to auscultation bilaterally, no crackles or wheezing  Cardiovascular: regular rate and rhythm, normal S1 and S2, no S3 or S4, and no murmur noted  GI: normal bowel sounds, soft, non-distended, non-tender, no masses palpated  Neuropsychiatric: General: normal, calm and normal eye contact    Medical Decision Making       MANAGEMENT DISCUSSED with the following over the past 24 hours: Patient, patient's healthcare proxy   NOTE(S)/MEDICAL RECORDS REVIEWED over the past 24 hours: Epic      Data     I have personally reviewed the following data over the past 24 hrs:    8.7  \   9.9 (L)   / 146 (L)     N/A N/A N/A /  N/A   N/A N/A N/A \       ALT: N/A AST: N/A AP: N/A TBILI: N/A   ALB: N/A TOT PROTEIN: N/A LIPASE: N/A       Trop: N/A BNP: N/A       TSH: N/A T4: N/A A1C: N/A       Procal: N/A CRP: N/A Lactic Acid: 1.8       INR:  2.40 (H) PTT:  N/A   D-dimer:  0.32 Fibrinogen:  N/A       Imaging results reviewed over the past 24 hrs:   Recent Results (from the past 24 hour(s))   Chest XR,  PA & LAT    Narrative    CHEST TWO VIEWS June 6, 2023 2:58 PM     HISTORY: Generalized weakness.    COMPARISON: 7/3/2018.      Impression    IMPRESSION: Some patchy opacities at the right lung base could just be  new atelectasis but new airspace disease is possible. Mild  cardiomegaly. Stable left chest pacer.    UGO REESE MD         SYSTEM ID:  [id number]

## 2023-06-08 ENCOUNTER — MEDICAL CORRESPONDENCE (OUTPATIENT)
Dept: HEALTH INFORMATION MANAGEMENT | Facility: CLINIC | Age: 88
End: 2023-06-08
Payer: COMMERCIAL

## 2023-06-08 LAB
BACTERIA UR CULT: NORMAL
BASOPHILS # BLD AUTO: 0 10E3/UL (ref 0–0.2)
BASOPHILS NFR BLD AUTO: 0 %
EOSINOPHIL # BLD AUTO: 0.1 10E3/UL (ref 0–0.7)
EOSINOPHIL NFR BLD AUTO: 2 %
ERYTHROCYTE [DISTWIDTH] IN BLOOD BY AUTOMATED COUNT: 16.3 % (ref 10–15)
FOLATE SERPL-MCNC: 14.9 NG/ML (ref 4.6–34.8)
HCT VFR BLD AUTO: 32.2 % (ref 40–53)
HGB BLD-MCNC: 10 G/DL (ref 13.3–17.7)
IMM GRANULOCYTES # BLD: 0 10E3/UL
IMM GRANULOCYTES NFR BLD: 0 %
INR PPP: 2.2 (ref 0.85–1.15)
LYMPHOCYTES # BLD AUTO: 0.4 10E3/UL (ref 0.8–5.3)
LYMPHOCYTES NFR BLD AUTO: 6 %
MCH RBC QN AUTO: 26.1 PG (ref 26.5–33)
MCHC RBC AUTO-ENTMCNC: 31.1 G/DL (ref 31.5–36.5)
MCV RBC AUTO: 84 FL (ref 78–100)
MONOCYTES # BLD AUTO: 1 10E3/UL (ref 0–1.3)
MONOCYTES NFR BLD AUTO: 14 %
NEUTROPHILS # BLD AUTO: 5.6 10E3/UL (ref 1.6–8.3)
NEUTROPHILS NFR BLD AUTO: 78 %
NRBC # BLD AUTO: 0 10E3/UL
NRBC BLD AUTO-RTO: 0 /100
PLATELET # BLD AUTO: 157 10E3/UL (ref 150–450)
PROCALCITONIN SERPL IA-MCNC: 0.18 NG/ML
RBC # BLD AUTO: 3.83 10E6/UL (ref 4.4–5.9)
WBC # BLD AUTO: 7.1 10E3/UL (ref 4–11)

## 2023-06-08 PROCEDURE — 36415 COLL VENOUS BLD VENIPUNCTURE: CPT | Performed by: HOSPITALIST

## 2023-06-08 PROCEDURE — G0378 HOSPITAL OBSERVATION PER HR: HCPCS

## 2023-06-08 PROCEDURE — 250N000011 HC RX IP 250 OP 636: Performed by: HOSPITALIST

## 2023-06-08 PROCEDURE — 258N000003 HC RX IP 258 OP 636: Performed by: HOSPITALIST

## 2023-06-08 PROCEDURE — 99232 SBSQ HOSP IP/OBS MODERATE 35: CPT | Performed by: HOSPITALIST

## 2023-06-08 PROCEDURE — 85025 COMPLETE CBC W/AUTO DIFF WBC: CPT | Performed by: HOSPITALIST

## 2023-06-08 PROCEDURE — 96375 TX/PRO/DX INJ NEW DRUG ADDON: CPT

## 2023-06-08 PROCEDURE — 85610 PROTHROMBIN TIME: CPT | Performed by: HOSPITALIST

## 2023-06-08 PROCEDURE — 84145 PROCALCITONIN (PCT): CPT | Performed by: HOSPITALIST

## 2023-06-08 PROCEDURE — 250N000013 HC RX MED GY IP 250 OP 250 PS 637: Performed by: HOSPITALIST

## 2023-06-08 PROCEDURE — 120N000001 HC R&B MED SURG/OB

## 2023-06-08 PROCEDURE — 82746 ASSAY OF FOLIC ACID SERUM: CPT | Performed by: HOSPITALIST

## 2023-06-08 RX ORDER — WARFARIN SODIUM 3 MG/1
3 TABLET ORAL
Status: COMPLETED | OUTPATIENT
Start: 2023-06-08 | End: 2023-06-08

## 2023-06-08 RX ORDER — LANOLIN ALCOHOL/MO/W.PET/CERES
1000 CREAM (GRAM) TOPICAL DAILY
Status: DISCONTINUED | OUTPATIENT
Start: 2023-06-08 | End: 2023-06-09 | Stop reason: HOSPADM

## 2023-06-08 RX ORDER — NALOXONE HYDROCHLORIDE 0.4 MG/ML
0.2 INJECTION, SOLUTION INTRAMUSCULAR; INTRAVENOUS; SUBCUTANEOUS
Status: DISCONTINUED | OUTPATIENT
Start: 2023-06-08 | End: 2023-06-09 | Stop reason: HOSPADM

## 2023-06-08 RX ORDER — METHYLPREDNISOLONE SODIUM SUCCINATE 125 MG/2ML
125 INJECTION, POWDER, LYOPHILIZED, FOR SOLUTION INTRAMUSCULAR; INTRAVENOUS
Status: DISCONTINUED | OUTPATIENT
Start: 2023-06-08 | End: 2023-06-09 | Stop reason: HOSPADM

## 2023-06-08 RX ORDER — FUROSEMIDE 40 MG
40 TABLET ORAL ONCE
Status: COMPLETED | OUTPATIENT
Start: 2023-06-08 | End: 2023-06-08

## 2023-06-08 RX ORDER — DIPHENHYDRAMINE HYDROCHLORIDE 50 MG/ML
50 INJECTION INTRAMUSCULAR; INTRAVENOUS
Status: DISCONTINUED | OUTPATIENT
Start: 2023-06-08 | End: 2023-06-09 | Stop reason: HOSPADM

## 2023-06-08 RX ORDER — NALOXONE HYDROCHLORIDE 0.4 MG/ML
0.4 INJECTION, SOLUTION INTRAMUSCULAR; INTRAVENOUS; SUBCUTANEOUS
Status: DISCONTINUED | OUTPATIENT
Start: 2023-06-08 | End: 2023-06-09 | Stop reason: HOSPADM

## 2023-06-08 RX ADMIN — DORZOLAMIDE HYDROCHLORIDE AND TIMOLOL MALEATE 1 DROP: 20; 5 SOLUTION/ DROPS OPHTHALMIC at 08:56

## 2023-06-08 RX ADMIN — DOXYCYCLINE HYCLATE 100 MG: 100 CAPSULE ORAL at 21:22

## 2023-06-08 RX ADMIN — AMIODARONE HYDROCHLORIDE 200 MG: 200 TABLET ORAL at 08:52

## 2023-06-08 RX ADMIN — CEFTRIAXONE SODIUM 2 G: 2 INJECTION, POWDER, FOR SOLUTION INTRAMUSCULAR; INTRAVENOUS at 18:43

## 2023-06-08 RX ADMIN — CYANOCOBALAMIN TAB 1000 MCG 1000 MCG: 1000 TAB at 08:52

## 2023-06-08 RX ADMIN — TAMSULOSIN HYDROCHLORIDE 0.4 MG: 0.4 CAPSULE ORAL at 08:52

## 2023-06-08 RX ADMIN — NETARSUDIL 1 DROP: 0.2 SOLUTION/ DROPS OPHTHALMIC; TOPICAL at 21:25

## 2023-06-08 RX ADMIN — DORZOLAMIDE HYDROCHLORIDE AND TIMOLOL MALEATE 1 DROP: 20; 5 SOLUTION/ DROPS OPHTHALMIC at 21:25

## 2023-06-08 RX ADMIN — IRON SUCROSE 300 MG: 20 INJECTION, SOLUTION INTRAVENOUS at 10:41

## 2023-06-08 RX ADMIN — LATANOPROST 1 DROP: 50 SOLUTION/ DROPS OPHTHALMIC at 21:24

## 2023-06-08 RX ADMIN — WARFARIN SODIUM 3 MG: 3 TABLET ORAL at 18:43

## 2023-06-08 RX ADMIN — BRIMONIDINE TARTRATE 1 DROP: 2 SOLUTION OPHTHALMIC at 08:56

## 2023-06-08 RX ADMIN — BRIMONIDINE TARTRATE 1 DROP: 2 SOLUTION OPHTHALMIC at 21:26

## 2023-06-08 RX ADMIN — FUROSEMIDE 40 MG: 40 TABLET ORAL at 10:41

## 2023-06-08 RX ADMIN — DOXYCYCLINE HYCLATE 100 MG: 100 CAPSULE ORAL at 08:52

## 2023-06-08 ASSESSMENT — ACTIVITIES OF DAILY LIVING (ADL)
ADLS_ACUITY_SCORE: 46
ADLS_ACUITY_SCORE: 46
ADLS_ACUITY_SCORE: 50
ADLS_ACUITY_SCORE: 46
ADLS_ACUITY_SCORE: 50
ADLS_ACUITY_SCORE: 46
ADLS_ACUITY_SCORE: 48
ADLS_ACUITY_SCORE: 46
ADLS_ACUITY_SCORE: 50
ADLS_ACUITY_SCORE: 46
ADLS_ACUITY_SCORE: 50
ADLS_ACUITY_SCORE: 50

## 2023-06-08 NOTE — PLAN OF CARE
Observation goals  PRIOR TO DISCHARGE        Comments: -diagnostic tests and consults completed and resulted  Partially met  -vital signs normal or at patient baseline   Not met  Nurse to notify provider when observation goals have been met and patient is ready for discharge.  Yes

## 2023-06-08 NOTE — PROGRESS NOTES
Care Management Follow Up    Length of Stay (days): 0    Expected Discharge Date: 06/10/2023     Concerns to be Addressed:       Patient plan of care discussed at interdisciplinary rounds: Yes    Anticipated Discharge Disposition: Skilled Nursing Facility, Transitional Care     Anticipated Discharge Services: Other (see comment)  Anticipated Discharge DME:      Patient/family educated on Medicare website which has current facility and service quality ratings: yes  Education Provided on the Discharge Plan: Yes  Patient/Family in Agreement with the Plan: yes    Referrals Placed by CM/SW:    Private pay costs discussed: Not applicable    Additional Information:  VM from [facility] with [facility] asking for updated notes.     Writer was asked to speak with Anna, with [facility]. Writer spoke with Anna who confirmed that patient is in the care suites at their facility.They are aware patient is going to TCU to [facility].     Writer emailed [facility] at [facility] to inquire what notes he is requesting.     Terri Campos, SARA, LGSW   Social Work   [facility]

## 2023-06-08 NOTE — PLAN OF CARE
".Orientation/Cognitive: A&Ox4 /forgetful  Observation Goals (Met/ Not Met): Partially met  Mobility Level/Assist Equipment: strong assist 2/gait belt/walker  Fall Risk (Y/N): Y  Behavior Concerns: N  Pain Management: Denies pain on this shift  Tele/VS/O2:  BP elevated, Other VSS on RA, 1L NC  ABNL Lab/BG: am labs  Diet: Regular  Bowel/Bladder: Incontinence  Skin Concerns: scattered bruises  Drains/Devices: N  Tests/Procedures for next shift: CT A/P  Anticipated DC date & active delays: TBD  Patient Stated Goal for Today: :sleep\"    Observation goals  PRIOR TO DISCHARGE       Comments: -diagnostic tests and consults completed and resulted - Not Met  -vital signs normal or at patient baseline -Not Met  Nurse to notify provider when observation goals have been met and patient is ready for discharge.                        "

## 2023-06-08 NOTE — PROGRESS NOTES
Pt A&O x4. Forgetful and confusion at times, needs reorient. Strong Ax2 with Gb/W to BSC. Small Black, loose BM this am. Up in chair x2 hours. Ate all of breakfast. Iron Sucrose infusing. Lasix PO given. Possible discharge to TCU 06/09. Plan is to have do oxygen test tonight.

## 2023-06-08 NOTE — PROGRESS NOTES
[facility]    Medicine Progress Note - Hospitalist Service    Date of Admission:  6/6/2023    Assessment & Plan   Ayaan Herrera is a 89 year old male with a history of chronic atrial fibrillation on warfarin, HTN, HLD, sinus node dysfunction s/p pacemaker placement, prostate cancer, glaucoma, cardiomyopathy, insomnia, multinodular goiter, BPH who is sent to the ED from his care facility due to weakness.     Generalized weakness  Acute respiratory failure and possible pneumonia on chest x-ray  Patient himself does not really offer any complaints, states that he was sent here from the facility as he was feeling weak over the last few days.  Apparently today he was not able to stand due to the weakness which is not normal for him.  Patient denies any recent fevers, chills, sweats, shortness of breath, cough, chest pain, abdominal pain, nausea, vomiting, leg swelling or skin rash, urinary symptoms.  He notes having some chronic issues with loose stools/frequent small stools.  No abdominal pain.  No blood in stools.  Apparently is on iron supplement and he was noted to have a dark stool.  In the ED, noted to be afebrile with stable vitals.  EKG showed paced rhythm.  CXR with some patchy opacities at right lung base which could be atelectasis versus pneumonia.  UA showed moderate LE, moderate bacteria, 114 WBC, negative nitrates.  Other labs showed normal renal function, normal liver function, WBC 14.5, Hb 11.1, .  INR 2.5, TSH 2.3.  COVID PCR negative.  Procalcitonin 0.18.  Head CT with no acute abnormality.  Mag and Phos normal.  Patient was given IV ceftriaxone and p.o. doxycycline for empiric treatment of possible pneumonia/UTI and admitted for further management.  Lymphopenia could indicate possible viral infection.  Other than weakness patient does not really endorse any other symptoms.  CT was also more consistent with atelectasis but pneumonia is possible.  He was on room air  this morning.    Continue ceftriaxone and doxycycline    Continue oxygen by nasal cannula as needed    Overnight oximetry    Incidental right exophytic renal mass on CT scan in February 2022  Pyuria   Benign prostatic hypertrophy   Prostate cancer   Urine culture grew mixed golden.  CT scan showed an unchanged exophytic right renal mass.    Will refer to urology after discharge    Continue PTA tamsulosin    Leukocytosis with leukopenia on CBC in the setting of 2 possible infections  Normocytic anemia  White blood cell count and differential are fairly normal today.  He has a slight decrease in total lymphocyte count.  B12 level is low and he should be on replacement.    Try oral B12 replacement since he is anticoagulated    Follow-up with primary care physician     Chronic atrial fibrillation  Cardiomyopathy EF 40 to 45%  Hypertension   Hyperlipidemia   S/p pacemaker placement  Moderate aortic valve stenosis  Last TTE was in November 2022 that showed EF 40 to 45%, mid to apical anteroseptal wall hypokinesis, moderate AAS, severe biatrial enlargement, mild TR and moderate MR.  Patient has some chronic lower extremity edema for which she is wearing wraps, does not think this has worsened recently.  Transthoracic echocardiogram showed:  Mildly decreased left ventricular systolic function  The visual ejection fraction is 40-45%.  Septal wall motion abnormality may reflect pacemaker activation.  There is mild global hypokinesia of the left ventricle.  The right ventricle is normal in size and function.  The left atrium is severely dilated.  Moderate aortic valve stenosis. And 14 mmHg, valve area 1.3 cmÂ .  Mild mitral and tricuspid valve regurgitation.  Dilated inferior vena cava.  Mildly dilated ascending aorta (4.1 cm).      Resume Lasix today.    Continue PTA amiodarone    Continue PTA warfarin dosed per pharmacy     ?Chronic diarrhea    Hold PTA bowel meds [laxative/stool softeners].    Monitor and depending on course  may need further work-up     Chronic right shoulder pain    Continue PTA Tylenol, tramadol     Glaucoma    Continue PTA eyedrops     Diet: Regular Diet Adult  Room Service    DVT Prophylaxis: Warfarin  Chirinos Catheter: Not present  Lines: None     Cardiac Monitoring: None  Code Status: Full Code      Clinically Significant Risk Factors Present on Admission               # Drug Induced Coagulation Defect: home medication list includes an anticoagulant medication    # Hypertension: Noted on problem list             Disposition Plan      Expected Discharge Date: 06/10/2023      Destination: inpatient rehabilitation facility  Discharge Comments: TCU--[facility] tomorrow.  Time unknown.  Needs overnight oximetry study          Robles Valdez MD  Hospitalist Service  [facility]  Securely message with [employer] (more info)  Text page via [employer] Paging/Directory   ______________________________________________________________________    Interval History   Cough and shortness of breath seem to be a little better.  No new complaints today.    Physical Exam   Vital Signs: Temp: 98  F (36.7  C) Temp src: Oral BP: 130/87 Pulse: 70   Resp: 18 SpO2: 97 % O2 Device: Nasal cannula Oxygen Delivery: 1 LPM  Weight: 0 lbs 0 oz    Constitutional: awake, alert, cooperative, no apparent distress  Respiratory: No increased work of breathing, good air exchange, clear to auscultation bilaterally, no crackles or wheezing  Cardiovascular: regular rate and rhythm, normal S1 and S2, no S3 or S4, and no murmur noted  Neuropsychiatric: General: normal, calm and normal eye contact    Medical Decision Making       MANAGEMENT DISCUSSED with the following over the past 24 hours: Patient and nurse  NOTE(S)/MEDICAL RECORDS REVIEWED over the past 24 hours: Epic      Data     I have personally reviewed the following data over the past 24 hrs:    7.1  \   10.0 (L)   / 157     N/A N/A N/A /  N/A   N/A N/A N/A \       Procal: 0.18  (H) CRP: N/A Lactic Acid: N/A       INR:  2.20 (H) PTT:  N/A   D-dimer:  N/A Fibrinogen:  N/A       Ferritin:  N/A % Retic:  N/A LDH:  N/A       Imaging results reviewed over the past 24 hrs:   Recent Results (from the past 24 hour(s))   Echocardiogram Complete   Result Value    LVEF  40-45%    [facility]    [id number]  [id number]  [id number]  [id number]^[email or web address]^ANDIE^KIRSTY     [facility]  Echocardiography Laboratory  [address] [place] [id number]     Name: ANTONIO BELL  MRN: [id number]  : 10/31/1932  Study Date: 2023 02:45 PM  Age: 89 yrs  Gender: Male  Patient Location: [facility]  Reason For Study: SOB  Ordering Physician: ANDIE FORTUNE  Referring Physician: Chance Morrell MD  Performed By: Oriana Dawson RDCS     BSA: 2.0 m2  Height: 73 in  Weight: 160 lb  HR: 72  BP: 133/85 mmHg  ______________________________________________________________________________  Procedure  Complete Portable Echo Adult.  ______________________________________________________________________________  Interpretation Summary     Mildly decreased left ventricular systolic function  The visual ejection fraction is 40-45%.  Septal wall motion abnormality may reflect pacemaker activation.  There is mild global hypokinesia of the left ventricle.  The right ventricle is normal in size and function.  The left atrium is severely dilated.  Moderate aortic valve stenosis. And 14 mmHg, valve area 1.3 cmÂ .  Mild mitral and tricuspid valve regurgitation.  Dilated inferior vena cava.  Mildly dilated ascending aorta (4.1 cm).     There is no comparison study available.  ______________________________________________________________________________  Left Ventricle  The left ventricle is normal in size. There is mild concentric left  ventricular hypertrophy. Mildly decreased left ventricular systolic function.  The visual ejection fraction is 40-45%. Left ventricular diastolic function  is  indeterminate. Septal wall motion abnormality may reflect pacemaker  activation. There is mild global hypokinesia of the left ventricle.     Right Ventricle  The right ventricle is normal in size and function. There is a pacemaker lead  in the right ventricle.     Atria  The left atrium is severely dilated. Right atrial size is normal.     Mitral Valve  The mitral valve leaflets appear normal. There is no evidence of stenosis,  fluttering, or prolapse. There is mild (1+) mitral regurgitation. There is no  mitral valve stenosis. The mean mitral valve gradient is 2.4 mmHg.     Tricuspid Valve  Normal tricuspid valve. There is mild (1+) tricuspid regurgitation. The right  ventricular systolic pressure is approximated at 44.4 mmHg plus the right  atrial pressure.     Aortic Valve  The aortic valve is trileaflet. There is trace aortic regurgitation. Moderate  valvular aortic stenosis. The mean AoV pressure gradient is 14.0 mmHg. The  calculated aortic valve are is 1.3 cm^2.     Pulmonic Valve  Normal pulmonic valve. There is trace pulmonic valvular regurgitation.     Vessels  The aortic root is normal size. The ascending aorta is Mildly dilated.  Dilation of the inferior vena cava is present with abnormal respiratory  variation in diameter.     Pericardium  There is no pericardial effusion.     Rhythm  Sinus rhythm was noted.  ______________________________________________________________________________  MMode/2D Measurements & Calculations  IVSd: 1.5 cm     LVIDd: 4.4 cm  LVIDs: 3.4 cm  LVPWd: 1.3 cm  FS: 21.8 %  LV mass(C)d: 243.8 grams  LV mass(C)dI: 124.6 grams/m2  Ao root diam: 3.6 cm  LA dimension: 6.2 cm  asc Aorta Diam: 4.1 cm  LA/Ao: 1.7  LVOT diam: 2.2 cm  LVOT area: 3.9 cm2  LA Volume (BP): 118.0 ml  LA Volume Index (BP): 60.2 ml/m2  RWT: 0.59  TAPSE: 1.5 cm     Doppler Measurements & Calculations  MV E max rose: 96.4 cm/sec  MV max P.8 mmHg  MV mean P.4 mmHg  MV V2 VTI: 33.8 cm  MVA(VTI): 1.9  cm2  MV dec slope: 702.0 cm/sec2  MV dec time: 0.14 sec  Ao V2 max: 247.0 cm/sec  Ao max P.4 mmHg  Ao V2 mean: 174.0 cm/sec  Ao mean P.0 mmHg  Ao V2 VTI: 50.5 cm  ANA MARIA(I,D): 1.3 cm2  ANA MARIA(V,D): 1.3 cm2  LV V1 max P.7 mmHg  LV V1 max: 82.3 cm/sec  LV V1 VTI: 16.1 cm  SV(LVOT): 63.4 ml  SI(LVOT): 32.4 ml/m2  PA acc time: 0.09 sec  PI end-d moises: 153.4 cm/sec  TR max moises: 333.3 cm/sec  TR max P.4 mmHg  AV Moises Ratio (DI): 0.33  ANA MARIA Index (cm2/m2): 0.64  E/E' avg: 10.3  Lateral E/e': 7.0  Medial E/e': 13.6     RV S Moises: 10.7 cm/sec     ______________________________________________________________________________  Report approved by: Dr Lilian Meza 2023 04:18 PM         CT Chest/Abdomen/Pelvis w Contrast    Narrative    EXAM: CT CHEST/ABDOMEN/PELVIS W CONTRAST  LOCATION: [facility]  DATE/TIME: 2023 10:18 PM CDT    INDICATION: Follow up right renal mass, abnormal chest x-ray.  COMPARISON: Chest x-ray from 2023. Renal ultrasound from 2022. CT scan from 2022.  TECHNIQUE: CT scan of the chest, abdomen, and pelvis was performed following injection of IV contrast. Multiplanar reformats were obtained. Dose reduction techniques were used.   CONTRAST: 81 mL Isovue 370    FINDINGS:   LUNGS AND PLEURA: Patchy bibasilar opacities right greater than left favored to be more likely due to atelectasis versus less likely infiltrate. There is some smooth interlobular septal thickening n the lungs and trace bilateral pleural effusions which   may indicate some mild fluid overload. Small clustered area of nodularity in the right middle lobe anteromedially could be seen with a localized infectious or inflammatory process or could represent chronic sequelae of previous infection. This area was   obscured on the prior study due to some consolidation. Indeterminate bilobar subpleural nodular density left upper lobe on series 4 image 103 measuring 10 x 4 mm is new compared  with previous. A few other scattered tiny nodular densities elsewhere.    MEDIASTINUM/AXILLAE: No adenopathy. Borderline cardiac enlargement. No pericardial effusion. Normal caliber thoracic aorta. Mild enlargement of the central pulmonary arteries can be seen with pulmonary arterial hypertension. Esophagus is grossly   negative. Left-sided cardiac pacer.    CORONARY ARTERY CALCIFICATION: Severe.    HEPATOBILIARY: Normal.    PANCREAS: Normal.    SPLEEN: Normal.    ADRENAL GLANDS: Normal.    KIDNEYS/BLADDER: Again seen is an indeterminate 1 cm partially exophytic lesion in the right kidney posteriorly on series 2 image 68. This does not appear significantly changed from the prior study but remains concerning for potential solid enhancing   lesion with renal cell carcinoma not excluded. 3 mm nonobstructing stone left kidney. Small benign-appearing cyst posteriorly in the left kidney. No hydronephrosis. Bladder is unremarkable.    BOWEL: Bowel is normal in caliber with no evidence for obstruction. Moderate amounts of stool in the colon. No acute bowel findings.    LYMPH NODES: Normal.    VASCULATURE: Diffuse aortoiliac atheromatous disease. 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    PELVIC ORGANS: Normal.    MUSCULOSKELETAL: Degenerative changes throughout the spine. Severe compression fracture deformity T11 vertebral body with resulting kyphotic angulation. This is significantly progressed compared with 02/28/2022. There is also irregular endplate changes   with some sclerosis in the adjacent T10 and T12 vertebral bodies that is new from previous. This appearance is nonspecific but could be seen with changes related to discitis/osteomyelitis.      Impression    IMPRESSION:  1.  Patchy bibasilar opacities right greater than left favored be more likely due to atelectasis versus less likely infiltrate. Clinical correlation.    2.  Borderline cardiac enlargement, trace  bilateral pleural effusions, and smooth interlobular septal thickening in the lungs suggests some mild fluid overload/edema.    3.  Indeterminate 10 x 4 mm nodule left upper lobe is new with some scattered nodularity elsewhere in the lungs of uncertain significance. This could represent sequelae of previous infectious or inflammatory process but is nonspecific. Consider follow-up   chest CT in 3 months for reevaluation.    4.  Enlargement of central pulmonary arteries suggest pulmonary arterial hypertension.    5.  Severe diffuse atheromatous disease including coronary artery calcification. There is aneurysmal dilatation of the abdominal aorta measuring 3.5 cm, unchanged.    6.  Indeterminate 1 cm partially exophytic lesion in the right kidney posteriorly is unchanged from the prior study. This remains concerning for renal cell carcinoma and could be better evaluated with MRI if indicated. If more aggressive management is   not pursued, continued imaging follow-up recommended.    7.  3 mm nonobstructing stone left kidney.    8.  Severe compression fracture deformity with resulting kyphosis involving the T11 vertebral body. This is significantly progressed from the prior study and there is also irregular endplate changes and sclerosis involving the adjacent T10 and T12   vertebral bodies that is new. This has an appearance that could be seen with sequelae from discitis/osteomyelitis. Clinical correlation is needed and follow-up as clinically warranted.

## 2023-06-08 NOTE — PLAN OF CARE
Goal Outcome Evaluation:    Orientation: A&O x4, confusion at times. Inpatient status     Vitals/Tele: VSS, off and on O2 at 1L NC. no tele.     IV Access/drains: PIV, intermittent ABX, was given Iron Sucrose this am.     Diet: Reg, room service.     Mobility: HEAVY Ax2 with Anusha Steady. Right side weakness.     GI/: Continent to BSC. Black stool noted. X2 BM this shift, wei.     Wound/Skin: Derrick BLE's, skin tear this shift to E, cleanse with cleaning spray and 4x4 mepliex applied. Pt wears his personal long socks and compression braces as well.     Consults: Hospitalist following    Discharge Plan: Possibly 06/09 TCU.    Plan tonight is to wean pt off O2 and oximetry testing.       See Flow sheets for assessment

## 2023-06-08 NOTE — UTILIZATION REVIEW
Admission Status; Secondary Review Determination       Under the authority of the Utilization Management Committee, the utilization review process indicated a secondary review on the above patient. The review outcome is based on review of the medical records, discussions with staff, and applying clinical experience noted on the date of the review.     (x) Inpatient Status Appropriate - This patient's medical care is consistent with medical management for inpatient care and reasonable inpatient medical practice.     RATIONALE FOR DETERMINATION     Ayaan Herrera is a 89 year old male with history of chronic atrial fibrillation, chronic coagulation with warfarin, HTN, HLD, sinus node dysfunction s/p pacemaker placement, prostate cancer, glaucoma, cardiomyopathy, insomnia, multinodular goiter, and BPH. He was sent to the ED from his care facility due to weakness.  Emergency department valuation showed white blood cells 14.5, BNP 3826, abnormal urinalysis (14 white blood cells, moderate leukocyte esterase, and moderate bacteria), and right lower lung infiltrate on chest x-ray.  He was admitted for treatment of suspected right lower lobe pneumonia with antibiotics (IV ceftriaxone and oral doxycycline).  After admission he developed some hypoxia for which he was started on supplemental oxygen.  He remains on supplemental oxygen and IV Rocephin.  He also had abnormal urine analysis and urine culture is pending.  This is being watched closely.  Inpatient admission is appropriate for ongoing treatment of right lower lung pneumonia with associated anoxia on antibiotics.    At the time of admission with the information available to the attending physician more than 2 nights Hospital complex care was anticipated, based on patient risk of adverse outcome if treated as outpatient and complex care required. Inpatient admission is appropriate based on the Medicare guidelines.     This document was produced using voice  recognition software       The information on this document is developed by the utilization review team in order for the business office to ensure compliance. This only denotes the appropriateness of proper admission status and does not reflect the quality of care rendered.   The definitions of Inpatient Status and Observation Status used in making the determination above are those provided in the CMS Coverage Manual, Chapter 1 and Chapter 6, section 70.4.   Sincerely,     Justo Cobb MD    Utilization Review  Physician Advisor  [facility].

## 2023-06-08 NOTE — PROGRESS NOTES
Spoke with Son Kain and daughter in law Christina over the phone. They were worried that pt is confused, I advised them that pt has some random sentences of confusion, but notes that he is confused and returns to baseline. I advised them that I will monitor. Care taker at the bedside.     CM or SABINO Please call son with updates. [phone]

## 2023-06-08 NOTE — PLAN OF CARE
".Orientation/Cognitive: A&Ox4 /forgetful  Observation Goals (Met/ Not Met): Partially met  Mobility Level/Assist Equipment: strong assist 2/gait belt/walker  Fall Risk (Y/N): Y  Behavior Concerns: N  Pain Management: PRN tylenol  Tele/VS/O2: 2 L NC  ABNL Lab/BG: am labs  Diet: Regular  Bowel/Bladder: Incontinence  Skin Concerns: scattered bruises  Drains/Devices: N  Tests/Procedures for next shift: CT A/P  Anticipated DC date & active delays: TBD  Patient Stated Goal for Today: :sleep\"                        "

## 2023-06-08 NOTE — PROGRESS NOTES
"Care Management Follow Up    Length of Stay (days): 0    Expected Discharge Date: 06/08/2023     Concerns to be Addressed:       Patient plan of care discussed at interdisciplinary rounds: Yes    Anticipated Discharge Disposition: Skilled Nursing Facility, Transitional Care     Anticipated Discharge Services: Other (see comment)  Anticipated Discharge DME:      Patient/family educated on Medicare website which has current facility and service quality ratings: yes  Education Provided on the Discharge Plan: Yes  Patient/Family in Agreement with the Plan: yes    Referrals Placed by CM/SW:    Private pay costs discussed: private room/amenity fees and transport.    Additional Information:  CM discussed with patient and friend Omar about [facility] TCU having a bed open for patient. Patient requested private room. Room fee of $45 was discussed with the patient and they accepted this. MD was messaged asking about when patient would be ready for discharge. MD stated they would like to keep patient overnight to test oxygen. Georgette from [facility] was called and CM left VM for Georgette stating patient would like private room and would be ready for transport likely sometime tomorrow. Transport will be ordered with [facility] transport wheel chair. $80 flat fee and $5 per mile was discussed with patient and this is what they would like. CM went back into Patient's room and discussed at bedside the patient would be staying overnight to test oxygen per MD. Daughter in-law Christina was on the phone as well and stated that son \"Kain\" would like to be updated as patient progresses. Kain's phone number is [phone]. Patient was agreeable for hospital to call Kain with discharge planning.    Kvng CLARK  [facility]  Care Management     "

## 2023-06-09 ENCOUNTER — MEDICAL CORRESPONDENCE (OUTPATIENT)
Dept: HEALTH INFORMATION MANAGEMENT | Facility: CLINIC | Age: 88
End: 2023-06-09
Payer: COMMERCIAL

## 2023-06-09 VITALS
HEART RATE: 70 BPM | DIASTOLIC BLOOD PRESSURE: 68 MMHG | TEMPERATURE: 98 F | SYSTOLIC BLOOD PRESSURE: 132 MMHG | RESPIRATION RATE: 18 BRPM | OXYGEN SATURATION: 92 %

## 2023-06-09 LAB — INR PPP: 2.76 (ref 0.85–1.15)

## 2023-06-09 PROCEDURE — 99239 HOSP IP/OBS DSCHRG MGMT >30: CPT | Performed by: HOSPITALIST

## 2023-06-09 PROCEDURE — 250N000013 HC RX MED GY IP 250 OP 250 PS 637: Performed by: HOSPITALIST

## 2023-06-09 PROCEDURE — 85610 PROTHROMBIN TIME: CPT | Performed by: HOSPITALIST

## 2023-06-09 PROCEDURE — 36415 COLL VENOUS BLD VENIPUNCTURE: CPT | Performed by: HOSPITALIST

## 2023-06-09 PROCEDURE — 94762 N-INVAS EAR/PLS OXIMTRY CONT: CPT

## 2023-06-09 RX ORDER — CEFUROXIME AXETIL 500 MG/1
500 TABLET ORAL 2 TIMES DAILY
DISCHARGE
Start: 2023-06-09 | End: 2023-06-12

## 2023-06-09 RX ORDER — DOXYCYCLINE 100 MG/1
100 CAPSULE ORAL 2 TIMES DAILY
DISCHARGE
Start: 2023-06-09 | End: 2023-06-12

## 2023-06-09 RX ORDER — WARFARIN SODIUM 2 MG/1
2 TABLET ORAL
Status: DISCONTINUED | OUTPATIENT
Start: 2023-06-09 | End: 2023-06-09 | Stop reason: HOSPADM

## 2023-06-09 RX ADMIN — ACETAMINOPHEN 1000 MG: 500 TABLET ORAL at 12:20

## 2023-06-09 RX ADMIN — DOXYCYCLINE HYCLATE 100 MG: 100 CAPSULE ORAL at 08:53

## 2023-06-09 RX ADMIN — CYANOCOBALAMIN TAB 1000 MCG 1000 MCG: 1000 TAB at 08:53

## 2023-06-09 RX ADMIN — TAMSULOSIN HYDROCHLORIDE 0.4 MG: 0.4 CAPSULE ORAL at 08:53

## 2023-06-09 RX ADMIN — AMIODARONE HYDROCHLORIDE 200 MG: 200 TABLET ORAL at 08:53

## 2023-06-09 RX ADMIN — BRIMONIDINE TARTRATE 1 DROP: 2 SOLUTION OPHTHALMIC at 08:54

## 2023-06-09 RX ADMIN — DORZOLAMIDE HYDROCHLORIDE AND TIMOLOL MALEATE 1 DROP: 20; 5 SOLUTION/ DROPS OPHTHALMIC at 08:54

## 2023-06-09 ASSESSMENT — ACTIVITIES OF DAILY LIVING (ADL)
ADLS_ACUITY_SCORE: 50
ADLS_ACUITY_SCORE: 46
ADLS_ACUITY_SCORE: 52
ADLS_ACUITY_SCORE: 50
ADLS_ACUITY_SCORE: 46
ADLS_ACUITY_SCORE: 52
ADLS_ACUITY_SCORE: 46
ADLS_ACUITY_SCORE: 50

## 2023-06-09 NOTE — PLAN OF CARE
Goal Outcome Evaluation:    A&Ox4. VSS on RA. Confused at times. BLE weakness & R side weakness. Up with extensive Ax2 with sera steady. Incontinent of B/B at times. PIV SL. Scattered buises. Overnight oxymetry monitoring completed. Denies pain at rest. Pending discharge to TCU. Will continue to monitor.

## 2023-06-09 NOTE — PROGRESS NOTES
Care Management Discharge Note    Discharge Date: 06/09/2023       Discharge Disposition: Skilled Nursing Facility, Transitional Care    Discharge Services: Other (see comment)    Discharge DME:      Discharge Transportation: health plan transportation    Private pay costs discussed: private room/amenity fees and transportation costs    Does the patient's insurance plan have a 3 day qualifying hospital stay waiver?  Yes   Will the waiver be used for post-acute placement? Yes    PAS Confirmation Code: [id number]  Patient/family educated on Medicare website which has current facility and service quality ratings: yes    Education Provided on the Discharge Plan: Yes  Persons Notified of Discharge Plans: Andrey Henry (Son) Omar Rhodes(Friend) Charge Nurse, Patient.  Patient/Family in Agreement with the Plan: yes    Handoff Referral Completed: No    Additional Information:  Phone calls were made to both Omar and Andrey. Andrey gave writer a call back wondering if anything else was needed before patient would be going to [facility]. Writer let Andrey know that transport was taken care of and nothing else was needed. Andrey asked for contact information for the SW at [facility]. Writer reached out to [facility] admissions coordinator (Georgette) for this information and left VM. Patient was made aware at bedside of the transport and charge nurse was notified of discharge plan.[facility] was notified to make a packet. Orders were faxed over to [facility].    Addendum 12:41 Writer received VM from [facility] Admissions Coordinator (Georgette) who said that the Patient's  at [facility] will be Maddy Bahena ([phone]). Writer made phone call to update son on  assignment and he is aware.     Kvng Richardson [facility]  Care Management

## 2023-06-09 NOTE — DISCHARGE SUMMARY
[facility]  Hospitalist Discharge Summary      Date of Admission:  6/6/2023  Date of Discharge:  6/9/2023  Discharging Provider: Robles Valdez MD  Discharge Service: Hospitalist Service    Discharge Diagnoses   1. Acute hypoxic respiratory failure and possible pneumonia  2. Normocytic anemia  3. Vitamin B12 deficiency   4. Probable chronic respiratory failure    History of     Benign prostatic hypertrophy     Prostate cancer     Incidental right exophytic renal mass on CT scan in February 2022    Chronic atrial fibrillation    Cardiomyopathy EF 40 to 45%    Hypertension     Hyperlipidemia     S/p pacemaker placement    Moderate aortic valve stenosis                         Chronic diarrhea    Chronic right shoulder pain    Glaucoma    Clinically Significant Risk Factors          Follow-ups Needed After Discharge   Follow-up Appointments     Follow Up and recommended labs and tests      Follow up with snf physician.  The following labs/tests are   recommended: INR 6/10; CBC and BMP on 6/14.  B12 7/1/23..             Unresulted Labs Ordered in the Past 30 Days of this Admission     No orders found from 5/7/2023 to 6/7/2023.          Discharge Disposition   Discharged to short-term care facility  Condition at discharge: Stable    Hospital Course   Ayaan Herrera is a 89 year old male with a history of chronic atrial fibrillation on warfarin, HTN, HLD, sinus node dysfunction s/p pacemaker placement, prostate cancer, glaucoma, cardiomyopathy, insomnia, multinodular goiter, BPH who is sent to the ED from his care facility due to weakness.     Generalized weakness  Acute respiratory failure and possible pneumonia on chest x-ray  Possible chronic respiratory failure, possible sleep disordered breathing  Patient himself does not really offer any complaints, states that he was sent here from the facility as he was feeling weak over the last few days.  Apparently today he was not  able to stand due to the weakness which is not normal for him.  Patient denies any recent fevers, chills, sweats, shortness of breath, cough, chest pain, abdominal pain, nausea, vomiting, leg swelling or skin rash, urinary symptoms.  He notes having some chronic issues with loose stools/frequent small stools.  No abdominal pain.  No blood in stools.  Apparently is on iron supplement and he was noted to have a dark stool.  In the ED, noted to be afebrile with stable vitals.  EKG showed paced rhythm.  CXR with some patchy opacities at right lung base which could be atelectasis versus pneumonia.  UA showed moderate LE, moderate bacteria, 114 WBC, negative nitrates.  Other labs showed normal renal function, normal liver function, WBC 14.5, Hb 11.1, .  INR 2.5, TSH 2.3.  COVID PCR negative.  Procalcitonin 0.18.  Head CT with no acute abnormality.  Mag and Phos normal.  Patient was given IV ceftriaxone and p.o. doxycycline for empiric treatment of possible pneumonia/UTI and admitted for further management.  Lymphopenia could indicate possible viral infection.  Other than weakness patient does not really endorse any other symptoms.  CT was also more consistent with atelectasis but pneumonia is possible. He has been on and off of supplemental oxygen.    Overnight oximetry showed multiple short desaturations.     Complete 7 days of antibiotics with oral cefuroxime and doxycycline    Continue daytime oxygen by nasal cannula as needed    Nocturnal oxygen 2L nasal canula     Incidental right exophytic renal mass on CT scan in February 2022  Pyuria   Benign prostatic hypertrophy   Prostate cancer   Urine culture grew mixed golden.  CT scan showed an unchanged exophytic right renal mass.    Will refer to urology     Continue PTA tamsulosin    Leukocytosis with leukopenia on CBC in the setting of 2 possible infections  Normocytic anemia  White blood cell count and differential are fairly normal today.  He has a slight  decrease in total lymphocyte count.  B12 level is low and he should be on replacement.    Try oral B12 replacement since he is anticoagulated    Follow-up with primary care physician     Chronic atrial fibrillation  Cardiomyopathy EF 40 to 45%  Hypertension   Hyperlipidemia   S/p pacemaker placement  Moderate aortic valve stenosis  Last TTE was in November 2022 that showed EF 40 to 45%, mid to apical anteroseptal wall hypokinesis, moderate AAS, severe biatrial enlargement, mild TR and moderate MR.  Patient has some chronic lower extremity edema for which she is wearing wraps, does not think this has worsened recently.  Transthoracic echocardiogram showed:  Mildly decreased left ventricular systolic function  The visual ejection fraction is 40-45%.  Septal wall motion abnormality may reflect pacemaker activation.  There is mild global hypokinesia of the left ventricle.  The right ventricle is normal in size and function.  The left atrium is severely dilated.  Moderate aortic valve stenosis. And 14 mmHg, valve area 1.3 cmÂ .  Mild mitral and tricuspid valve regurgitation.  Dilated inferior vena cava.  Mildly dilated ascending aorta (4.1 cm).      Continue Lasix, amiodarone and warfarin    INR 6/10     Chronic diarrhea  Not an issue here.     Chronic right shoulder pain    Continue PTA Tylenol, tramadol     Glaucoma    Continue PTA eyedrops    Consultations This Hospital Stay   PHARMACY TO DOSE WARFARIN  PHYSICAL THERAPY ADULT IP CONSULT  CARE MANAGEMENT / SOCIAL WORK IP CONSULT  OCCUPATIONAL THERAPY ADULT IP CONSULT  PHYSICAL THERAPY ADULT IP CONSULT    Code Status   Full Code    Time Spent on this Encounter   I, Robles Valdez MD, personally saw the patient today and spent greater than 30 minutes discharging this patient.       Robles Valdez MD  [facility] EXTENDED RECOVERY AND SHORT STAY  [address] [address]  Phone:  [phone]  ______________________________________________________________________    Physical Exam   Vital Signs: Temp: 98  F (36.7  C) Temp src: Oral BP: 132/68 Pulse: 70   Resp: 18 SpO2: 92 % O2 Device: None (Room air) Oxygen Delivery: 1 LPM  Weight: 0 lbs 0 oz  Constitutional: not in any distress, looks well   Respiratory: no wheezing, rales or rhonchi   Neuropsychiatric: awake and alert        Primary Care Physician   Chance Morrell    Discharge Orders      Adult Urology  Referral      General info for SNF    Length of Stay Estimate: Short Term Care: Estimated # of Days <30  Condition at Discharge: Improving  Level of care:skilled   Rehabilitation Potential: Good  Admission H&P remains valid and up-to-date: Yes  Recent Chemotherapy: N/A  Use Nursing Home Standing Orders: Yes     Mantoux instructions    Give two-step Mantoux (PPD) Per Facility Policy Yes     Activity - Up with nursing assistance     Daily weights    Call Provider for weight gain of more than 2 pounds per day or 5 pounds per week.     Reason for your hospital stay    Weakness, possible pneumonia     Additional Discharge Instructions    Oxygen 2L nasal canula at bedtime.  Oxygen 0-3L during the day PRN to keep oxygen saturation >=90%     Follow Up and recommended labs and tests    Follow up with half-way physician.  The following labs/tests are recommended: INR 6/10; CBC and BMP on 6/14.  B12 7/1/23..     Full Code     Occupational Therapy Adult Consult    Evaluate and treat as clinically indicated.    Reason:  deconditioning     Physical Therapy Adult Consult    Evaluate and treat as clinically indicated.    Reason:  deconditioning     Fall precautions     Diet    Follow this diet upon discharge:       Regular Diet Adult       Significant Results and Procedures   Most Recent 3 CBC's:Recent Labs   Lab Test 06/08/23  0651 06/07/23  0618 06/06/23  1346   WBC 7.1 8.7 14.5*   HGB 10.0* 9.9* 11.1*   MCV 84 83 84    146*  200     Most Recent 3 BMP's:Recent Labs   Lab Test 06/06/23  1346 04/21/23  1040 12/22/22  0623    140 138   POTASSIUM 4.3 4.0 4.6   CHLORIDE 99 102 101   CO2 28 25 26   BUN 22.5 26.8* 23.9*   CR 0.88 1.13 0.78   ANIONGAP 10 13 11   BRENDAN 9.8* 9.2 9.6   * 118* 84   ,   Results for orders placed or performed during the hospital encounter of 06/06/23   Chest XR,  PA & LAT    Narrative    CHEST TWO VIEWS June 6, 2023 2:58 PM     HISTORY: Generalized weakness.    COMPARISON: 7/3/2018.      Impression    IMPRESSION: Some patchy opacities at the right lung base could just be  new atelectasis but new airspace disease is possible. Mild  cardiomegaly. Stable left chest pacer.    UGO REESE MD         SYSTEM ID:  [id number]   CT Chest/Abdomen/Pelvis w Contrast    Narrative    EXAM: CT CHEST/ABDOMEN/PELVIS W CONTRAST  LOCATION: [facility]  DATE/TIME: 6/7/2023 10:18 PM CDT    INDICATION: Follow up right renal mass, abnormal chest x-ray.  COMPARISON: Chest x-ray from 06/06/2023. Renal ultrasound from 02/28/2022. CT scan from 02/28/2022.  TECHNIQUE: CT scan of the chest, abdomen, and pelvis was performed following injection of IV contrast. Multiplanar reformats were obtained. Dose reduction techniques were used.   CONTRAST: 81 mL Isovue 370    FINDINGS:   LUNGS AND PLEURA: Patchy bibasilar opacities right greater than left favored to be more likely due to atelectasis versus less likely infiltrate. There is some smooth interlobular septal thickening n the lungs and trace bilateral pleural effusions which   may indicate some mild fluid overload. Small clustered area of nodularity in the right middle lobe anteromedially could be seen with a localized infectious or inflammatory process or could represent chronic sequelae of previous infection. This area was   obscured on the prior study due to some consolidation. Indeterminate bilobar subpleural nodular density left upper lobe on series 4 image 103  measuring 10 x 4 mm is new compared with previous. A few other scattered tiny nodular densities elsewhere.    MEDIASTINUM/AXILLAE: No adenopathy. Borderline cardiac enlargement. No pericardial effusion. Normal caliber thoracic aorta. Mild enlargement of the central pulmonary arteries can be seen with pulmonary arterial hypertension. Esophagus is grossly   negative. Left-sided cardiac pacer.    CORONARY ARTERY CALCIFICATION: Severe.    HEPATOBILIARY: Normal.    PANCREAS: Normal.    SPLEEN: Normal.    ADRENAL GLANDS: Normal.    KIDNEYS/BLADDER: Again seen is an indeterminate 1 cm partially exophytic lesion in the right kidney posteriorly on series 2 image 68. This does not appear significantly changed from the prior study but remains concerning for potential solid enhancing   lesion with renal cell carcinoma not excluded. 3 mm nonobstructing stone left kidney. Small benign-appearing cyst posteriorly in the left kidney. No hydronephrosis. Bladder is unremarkable.    BOWEL: Bowel is normal in caliber with no evidence for obstruction. Moderate amounts of stool in the colon. No acute bowel findings.    LYMPH NODES: Normal.    VASCULATURE: Diffuse aortoiliac atheromatous disease. 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    PELVIC ORGANS: Normal.    MUSCULOSKELETAL: Degenerative changes throughout the spine. Severe compression fracture deformity T11 vertebral body with resulting kyphotic angulation. This is significantly progressed compared with 02/28/2022. There is also irregular endplate changes   with some sclerosis in the adjacent T10 and T12 vertebral bodies that is new from previous. This appearance is nonspecific but could be seen with changes related to discitis/osteomyelitis.      Impression    IMPRESSION:  1.  Patchy bibasilar opacities right greater than left favored be more likely due to atelectasis versus less likely infiltrate. Clinical correlation.    2.  Borderline  cardiac enlargement, trace bilateral pleural effusions, and smooth interlobular septal thickening in the lungs suggests some mild fluid overload/edema.    3.  Indeterminate 10 x 4 mm nodule left upper lobe is new with some scattered nodularity elsewhere in the lungs of uncertain significance. This could represent sequelae of previous infectious or inflammatory process but is nonspecific. Consider follow-up   chest CT in 3 months for reevaluation.    4.  Enlargement of central pulmonary arteries suggest pulmonary arterial hypertension.    5.  Severe diffuse atheromatous disease including coronary artery calcification. There is aneurysmal dilatation of the abdominal aorta measuring 3.5 cm, unchanged.    6.  Indeterminate 1 cm partially exophytic lesion in the right kidney posteriorly is unchanged from the prior study. This remains concerning for renal cell carcinoma and could be better evaluated with MRI if indicated. If more aggressive management is   not pursued, continued imaging follow-up recommended.    7.  3 mm nonobstructing stone left kidney.    8.  Severe compression fracture deformity with resulting kyphosis involving the T11 vertebral body. This is significantly progressed from the prior study and there is also irregular endplate changes and sclerosis involving the adjacent T10 and T12   vertebral bodies that is new. This has an appearance that could be seen with sequelae from discitis/osteomyelitis. Clinical correlation is needed and follow-up as clinically warranted.   Echocardiogram Complete     Value    LVEF  40-45%    Narrative    [id number]  [id number]  [id number]  [id number]^DEVIKA^ANDIE^D     [facility]  Echocardiography Laboratory  [address] [place], [place] [address]     Name: ANTONIO BELL  MRN: [id number]  : 10/31/1932  Study Date: 2023 02:45 PM  Age: 89 yrs  Gender: Male  Patient Location: [facility]  Reason For Study: SOB  Ordering Physician: ANDIE FORTUNE  D  Referring Physician: Chance Morrell MD  Performed By: Oriana Dawson RD     BSA: 2.0 m2  Height: 73 in  Weight: 160 lb  HR: 72  BP: 133/85 mmHg  ______________________________________________________________________________  Procedure  Complete Portable Echo Adult.  ______________________________________________________________________________  Interpretation Summary     Mildly decreased left ventricular systolic function  The visual ejection fraction is 40-45%.  Septal wall motion abnormality may reflect pacemaker activation.  There is mild global hypokinesia of the left ventricle.  The right ventricle is normal in size and function.  The left atrium is severely dilated.  Moderate aortic valve stenosis. And 14 mmHg, valve area 1.3 cmÂ .  Mild mitral and tricuspid valve regurgitation.  Dilated inferior vena cava.  Mildly dilated ascending aorta (4.1 cm).     There is no comparison study available.  ______________________________________________________________________________  Left Ventricle  The left ventricle is normal in size. There is mild concentric left  ventricular hypertrophy. Mildly decreased left ventricular systolic function.  The visual ejection fraction is 40-45%. Left ventricular diastolic function is  indeterminate. Septal wall motion abnormality may reflect pacemaker  activation. There is mild global hypokinesia of the left ventricle.     Right Ventricle  The right ventricle is normal in size and function. There is a pacemaker lead  in the right ventricle.     Atria  The left atrium is severely dilated. Right atrial size is normal.     Mitral Valve  The mitral valve leaflets appear normal. There is no evidence of stenosis,  fluttering, or prolapse. There is mild (1+) mitral regurgitation. There is no  mitral valve stenosis. The mean mitral valve gradient is 2.4 mmHg.     Tricuspid Valve  Normal tricuspid valve. There is mild (1+) tricuspid regurgitation. The right  ventricular  systolic pressure is approximated at 44.4 mmHg plus the right  atrial pressure.     Aortic Valve  The aortic valve is trileaflet. There is trace aortic regurgitation. Moderate  valvular aortic stenosis. The mean AoV pressure gradient is 14.0 mmHg. The  calculated aortic valve are is 1.3 cm^2.     Pulmonic Valve  Normal pulmonic valve. There is trace pulmonic valvular regurgitation.     Vessels  The aortic root is normal size. The ascending aorta is Mildly dilated.  Dilation of the inferior vena cava is present with abnormal respiratory  variation in diameter.     Pericardium  There is no pericardial effusion.     Rhythm  Sinus rhythm was noted.  ______________________________________________________________________________  MMode/2D Measurements & Calculations  IVSd: 1.5 cm     LVIDd: 4.4 cm  LVIDs: 3.4 cm  LVPWd: 1.3 cm  FS: 21.8 %  LV mass(C)d: 243.8 grams  LV mass(C)dI: 124.6 grams/m2  Ao root diam: 3.6 cm  LA dimension: 6.2 cm  asc Aorta Diam: 4.1 cm  LA/Ao: 1.7  LVOT diam: 2.2 cm  LVOT area: 3.9 cm2  LA Volume (BP): 118.0 ml  LA Volume Index (BP): 60.2 ml/m2  RWT: 0.59  TAPSE: 1.5 cm     Doppler Measurements & Calculations  MV E max moises: 96.4 cm/sec  MV max P.8 mmHg  MV mean P.4 mmHg  MV V2 VTI: 33.8 cm  MVA(VTI): 1.9 cm2  MV dec slope: 702.0 cm/sec2  MV dec time: 0.14 sec  Ao V2 max: 247.0 cm/sec  Ao max P.4 mmHg  Ao V2 mean: 174.0 cm/sec  Ao mean P.0 mmHg  Ao V2 VTI: 50.5 cm  ANA MARIA(I,D): 1.3 cm2  ANA MARIA(V,D): 1.3 cm2  LV V1 max P.7 mmHg  LV V1 max: 82.3 cm/sec  LV V1 VTI: 16.1 cm  SV(LVOT): 63.4 ml  SI(LVOT): 32.4 ml/m2  PA acc time: 0.09 sec  PI end-d moises: 153.4 cm/sec  TR max moises: 333.3 cm/sec  TR max P.4 mmHg  AV Moises Ratio (DI): 0.33  ANA MARIA Index (cm2/m2): 0.64  E/E' avg: 10.3  Lateral E/e': 7.0  Medial E/e': 13.6     RV S Moises: 10.7 cm/sec     ______________________________________________________________________________  Report approved by: Dr Lilian Meza 2023 04:18 PM                Discharge Medications   Current Discharge Medication List      START taking these medications    Details   cefuroxime (CEFTIN) 500 MG tablet Take 1 tablet (500 mg) by mouth 2 times daily for 3 days    Associated Diagnoses: Pneumonia due to infectious organism, unspecified laterality, unspecified part of lung      cyanocobalamin (CYANOCOBALAMIN) 1000 MCG tablet Take 1 tablet (1,000 mcg) by mouth daily    Associated Diagnoses: Vitamin B12 deficiency (non anemic)      doxycycline hyclate (VIBRAMYCIN) 100 MG capsule Take 1 capsule (100 mg) by mouth 2 times daily for 3 days    Associated Diagnoses: Pneumonia due to infectious organism, unspecified laterality, unspecified part of lung         CONTINUE these medications which have NOT CHANGED    Details   !! acetaminophen (TYLENOL) 500 MG tablet Take 1,000 mg by mouth 2 times daily as needed for mild pain      !! acetaminophen (TYLENOL) 500 MG tablet Take 1,000 mg by mouth nightly as needed for mild pain      alendronate (FOSAMAX) 70 MG tablet Take 1 tablet (70 mg) by mouth every 7 days TAKE ON EMPTY STOMACH, REMAIN UPRIGHT FOR ONE HOUR AFTER TAKING  Qty: 4 tablet, Refills: 11    Associated Diagnoses: Senile osteoporosis      amiodarone (PACERONE) 200 MG tablet Take 1 tablet (200 mg) by mouth daily      bisacodyl (DULCOLAX) 10 MG suppository Place 10 mg rectally daily as needed for constipation      brimonidine (ALPHAGAN) 0.2 % ophthalmic solution Place 1 drop into both eyes 2 times daily      calcium carbonate (TUMS) 500 MG chewable tablet Take 1 chew tab by mouth 4 times daily as needed for heartburn      diclofenac (VOLTAREN) 1 % topical gel Apply 2 g topically 2 times daily To both shoulders.  And twice daily as needed.      dimethicone-zinc oxide (DONTE PROTECT) external cream Apply topically daily Apply to upper inner thighs, groin, and scrotum.      dorzolamide-timolol (COSOPT) 2-0.5 % ophthalmic solution INSTILL 1 DROP IN BOTH EYES TWICE A DAY  Qty: 10 mL,  Refills: 0    Comments: PLEASE SEND US A NEW ORDER  Associated Diagnoses: Acute maxillary sinusitis      ferrous sulfate (FE TABS) 325 (65 Fe) MG EC tablet Take 325 mg by mouth Take 1 tablet by mouth twice weekly on Monday and Friday at bedtime.      !! furosemide (LASIX) 20 MG tablet TAKE 1 TAB BY MOUTH ONCE DAILY  Qty: 30 tablet, Refills: 0    Comments: PLEASE AUTHORIZE REFILL. NO REFILLS REMAIN. THANK YOU!  Associated Diagnoses: Bilateral leg edema      !! furosemide (LASIX) 40 MG tablet Take 40 mg by mouth Take 1 tab by mouth on Tues, Thurs, Sat, and Sun.      hypromellose (ARTIFICIAL TEARS) 0.5 % SOLN ophthalmic solution Place 1 drop into both eyes 4 times daily as needed for dry eyes      ketotifen (ZADITOR) 0.025 % ophthalmic solution Place 1 drop into both eyes as needed      latanoprost (XALATAN) 0.005 % ophthalmic solution INSTILL 1 DROP INTO BOTH EYES AT BEDTIME  Qty: 2.5 mL, Refills: 5    Comments: PLEASE, AUTHORIZE REFILLS. THANKS!  Associated Diagnoses: Open-angle glaucoma of both eyes, unspecified glaucoma stage, unspecified open-angle glaucoma type      melatonin 5 MG tablet Take 5 mg by mouth nightly as needed      netarsudil (RHOPRESSA) 0.02 % ophthalmic solution 1 drop At Bedtime      nystatin (MYCOSTATIN) 165175 UNIT/GM external powder Apply topically 2 times daily Apply to abdominal folds      polyethylene glycol (MIRALAX) 17 g packet Take 8.5 g by mouth daily      potassium chloride ER (K-TAB/KLOR-CON) 10 MEQ CR tablet TAKE 1 TAB BY MOUTH DAILY WITH EACH FUROSEMIDE DOSE.  Qty: 30 tablet, Refills: 5    Comments: PLEASE AUTHORIZE REFILL. NO REFILLS REMAIN. THANK YOU!  Associated Diagnoses: Bilateral leg edema      senna-docusate (SENOKOT-S/PERICOLACE) 8.6-50 MG tablet Take 1 tablet by mouth daily as needed for constipation      simethicone (MYLICON) 80 MG chewable tablet Take 80 mg by mouth daily as needed for flatulence or cramping      tamsulosin (FLOMAX) 0.4 MG capsule TAKE 1 CAP BY MOUTH ONCE  DAILY  Qty: 30 capsule, Refills: 5    Comments: PLEASE AUTHORIZE REFILL. NO REFILLS REMAIN. THANK YOU!  Associated Diagnoses: BPH with obstruction/lower urinary tract symptoms      traMADol (ULTRAM) 50 MG tablet Take 0.5 tablets (25 mg) by mouth every 6 hours as needed for severe pain  Qty: 20 tablet, Refills: 0    Associated Diagnoses: Closed wedge compression fracture of T11 vertebra, initial encounter (H)      Vitamin D, Cholecalciferol, 1000 UNITS TABS Take 1 tablet by mouth daily       !! warfarin ANTICOAGULANT (COUMADIN) 2.5 MG tablet Take 2.5 mg by mouth Take 1 tab by mouth on Fridays   INR 6/23      !! warfarin ANTICOAGULANT (COUMADIN) 3 MG tablet Take 3 mg by mouth Take 1 tab by mouth on Mon, Tues, Wed, Thurs, Sat, Sun  INR 6/23       !! - Potential duplicate medications found. Please discuss with provider.      STOP taking these medications       brimonidine (ALPHAGAN-P) 0.15 % ophthalmic solution Comments:   Reason for Stopping:         Lidocaine (LIDOCARE) 4 % Patch Comments:   Reason for Stopping:             Allergies   Allergies   Allergen Reactions     Zithromax [Azithromycin] Diarrhea            Amoxicillin Diarrhea     Amoxicillin-Pot Clavulanate GI Disturbance

## 2023-06-09 NOTE — PLAN OF CARE
Goal Outcome Evaluation:      Plan of Care Reviewed With: patient    Orientation/Cognitive: AOx4; forgetful, confused at times   Observation Goals (Met/ Not Met): Inpatient  Mobility Level/Assist Equipment: A2/SS  Fall Risk (Y/N): Y  Behavior Concerns: Green  Pain Management: Denies, on Scheduled Tylenol  Tele/VS/O2: VSS on RA  ABNL Lab/BG: None  Diet: Regular  Bowel/Bladder: Continent, 1 loose BM   Skin Concerns: Scattered bruising and skin tears  Drains/Devices: PIV removed  Tests/Procedures for next shift: None  Anticipated DC date & active delays: Today between 3:00-4:00 pm to [facility], transportation set-up   EMS provided transportation. All belongings gathered and packet sent. Patient discharged off the unit.

## 2023-06-09 NOTE — PROGRESS NOTES
Care Management Follow Up    Length of Stay (days): 1    Expected Discharge Date: 06/10/2023     Concerns to be Addressed:       Patient plan of care discussed at interdisciplinary rounds: Yes    Anticipated Discharge Disposition: Skilled Nursing Facility, Transitional Care     Anticipated Discharge Services: Other (see comment)  Anticipated Discharge DME:      Patient/family educated on Medicare website which has current facility and service quality ratings: yes  Education Provided on the Discharge Plan: Yes  Patient/Family in Agreement with the Plan: yes    Referrals Placed by CM/SW:    Private pay costs discussed: private room/amenity fees and Transport    Additional Information:Paged MD waiting to see if patient is medically ready still waiting on  overnight oximetry report. Completed PAS-RR    D: Per [facility] regulation, SW completed and submitted PAS-RR to [place] [employer] Direct Connect via the Senior LinkAge Line.  PAS-RR confirmation # is : [id number]    P: Further questions may be directed to [facility] LinkAge Line at #[phone], option #4 for PAS-RR staff.    PAS faxed to [facility]. PAS placed on patients chart for packet to be made by [facility].     Addendum 10:30 VM from Georgette at [facility] about patient's status changing from observation to inpatient. SARA made phone call back to Georgette and let her know that hospital staff are still waiting on overnight oximetry report to medically clear patient. Patient should be able to leave sometime this afternoon.    Kvng Richardson [facility]  Care Management

## 2023-06-09 NOTE — PROGRESS NOTES
Care Management Follow Up    Length of Stay (days): 1    Expected Discharge Date: 06/09/2023     Concerns to be Addressed:       Patient plan of care discussed at interdisciplinary rounds: Yes    Anticipated Discharge Disposition: Skilled Nursing Facility, Transitional Care     Anticipated Discharge Services: Other (see comment)  Anticipated Discharge DME:      Patient/family educated on Medicare website which has current facility and service quality ratings: yes  Education Provided on the Discharge Plan: Yes  Patient/Family in Agreement with the Plan: yes    Referrals Placed by CM/SW:    Private pay costs discussed: Not applicable    Additional Information:  Discharge orders received. Writer called [facility] transport and spoke with Lor. Wheelchair ride set for today at [phone].     Addendum 1544: Facility is requesting updated notes. Writer faxed to Moncho at [facility] suites.     SARA Tejada, SW   Social Work   [facility]

## 2023-06-10 ENCOUNTER — TELEPHONE (OUTPATIENT)
Dept: GERIATRICS | Facility: CLINIC | Age: 88
End: 2023-06-10
Payer: COMMERCIAL

## 2023-06-10 NOTE — TELEPHONE ENCOUNTER
[facility] TRIAGE ENCOUNTER    Chief Complaint   Patient presents with     INR RESULTS       Ayaan Herrera is a 89 year old  (10/31/1932), Nurse called today to report:   INR today 3.1. taking 2.5 mg fridays and 3 mg all other days    ASSESSMENT/PLAN    2.5 mg daily x 2 days INR Monday    Electronically signed by:   Michelle Reina NP

## 2023-06-11 VITALS
OXYGEN SATURATION: 92 % | HEART RATE: 71 BPM | BODY MASS INDEX: 23.73 KG/M2 | TEMPERATURE: 97.9 F | WEIGHT: 156.6 LBS | RESPIRATION RATE: 18 BRPM | SYSTOLIC BLOOD PRESSURE: 127 MMHG | DIASTOLIC BLOOD PRESSURE: 72 MMHG | HEIGHT: 68 IN

## 2023-06-11 DIAGNOSIS — S22.080A CLOSED WEDGE COMPRESSION FRACTURE OF T11 VERTEBRA, INITIAL ENCOUNTER (H): ICD-10-CM

## 2023-06-11 RX ORDER — TRAMADOL HYDROCHLORIDE 50 MG/1
25 TABLET ORAL EVERY 6 HOURS PRN
Qty: 20 TABLET | Refills: 0 | Status: ON HOLD | OUTPATIENT
Start: 2023-06-11 | End: 2023-06-20

## 2023-06-12 ENCOUNTER — TRANSITIONAL CARE UNIT VISIT (OUTPATIENT)
Dept: GERIATRICS | Facility: CLINIC | Age: 88
End: 2023-06-12
Payer: COMMERCIAL

## 2023-06-12 ENCOUNTER — PATIENT OUTREACH (OUTPATIENT)
Dept: INTERNAL MEDICINE | Facility: CLINIC | Age: 88
End: 2023-06-12

## 2023-06-12 DIAGNOSIS — D64.9 NORMOCYTIC ANEMIA: ICD-10-CM

## 2023-06-12 DIAGNOSIS — I48.20 CHRONIC ATRIAL FIBRILLATION (H): ICD-10-CM

## 2023-06-12 DIAGNOSIS — K59.03 DRUG-INDUCED CONSTIPATION: ICD-10-CM

## 2023-06-12 DIAGNOSIS — I42.8 OTHER CARDIOMYOPATHY (H): ICD-10-CM

## 2023-06-12 DIAGNOSIS — N40.0 BENIGN PROSTATIC HYPERPLASIA WITHOUT LOWER URINARY TRACT SYMPTOMS: ICD-10-CM

## 2023-06-12 DIAGNOSIS — R53.81 PHYSICAL DECONDITIONING: ICD-10-CM

## 2023-06-12 DIAGNOSIS — R41.89 COGNITIVE IMPAIRMENT: ICD-10-CM

## 2023-06-12 DIAGNOSIS — J96.01 ACUTE RESPIRATORY FAILURE WITH HYPOXIA (H): Primary | ICD-10-CM

## 2023-06-12 DIAGNOSIS — I10 ESSENTIAL HYPERTENSION: ICD-10-CM

## 2023-06-12 PROBLEM — I48.91 ATRIAL FIBRILLATION (H): Status: ACTIVE | Noted: 2017-09-20

## 2023-06-12 PROBLEM — M81.0 OSTEOPOROSIS: Status: ACTIVE | Noted: 2021-04-16

## 2023-06-12 PROBLEM — H52.203 UNSPECIFIED ASTIGMATISM, BILATERAL: Status: ACTIVE | Noted: 2023-06-12

## 2023-06-12 PROBLEM — R42 DIZZINESS: Status: ACTIVE | Noted: 2019-12-19

## 2023-06-12 PROBLEM — Z95.0 PRESENCE OF CARDIAC PACEMAKER: Status: ACTIVE | Noted: 2018-10-24

## 2023-06-12 PROBLEM — L40.9 PSORIASIS: Status: ACTIVE | Noted: 2021-04-16

## 2023-06-12 PROCEDURE — 99310 SBSQ NF CARE HIGH MDM 45: CPT | Performed by: NURSE PRACTITIONER

## 2023-06-12 NOTE — LETTER
6/12/2023        RE: Ayaan Herrera  [address]  [place] [address]        [facility] GERIATRICS    PRIMARY CARE PROVIDER AND CLINIC:  Chance Morrell MD, [address] [place] [address] / [facility] [id number]***  Chief Complaint   Patient presents with     Hospital F/U      [place] Medical Record Number:  [id number]  Place of Service where encounter took place:  [facility]) [25]    Ayaan Herrera  is a 89 year old  (10/31/1932), admitted to the above facility from  [facility]. Hospital stay 6/6/23 through 6/9/23..   HPI:    ***    CODE STATUS/ADVANCE DIRECTIVES DISCUSSION:  Full Code  CPR/Full code   ALLERGIES:   Allergies   Allergen Reactions     Zithromax [Azithromycin] Diarrhea            Amoxicillin Diarrhea     Amoxicillin-Pot Clavulanate GI Disturbance      PAST MEDICAL HISTORY:   Past Medical History:   Diagnosis Date     Atrial fibrillation (H)      BPH (benign prostatic hyperplasia)      Dyslipidemia      History of basal cell carcinoma      HTN (hypertension)      Nonsenile cataract      NSVT (nonsustained ventricular tachycardia) (H)      POAG (primary open-angle glaucoma)     Advanced      Prostate cancer (H)      Psoriasis      Sinus node dysfunction (H)       PAST SURGICAL HISTORY:   has a past surgical history that includes Glaucoma surgery (01/01/1996); laser surgery of eye (10/14/04 & 2/17/05); cataract iol, rt/lt (1985/1996); Glaucoma surgery (01/01/1997); Punctal Closure, Plugs (01/01/2000); turp (01/01/2003); and Electrophysiology Pacemaker (2007).  FAMILY HISTORY: family history includes C.A.D. in an other family member; Glaucoma in his father; Hypertension in an other family member; Macular Degeneration in his mother.  SOCIAL HISTORY:   reports that he quit smoking about 43 years ago. He started smoking about 67 years ago. He has a 5.00 pack-year smoking history. He has quit using smokeless tobacco. He reports current alcohol use. He reports  that he does not use drugs.  Patient's living condition: lives alone    Post Discharge Medication Reconciliation Status:   MED REC REQUIRED{TIP  Click the link below to document or use med rec list, use list to pull in response :[id number]}  Post Medication Reconciliation Status: {MED REC LIST:111056}       Current Outpatient Medications   Medication Sig     acetaminophen (TYLENOL) 500 MG tablet Take 1,000 mg by mouth 2 times daily as needed for mild pain     acetaminophen (TYLENOL) 500 MG tablet Take 1,000 mg by mouth nightly as needed for mild pain     alendronate (FOSAMAX) 70 MG tablet Take 1 tablet (70 mg) by mouth every 7 days TAKE ON EMPTY STOMACH, REMAIN UPRIGHT FOR ONE HOUR AFTER TAKING     amiodarone (PACERONE) 200 MG tablet Take 1 tablet (200 mg) by mouth daily     bisacodyl (DULCOLAX) 10 MG suppository Place 10 mg rectally daily as needed for constipation     brimonidine (ALPHAGAN) 0.2 % ophthalmic solution Place 1 drop into both eyes 2 times daily     calcium carbonate (TUMS) 500 MG chewable tablet Take 1 chew tab by mouth 4 times daily as needed for heartburn     cefuroxime (CEFTIN) 500 MG tablet Take 1 tablet (500 mg) by mouth 2 times daily for 3 days     cyanocobalamin (CYANOCOBALAMIN) 1000 MCG tablet Take 1 tablet (1,000 mcg) by mouth daily     diclofenac (VOLTAREN) 1 % topical gel Apply 2 g topically 2 times daily To both shoulders.  And twice daily as needed.     dimethicone-zinc oxide (DONTE PROTECT) external cream Apply topically daily Apply to upper inner thighs, groin, and scrotum.     dorzolamide-timolol (COSOPT) 2-0.5 % ophthalmic solution INSTILL 1 DROP IN BOTH EYES TWICE A DAY     doxycycline hyclate (VIBRAMYCIN) 100 MG capsule Take 1 capsule (100 mg) by mouth 2 times daily for 3 days     ferrous sulfate (FE TABS) 325 (65 Fe) MG EC tablet Take 325 mg by mouth Take 1 tablet by mouth twice weekly on Monday and Friday at bedtime.     furosemide (LASIX) 20 MG tablet TAKE 1 TAB BY MOUTH ONCE DAILY  (Patient taking differently: Take 20 mg by mouth On Mondays and Wednesdays)     furosemide (LASIX) 40 MG tablet Take 40 mg by mouth Take 1 tab by mouth on Tues, Thurs, Sat, and Sun.     hypromellose (ARTIFICIAL TEARS) 0.5 % SOLN ophthalmic solution Place 1 drop into both eyes 4 times daily as needed for dry eyes     ketotifen (ZADITOR) 0.025 % ophthalmic solution Place 1 drop into both eyes as needed     latanoprost (XALATAN) 0.005 % ophthalmic solution INSTILL 1 DROP INTO BOTH EYES AT BEDTIME     melatonin 5 MG tablet Take 5 mg by mouth nightly as needed     netarsudil (RHOPRESSA) 0.02 % ophthalmic solution 1 drop At Bedtime     nystatin (MYCOSTATIN) 764477 UNIT/GM external powder Apply topically 2 times daily Apply to abdominal folds     polyethylene glycol (MIRALAX) 17 g packet Take 8.5 g by mouth daily     potassium chloride ER (K-TAB/KLOR-CON) 10 MEQ CR tablet TAKE 1 TAB BY MOUTH DAILY WITH EACH FUROSEMIDE DOSE.     senna-docusate (SENOKOT-S/PERICOLACE) 8.6-50 MG tablet Take 1 tablet by mouth daily as needed for constipation     simethicone (MYLICON) 80 MG chewable tablet Take 80 mg by mouth daily as needed for flatulence or cramping     tamsulosin (FLOMAX) 0.4 MG capsule TAKE 1 CAP BY MOUTH ONCE DAILY     traMADol (ULTRAM) 50 MG tablet Take 0.5 tablets (25 mg) by mouth every 6 hours as needed for severe pain     Vitamin D, Cholecalciferol, 1000 UNITS TABS Take 1 tablet by mouth daily      warfarin ANTICOAGULANT (COUMADIN) 3 MG tablet Take 2.5 mg by mouth daily X 2 DAYS.     warfarin ANTICOAGULANT (COUMADIN) 2.5 MG tablet Take 2.5 mg by mouth Take 1 tab by mouth on Fridays   INR 6/23 (Patient not taking: Reported on 6/11/2023)     No current facility-administered medications for this visit.       ROS:  10 point ROS of systems including Constitutional, Eyes, Respiratory, Cardiovascular, Gastroenterology, Genitourinary, Integumentary, Musculoskeletal, Psychiatric were all negative except for pertinent positives  "noted in my HPI.    Vitals:  /72   Pulse 71   Temp 97.9  F (36.6  C)   Resp 18   Ht 1.727 m (5' 8\")   Wt 71 kg (156 lb 9.6 oz)   SpO2 92%   BMI 23.81 kg/m    Exam:  GENERAL APPEARANCE:  {[facility] GENERAL APPEARANCE:757917}  ENT:  {[facility] ENT PE:801205::\"Mouth and posterior oropharynx normal, moist mucous membranes\"}  EYES:  {[facility] EYES PE :024948::\"EOM, conjunctivae, lids, pupils and irises normal\"}  RESP:  {[facility] RESP PE:261024}  CV:  {[facility] CV PE:108550::\"Palpation and auscultation of heart done \",\"regular rate and rhythm, no murmur, rub, or gallop\"}  ABDOMEN:  {[facility] GI PE:409772::\"normal bowel sounds, soft, nontender, no hepatosplenomegaly or other masses\"}  M/S:   {[facility] M/S PE:328277}  SKIN:  {[facility] SKIN PE:298713}  NEURO:   {[facility] NEURO PE:507702}  PSYCH:  {[facility] PSYCH PE:821709}    Lab/Diagnostic data:  {fgslab:021275}    ASSESSMENT/PLAN:    {FGS DX2:953612}    Orders:  {fgsorders:312360}  ***    Total time spent with patient visit at the skilled nursing facility was {1/2/3/4/5:059255} including {1/2/3/4/5:775586}. Greater than 50% of total time spent with counseling and coordinating care due to ***.     Electronically signed by:  Marian Toussaint MA ***                [facility] GERIATRICS    PRIMARY CARE PROVIDER AND CLINIC:  Chance Morrell MD, [address] [place] [address] / [facility] [address]  Chief Complaint   Patient presents with     Hospital F/U      [place] Medical Record Number:  [id number]  Place of Service where encounter took place:  [facility] ([facility]) [25]    Ayaan Herrera  is a 89 year old  (10/31/1932), admitted to the above facility from  [facility]. Hospital stay 6/6/23 through 6/9/23..   HPI:    PMH of HTN, atrial fib s/p PPM, cardiomyopathy EF 40-45%,  HLD, AS, prostate cancer, BPH, chronic right shoulder pain who presents with SOB.   Acute respiratory failure possible " "pneumonia on CXR  Possible chronic respiratory failure/sleep disorder  Generalized weakness  Tx empirically with IV rocephin and oral doxycycline, will complete a 7 day coarse cefuroxime and doxycycline, continues on O2 during day may wean, continue O2 at 2 liters at night.   Incidental right exophytic renal mass on CT 2/23  Pyuria  BPH hx of prostate cancer  UC grew mixed golden, CT scan unchanged, f/u with urology as OP continue tamsulosin  Normocytic anemia: B12 level low, started on replacement  On exam today: patient sitting up in w/c, states she is \"tired\" denies SOB at rest, does have SHRESTHA, occasional non productive cough, denies CP, palpitations, N/V/D states he is feeling constipated he is unsure when last BM was.       CODE STATUS/ADVANCE DIRECTIVES DISCUSSION:  Full Code  CPR/Full code   ALLERGIES:   Allergies   Allergen Reactions     Zithromax [Azithromycin] Diarrhea            Amoxicillin Diarrhea     Amoxicillin-Pot Clavulanate GI Disturbance      PAST MEDICAL HISTORY:   Past Medical History:   Diagnosis Date     Atrial fibrillation (H)      BPH (benign prostatic hyperplasia)      Dyslipidemia      History of basal cell carcinoma      HTN (hypertension)      Nonsenile cataract      NSVT (nonsustained ventricular tachycardia) (H)      POAG (primary open-angle glaucoma)     Advanced      Prostate cancer (H)      Psoriasis      Sinus node dysfunction (H)       PAST SURGICAL HISTORY:   has a past surgical history that includes Glaucoma surgery (01/01/1996); laser surgery of eye (10/14/04 & 2/17/05); cataract iol, rt/lt (1985/1996); Glaucoma surgery (01/01/1997); Punctal Closure, Plugs (01/01/2000); turp (01/01/2003); and Electrophysiology Pacemaker (2007).  FAMILY HISTORY: family history includes C.A.D. in an other family member; Glaucoma in his father; Hypertension in an other family member; Macular Degeneration in his mother.  SOCIAL HISTORY:   reports that he quit smoking about 43 years ago. He started " smoking about 67 years ago. He has a 5.00 pack-year smoking history. He has quit using smokeless tobacco. He reports current alcohol use. He reports that he does not use drugs.  Patient's living condition: lives alone    Post Discharge Medication Reconciliation Status:   MED REC REQUIRED  Post Medication Reconciliation Status: discharge medications reconciled and changed, per note/orders       Current Outpatient Medications   Medication Sig     acetaminophen (TYLENOL) 500 MG tablet Take 1,000 mg by mouth 2 times daily as needed for mild pain     acetaminophen (TYLENOL) 500 MG tablet Take 1,000 mg by mouth nightly as needed for mild pain     alendronate (FOSAMAX) 70 MG tablet Take 1 tablet (70 mg) by mouth every 7 days TAKE ON EMPTY STOMACH, REMAIN UPRIGHT FOR ONE HOUR AFTER TAKING     amiodarone (PACERONE) 200 MG tablet Take 1 tablet (200 mg) by mouth daily     bisacodyl (DULCOLAX) 10 MG suppository Place 10 mg rectally daily as needed for constipation     brimonidine (ALPHAGAN) 0.2 % ophthalmic solution Place 1 drop into both eyes 2 times daily     calcium carbonate (TUMS) 500 MG chewable tablet Take 1 chew tab by mouth 4 times daily as needed for heartburn     cefuroxime (CEFTIN) 500 MG tablet Take 1 tablet (500 mg) by mouth 2 times daily for 3 days     cyanocobalamin (CYANOCOBALAMIN) 1000 MCG tablet Take 1 tablet (1,000 mcg) by mouth daily     diclofenac (VOLTAREN) 1 % topical gel Apply 2 g topically 2 times daily To both shoulders.  And twice daily as needed.     dimethicone-zinc oxide (DONTE PROTECT) external cream Apply topically daily Apply to upper inner thighs, groin, and scrotum.     dorzolamide-timolol (COSOPT) 2-0.5 % ophthalmic solution INSTILL 1 DROP IN BOTH EYES TWICE A DAY     doxycycline hyclate (VIBRAMYCIN) 100 MG capsule Take 1 capsule (100 mg) by mouth 2 times daily for 3 days     ferrous sulfate (FE TABS) 325 (65 Fe) MG EC tablet Take 325 mg by mouth Take 1 tablet by mouth twice weekly on Monday  and Friday at bedtime.     furosemide (LASIX) 20 MG tablet TAKE 1 TAB BY MOUTH ONCE DAILY (Patient taking differently: Take 20 mg by mouth On Mondays and Wednesdays)     furosemide (LASIX) 40 MG tablet Take 40 mg by mouth Take 1 tab by mouth on Tues, Thurs, Sat, and Sun.     hypromellose (ARTIFICIAL TEARS) 0.5 % SOLN ophthalmic solution Place 1 drop into both eyes 4 times daily as needed for dry eyes     ketotifen (ZADITOR) 0.025 % ophthalmic solution Place 1 drop into both eyes as needed     latanoprost (XALATAN) 0.005 % ophthalmic solution INSTILL 1 DROP INTO BOTH EYES AT BEDTIME     melatonin 5 MG tablet Take 5 mg by mouth nightly as needed     netarsudil (RHOPRESSA) 0.02 % ophthalmic solution 1 drop At Bedtime     nystatin (MYCOSTATIN) 588640 UNIT/GM external powder Apply topically 2 times daily Apply to abdominal folds     polyethylene glycol (MIRALAX) 17 g packet Take 8.5 g by mouth daily     potassium chloride ER (K-TAB/KLOR-CON) 10 MEQ CR tablet TAKE 1 TAB BY MOUTH DAILY WITH EACH FUROSEMIDE DOSE.     senna-docusate (SENOKOT-S/PERICOLACE) 8.6-50 MG tablet Take 1 tablet by mouth daily as needed for constipation     simethicone (MYLICON) 80 MG chewable tablet Take 80 mg by mouth daily as needed for flatulence or cramping     tamsulosin (FLOMAX) 0.4 MG capsule TAKE 1 CAP BY MOUTH ONCE DAILY     traMADol (ULTRAM) 50 MG tablet Take 0.5 tablets (25 mg) by mouth every 6 hours as needed for severe pain     Vitamin D, Cholecalciferol, 1000 UNITS TABS Take 1 tablet by mouth daily      warfarin ANTICOAGULANT (COUMADIN) 3 MG tablet Take 2.5 mg by mouth daily X 2 DAYS.     warfarin ANTICOAGULANT (COUMADIN) 2.5 MG tablet Take 2.5 mg by mouth Take 1 tab by mouth on Fridays   INR 6/23 (Patient not taking: Reported on 6/11/2023)     No current facility-administered medications for this visit.       ROS:  10 point ROS of systems including Constitutional, Eyes, Respiratory, Cardiovascular, Gastroenterology, Genitourinary,  "Integumentary, Musculoskeletal, Psychiatric were all negative except for pertinent positives noted in my HPI.    Vitals:  /72   Pulse 71   Temp 97.9  F (36.6  C)   Resp 18   Ht 1.727 m (5' 8\")   Wt 71 kg (156 lb 9.6 oz)   SpO2 92%   BMI 23.81 kg/m    Exam:  GENERAL APPEARANCE:  Alert, in no distress  ENT:  Mouth and posterior oropharynx normal, moist mucous membranes, [place]  EYES:  EOM, conjunctivae, lids, pupils and irises normal, PERRL  RESP:  respiratory effort and palpation of chest normal, lungs clear to auscultation , no respiratory distress  CV:  Palpation and auscultation of heart done , regular rate and rhythm, no murmur, rub, or gallop, peripheral edema 1+ in LE bilaterally  ABDOMEN:  normal bowel sounds, soft, nontender, no hepatosplenomegaly or other masses  M/S:   patient sitting up in w/c  SKIN:  Inspection of skin and subcutaneous tissue baseline  NEURO:   speech wnl  PSYCH:  affect and mood normal    Lab/Diagnostic data:  Recent labs in [facility] reviewed by me today.  and   Most Recent 3 CBC's:Recent Labs   Lab Test 06/08/23  0651 06/07/23  0618 06/06/23  1346   WBC 7.1 8.7 14.5*   HGB 10.0* 9.9* 11.1*   MCV 84 83 84    146* 200     Most Recent 3 BMP's:Recent Labs   Lab Test 06/06/23  1346 04/21/23  1040 12/22/22  0623    140 138   POTASSIUM 4.3 4.0 4.6   CHLORIDE 99 102 101   CO2 28 25 26   BUN 22.5 26.8* 23.9*   CR 0.88 1.13 0.78   ANIONGAP 10 13 11   BRENDAN 9.8* 9.2 9.6   * 118* 84       ASSESSMENT/PLAN:    (J96.01) Acute respiratory failure with hypoxia (H)  (primary encounter diagnosis)  (R53.81) Physical deconditioning  Comment: acute/ongoing possible chronic hypoxia with sleep disorder  Plan: PT and OT, monitor SaO2 at rest and with activity, Tx with doxycycline 100mg BID and cefuroxime 500mg BID for 7 days total  Wean O2 during the day as able, continue O2 2 liters NC at night.       (R41.89) Cognitive impairment  Comment: ongoing  Plan: OT evaluation    (N40.0) Benign " prostatic hyperplasia without lower urinary tract symptoms  Comment: ongoing Hx of prostate cancer  Plan: continue flomax 0.4mg QD    (D64.9) Normocytic anemia  Comment: acute/ongoing  Plan: Hgb follow, continue vitamin B12 1000mcg QD, ferrous sulfate 325mg twice weekly    (I10) Essential hypertension  (I48.20) Chronic atrial fibrillation (H)  (I42.8) Other cardiomyopathy (H)  Comment: acute/ongoing EF 40-45% on echo stable  Plan: BMP follow, continue coumadin INR goal of 2-3, lasix 20mg QD, and 40mg Tues, Thurs, Sat and Sun, KCL 10meq QD    (K59.03) Drug-induced constipation  Comment: acute/ongoing  Plan: continue senna s 1 PO QD prn, miralax 8.5g QD    Orders:  BMP and CBC on Thursday      Total time spent with patient visit at the skilled nursing facility was 45 min including patient visit and review of past records. Greater than 50% of total time spent with counseling and coordinating care due to reviewed internal medicine progress notes, imaging and lab results, reviewed medications and changes, discussed hospitalization and medications at bedside with patient.     Electronically signed by:  Tonya Lynn Haase, APRN CNP                     Sincerely,        Tonya Lynn Haase, APRN CNP

## 2023-06-12 NOTE — PROGRESS NOTES
[facility] GERIATRICS    PRIMARY CARE PROVIDER AND CLINIC:  Chance Morrell MD, [address] [place] [address] / [facility] [address]***  Chief Complaint   Patient presents with     Hospital F/U      [facility] [place] Medical Record Number:  [id number]  Place of Service where encounter took place:  [facility] ([facility]) [25]    Ayaan Herrera  is a 89 year old  (10/31/1932), admitted to the above facility from  [facility]. Hospital stay 6/6/23 through 6/9/23..   HPI:    ***    CODE STATUS/ADVANCE DIRECTIVES DISCUSSION:  Full Code  CPR/Full code   ALLERGIES:   Allergies   Allergen Reactions     Zithromax [Azithromycin] Diarrhea            Amoxicillin Diarrhea     Amoxicillin-Pot Clavulanate GI Disturbance      PAST MEDICAL HISTORY:   Past Medical History:   Diagnosis Date     Atrial fibrillation (H)      BPH (benign prostatic hyperplasia)      Dyslipidemia      History of basal cell carcinoma      HTN (hypertension)      Nonsenile cataract      NSVT (nonsustained ventricular tachycardia) (H)      POAG (primary open-angle glaucoma)     Advanced      Prostate cancer (H)      Psoriasis      Sinus node dysfunction (H)       PAST SURGICAL HISTORY:   has a past surgical history that includes Glaucoma surgery (01/01/1996); laser surgery of eye (10/14/04 & 2/17/05); cataract iol, rt/lt (1985/1996); Glaucoma surgery (01/01/1997); Punctal Closure, Plugs (01/01/2000); turp (01/01/2003); and Electrophysiology Pacemaker (2007).  FAMILY HISTORY: family history includes C.A.D. in an other family member; Glaucoma in his father; Hypertension in an other family member; Macular Degeneration in his mother.  SOCIAL HISTORY:   reports that he quit smoking about 43 years ago. He started smoking about 67 years ago. He has a 5.00 pack-year smoking history. He has quit using smokeless tobacco. He reports current alcohol use. He reports that he does not use drugs.  Patient's living condition: lives alone    Post Discharge  Medication Reconciliation Status:   MED REC REQUIRED{TIP  Click the link below to document or use med rec list, use list to pull in response :[id number]}  Post Medication Reconciliation Status: {MED REC LIST:131671}       Current Outpatient Medications   Medication Sig     acetaminophen (TYLENOL) 500 MG tablet Take 1,000 mg by mouth 2 times daily as needed for mild pain     acetaminophen (TYLENOL) 500 MG tablet Take 1,000 mg by mouth nightly as needed for mild pain     alendronate (FOSAMAX) 70 MG tablet Take 1 tablet (70 mg) by mouth every 7 days TAKE ON EMPTY STOMACH, REMAIN UPRIGHT FOR ONE HOUR AFTER TAKING     amiodarone (PACERONE) 200 MG tablet Take 1 tablet (200 mg) by mouth daily     bisacodyl (DULCOLAX) 10 MG suppository Place 10 mg rectally daily as needed for constipation     brimonidine (ALPHAGAN) 0.2 % ophthalmic solution Place 1 drop into both eyes 2 times daily     calcium carbonate (TUMS) 500 MG chewable tablet Take 1 chew tab by mouth 4 times daily as needed for heartburn     cefuroxime (CEFTIN) 500 MG tablet Take 1 tablet (500 mg) by mouth 2 times daily for 3 days     cyanocobalamin (CYANOCOBALAMIN) 1000 MCG tablet Take 1 tablet (1,000 mcg) by mouth daily     diclofenac (VOLTAREN) 1 % topical gel Apply 2 g topically 2 times daily To both shoulders.  And twice daily as needed.     dimethicone-zinc oxide (DONTE PROTECT) external cream Apply topically daily Apply to upper inner thighs, groin, and scrotum.     dorzolamide-timolol (COSOPT) 2-0.5 % ophthalmic solution INSTILL 1 DROP IN BOTH EYES TWICE A DAY     doxycycline hyclate (VIBRAMYCIN) 100 MG capsule Take 1 capsule (100 mg) by mouth 2 times daily for 3 days     ferrous sulfate (FE TABS) 325 (65 Fe) MG EC tablet Take 325 mg by mouth Take 1 tablet by mouth twice weekly on Monday and Friday at bedtime.     furosemide (LASIX) 20 MG tablet TAKE 1 TAB BY MOUTH ONCE DAILY (Patient taking differently: Take 20 mg by mouth On Mondays and Wednesdays)      furosemide (LASIX) 40 MG tablet Take 40 mg by mouth Take 1 tab by mouth on Tues, Thurs, Sat, and Sun.     hypromellose (ARTIFICIAL TEARS) 0.5 % SOLN ophthalmic solution Place 1 drop into both eyes 4 times daily as needed for dry eyes     ketotifen (ZADITOR) 0.025 % ophthalmic solution Place 1 drop into both eyes as needed     latanoprost (XALATAN) 0.005 % ophthalmic solution INSTILL 1 DROP INTO BOTH EYES AT BEDTIME     melatonin 5 MG tablet Take 5 mg by mouth nightly as needed     netarsudil (RHOPRESSA) 0.02 % ophthalmic solution 1 drop At Bedtime     nystatin (MYCOSTATIN) 305057 UNIT/GM external powder Apply topically 2 times daily Apply to abdominal folds     polyethylene glycol (MIRALAX) 17 g packet Take 8.5 g by mouth daily     potassium chloride ER (K-TAB/KLOR-CON) 10 MEQ CR tablet TAKE 1 TAB BY MOUTH DAILY WITH EACH FUROSEMIDE DOSE.     senna-docusate (SENOKOT-S/PERICOLACE) 8.6-50 MG tablet Take 1 tablet by mouth daily as needed for constipation     simethicone (MYLICON) 80 MG chewable tablet Take 80 mg by mouth daily as needed for flatulence or cramping     tamsulosin (FLOMAX) 0.4 MG capsule TAKE 1 CAP BY MOUTH ONCE DAILY     traMADol (ULTRAM) 50 MG tablet Take 0.5 tablets (25 mg) by mouth every 6 hours as needed for severe pain     Vitamin D, Cholecalciferol, 1000 UNITS TABS Take 1 tablet by mouth daily      warfarin ANTICOAGULANT (COUMADIN) 3 MG tablet Take 2.5 mg by mouth daily X 2 DAYS.     warfarin ANTICOAGULANT (COUMADIN) 2.5 MG tablet Take 2.5 mg by mouth Take 1 tab by mouth on Fridays   INR 6/23 (Patient not taking: Reported on 6/11/2023)     No current facility-administered medications for this visit.       ROS:  10 point ROS of systems including Constitutional, Eyes, Respiratory, Cardiovascular, Gastroenterology, Genitourinary, Integumentary, Musculoskeletal, Psychiatric were all negative except for pertinent positives noted in my HPI.    Vitals:  /72   Pulse 71   Temp 97.9  F (36.6  C)    "Resp 18   Ht 1.727 m (5' 8\")   Wt 71 kg (156 lb 9.6 oz)   SpO2 92%   BMI 23.81 kg/m    Exam:  GENERAL APPEARANCE:  {[facility] GENERAL APPEARANCE:691822}  ENT:  {[facility] ENT PE:777280}  EYES:  {[facility] EYES PE :897004}  RESP:  {[facility] HOME RESP PE:859170}  CV:  {[facility] CV PE:991158}  ABDOMEN:  {[facility] HOME GI PE:640441}  M/S:   {[facility] HOME M/S PE:544730}  SKIN:  {[facility] SKIN PE:917503}  NEURO:   {[facility] NEURO PE:382437}  PSYCH:  {[facility] PSYCH PE:088338}    Lab/Diagnostic data:  {fgslab:173990}    ASSESSMENT/PLAN:    {FGS DX2:026033}    Orders:  {fgsorders:033350}  ***    Total time spent with patient visit at the skilled nursing facility was {1/2/3/4/5:132187} including {1/2/3/4/5:646160}. Greater than 50% of total time spent with counseling and coordinating care due to ***.     Electronically signed by:  Marian Toussaint MA ***              "

## 2023-06-12 NOTE — LETTER
"    6/12/2023        RE: Ayaan Herrera  [address]  [place] [address]        [facility] GERIATRICS    PRIMARY CARE PROVIDER AND CLINIC:  Chance Morrell MD, [address] [place] [address] / [facility] [address]  Chief Complaint   Patient presents with     Hospital F/U      [place] Medical Record Number:  [id number]  Place of Service where encounter took place:  [facility]) [25]    Ayaan Herrera  is a 89 year old  (10/31/1932), admitted to the above facility from  [facility]. Hospital stay 6/6/23 through 6/9/23..   HPI:    PMH of HTN, atrial fib s/p PPM, cardiomyopathy EF 40-45%,  HLD, AS, prostate cancer, BPH, chronic right shoulder pain who presents with SOB.   Acute respiratory failure possible pneumonia on CXR  Possible chronic respiratory failure/sleep disorder  Generalized weakness  Tx empirically with IV rocephin and oral doxycycline, will complete a 7 day coarse cefuroxime and doxycycline, continues on O2 during day may wean, continue O2 at 2 liters at night.   Incidental right exophytic renal mass on CT 2/23  Pyuria  BPH hx of prostate cancer  UC grew mixed golden, CT scan unchanged, f/u with urology as OP continue tamsulosin  Normocytic anemia: B12 level low, started on replacement  On exam today: patient sitting up in w/c, states she is \"tired\" denies SOB at rest, does have SHRESTHA, occasional non productive cough, denies CP, palpitations, N/V/D states he is feeling constipated he is unsure when last BM was.       CODE STATUS/ADVANCE DIRECTIVES DISCUSSION:  Full Code  CPR/Full code   ALLERGIES:   Allergies   Allergen Reactions     Zithromax [Azithromycin] Diarrhea            Amoxicillin Diarrhea     Amoxicillin-Pot Clavulanate GI Disturbance      PAST MEDICAL HISTORY:   Past Medical History:   Diagnosis Date     Atrial fibrillation (H)      BPH (benign prostatic hyperplasia)      Dyslipidemia      History of basal cell carcinoma      HTN (hypertension)      " Nonsenile cataract      NSVT (nonsustained ventricular tachycardia) (H)      POAG (primary open-angle glaucoma)     Advanced      Prostate cancer (H)      Psoriasis      Sinus node dysfunction (H)       PAST SURGICAL HISTORY:   has a past surgical history that includes Glaucoma surgery (01/01/1996); laser surgery of eye (10/14/04 & 2/17/05); cataract iol, rt/lt (1985/1996); Glaucoma surgery (01/01/1997); Punctal Closure, Plugs (01/01/2000); turp (01/01/2003); and Electrophysiology Pacemaker (2007).  FAMILY HISTORY: family history includes C.A.D. in an other family member; Glaucoma in his father; Hypertension in an other family member; Macular Degeneration in his mother.  SOCIAL HISTORY:   reports that he quit smoking about 43 years ago. He started smoking about 67 years ago. He has a 5.00 pack-year smoking history. He has quit using smokeless tobacco. He reports current alcohol use. He reports that he does not use drugs.  Patient's living condition: lives alone    Post Discharge Medication Reconciliation Status:   MED REC REQUIRED  Post Medication Reconciliation Status: discharge medications reconciled and changed, per note/orders       Current Outpatient Medications   Medication Sig     acetaminophen (TYLENOL) 500 MG tablet Take 1,000 mg by mouth 2 times daily as needed for mild pain     acetaminophen (TYLENOL) 500 MG tablet Take 1,000 mg by mouth nightly as needed for mild pain     alendronate (FOSAMAX) 70 MG tablet Take 1 tablet (70 mg) by mouth every 7 days TAKE ON EMPTY STOMACH, REMAIN UPRIGHT FOR ONE HOUR AFTER TAKING     amiodarone (PACERONE) 200 MG tablet Take 1 tablet (200 mg) by mouth daily     bisacodyl (DULCOLAX) 10 MG suppository Place 10 mg rectally daily as needed for constipation     brimonidine (ALPHAGAN) 0.2 % ophthalmic solution Place 1 drop into both eyes 2 times daily     calcium carbonate (TUMS) 500 MG chewable tablet Take 1 chew tab by mouth 4 times daily as needed for heartburn     cefuroxime  (CEFTIN) 500 MG tablet Take 1 tablet (500 mg) by mouth 2 times daily for 3 days     cyanocobalamin (CYANOCOBALAMIN) 1000 MCG tablet Take 1 tablet (1,000 mcg) by mouth daily     diclofenac (VOLTAREN) 1 % topical gel Apply 2 g topically 2 times daily To both shoulders.  And twice daily as needed.     dimethicone-zinc oxide (DONTE PROTECT) external cream Apply topically daily Apply to upper inner thighs, groin, and scrotum.     dorzolamide-timolol (COSOPT) 2-0.5 % ophthalmic solution INSTILL 1 DROP IN BOTH EYES TWICE A DAY     doxycycline hyclate (VIBRAMYCIN) 100 MG capsule Take 1 capsule (100 mg) by mouth 2 times daily for 3 days     ferrous sulfate (FE TABS) 325 (65 Fe) MG EC tablet Take 325 mg by mouth Take 1 tablet by mouth twice weekly on Monday and Friday at bedtime.     furosemide (LASIX) 20 MG tablet TAKE 1 TAB BY MOUTH ONCE DAILY (Patient taking differently: Take 20 mg by mouth On Mondays and Wednesdays)     furosemide (LASIX) 40 MG tablet Take 40 mg by mouth Take 1 tab by mouth on Tues, Thurs, Sat, and Sun.     hypromellose (ARTIFICIAL TEARS) 0.5 % SOLN ophthalmic solution Place 1 drop into both eyes 4 times daily as needed for dry eyes     ketotifen (ZADITOR) 0.025 % ophthalmic solution Place 1 drop into both eyes as needed     latanoprost (XALATAN) 0.005 % ophthalmic solution INSTILL 1 DROP INTO BOTH EYES AT BEDTIME     melatonin 5 MG tablet Take 5 mg by mouth nightly as needed     netarsudil (RHOPRESSA) 0.02 % ophthalmic solution 1 drop At Bedtime     nystatin (MYCOSTATIN) 584928 UNIT/GM external powder Apply topically 2 times daily Apply to abdominal folds     polyethylene glycol (MIRALAX) 17 g packet Take 8.5 g by mouth daily     potassium chloride ER (K-TAB/KLOR-CON) 10 MEQ CR tablet TAKE 1 TAB BY MOUTH DAILY WITH EACH FUROSEMIDE DOSE.     senna-docusate (SENOKOT-S/PERICOLACE) 8.6-50 MG tablet Take 1 tablet by mouth daily as needed for constipation     simethicone (MYLICON) 80 MG chewable tablet Take 80  "mg by mouth daily as needed for flatulence or cramping     tamsulosin (FLOMAX) 0.4 MG capsule TAKE 1 CAP BY MOUTH ONCE DAILY     traMADol (ULTRAM) 50 MG tablet Take 0.5 tablets (25 mg) by mouth every 6 hours as needed for severe pain     Vitamin D, Cholecalciferol, 1000 UNITS TABS Take 1 tablet by mouth daily      warfarin ANTICOAGULANT (COUMADIN) 3 MG tablet Take 2.5 mg by mouth daily X 2 DAYS.     warfarin ANTICOAGULANT (COUMADIN) 2.5 MG tablet Take 2.5 mg by mouth Take 1 tab by mouth on Fridays   INR 6/23 (Patient not taking: Reported on 6/11/2023)     No current facility-administered medications for this visit.       ROS:  10 point ROS of systems including Constitutional, Eyes, Respiratory, Cardiovascular, Gastroenterology, Genitourinary, Integumentary, Musculoskeletal, Psychiatric were all negative except for pertinent positives noted in my HPI.    Vitals:  /72   Pulse 71   Temp 97.9  F (36.6  C)   Resp 18   Ht 1.727 m (5' 8\")   Wt 71 kg (156 lb 9.6 oz)   SpO2 92%   BMI 23.81 kg/m    Exam:  GENERAL APPEARANCE:  Alert, in no distress  ENT:  Mouth and posterior oropharynx normal, moist mucous membranes, [place]  EYES:  EOM, conjunctivae, lids, pupils and irises normal, PERRL  RESP:  respiratory effort and palpation of chest normal, lungs clear to auscultation , no respiratory distress  CV:  Palpation and auscultation of heart done , regular rate and rhythm, no murmur, rub, or gallop, peripheral edema 1+ in LE bilaterally  ABDOMEN:  normal bowel sounds, soft, nontender, no hepatosplenomegaly or other masses  M/S:   patient sitting up in w/c  SKIN:  Inspection of skin and subcutaneous tissue baseline  NEURO:   speech wnl  PSYCH:  affect and mood normal    Lab/Diagnostic data:  Recent labs in [facility] reviewed by me today.  and   Most Recent 3 CBC's:  Recent Labs   Lab Test 06/08/23  0651 06/07/23  0618 06/06/23  1346   WBC 7.1 8.7 14.5*   HGB 10.0* 9.9* 11.1*   MCV 84 83 84    146* 200     Most Recent 3 " BMP's:  Recent Labs   Lab Test 06/06/23  1346 04/21/23  1040 12/22/22  0623    140 138   POTASSIUM 4.3 4.0 4.6   CHLORIDE 99 102 101   CO2 28 25 26   BUN 22.5 26.8* 23.9*   CR 0.88 1.13 0.78   ANIONGAP 10 13 11   BRENDAN 9.8* 9.2 9.6   * 118* 84       ASSESSMENT/PLAN:    (J96.01) Acute respiratory failure with hypoxia (H)  (primary encounter diagnosis)  (R53.81) Physical deconditioning  Comment: acute/ongoing possible chronic hypoxia with sleep disorder  Plan: PT and OT, monitor SaO2 at rest and with activity, Tx with doxycycline 100mg BID and cefuroxime 500mg BID for 7 days total  Wean O2 during the day as able, continue O2 2 liters NC at night.       (R41.89) Cognitive impairment  Comment: ongoing  Plan: OT evaluation    (N40.0) Benign prostatic hyperplasia without lower urinary tract symptoms  Comment: ongoing Hx of prostate cancer  Plan: continue flomax 0.4mg QD    (D64.9) Normocytic anemia  Comment: acute/ongoing  Plan: Hgb follow, continue vitamin B12 1000mcg QD, ferrous sulfate 325mg twice weekly    (I10) Essential hypertension  (I48.20) Chronic atrial fibrillation (H)  (I42.8) Other cardiomyopathy (H)  Comment: acute/ongoing EF 40-45% on echo stable  Plan: BMP follow, continue coumadin INR goal of 2-3, lasix 20mg QD, and 40mg Tues, Thurs, Sat and Sun, KCL 10meq QD    (K59.03) Drug-induced constipation  Comment: acute/ongoing  Plan: continue senna s 1 PO QD prn, miralax 8.5g QD    Orders:  BMP and CBC on Thursday      Total time spent with patient visit at the skilled nursing facility was 45 min including patient visit and review of past records. Greater than 50% of total time spent with counseling and coordinating care due to reviewed internal medicine progress notes, imaging and lab results, reviewed medications and changes, discussed hospitalization and medications at bedside with patient.     Electronically signed by:  Tonya Lynn Haase, APRN CNP                     Sincerely,        Vero Hagen  Haase, APRN CNP

## 2023-06-12 NOTE — PROGRESS NOTES
"[facility] GERIATRICS    PRIMARY CARE PROVIDER AND CLINIC:  Chance Morrell MD, [address] [place] [address] / [facility] [id number]  Chief Complaint   Patient presents with     Hospital F/U      [facility] Record Number:  [id number]  Place of Service where encounter took place:  [facility] ([facility]) [25]    Ayaan Herrera  is a 89 year old  (10/31/1932), admitted to the above facility from  [facility]. Hospital stay 6/6/23 through 6/9/23..   HPI:    PMH of HTN, atrial fib s/p PPM, cardiomyopathy EF 40-45%,  HLD, AS, prostate cancer, BPH, chronic right shoulder pain who presents with SOB.   Acute respiratory failure possible pneumonia on CXR  Possible chronic respiratory failure/sleep disorder  Generalized weakness  Tx empirically with IV rocephin and oral doxycycline, will complete a 7 day coarse cefuroxime and doxycycline, continues on O2 during day may wean, continue O2 at 2 liters at night.   Incidental right exophytic renal mass on CT 2/23  Pyuria  BPH hx of prostate cancer  UC grew mixed golden, CT scan unchanged, f/u with urology as OP continue tamsulosin  Normocytic anemia: B12 level low, started on replacement  On exam today: patient sitting up in w/c, states she is \"tired\" denies SOB at rest, does have SHRESTHA, occasional non productive cough, denies CP, palpitations, N/V/D states he is feeling constipated he is unsure when last BM was.       CODE STATUS/ADVANCE DIRECTIVES DISCUSSION:  Full Code  CPR/Full code   ALLERGIES:   Allergies   Allergen Reactions     Zithromax [Azithromycin] Diarrhea            Amoxicillin Diarrhea     Amoxicillin-Pot Clavulanate GI Disturbance      PAST MEDICAL HISTORY:   Past Medical History:   Diagnosis Date     Atrial fibrillation (H)      BPH (benign prostatic hyperplasia)      Dyslipidemia      History of basal cell carcinoma      HTN (hypertension)      Nonsenile cataract      NSVT (nonsustained ventricular tachycardia) (H)      POAG (primary " open-angle glaucoma)     Advanced      Prostate cancer (H)      Psoriasis      Sinus node dysfunction (H)       PAST SURGICAL HISTORY:   has a past surgical history that includes Glaucoma surgery (01/01/1996); laser surgery of eye (10/14/04 & 2/17/05); cataract iol, rt/lt (1985/1996); Glaucoma surgery (01/01/1997); Punctal Closure, Plugs (01/01/2000); turp (01/01/2003); and Electrophysiology Pacemaker (2007).  FAMILY HISTORY: family history includes C.A.D. in an other family member; Glaucoma in his father; Hypertension in an other family member; Macular Degeneration in his mother.  SOCIAL HISTORY:   reports that he quit smoking about 43 years ago. He started smoking about 67 years ago. He has a 5.00 pack-year smoking history. He has quit using smokeless tobacco. He reports current alcohol use. He reports that he does not use drugs.  Patient's living condition: lives alone    Post Discharge Medication Reconciliation Status:   MED REC REQUIRED  Post Medication Reconciliation Status: discharge medications reconciled and changed, per note/orders       Current Outpatient Medications   Medication Sig     acetaminophen (TYLENOL) 500 MG tablet Take 1,000 mg by mouth 2 times daily as needed for mild pain     acetaminophen (TYLENOL) 500 MG tablet Take 1,000 mg by mouth nightly as needed for mild pain     alendronate (FOSAMAX) 70 MG tablet Take 1 tablet (70 mg) by mouth every 7 days TAKE ON EMPTY STOMACH, REMAIN UPRIGHT FOR ONE HOUR AFTER TAKING     amiodarone (PACERONE) 200 MG tablet Take 1 tablet (200 mg) by mouth daily     bisacodyl (DULCOLAX) 10 MG suppository Place 10 mg rectally daily as needed for constipation     brimonidine (ALPHAGAN) 0.2 % ophthalmic solution Place 1 drop into both eyes 2 times daily     calcium carbonate (TUMS) 500 MG chewable tablet Take 1 chew tab by mouth 4 times daily as needed for heartburn     cefuroxime (CEFTIN) 500 MG tablet Take 1 tablet (500 mg) by mouth 2 times daily for 3 days      cyanocobalamin (CYANOCOBALAMIN) 1000 MCG tablet Take 1 tablet (1,000 mcg) by mouth daily     diclofenac (VOLTAREN) 1 % topical gel Apply 2 g topically 2 times daily To both shoulders.  And twice daily as needed.     dimethicone-zinc oxide (DONTE PROTECT) external cream Apply topically daily Apply to upper inner thighs, groin, and scrotum.     dorzolamide-timolol (COSOPT) 2-0.5 % ophthalmic solution INSTILL 1 DROP IN BOTH EYES TWICE A DAY     doxycycline hyclate (VIBRAMYCIN) 100 MG capsule Take 1 capsule (100 mg) by mouth 2 times daily for 3 days     ferrous sulfate (FE TABS) 325 (65 Fe) MG EC tablet Take 325 mg by mouth Take 1 tablet by mouth twice weekly on Monday and Friday at bedtime.     furosemide (LASIX) 20 MG tablet TAKE 1 TAB BY MOUTH ONCE DAILY (Patient taking differently: Take 20 mg by mouth On Mondays and Wednesdays)     furosemide (LASIX) 40 MG tablet Take 40 mg by mouth Take 1 tab by mouth on Tues, Thurs, Sat, and Sun.     hypromellose (ARTIFICIAL TEARS) 0.5 % SOLN ophthalmic solution Place 1 drop into both eyes 4 times daily as needed for dry eyes     ketotifen (ZADITOR) 0.025 % ophthalmic solution Place 1 drop into both eyes as needed     latanoprost (XALATAN) 0.005 % ophthalmic solution INSTILL 1 DROP INTO BOTH EYES AT BEDTIME     melatonin 5 MG tablet Take 5 mg by mouth nightly as needed     netarsudil (RHOPRESSA) 0.02 % ophthalmic solution 1 drop At Bedtime     nystatin (MYCOSTATIN) 842481 UNIT/GM external powder Apply topically 2 times daily Apply to abdominal folds     polyethylene glycol (MIRALAX) 17 g packet Take 8.5 g by mouth daily     potassium chloride ER (K-TAB/KLOR-CON) 10 MEQ CR tablet TAKE 1 TAB BY MOUTH DAILY WITH EACH FUROSEMIDE DOSE.     senna-docusate (SENOKOT-S/PERICOLACE) 8.6-50 MG tablet Take 1 tablet by mouth daily as needed for constipation     simethicone (MYLICON) 80 MG chewable tablet Take 80 mg by mouth daily as needed for flatulence or cramping     tamsulosin (FLOMAX) 0.4  "MG capsule TAKE 1 CAP BY MOUTH ONCE DAILY     traMADol (ULTRAM) 50 MG tablet Take 0.5 tablets (25 mg) by mouth every 6 hours as needed for severe pain     Vitamin D, Cholecalciferol, 1000 UNITS TABS Take 1 tablet by mouth daily      warfarin ANTICOAGULANT (COUMADIN) 3 MG tablet Take 2.5 mg by mouth daily X 2 DAYS.     warfarin ANTICOAGULANT (COUMADIN) 2.5 MG tablet Take 2.5 mg by mouth Take 1 tab by mouth on Fridays   INR 6/23 (Patient not taking: Reported on 6/11/2023)     No current facility-administered medications for this visit.       ROS:  10 point ROS of systems including Constitutional, Eyes, Respiratory, Cardiovascular, Gastroenterology, Genitourinary, Integumentary, Musculoskeletal, Psychiatric were all negative except for pertinent positives noted in my HPI.    Vitals:  /72   Pulse 71   Temp 97.9  F (36.6  C)   Resp 18   Ht 1.727 m (5' 8\")   Wt 71 kg (156 lb 9.6 oz)   SpO2 92%   BMI 23.81 kg/m    Exam:  GENERAL APPEARANCE:  Alert, in no distress  ENT:  Mouth and posterior oropharynx normal, moist mucous membranes, [place]  EYES:  EOM, conjunctivae, lids, pupils and irises normal, PERRL  RESP:  respiratory effort and palpation of chest normal, lungs clear to auscultation , no respiratory distress  CV:  Palpation and auscultation of heart done , regular rate and rhythm, no murmur, rub, or gallop, peripheral edema 1+ in LE bilaterally  ABDOMEN:  normal bowel sounds, soft, nontender, no hepatosplenomegaly or other masses  M/S:   patient sitting up in w/c  SKIN:  Inspection of skin and subcutaneous tissue baseline  NEURO:   speech wnl  PSYCH:  affect and mood normal    Lab/Diagnostic data:  Recent labs in [facility] reviewed by me today.  and   Most Recent 3 CBC's:  Recent Labs   Lab Test 06/08/23  0651 06/07/23  0618 06/06/23  1346   WBC 7.1 8.7 14.5*   HGB 10.0* 9.9* 11.1*   MCV 84 83 84    146* 200     Most Recent 3 BMP's:  Recent Labs   Lab Test 06/06/23  1346 04/21/23  1040 12/22/22  0623   NA " 137 140 138   POTASSIUM 4.3 4.0 4.6   CHLORIDE 99 102 101   CO2 28 25 26   BUN 22.5 26.8* 23.9*   CR 0.88 1.13 0.78   ANIONGAP 10 13 11   BRENDAN 9.8* 9.2 9.6   * 118* 84       ASSESSMENT/PLAN:    (J96.01) Acute respiratory failure with hypoxia (H)  (primary encounter diagnosis)  (R53.81) Physical deconditioning  Comment: acute/ongoing possible chronic hypoxia with sleep disorder  Plan: PT and OT, monitor SaO2 at rest and with activity, Tx with doxycycline 100mg BID and cefuroxime 500mg BID for 7 days total  Wean O2 during the day as able, continue O2 2 liters NC at night.       (R41.89) Cognitive impairment  Comment: ongoing  Plan: OT evaluation    (N40.0) Benign prostatic hyperplasia without lower urinary tract symptoms  Comment: ongoing Hx of prostate cancer  Plan: continue flomax 0.4mg QD    (D64.9) Normocytic anemia  Comment: acute/ongoing  Plan: Hgb follow, continue vitamin B12 1000mcg QD, ferrous sulfate 325mg twice weekly    (I10) Essential hypertension  (I48.20) Chronic atrial fibrillation (H)  (I42.8) Other cardiomyopathy (H)  Comment: acute/ongoing EF 40-45% on echo stable  Plan: BMP follow, continue coumadin INR goal of 2-3, lasix 20mg QD, and 40mg Tues, Thurs, Sat and Sun, KCL 10meq QD    (K59.03) Drug-induced constipation  Comment: acute/ongoing  Plan: continue senna s 1 PO QD prn, miralax 8.5g QD    Orders:  BMP and CBC on Thursday      Total time spent with patient visit at the skilled nursing facility was 45 min including patient visit and review of past records. Greater than 50% of total time spent with counseling and coordinating care due to reviewed internal medicine progress notes, imaging and lab results, reviewed medications and changes, discussed hospitalization and medications at bedside with patient.     Electronically signed by:  Tonya Lynn Haase, APRN CNP

## 2023-06-12 NOTE — TELEPHONE ENCOUNTER
What type of discharge? Inpatient  Risk of Hospital admission or ED visit: 78%  Is a TCM episode required? Yes  When should the patient follow up with PCP? 7 days of discharge.    Justice L. Phoenix, RN  [facility]

## 2023-06-13 ENCOUNTER — DOCUMENTATION ONLY (OUTPATIENT)
Dept: OTHER | Facility: CLINIC | Age: 88
End: 2023-06-13
Payer: COMMERCIAL

## 2023-06-14 ENCOUNTER — TELEPHONE (OUTPATIENT)
Dept: GERIATRICS | Facility: CLINIC | Age: 88
End: 2023-06-14

## 2023-06-14 ENCOUNTER — TRANSITIONAL CARE UNIT VISIT (OUTPATIENT)
Dept: GERIATRICS | Facility: CLINIC | Age: 88
End: 2023-06-14
Payer: COMMERCIAL

## 2023-06-14 ENCOUNTER — LAB REQUISITION (OUTPATIENT)
Dept: LAB | Facility: CLINIC | Age: 88
End: 2023-06-14
Payer: COMMERCIAL

## 2023-06-14 ENCOUNTER — DOCUMENTATION ONLY (OUTPATIENT)
Dept: GERIATRICS | Facility: CLINIC | Age: 88
End: 2023-06-14

## 2023-06-14 VITALS
TEMPERATURE: 97.6 F | RESPIRATION RATE: 16 BRPM | WEIGHT: 159 LBS | DIASTOLIC BLOOD PRESSURE: 76 MMHG | HEART RATE: 72 BPM | OXYGEN SATURATION: 93 % | HEIGHT: 68 IN | BODY MASS INDEX: 24.1 KG/M2 | SYSTOLIC BLOOD PRESSURE: 120 MMHG

## 2023-06-14 DIAGNOSIS — K59.03 DRUG-INDUCED CONSTIPATION: ICD-10-CM

## 2023-06-14 DIAGNOSIS — J96.01 ACUTE RESPIRATORY FAILURE WITH HYPOXIA (H): Primary | ICD-10-CM

## 2023-06-14 DIAGNOSIS — I42.8 OTHER CARDIOMYOPATHY (H): ICD-10-CM

## 2023-06-14 DIAGNOSIS — R53.81 PHYSICAL DECONDITIONING: ICD-10-CM

## 2023-06-14 DIAGNOSIS — I48.20 CHRONIC ATRIAL FIBRILLATION (H): ICD-10-CM

## 2023-06-14 DIAGNOSIS — D64.9 NORMOCYTIC ANEMIA: ICD-10-CM

## 2023-06-14 DIAGNOSIS — D64.9 ANEMIA, UNSPECIFIED: ICD-10-CM

## 2023-06-14 DIAGNOSIS — I10 ESSENTIAL HYPERTENSION: ICD-10-CM

## 2023-06-14 DIAGNOSIS — N40.0 BENIGN PROSTATIC HYPERPLASIA WITHOUT LOWER URINARY TRACT SYMPTOMS: ICD-10-CM

## 2023-06-14 DIAGNOSIS — R41.89 COGNITIVE IMPAIRMENT: ICD-10-CM

## 2023-06-14 PROCEDURE — 99310 SBSQ NF CARE HIGH MDM 45: CPT | Performed by: NURSE PRACTITIONER

## 2023-06-14 NOTE — PROGRESS NOTES
"[facility] GERIATRICS    Chief Complaint   Patient presents with     Nursing Home Acute     HPI:  Ayaan Herrera is a 89 year old  (10/31/1932), who is being seen today for an episodic care visit at: [facility] ([facility]) [25]. Today's concern is:   Acute respiratory failure with hypoxia: patient denies SOB at rest, cough, congestion, states he does have some SHRESTHA, SaO2 is 92-93% on room air  In therapy patient is walking up to 45 feet using a 4ww with CGA, assist with ADL's  Cognitive impairment: BIMS 13/15, SBT 6/28  BPH: voiding without difficulty  Anemia: see labs  HTN/Atrial fib/cardiomyopathy:  /76, 122/70, 128/76 with HR 70 range, denies CP, palpitations, SOB at rest, admit weight 158lbs current weight 159lbs  Constipation: patient states bowels are working LBM 6/11/23    Allergies, and PMH/PSH reviewed in [facility] today.  REVIEW OF SYSTEMS:  10 point ROS of systems including Constitutional, Eyes, Respiratory, Cardiovascular, Gastroenterology, Genitourinary, Integumentary, Musculoskeletal, Psychiatric were all negative except for pertinent positives noted in my HPI.    Objective:   /76   Pulse 72   Temp 97.6  F (36.4  C)   Resp 16   Ht 1.727 m (5' 8\")   Wt 72.1 kg (159 lb)   SpO2 93%   BMI 24.18 kg/m    GENERAL APPEARANCE:  Alert, in no distress  ENT:  Mouth and posterior oropharynx normal, moist mucous membranes, [place]  EYES:  EOM, conjunctivae, lids, pupils and irises normal, PERRL  RESP:  respiratory effort and palpation of chest normal, lungs clear to auscultation , no respiratory distress, diminished breath sounds bases bilaterally  CV:  Palpation and auscultation of heart done , regular rate and rhythm, no murmur, rub, or gallop, peripheral edema trace+ in LE bilaterally  ABDOMEN:  normal bowel sounds, soft, nontender, no hepatosplenomegaly or other masses  M/S:   patient sitting up in w/c  SKIN:  Inspection of skin and subcutaneous tissue baseline  NEURO:   speech wnl  PSYCH:  " affect and mood normal    Recent labs in [facility] reviewed by me today.  and   Most Recent 3 CBC's:  Recent Labs   Lab Test 06/08/23  0651 06/07/23  0618 06/06/23  1346   WBC 7.1 8.7 14.5*   HGB 10.0* 9.9* 11.1*   MCV 84 83 84    146* 200     Most Recent 3 BMP's:  Recent Labs   Lab Test 06/06/23  1346 04/21/23  1040 12/22/22  0623    140 138   POTASSIUM 4.3 4.0 4.6   CHLORIDE 99 102 101   CO2 28 25 26   BUN 22.5 26.8* 23.9*   CR 0.88 1.13 0.78   ANIONGAP 10 13 11   BRENDAN 9.8* 9.2 9.6   * 118* 84       Assessment/Plan:  (J96.01) Acute respiratory failure with hypoxia (H)  (primary encounter diagnosis)  (R53.81) Physical deconditioning  Comment: acute/ongoing possible chronic hypoxia with sleep disorder  No changes  Plan: PT and OT, monitor SaO2 at rest and with activity, Tx with doxycycline 100mg BID and cefuroxime 500mg BID for 7 days total  Wean O2 during the day as able, continue O2 2 liters NC at night.         (R41.89) Cognitive impairment  Comment: ongoing, no change  Plan: OT evaluation     (N40.0) Benign prostatic hyperplasia without lower urinary tract symptoms  Comment: ongoing Hx of prostate cancer  No change  Plan: continue flomax 0.4mg QD     (D64.9) Normocytic anemia  Comment: acute/ongoing, no change  Plan: Hgb follow, continue vitamin B12 1000mcg QD, ferrous sulfate 325mg twice weekly     (I10) Essential hypertension  (I48.20) Chronic atrial fibrillation (H)  (I42.8) Other cardiomyopathy (H)  Comment: acute/ongoing EF 40-45% on echo stable  No change  Plan: BMP follow, continue coumadin INR goal of 2-3, lasix 20mg QD, and 40mg Tues, Thurs, Sat and Sun, KCL 10meq QD     (K59.03) Drug-induced constipation  Comment: acute/ongoing, no change  Plan: continue senna s 1 PO QD prn, miralax 8.5g QD    MED REC REQUIRED  Post Medication Reconciliation Status: medication reconcilation previously completed during another office visit      Orders:  No new order      Electronically signed by: Vero  Lynn Haase, APRN CNP

## 2023-06-14 NOTE — TELEPHONE ENCOUNTER
[facility] Geriatrics Lab Note     Provider: Tonya Haase, APRN CNP  Facility: [facility] Facility Type:  TCU    Allergies   Allergen Reactions     Zithromax [Azithromycin] Diarrhea            Amoxicillin Diarrhea     Amoxicillin-Pot Clavulanate GI Disturbance       Labs Reviewed by provider: Heme 1     Verbal Order/Direction given by Provider: No new orders.      Provider giving Order:  Tonya Haase, APRN CNP    Verbal Order given to: Gama([phone])    Wade Clements RN

## 2023-06-14 NOTE — LETTER
"    6/14/2023        RE: Ayaan Herrera  [address]  [facility] [address]        [facility] GERIATRICS    Chief Complaint   Patient presents with     Nursing Home Acute     HPI:  Ayaan Herrera is a 89 year old  (10/31/1932), who is being seen today for an episodic care visit at: [facility]) [25]. Today's concern is:   Acute respiratory failure with hypoxia: patient denies SOB at rest, cough, congestion, states he does have some SHRESTHA, SaO2 is 92-93% on room air  In therapy patient is walking up to 45 feet using a 4ww with CGA, assist with ADL's  Cognitive impairment: BIMS 13/15, SBT 6/28  BPH: voiding without difficulty  Anemia: see labs  HTN/Atrial fib/cardiomyopathy:  /76, 122/70, 128/76 with HR 70 range, denies CP, palpitations, SOB at rest, admit weight 158lbs current weight 159lbs  Constipation: patient states bowels are working LBM 6/11/23    Allergies, and PMH/PSH reviewed in [facility] today.  REVIEW OF SYSTEMS:  10 point ROS of systems including Constitutional, Eyes, Respiratory, Cardiovascular, Gastroenterology, Genitourinary, Integumentary, Musculoskeletal, Psychiatric were all negative except for pertinent positives noted in my HPI.    Objective:   /76   Pulse 72   Temp 97.6  F (36.4  C)   Resp 16   Ht 1.727 m (5' 8\")   Wt 72.1 kg (159 lb)   SpO2 93%   BMI 24.18 kg/m    GENERAL APPEARANCE:  Alert, in no distress  ENT:  Mouth and posterior oropharynx normal, moist mucous membranes, [place]  EYES:  EOM, conjunctivae, lids, pupils and irises normal, PERRL  RESP:  respiratory effort and palpation of chest normal, lungs clear to auscultation , no respiratory distress, diminished breath sounds bases bilaterally  CV:  Palpation and auscultation of heart done , regular rate and rhythm, no murmur, rub, or gallop, peripheral edema trace+ in LE bilaterally  ABDOMEN:  normal bowel sounds, soft, nontender, no hepatosplenomegaly or other masses  M/S:   patient sitting up in " w/c  SKIN:  Inspection of skin and subcutaneous tissue baseline  NEURO:   speech wnl  PSYCH:  affect and mood normal    Recent labs in [facility] reviewed by me today.  and   Most Recent 3 CBC's:  Recent Labs   Lab Test 06/08/23  0651 06/07/23  0618 06/06/23  1346   WBC 7.1 8.7 14.5*   HGB 10.0* 9.9* 11.1*   MCV 84 83 84    146* 200     Most Recent 3 BMP's:  Recent Labs   Lab Test 06/06/23  1346 04/21/23  1040 12/22/22  0623    140 138   POTASSIUM 4.3 4.0 4.6   CHLORIDE 99 102 101   CO2 28 25 26   BUN 22.5 26.8* 23.9*   CR 0.88 1.13 0.78   ANIONGAP 10 13 11   BRENDAN 9.8* 9.2 9.6   * 118* 84       Assessment/Plan:  (J96.01) Acute respiratory failure with hypoxia (H)  (primary encounter diagnosis)  (R53.81) Physical deconditioning  Comment: acute/ongoing possible chronic hypoxia with sleep disorder  No changes  Plan: PT and OT, monitor SaO2 at rest and with activity, Tx with doxycycline 100mg BID and cefuroxime 500mg BID for 7 days total  Wean O2 during the day as able, continue O2 2 liters NC at night.         (R41.89) Cognitive impairment  Comment: ongoing, no change  Plan: OT evaluation     (N40.0) Benign prostatic hyperplasia without lower urinary tract symptoms  Comment: ongoing Hx of prostate cancer  No change  Plan: continue flomax 0.4mg QD     (D64.9) Normocytic anemia  Comment: acute/ongoing, no change  Plan: Hgb follow, continue vitamin B12 1000mcg QD, ferrous sulfate 325mg twice weekly     (I10) Essential hypertension  (I48.20) Chronic atrial fibrillation (H)  (I42.8) Other cardiomyopathy (H)  Comment: acute/ongoing EF 40-45% on echo stable  No change  Plan: BMP follow, continue coumadin INR goal of 2-3, lasix 20mg QD, and 40mg Tues, Thurs, Sat and Sun, KCL 10meq QD     (K59.03) Drug-induced constipation  Comment: acute/ongoing, no change  Plan: continue senna s 1 PO QD prn, miralax 8.5g QD    MED REC REQUIRED  Post Medication Reconciliation Status: medication reconcilation previously completed  during another office visit      Orders:  No new order      Electronically signed by: Tonya Lynn Haase, APRN CNP           Sincerely,        Tonya Lynn Haase, APRN CNP

## 2023-06-15 ENCOUNTER — HOSPITAL ENCOUNTER (INPATIENT)
Facility: CLINIC | Age: 88
LOS: 5 days | Discharge: NURSING, REHAB OR LONG-TERM CARE | DRG: 555 | End: 2023-06-20
Attending: EMERGENCY MEDICINE | Admitting: PSYCHOLOGIST
Payer: COMMERCIAL

## 2023-06-15 ENCOUNTER — TELEPHONE (OUTPATIENT)
Dept: GERIATRICS | Facility: CLINIC | Age: 88
End: 2023-06-15

## 2023-06-15 ENCOUNTER — TRANSITIONAL CARE UNIT VISIT (OUTPATIENT)
Dept: GERIATRICS | Facility: CLINIC | Age: 88
End: 2023-06-15
Payer: COMMERCIAL

## 2023-06-15 ENCOUNTER — APPOINTMENT (OUTPATIENT)
Dept: GENERAL RADIOLOGY | Facility: CLINIC | Age: 88
DRG: 555 | End: 2023-06-15
Payer: COMMERCIAL

## 2023-06-15 ENCOUNTER — APPOINTMENT (OUTPATIENT)
Dept: CT IMAGING | Facility: CLINIC | Age: 88
DRG: 555 | End: 2023-06-15
Attending: EMERGENCY MEDICINE
Payer: COMMERCIAL

## 2023-06-15 VITALS
WEIGHT: 161.9 LBS | DIASTOLIC BLOOD PRESSURE: 74 MMHG | RESPIRATION RATE: 16 BRPM | OXYGEN SATURATION: 92 % | BODY MASS INDEX: 24.54 KG/M2 | SYSTOLIC BLOOD PRESSURE: 131 MMHG | TEMPERATURE: 97.7 F | HEIGHT: 68 IN | HEART RATE: 71 BPM

## 2023-06-15 DIAGNOSIS — Z53.9 ERRONEOUS ENCOUNTER--DISREGARD: Primary | ICD-10-CM

## 2023-06-15 DIAGNOSIS — S30.1XXA PSOAS HEMATOMA, LEFT, SECONDARY TO ANTICOAGULANT THERAPY, INITIAL ENCOUNTER: ICD-10-CM

## 2023-06-15 DIAGNOSIS — S22.080A CLOSED WEDGE COMPRESSION FRACTURE OF T11 VERTEBRA, INITIAL ENCOUNTER (H): ICD-10-CM

## 2023-06-15 DIAGNOSIS — Z79.01 ANTICOAGULATED ON COUMADIN: ICD-10-CM

## 2023-06-15 LAB
ALBUMIN UR-MCNC: NEGATIVE MG/DL
ANION GAP SERPL CALCULATED.3IONS-SCNC: 13 MMOL/L (ref 7–15)
APPEARANCE UR: CLEAR
ATRIAL RATE - MUSE: 78 BPM
BASOPHILS # BLD AUTO: 0 10E3/UL (ref 0–0.2)
BASOPHILS NFR BLD AUTO: 0 %
BILIRUB UR QL STRIP: NEGATIVE
BUN SERPL-MCNC: 24 MG/DL (ref 8–23)
CALCIUM SERPL-MCNC: 9.4 MG/DL (ref 8.2–9.6)
CHLORIDE SERPL-SCNC: 98 MMOL/L (ref 98–107)
COLOR UR AUTO: ABNORMAL
CREAT SERPL-MCNC: 0.75 MG/DL (ref 0.67–1.17)
DEPRECATED HCO3 PLAS-SCNC: 26 MMOL/L (ref 22–29)
DIASTOLIC BLOOD PRESSURE - MUSE: NORMAL MMHG
EOSINOPHIL # BLD AUTO: 0.2 10E3/UL (ref 0–0.7)
EOSINOPHIL NFR BLD AUTO: 2 %
ERYTHROCYTE [DISTWIDTH] IN BLOOD BY AUTOMATED COUNT: 16.7 % (ref 10–15)
GFR SERPL CREATININE-BSD FRML MDRD: 86 ML/MIN/1.73M2
GLUCOSE SERPL-MCNC: 112 MG/DL (ref 70–99)
GLUCOSE UR STRIP-MCNC: NEGATIVE MG/DL
HCT VFR BLD AUTO: 30.3 % (ref 40–53)
HGB BLD-MCNC: 9.5 G/DL (ref 13.3–17.7)
HGB UR QL STRIP: NEGATIVE
HYALINE CASTS: 8 /LPF
IMM GRANULOCYTES # BLD: 0.1 10E3/UL
IMM GRANULOCYTES NFR BLD: 1 %
INR PPP: 3.03 (ref 0.85–1.15)
INTERPRETATION ECG - MUSE: NORMAL
KETONES UR STRIP-MCNC: NEGATIVE MG/DL
LEUKOCYTE ESTERASE UR QL STRIP: NEGATIVE
LYMPHOCYTES # BLD AUTO: 0.7 10E3/UL (ref 0.8–5.3)
LYMPHOCYTES NFR BLD AUTO: 6 %
MCH RBC QN AUTO: 26.8 PG (ref 26.5–33)
MCHC RBC AUTO-ENTMCNC: 31.4 G/DL (ref 31.5–36.5)
MCV RBC AUTO: 85 FL (ref 78–100)
MONOCYTES # BLD AUTO: 1.3 10E3/UL (ref 0–1.3)
MONOCYTES NFR BLD AUTO: 12 %
MUCOUS THREADS #/AREA URNS LPF: PRESENT /LPF
NEUTROPHILS # BLD AUTO: 8.5 10E3/UL (ref 1.6–8.3)
NEUTROPHILS NFR BLD AUTO: 79 %
NITRATE UR QL: NEGATIVE
NRBC # BLD AUTO: 0 10E3/UL
NRBC BLD AUTO-RTO: 0 /100
P AXIS - MUSE: NORMAL DEGREES
PH UR STRIP: 6.5 [PH] (ref 5–7)
PLATELET # BLD AUTO: 229 10E3/UL (ref 150–450)
POTASSIUM SERPL-SCNC: 4.3 MMOL/L (ref 3.4–5.3)
PR INTERVAL - MUSE: NORMAL MS
QRS DURATION - MUSE: 190 MS
QT - MUSE: 476 MS
QTC - MUSE: 521 MS
R AXIS - MUSE: -58 DEGREES
RBC # BLD AUTO: 3.55 10E6/UL (ref 4.4–5.9)
RBC URINE: 1 /HPF
SODIUM SERPL-SCNC: 137 MMOL/L (ref 136–145)
SP GR UR STRIP: 1.01 (ref 1–1.03)
SQUAMOUS EPITHELIAL: <1 /HPF
SYSTOLIC BLOOD PRESSURE - MUSE: NORMAL MMHG
T AXIS - MUSE: 106 DEGREES
UROBILINOGEN UR STRIP-MCNC: NORMAL MG/DL
VENTRICULAR RATE- MUSE: 72 BPM
WBC # BLD AUTO: 10.9 10E3/UL (ref 4–11)
WBC URINE: 3 /HPF

## 2023-06-15 PROCEDURE — 36415 COLL VENOUS BLD VENIPUNCTURE: CPT

## 2023-06-15 PROCEDURE — 73502 X-RAY EXAM HIP UNI 2-3 VIEWS: CPT

## 2023-06-15 PROCEDURE — 99223 1ST HOSP IP/OBS HIGH 75: CPT | Performed by: INTERNAL MEDICINE

## 2023-06-15 PROCEDURE — 36415 COLL VENOUS BLD VENIPUNCTURE: CPT | Performed by: INTERNAL MEDICINE

## 2023-06-15 PROCEDURE — 96360 HYDRATION IV INFUSION INIT: CPT

## 2023-06-15 PROCEDURE — 93005 ELECTROCARDIOGRAM TRACING: CPT

## 2023-06-15 PROCEDURE — 81001 URINALYSIS AUTO W/SCOPE: CPT

## 2023-06-15 PROCEDURE — 250N000013 HC RX MED GY IP 250 OP 250 PS 637: Performed by: EMERGENCY MEDICINE

## 2023-06-15 PROCEDURE — 120N000001 HC R&B MED SURG/OB

## 2023-06-15 PROCEDURE — 85018 HEMOGLOBIN: CPT | Performed by: INTERNAL MEDICINE

## 2023-06-15 PROCEDURE — 72192 CT PELVIS W/O DYE: CPT

## 2023-06-15 PROCEDURE — 99285 EMERGENCY DEPT VISIT HI MDM: CPT | Mod: 25

## 2023-06-15 PROCEDURE — 85025 COMPLETE CBC W/AUTO DIFF WBC: CPT

## 2023-06-15 PROCEDURE — 85610 PROTHROMBIN TIME: CPT

## 2023-06-15 PROCEDURE — 258N000003 HC RX IP 258 OP 636

## 2023-06-15 PROCEDURE — 250N000009 HC RX 250: Performed by: INTERNAL MEDICINE

## 2023-06-15 PROCEDURE — 250N000013 HC RX MED GY IP 250 OP 250 PS 637: Performed by: INTERNAL MEDICINE

## 2023-06-15 PROCEDURE — 80048 BASIC METABOLIC PNL TOTAL CA: CPT

## 2023-06-15 RX ORDER — TRAMADOL HYDROCHLORIDE 50 MG/1
50 TABLET ORAL ONCE
Status: DISCONTINUED | OUTPATIENT
Start: 2023-06-15 | End: 2023-06-15 | Stop reason: DRUGHIGH

## 2023-06-15 RX ORDER — ACETAMINOPHEN 325 MG/10.15ML
650 LIQUID ORAL ONCE
Status: COMPLETED | OUTPATIENT
Start: 2023-06-15 | End: 2023-06-15

## 2023-06-15 RX ORDER — NALOXONE HYDROCHLORIDE 0.4 MG/ML
0.2 INJECTION, SOLUTION INTRAMUSCULAR; INTRAVENOUS; SUBCUTANEOUS
Status: DISCONTINUED | OUTPATIENT
Start: 2023-06-15 | End: 2023-06-20 | Stop reason: HOSPADM

## 2023-06-15 RX ORDER — DORZOLAMIDE HYDROCHLORIDE AND TIMOLOL MALEATE 20; 5 MG/ML; MG/ML
1 SOLUTION/ DROPS OPHTHALMIC EVERY 12 HOURS SCHEDULED
Status: DISCONTINUED | OUTPATIENT
Start: 2023-06-15 | End: 2023-06-20 | Stop reason: HOSPADM

## 2023-06-15 RX ORDER — AMIODARONE HYDROCHLORIDE 200 MG/1
200 TABLET ORAL DAILY
Status: DISCONTINUED | OUTPATIENT
Start: 2023-06-16 | End: 2023-06-20 | Stop reason: HOSPADM

## 2023-06-15 RX ORDER — AMOXICILLIN 250 MG
1 CAPSULE ORAL 2 TIMES DAILY PRN
Status: DISCONTINUED | OUTPATIENT
Start: 2023-06-15 | End: 2023-06-20 | Stop reason: HOSPADM

## 2023-06-15 RX ORDER — ACETAMINOPHEN 500 MG
1000 TABLET ORAL 2 TIMES DAILY PRN
Status: DISCONTINUED | OUTPATIENT
Start: 2023-06-15 | End: 2023-06-20 | Stop reason: HOSPADM

## 2023-06-15 RX ORDER — AMOXICILLIN 250 MG
2 CAPSULE ORAL 2 TIMES DAILY PRN
Status: DISCONTINUED | OUTPATIENT
Start: 2023-06-15 | End: 2023-06-20 | Stop reason: HOSPADM

## 2023-06-15 RX ORDER — TAMSULOSIN HYDROCHLORIDE 0.4 MG/1
0.4 CAPSULE ORAL EVERY EVENING
Status: DISCONTINUED | OUTPATIENT
Start: 2023-06-15 | End: 2023-06-19

## 2023-06-15 RX ORDER — FUROSEMIDE 40 MG
40 TABLET ORAL DAILY
Status: DISCONTINUED | OUTPATIENT
Start: 2023-06-16 | End: 2023-06-20 | Stop reason: HOSPADM

## 2023-06-15 RX ORDER — LATANOPROST 50 UG/ML
1 SOLUTION/ DROPS OPHTHALMIC AT BEDTIME
Status: DISCONTINUED | OUTPATIENT
Start: 2023-06-15 | End: 2023-06-20 | Stop reason: HOSPADM

## 2023-06-15 RX ORDER — LIDOCAINE 40 MG/G
CREAM TOPICAL
Status: DISCONTINUED | OUTPATIENT
Start: 2023-06-15 | End: 2023-06-20 | Stop reason: HOSPADM

## 2023-06-15 RX ORDER — NALOXONE HYDROCHLORIDE 0.4 MG/ML
0.4 INJECTION, SOLUTION INTRAMUSCULAR; INTRAVENOUS; SUBCUTANEOUS
Status: DISCONTINUED | OUTPATIENT
Start: 2023-06-15 | End: 2023-06-20 | Stop reason: HOSPADM

## 2023-06-15 RX ORDER — WARFARIN SODIUM 1 MG/1
1 TABLET ORAL ONCE
Status: ON HOLD | COMMUNITY
End: 2023-06-20

## 2023-06-15 RX ORDER — BRIMONIDINE TARTRATE 2 MG/ML
1 SOLUTION/ DROPS OPHTHALMIC 2 TIMES DAILY
Status: DISCONTINUED | OUTPATIENT
Start: 2023-06-15 | End: 2023-06-20 | Stop reason: HOSPADM

## 2023-06-15 RX ORDER — ACETAMINOPHEN 500 MG
1000 TABLET ORAL
Status: DISCONTINUED | OUTPATIENT
Start: 2023-06-15 | End: 2023-06-20 | Stop reason: HOSPADM

## 2023-06-15 RX ORDER — ONDANSETRON 4 MG/1
4 TABLET, ORALLY DISINTEGRATING ORAL EVERY 6 HOURS PRN
Status: DISCONTINUED | OUTPATIENT
Start: 2023-06-15 | End: 2023-06-20 | Stop reason: HOSPADM

## 2023-06-15 RX ORDER — ONDANSETRON 2 MG/ML
4 INJECTION INTRAMUSCULAR; INTRAVENOUS EVERY 6 HOURS PRN
Status: DISCONTINUED | OUTPATIENT
Start: 2023-06-15 | End: 2023-06-20 | Stop reason: HOSPADM

## 2023-06-15 RX ADMIN — DORZOLAMIDE HYDROCHLORIDE AND TIMOLOL MALEATE 1 DROP: 20; 5 SOLUTION/ DROPS OPHTHALMIC at 22:19

## 2023-06-15 RX ADMIN — TAMSULOSIN HYDROCHLORIDE 0.4 MG: 0.4 CAPSULE ORAL at 22:16

## 2023-06-15 RX ADMIN — ACETAMINOPHEN 650 MG: 160 LIQUID ORAL at 16:11

## 2023-06-15 RX ADMIN — MICONAZOLE NITRATE: 2 POWDER TOPICAL at 22:16

## 2023-06-15 RX ADMIN — SODIUM CHLORIDE 500 ML: 9 INJECTION, SOLUTION INTRAVENOUS at 16:29

## 2023-06-15 RX ADMIN — BRIMONIDINE TARTRATE 1 DROP: 2 SOLUTION OPHTHALMIC at 22:17

## 2023-06-15 RX ADMIN — LATANOPROST 1 DROP: 50 SOLUTION/ DROPS OPHTHALMIC at 22:18

## 2023-06-15 RX ADMIN — NETARSUDIL 1 DROP: 0.2 SOLUTION/ DROPS OPHTHALMIC; TOPICAL at 22:23

## 2023-06-15 RX ADMIN — ACETAMINOPHEN 1000 MG: 500 TABLET ORAL at 22:16

## 2023-06-15 ASSESSMENT — ACTIVITIES OF DAILY LIVING (ADL)
ADLS_ACUITY_SCORE: 47
ADLS_ACUITY_SCORE: 35
DRESS: 1-->ASSISTANCE (EQUIPMENT/PERSON) NEEDED (NOT DEVELOPMENTALLY APPROPRIATE)
WALKING_OR_CLIMBING_STAIRS_DIFFICULTY: YES
TOILETING: 2-->COMPLETELY DEPENDENT
DRESSING/BATHING_DIFFICULTY: YES
TRANSFERRING: 1-->ASSISTANCE (EQUIPMENT/PERSON) NEEDED
ADLS_ACUITY_SCORE: 35
TRANSFERRING: 1-->ASSISTANCE (EQUIPMENT/PERSON) NEEDED (NOT DEVELOPMENTALLY APPROPRIATE)
BATHING: 1-->ASSISTANCE NEEDED
DRESS: 2-->COMPLETELY DEPENDENT
ADLS_ACUITY_SCORE: 42
DRESSING/BATHING: BATHING DIFFICULTY, REQUIRES EQUIPMENT
DIFFICULTY_EATING/SWALLOWING: NO
WEAR_GLASSES_OR_BLIND: YES
CONCENTRATING,_REMEMBERING_OR_MAKING_DECISIONS_DIFFICULTY: NO
WALKING_OR_CLIMBING_STAIRS: AMBULATION DIFFICULTY, REQUIRES EQUIPMENT
ADLS_ACUITY_SCORE: 33
TOILETING: 1-->ASSISTANCE (EQUIPMENT/PERSON) NEEDED (NOT DEVELOPMENTALLY APPROPRIATE)
EQUIPMENT_CURRENTLY_USED_AT_HOME: WALKER, STANDARD
FALL_HISTORY_WITHIN_LAST_SIX_MONTHS: NO
DIFFICULTY_COMMUNICATING: NO
TOILETING_ISSUES: YES
TOILETING_ASSISTANCE: TOILETING DIFFICULTY, REQUIRES EQUIPMENT
CHANGE_IN_FUNCTIONAL_STATUS_SINCE_ONSET_OF_CURRENT_ILLNESS/INJURY: YES
DOING_ERRANDS_INDEPENDENTLY_DIFFICULTY: YES

## 2023-06-15 NOTE — TELEPHONE ENCOUNTER
[facility] Geriatrics Triage Nurse Telephone Encounter    Provider: Justo Augustin MD  Facility: [facility] Facility Type:  TCU    Caller: Demarco  Call Back Number: [phone]    Allergies:    Allergies   Allergen Reactions     Zithromax [Azithromycin] Diarrhea            Amoxicillin Diarrhea     Amoxicillin-Pot Clavulanate GI Disturbance        Reason for call: Nurse is reporting that patient has had a very abrupt change in transferring and alertness.  Patient was an assist of one, but now he's an assist of 2 due to abrupt weakness.  Earlier today, patient was more alert and now he's quite lethargic.  VS are stable.  Patient is c/o pain to his left lateral thigh down to the knee.      Verbal Order/Direction given by Provider: Send patient to the ER due to new onset weakness and profound lethargy.      Provider giving Order:  Justo Augustin MD    Verbal Order given to: Demarco Clements RN

## 2023-06-15 NOTE — ED TRIAGE NOTES
Patient presents from [facility] TCU for complaints of left hip pain and decreased apatite.

## 2023-06-15 NOTE — ED PROVIDER NOTES
ED ATTENDING PHYSICIAN NOTE:   I evaluated this patient in conjunction with Brenda Temple PA-C  I have participated in the care of the patient and personally performed key elements of the history, exam, and medical decision making.      HPI:   Ayaan Herrera is a 89 year old male with a history of atrial fibrillation on Coumadin who presents alone from [facility] for left hip pain.  This started about 4-5 days ago although he did not have a fall.  He has been using Tylenol and tramadol with limited improvement.  Reportedly today he was unable to get off the toilet without assistance which is a change for him and prompted his visit. He denies pain elsewhere or other symptoms though he had right sided hip pain in the recent past.      Independent Historian:   As above.    Review of External Notes: Paperwork provided by [facility]. TCU visit note yesterday. Nurse triage call today.     EXAM:   Patient Vitals for the past 24 hrs:   BP Temp Temp src Pulse Resp SpO2   06/15/23 2002 98/65 -- -- 70 18 93 %   06/15/23 1929 127/83 -- -- -- 18 95 %   06/15/23 1840 127/83 -- -- 71 -- 95 %   06/15/23 1530 129/79 -- -- 73 -- --   06/15/23 1500 115/64 -- -- 70 -- --   06/15/23 1430 113/63 -- -- 71 -- 94 %   06/15/23 1420 111/74 98.5  F (36.9  C) Oral 73 18 98 %     General: Well-developed and well-nourished. Well appearing elderly  man. Cooperative.  Head:  Atraumatic.  Eyes:  Conjunctivae, lids, and sclerae are normal.  ENT:    Normal nose. Moist mucous membranes.  Neck:  Supple. Normal range of motion.  Resp:  No respiratory distress.  GI:  Soft. Non-distended. Non-tender.    MS:  Normal ROM. Pain with flexion of the left hip. No bony tenderness to palpation of the left hip or buttocks.  Skin:  Warm. Non-diaphoretic. No pallor.  Wound on the left lower extremity with dressing in place.  Neuro:  Awake and alert. Normal strength.  Psych: Normal mood and affect. Normal speech.  Vitals  reviewed.    EKG  Indication: weakness  Time: 1537  Rate 72 bpm. QRS duration 190. QT/QTc 476/521.   Ventricular paced rhythm   Abnormal ECG  No acute ST changes.  No change as compared to prior, dated 6/6/23.    Labs Ordered and Resulted from Time of ED Arrival to Time of ED Departure   BASIC METABOLIC PANEL - Abnormal       Result Value    Sodium 137      Potassium 4.3      Chloride 98      Carbon Dioxide (CO2) 26      Anion Gap 13      Urea Nitrogen 24.0 (*)     Creatinine 0.75      Calcium 9.4      Glucose 112 (*)     GFR Estimate 86     ROUTINE UA WITH MICROSCOPIC REFLEX TO CULTURE - Abnormal    Color Urine Light Yellow      Appearance Urine Clear      Glucose Urine Negative      Bilirubin Urine Negative      Ketones Urine Negative      Specific Gravity Urine 1.013      Blood Urine Negative      pH Urine 6.5      Protein Albumin Urine Negative      Urobilinogen Urine Normal      Nitrite Urine Negative      Leukocyte Esterase Urine Negative      Mucus Urine Present (*)     RBC Urine 1      WBC Urine 3      Squamous Epithelials Urine <1      Hyaline Casts Urine 8 (*)    CBC WITH PLATELETS AND DIFFERENTIAL - Abnormal    WBC Count 10.9      RBC Count 3.55 (*)     Hemoglobin 9.5 (*)     Hematocrit 30.3 (*)     MCV 85      MCH 26.8      MCHC 31.4 (*)     RDW 16.7 (*)     Platelet Count 229      % Neutrophils 79      % Lymphocytes 6      % Monocytes 12      % Eosinophils 2      % Basophils 0      % Immature Granulocytes 1      NRBCs per 100 WBC 0      Absolute Neutrophils 8.5 (*)     Absolute Lymphocytes 0.7 (*)     Absolute Monocytes 1.3      Absolute Eosinophils 0.2      Absolute Basophils 0.0      Absolute Immature Granulocytes 0.1      Absolute NRBCs 0.0     INR - Abnormal    INR 3.03 (*)      CT Pelvis Bone wo Contrast   Final Result   IMPRESSION:   1.  There is enlargement of the left psoas muscle with increased attenuation compatible with a hematoma.   2.  No acute fracture or malalignment.   3.  Remote healed  right superior and inferior pubic ramus fractures.   4.  Degenerative changes throughout the pelvis and lower lumbar spine.   5.  Other ancillary findings as described above.         XR Pelvis w Hip Left 1 View   Final Result   IMPRESSION:   Subtle sclerosis left sacral hollow which can be seen with an acute to   subacute nondisplaced left sacral insufficiency fracture.       Bones are demineralized.       Subacute nondisplaced fractures of the right inferior and superior   pubic rami are suspected.       Nothing to suggest a dislocation of the left hip. Extensive   atherosclerotic vascular calcifications.       NOTE: ABNORMAL REPORT      THE DICTATION ABOVE DESCRIBES AN ABNORMAL REPORT FOR WHICH FOLLOW-UP   IS NEEDED.      COREEN GLEASON MD            SYSTEM ID:  [id number]        Independent Interpretation (X-rays, CTs, rhythm strip):  I independently interpreted the pelvis/hip XR and see no displaced fracture or malalignment.    Consultations/Discussion of Management or Tests:  Hospitalist as per PA    Social Determinants of Health affecting care:   Currently living in a care facility.     MEDICAL DECISION MAKING/ASSESSMENT AND PLAN:   Ayaan is a 89 year old man on Coumadin for history of atrial fibrillation who presents with left hip pain over the last 4 to 5 days without trauma.  Reportedly he was ultimately sent to the emergency department when he was unable to get off the toilet without assistance which is a change for him.  He denies any other symptoms and appears well on exam.  He has significantly decreased flexion of the left hip as compared to the right but no bony tenderness or skin changes.    EKG is reassuring without acute ST changes or arrhythmias.  Laboratory studies are overall reassuring with therapeutic INR at 3.03 and baseline anemia of 9.5.  He does not have UTI, leukocytosis, kidney injury, or electrolyte derangement.    XR of the pelvis and left hip was concerning for left sacral  insufficiency fracture.  There were subacute nondisplaced fractures on the right inferior and superior pubic rami.  I suspect this was the cause of his recent right-sided hip pain.  In order to better visualize the left, he was sent for pelvis CT.  This reveals the cause of his pain to be a left psoas hematoma.  There is no acute fracture noted.    He was treated with IV fluids and Tylenol.  He required no other interventions and overall appears well.  However, given his advanced age, anticoagulation, and pain due to psoas hematoma that is limiting his mobility, he warrants hospitalization for monitoring of pain, hemoglobin, and physical therapy to reduce his fall risk.  He was updated on this plan and the hospital service was contacted for admission by MARILEE Temple.      DIAGNOSIS:     ICD-10-CM    1. Psoas hematoma, left, secondary to anticoagulant therapy, initial encounter  S30.1XXA       2. Anticoagulated on Coumadin  Z79.01         DISPOSITION:   Admitted to hospitalist.       6/15/2023  [facility] EMERGENCY DEPT         Ashlie Carr MD  06/15/23 0963

## 2023-06-15 NOTE — PHARMACY-ADMISSION MEDICATION HISTORY
Pharmacist Admission Medication History    Admission medication history is complete. The information provided in this note is only as accurate as the sources available at the time of the update.    Medication reconciliation/reorder completed by provider prior to medication history? No    Information Source(s): Facility ([facility] medication list/MAR ([facility]) via N/A    Pertinent Information:     Patient's warfarin regimen variable at U. Appears current Rx for warfarin 2.5 mg on Fridays, 3 mg all other days. Per MAR, pt received once dose of 1 mg on 6/14/23. Received 2 mg on 6/13 and 6/12.     Changes made to PTA medication list:    Added: None    Deleted: None    Changed: warfarin    Medication Affordability:       Allergies reviewed with patient and updates made in EHR: unable to assess    Medication History Completed By: PETRONA NORRIS ALYSSA 6/15/2023 6:25 PM    Prior to Admission medications    Medication Sig Last Dose Taking? Auth Provider Long Term End Date   acetaminophen (TYLENOL) 500 MG tablet Take 1,000 mg by mouth 2 times daily as needed for mild pain Unknown Yes Unknown, Entered By History     acetaminophen (TYLENOL) 500 MG tablet Take 1,000 mg by mouth nightly as needed for mild pain Past Week Yes Reported, Patient     alendronate (FOSAMAX) 70 MG tablet Take 1 tablet (70 mg) by mouth every 7 days TAKE ON EMPTY STOMACH, REMAIN UPRIGHT FOR ONE HOUR AFTER TAKING 6/10/2023 Yes Chance Morrell MD Yes    amiodarone (PACERONE) 200 MG tablet Take 1 tablet (200 mg) by mouth daily 6/15/2023 at AM Yes Dean Rider MD Yes    bisacodyl (DULCOLAX) 10 MG suppository Place 10 mg rectally daily as needed for constipation Unknown Yes Unknown, Entered By History     brimonidine (ALPHAGAN) 0.2 % ophthalmic solution Place 1 drop into both eyes 2 times daily 6/15/2023 Yes Unknown, Entered By History     calcium carbonate (TUMS) 500 MG chewable tablet Take 1 chew tab by mouth 4 times daily as needed  for heartburn Unknown Yes Unknown, Entered By History     cyanocobalamin (CYANOCOBALAMIN) 1000 MCG tablet Take 1 tablet (1,000 mcg) by mouth daily 6/15/2023 at AM Yes Robles Valdez MD     diclofenac (VOLTAREN) 1 % topical gel Apply 2 g topically 2 times daily To both shoulders.  And twice daily as needed. 6/15/2023 Yes Unknown, Entered By History     dimethicone-zinc oxide (DONTE PROTECT) external cream Apply topically daily Apply to upper inner thighs, groin, and scrotum. 6/15/2023 Yes Unknown, Entered By History     dorzolamide-timolol (COSOPT) 2-0.5 % ophthalmic solution INSTILL 1 DROP IN BOTH EYES TWICE A DAY 6/15/2023 Yes Chance Morrell MD     ferrous sulfate (FE TABS) 325 (65 Fe) MG EC tablet Take 325 mg by mouth Take 1 tablet by mouth twice weekly on Monday and Friday at bedtime. 6/12/2023 Yes Unknown, Entered By History     furosemide (LASIX) 20 MG tablet TAKE 1 TAB BY MOUTH ONCE DAILY  Patient taking differently: Take 20 mg by mouth On Mondays and Wednesdays Past Week Yes Chance Morrell MD Yes    furosemide (LASIX) 40 MG tablet Take 40 mg by mouth Take 1 tab by mouth on Tues, Thurs, Sat, and Sun. 6/15/2023 at AM Yes Unknown, Entered By History Yes    hypromellose (ARTIFICIAL TEARS) 0.5 % SOLN ophthalmic solution Place 1 drop into both eyes 4 times daily as needed for dry eyes Unknown Yes Unknown, Entered By History     ketotifen (ZADITOR) 0.025 % ophthalmic solution Place 1 drop into both eyes as needed Unknown Yes Reported, Patient Yes    latanoprost (XALATAN) 0.005 % ophthalmic solution INSTILL 1 DROP INTO BOTH EYES AT BEDTIME 6/14/2023 at PM Yes Chance Morrell MD Yes    melatonin 5 MG tablet Take 5 mg by mouth nightly as needed 6/14/2023 Yes Reported, Patient     netarsudil (RHOPRESSA) 0.02 % ophthalmic solution Place 1 drop into both eyes At Bedtime 6/14/2023 Yes Reported, Patient     nystatin (MYCOSTATIN) 802463 UNIT/GM external powder Apply topically 2 times  daily Apply to abdominal folds 6/15/2023 Yes Unknown, Entered By History     polyethylene glycol (MIRALAX) 17 g packet Take 8.5 g by mouth daily 6/15/2023 at AM Yes Reported, Patient     potassium chloride ER (K-TAB/KLOR-CON) 10 MEQ CR tablet TAKE 1 TAB BY MOUTH DAILY WITH EACH FUROSEMIDE DOSE. 6/15/2023 at AM Yes Chance Morrell MD     senna-docusate (SENOKOT-S/PERICOLACE) 8.6-50 MG tablet Take 1 tablet by mouth daily as needed for constipation Unknown Yes Reported, Patient     simethicone (MYLICON) 80 MG chewable tablet Take 80 mg by mouth daily as needed for flatulence or cramping Unknown Yes Unknown, Entered By History     tamsulosin (FLOMAX) 0.4 MG capsule TAKE 1 CAP BY MOUTH ONCE DAILY 6/15/2023 at PM Yes Chance Morrell MD     traMADol (ULTRAM) 50 MG tablet Take 0.5 tablets (25 mg) by mouth every 6 hours as needed for severe pain Unknown Yes London Nielsen NP     Vitamin D, Cholecalciferol, 1000 UNITS TABS Take 1 tablet by mouth daily  6/15/2023 Yes Dean Rider MD     warfarin ANTICOAGULANT (COUMADIN) 1 MG tablet Take 1 mg by mouth once 6/14/2023 at 1700 Yes Unknown, Entered By History     warfarin ANTICOAGULANT (COUMADIN) 2.5 MG tablet Take 2.5 mg by mouth Take 1 tab by mouth on Fridays   INR 6/23 Past Week Yes Unknown, Entered By History     warfarin ANTICOAGULANT (COUMADIN) 3 MG tablet Take 3 mg by mouth daily Take 3 mg on Mondays, Tuesdays, Wednesdays, Thursdays, Saturdays, and Sundays Past Week Yes Unknown, Entered By History

## 2023-06-15 NOTE — H&P
[facility]    History and Physical - Hospitalist Service       Date of Admission:  6/15/2023    Assessment & Plan      Ayaan Herrera is a 89 year old male with PMHx of hypertension, hyperlipidemia, chronic afib on anticoagulation with warfarin, hx of SSS s/p ppm, cardiomyopathy, HFrEF, prostate cancer, BPH, glaucoma, insomnia and multinodular goiter.     He was recently admitted to [facility] from 6/6/23-6/9/23 for acute on suspected chronic hypoxic respiratory failure thought to be related to pneumonia and generalized weakness. Was started on abx. Intermittently required O2 during that stay.     Discharged to TCU. Had been rehabbing well at TCU. Seen by medical staff on 6/14 with plans to cont current treatments. On 6/15 AM, patient was noted to be ambulating. That afternoon, he needed assist of 2 getting up from the toilet and reported L sided hip pain. Staff noted he seemed generally weak. No recent injury. Workup in ED showed a L psoas muscle hematoma. Was admitted on 6/15/2023 for ongoing management.     L psoas hematoma  Generalized weakness  * Appears to have occurred spontaneously in the context of chronic anticoagulation use. No recent falls/injury.   -- monitor hgb, recheck ordered for this evening  -- hold warfarin, if s/sx of ongoing bleeding or drop in hgb will give vitamin K  -- ortho consult  -- PT    Normocytic anemia, with possible component of acute blood loss anemia  * Hgb had been 10-11 during last stay. Noted to have low B12 and recommended starting replacement.   * Hgb 9.5 this stay in setting of hematoma  -- monitor hgb as above     Chronic atrial fibrillation  HFrEF, EF 40 to 45%  Hypertension   Hyperlipidemia   SSS s/p pacemaker placement  Moderate aortic valve stenosis  * Echo done 6/7/23 showed EF 40-45%, mild global hypokinesia, mod AV stenosis.   * Chronic and stable on amiodarone, Lasix, warfarin.   * EKG showed a V-paced rhythm this stay. INR mildly  supratherpeutic on admission at 3.03  -- hold warfarin and allow INR to drift down, if s/sx of ongoing bleeding or drop in hgb will need to give vitamin K  -- cont amiodarone and Lasix     Hx of recent hypoxic respiratory failure dt suspected CAP  Possible chronic respiratory failure, possible sleep disordered breathing  * With recent hospital stay as above. Needing 2L NC HS at time of discharge, uses 0-3L NC prn during daytime. Discharged on 3d of cefuroxime and doxycycline (to complete 7d course of treatment). Has since completed course of abx. Respiratory status has remained stable  -- cont O2 as needed during day and 2L HS     Incidental right exophytic renal mass on CT  in 2/2022  Benign prostatic hypertrophy   Prostate cancer   * CT scan last stay showed an unchanged exophytic right renal mass.   * Cont Flomax.  * OP urology followup.      Chronic diarrhea  * No issues noted during recent stay.      Chronic right shoulder pain  * Chronic and stable on Tylenol and tramadol     Glaucoma  * Chronic and stable on home eye drop     Diet:  Regular  DVT Prophylaxis: Pneumatic Compression Devices  Chirinos Catheter: Not present  Lines: None     Cardiac Monitoring: None  Code Status:  Full code    Clinically Significant Risk Factors Present on Admission               # Drug Induced Coagulation Defect: home medication list includes an anticoagulant medication    # Hypertension: Noted on problem list               Disposition Plan      Expected Discharge Date: 06/16/2023                Updated patient's son Andrey (lives in [place]) and ex-wife/friend Kae Buenrostro,   Hospitalist Service  [facility]  Securely message with [employer] (more info)  Text page via "[employer] Paging/Directory     ______________________________________________________________________    Chief Complaint   L hip pain, generalized weakness    History is obtained from the patient, ED MD and chart  review    History of Present Illness   Ayaan Herrera is a 89 year old male with PMHx of hypertension, hyperlipidemia, chronic afib on anticoagulation with warfarin, hx of SSS s/p ppm, cardiomyopathy, HFrEF, prostate cancer, BPH, glaucoma, insomnia and multinodular goiter.     He was recently admitted to [facility] from 6/6/23-6/9/23 for acute on suspected chronic hypoxic respiratory failure thought to be related to pneumonia and generalized weakness. Was started on abx. Intermittently required O2 during that stay.     Discharged to TCU. Had been rehabbing well at TCU. Seen by medical staff on 6/14 with plans to cont current treatments. On 6/15 AM, patient was noted to be ambulating. That afternoon, he needed assist of 2 getting up from the toilet and reported L sided hip pain. Staff noted he seemed generally weak. No recent injury.     Workup in ED showed a L psoas muscle hematoma. Was admitted on 6/15/2023 for ongoing management.  When I saw the patient in the emergency room this evening he was resting comfortably and asking for something to eat. No overt pain in his left hip at present. Respiratory issues have now resolved. He completed his previously prescribed course of antibiotics. Wears oxygen as needed during the day and 2 L nasal cannula at night.     Past Medical History    Past Medical History:   Diagnosis Date     Atrial fibrillation (H)      BPH (benign prostatic hyperplasia)      Dyslipidemia      History of basal cell carcinoma      HTN (hypertension)      Nonsenile cataract      NSVT (nonsustained ventricular tachycardia) (H)      POAG (primary open-angle glaucoma)     Advanced      Prostate cancer (H)      Psoriasis      Sinus node dysfunction (H)        Past Surgical History   Past Surgical History:   Procedure Laterality Date     CATARACT IOL, RT/LT  1985/1996    RE/LE     EP PACEMAKER  2007    [employer]     GLAUCOMA SURGERY  01/01/1996    LE     GLAUCOMA SURGERY  01/01/1997    Bleb revision LE      LASER SURGERY OF EYE  10/14/04 & 2/17/05    SLT RE     PUNCTAL CLOSURE, PLUGS  01/01/2000    BE     TURP  01/01/2003       Prior to Admission Medications         Prior to Admission medications    Medication Sig Last Dose Taking? Auth Provider Long Term End Date   acetaminophen (TYLENOL) 500 MG tablet Take 1,000 mg by mouth 2 times daily as needed for mild pain Unknown Yes Unknown, Entered By History       acetaminophen (TYLENOL) 500 MG tablet Take 1,000 mg by mouth nightly as needed for mild pain Past Week Yes Reported, Patient       alendronate (FOSAMAX) 70 MG tablet Take 1 tablet (70 mg) by mouth every 7 days TAKE ON EMPTY STOMACH, REMAIN UPRIGHT FOR ONE HOUR AFTER TAKING 6/10/2023 Yes Chance Morrell MD Yes     amiodarone (PACERONE) 200 MG tablet Take 1 tablet (200 mg) by mouth daily 6/15/2023 at AM Yes Dean Rider MD Yes     bisacodyl (DULCOLAX) 10 MG suppository Place 10 mg rectally daily as needed for constipation Unknown Yes Unknown, Entered By History       brimonidine (ALPHAGAN) 0.2 % ophthalmic solution Place 1 drop into both eyes 2 times daily 6/15/2023 Yes Unknown, Entered By History       calcium carbonate (TUMS) 500 MG chewable tablet Take 1 chew tab by mouth 4 times daily as needed for heartburn Unknown Yes Unknown, Entered By History       cyanocobalamin (CYANOCOBALAMIN) 1000 MCG tablet Take 1 tablet (1,000 mcg) by mouth daily 6/15/2023 at AM Yes Robles Valdez MD       diclofenac (VOLTAREN) 1 % topical gel Apply 2 g topically 2 times daily To both shoulders.  And twice daily as needed. 6/15/2023 Yes Unknown, Entered By History       dimethicone-zinc oxide (DONET PROTECT) external cream Apply topically daily Apply to upper inner thighs, groin, and scrotum. 6/15/2023 Yes Unknown, Entered By History       dorzolamide-timolol (COSOPT) 2-0.5 % ophthalmic solution INSTILL 1 DROP IN BOTH EYES TWICE A DAY 6/15/2023 Yes Chance Morrell MD       ferrous sulfate (FE TABS) 325 (65  Fe) MG EC tablet Take 325 mg by mouth Take 1 tablet by mouth twice weekly on Monday and Friday at bedtime. 6/12/2023 Yes Unknown, Entered By History       furosemide (LASIX) 20 MG tablet TAKE 1 TAB BY MOUTH ONCE DAILY  Patient taking differently: Take 20 mg by mouth On Mondays and Wednesdays Past Week Yes Chance Morrell MD Yes     furosemide (LASIX) 40 MG tablet Take 40 mg by mouth Take 1 tab by mouth on Tues, Thurs, Sat, and Sun. 6/15/2023 at AM Yes Unknown, Entered By History Yes     hypromellose (ARTIFICIAL TEARS) 0.5 % SOLN ophthalmic solution Place 1 drop into both eyes 4 times daily as needed for dry eyes Unknown Yes Unknown, Entered By History       ketotifen (ZADITOR) 0.025 % ophthalmic solution Place 1 drop into both eyes as needed Unknown Yes Reported, Patient Yes     latanoprost (XALATAN) 0.005 % ophthalmic solution INSTILL 1 DROP INTO BOTH EYES AT BEDTIME 6/14/2023 at PM Yes Chance Morrell MD Yes     melatonin 5 MG tablet Take 5 mg by mouth nightly as needed 6/14/2023 Yes Reported, Patient       netarsudil (RHOPRESSA) 0.02 % ophthalmic solution Place 1 drop into both eyes At Bedtime 6/14/2023 Yes Reported, Patient       nystatin (MYCOSTATIN) 234195 UNIT/GM external powder Apply topically 2 times daily Apply to abdominal folds 6/15/2023 Yes Unknown, Entered By History       polyethylene glycol (MIRALAX) 17 g packet Take 8.5 g by mouth daily 6/15/2023 at AM Yes Reported, Patient       potassium chloride ER (K-TAB/KLOR-CON) 10 MEQ CR tablet TAKE 1 TAB BY MOUTH DAILY WITH EACH FUROSEMIDE DOSE. 6/15/2023 at AM Yes Chance Morrell MD       senna-docusate (SENOKOT-S/PERICOLACE) 8.6-50 MG tablet Take 1 tablet by mouth daily as needed for constipation Unknown Yes Reported, Patient       simethicone (MYLICON) 80 MG chewable tablet Take 80 mg by mouth daily as needed for flatulence or cramping Unknown Yes Unknown, Entered By History       tamsulosin (FLOMAX) 0.4 MG capsule TAKE 1 CAP BY  MOUTH ONCE DAILY 6/15/2023 at PM Yes Chance Morrell MD       traMADol (ULTRAM) 50 MG tablet Take 0.5 tablets (25 mg) by mouth every 6 hours as needed for severe pain Unknown Yes London Nielsen NP       Vitamin D, Cholecalciferol, 1000 UNITS TABS Take 1 tablet by mouth daily  6/15/2023 Yes Dean Rider MD       warfarin ANTICOAGULANT (COUMADIN) 1 MG tablet Take 1 mg by mouth once 6/14/2023 at 1700 Yes Unknown, Entered By History       warfarin ANTICOAGULANT (COUMADIN) 2.5 MG tablet Take 2.5 mg by mouth Take 1 tab by mouth on Fridays   INR 6/23 Past Week Yes Unknown, Entered By History       warfarin ANTICOAGULANT (COUMADIN) 3 MG tablet Take 3 mg by mouth daily Take 3 mg on Mondays, Tuesdays, Wednesdays, Thursdays, Saturdays, and Sundays Past Week Yes Unknown, Entered By History         Review of Systems    The 10 point Review of Systems is negative other than noted in the HPI or here.      Physical Exam   Vital Signs: Temp: 98.5  F (36.9  C) Temp src: Oral BP: 115/64 Pulse: 70   Resp: 18 SpO2: 94 % O2 Device: None (Room air)    Weight: 0 lbs 0 oz    General Appearance: Elderly appearing male, alert and conversing appropriately, NAD  Respiratory: CTAB, no wheeze/rales/rhonchi, no increased work of breathing  Cardiovascular: HRRR, no MGR, +bilateral LE edema to mid shins  GI: S, NT, ND, +BS  Skin: scattered echymoses, chronic venous stasis changes in bilateral LEs, excoriations on anterior left shin were covered with dressing, no erythema or drainage suggestive of infection  Other:     Medical Decision Making       75 MINUTES SPENT BY ME on the date of service doing chart review, history, exam, documentation & further activities per the note.      Data   ------------------------- PAST 24 HR DATA REVIEWED -----------------------------------------------    I have personally reviewed the following data over the past 24 hrs:    10.9  \   9.5 (L)   / 229     137 98 24.0 (H) /  112 (H)   4.3 26 0.75 \        INR:  3.03 (H) PTT:  N/A   D-dimer:  N/A Fibrinogen:  N/A       Imaging results reviewed over the past 24 hrs:   Recent Results (from the past 24 hour(s))   XR Pelvis w Hip Left 1 View    Narrative    XR PELVIS AND HIP LEFT 1 VIEW 6/15/2023 4:23 PM     HISTORY: Evaluation of left>right hip pain    COMPARISON: None.       Impression    IMPRESSION:  Subtle sclerosis left sacral hollow which can be seen with an acute to  subacute nondisplaced left sacral insufficiency fracture.     Bones are demineralized.     Subacute nondisplaced fractures of the right inferior and superior  pubic rami are suspected.     Nothing to suggest a dislocation of the left hip. Extensive  atherosclerotic vascular calcifications.     NOTE: ABNORMAL REPORT    THE DICTATION ABOVE DESCRIBES AN ABNORMAL REPORT FOR WHICH FOLLOW-UP  IS NEEDED.    COREEN GLEASON MD         SYSTEM ID:  [id number]   CT Pelvis Bone wo Contrast    Narrative    EXAM: CT PELVIS BONE WO CONTRAST  LOCATION: [facility]  DATE: 6/15/2023    INDICATION: abn XR, hip pain  COMPARISON: CT from 02/28/2022  TECHNIQUE: CT scan of the pelvis was performed without IV contrast. Multiplanar reformats were obtained. Dose reduction techniques were used.  CONTRAST: None.    FINDINGS:    BONES AND JOINTS:  No acute fracture is identified. Remote healed right superior and inferior pubic ramus fractures. Moderate bilateral hip and sacroiliac joint degenerative changes. Severe degenerative changes of the lower lumbar spine. Osteopenia. No significant joint   effusion.    SOFT TISSUES:  There is enlargement of the left iliopsoas muscle with adjacent fat stranding, most pronounced proximally in the left psoas muscle with increased attenuation; this is compatible with a hematoma. Mild diffuse body wall edema and presacral edema. Diffuse   muscle atrophy as expected for patient age. Extensive atherosclerosis.      Impression    IMPRESSION:  1.  There is  enlargement of the left psoas muscle with increased attenuation compatible with a hematoma.  2.  No acute fracture or malalignment.  3.  Remote healed right superior and inferior pubic ramus fractures.  4.  Degenerative changes throughout the pelvis and lower lumbar spine.  5.  Other ancillary findings as described above.

## 2023-06-15 NOTE — ED PROVIDER NOTES
History     Chief Complaint:  Hip Pain and Generalized Weakness       HPI   Ayaan Herrera is a 89 year old male with a complex medical history including hypertension, hyperlipidemia, atrial fibrillation anticoagulated on warfarin, mitral valve insufficiency, anemia, cardiomyopathy, prostate cancer, and is s/p cardiac pacemaker who presents for evaluation of left hip pain.  The patient states that he has been having right-sided hip pain over the last few weeks, however, a few days ago he developed left-sided hip pain that is now worse compared to the right side making position changes difficult.  The patient states that he walks with a walker, but this is difficult overall and the hip pain has caused an even greater problem with this.  The patient notes that he has had a decreased appetite as well secondary to changing locations and not finding the food appetizing.  He denies any recent fever, chills, upper respiratory symptoms, chest pain, shortness of breath, abdominal pain, nausea, vomiting, diarrhea, urinary symptoms, unilateral numbness, tingling, weakness, or recent trauma/injury.    Patient's TCU at [facility] in [place] was called who stated this morning the patient was able to walk on his own with a walker.  However, this afternoon the patient was unable to get up from the toilet by himself and required 2 members of the staff to help him up.  The patient's nurse states this was due to both left-sided hip pain and generalized weakness.  They also note the patient has appeared more confused and lethargic causing concern and prompting need for urgent ED evaluation.  They deny any other fever, chills, other complaints, recent head injury, or any recent trauma/fall.    Independent Historian:   The patient and one of the nurses from his TCU provide the history as noted above    Review of External Notes:   6/6/23: Patient was admitted to the hospital for acute hypoxic respiratory failure, anemia, vitamin B12  deficiency, probable chronic respiratory failure and discharged to short-term care facility.      Medications:    acetaminophen (TYLENOL) 500 MG tablet  acetaminophen (TYLENOL) 500 MG tablet  alendronate (FOSAMAX) 70 MG tablet  amiodarone (PACERONE) 200 MG tablet  bisacodyl (DULCOLAX) 10 MG suppository  brimonidine (ALPHAGAN) 0.2 % ophthalmic solution  calcium carbonate (TUMS) 500 MG chewable tablet  cyanocobalamin (CYANOCOBALAMIN) 1000 MCG tablet  diclofenac (VOLTAREN) 1 % topical gel  dimethicone-zinc oxide (DONTE PROTECT) external cream  dorzolamide-timolol (COSOPT) 2-0.5 % ophthalmic solution  ferrous sulfate (FE TABS) 325 (65 Fe) MG EC tablet  furosemide (LASIX) 20 MG tablet  furosemide (LASIX) 40 MG tablet  hypromellose (ARTIFICIAL TEARS) 0.5 % SOLN ophthalmic solution  ketotifen (ZADITOR) 0.025 % ophthalmic solution  latanoprost (XALATAN) 0.005 % ophthalmic solution  melatonin 5 MG tablet  netarsudil (RHOPRESSA) 0.02 % ophthalmic solution  nystatin (MYCOSTATIN) 041903 UNIT/GM external powder  polyethylene glycol (MIRALAX) 17 g packet  potassium chloride ER (K-TAB/KLOR-CON) 10 MEQ CR tablet  senna-docusate (SENOKOT-S/PERICOLACE) 8.6-50 MG tablet  simethicone (MYLICON) 80 MG chewable tablet  tamsulosin (FLOMAX) 0.4 MG capsule  traMADol (ULTRAM) 50 MG tablet  Vitamin D, Cholecalciferol, 1000 UNITS TABS  warfarin ANTICOAGULANT (COUMADIN) 2.5 MG tablet  warfarin ANTICOAGULANT (COUMADIN) 3 MG tablet        Past Medical History:    Past Medical History:   Diagnosis Date     Atrial fibrillation (H)      BPH (benign prostatic hyperplasia)      Dyslipidemia      History of basal cell carcinoma      HTN (hypertension)      Nonsenile cataract      NSVT (nonsustained ventricular tachycardia) (H)      POAG (primary open-angle glaucoma)      Prostate cancer (H)      Psoriasis      Sinus node dysfunction (H)        Past Surgical History:    Past Surgical History:   Procedure Laterality Date     CATARACT IOL, RT/LT  1985/1996     RE/LE     EP PACEMAKER  2007    [employer]     GLAUCOMA SURGERY  01/01/1996    LE     GLAUCOMA SURGERY  01/01/1997    Bleb revision LE     LASER SURGERY OF EYE  10/14/04 & 2/17/05    SLT RE     PUNCTAL CLOSURE, PLUGS  01/01/2000    BE     TURP  01/01/2003        Physical Exam     Patient Vitals for the past 24 hrs:   BP Temp Temp src Pulse Resp SpO2   06/15/23 1500 115/64 -- -- 70 -- --   06/15/23 1430 113/63 -- -- 71 -- 94 %   06/15/23 1420 111/74 98.5  F (36.9  C) Oral 73 18 98 %        Physical Exam  General: Alert, appears well-developed and well-nourished. Cooperative.     In mild distress. Frail, elderly.   HEENT:  Head:  Atraumatic  Ears:  External ears are normal  Eyes:   Conjunctivae normal and EOM are normal. No scleral icterus.    Pupils are equal, round, and reactive to light.   Neck:   Normal range of motion. Neck supple.  CV:  Normal rate, regular rhythm, normal heart sounds and radial pulses are 2+ and symmetric.  No murmur.  Resp:  Breath sounds are clear bilaterally    Non-labored, no retractions or accessory muscle use  GI:  Distended abdomen, at baseline per patient. Abdomen is soft, no tenderness. No rebound or guarding.  MS:  Bilateral lower extremities:     Left calf wrapped in wound dressing, anasarca bilaterally. No overlying    skin changes of bilateral hips.     Bilateral pitting edema.      No TTP over lateral/anterior hip bilaterally. Pain with hip flexion on the    left, no pain with hip flexion on the right.      Dorsiflexion/plantarflexion intact bilaterally. Capillary refill < 2 seconds.    Peripheral sensation to light touch intact distally. Distal CMS intact.     Back atraumatic.  Skin:  Warm and dry.  No rash or lesions noted.  Neuro: Alert. Normal strength.  Sensation intact in all 4 extremities. GCS: 15  Psych:  Normal mood and affect.    Emergency Department Course     ECG results from 06/15/23   EKG 12-lead, tracing only     Value    Systolic Blood Pressure     Diastolic  Blood Pressure     Ventricular Rate 72    Atrial Rate 78    DC Interval     QRS Duration 190        QTc 521    P Axis     R AXIS -58    T Axis 106    Interpretation ECG      Ventricular-paced rhythm  Abnormal ECG  When compared with ECG of 06-JUN-2023 13:56,  No significant change was found       Imaging:  CT Pelvis Bone wo Contrast   Final Result   IMPRESSION:   1.  There is enlargement of the left psoas muscle with increased attenuation compatible with a hematoma.   2.  No acute fracture or malalignment.   3.  Remote healed right superior and inferior pubic ramus fractures.   4.  Degenerative changes throughout the pelvis and lower lumbar spine.   5.  Other ancillary findings as described above.         XR Pelvis w Hip Left 1 View   Final Result   IMPRESSION:   Subtle sclerosis left sacral hollow which can be seen with an acute to   subacute nondisplaced left sacral insufficiency fracture.       Bones are demineralized.       Subacute nondisplaced fractures of the right inferior and superior   pubic rami are suspected.       Nothing to suggest a dislocation of the left hip. Extensive   atherosclerotic vascular calcifications.       NOTE: ABNORMAL REPORT      THE DICTATION ABOVE DESCRIBES AN ABNORMAL REPORT FOR WHICH FOLLOW-UP   IS NEEDED.      COREEN GLEASON MD            SYSTEM ID:  [id number]         Report per radiology    Laboratory:  Labs Ordered and Resulted from Time of ED Arrival to Time of ED Departure   BASIC METABOLIC PANEL - Abnormal       Result Value    Sodium 137      Potassium 4.3      Chloride 98      Carbon Dioxide (CO2) 26      Anion Gap 13      Urea Nitrogen 24.0 (*)     Creatinine 0.75      Calcium 9.4      Glucose 112 (*)     GFR Estimate 86     ROUTINE UA WITH MICROSCOPIC REFLEX TO CULTURE - Abnormal    Color Urine Light Yellow      Appearance Urine Clear      Glucose Urine Negative      Bilirubin Urine Negative      Ketones Urine Negative      Specific Gravity Urine 1.013       Blood Urine Negative      pH Urine 6.5      Protein Albumin Urine Negative      Urobilinogen Urine Normal      Nitrite Urine Negative      Leukocyte Esterase Urine Negative      Mucus Urine Present (*)     RBC Urine 1      WBC Urine 3      Squamous Epithelials Urine <1      Hyaline Casts Urine 8 (*)    CBC WITH PLATELETS AND DIFFERENTIAL - Abnormal    WBC Count 10.9      RBC Count 3.55 (*)     Hemoglobin 9.5 (*)     Hematocrit 30.3 (*)     MCV 85      MCH 26.8      MCHC 31.4 (*)     RDW 16.7 (*)     Platelet Count 229      % Neutrophils 79      % Lymphocytes 6      % Monocytes 12      % Eosinophils 2      % Basophils 0      % Immature Granulocytes 1      NRBCs per 100 WBC 0      Absolute Neutrophils 8.5 (*)     Absolute Lymphocytes 0.7 (*)     Absolute Monocytes 1.3      Absolute Eosinophils 0.2      Absolute Basophils 0.0      Absolute Immature Granulocytes 0.1      Absolute NRBCs 0.0     INR - Abnormal    INR 3.03 (*)         Procedures   None    Emergency Department Course & Assessments:       Interventions:  Medications   traMADol (ULTRAM) tablet 50 mg (has no administration in time range)   0.9% sodium chloride BOLUS (0 mLs Intravenous Stopped 6/15/23 1741)   acetaminophen (TYLENOL) solution 650 mg (650 mg Oral $Given 6/15/23 1611)        Assessments:  1445: Initial evaluation and assessment.  1511: Discussed the patient's case with [facility], conversation detailed in the HPI.    Independent Interpretation (X-rays, CTs, rhythm strip):  None    Consultations/Discussion of Management or Tests:  1820: Discussed patient with Dr. Ariella Buenrostro, hospitalist, who agreed to accept the patient for inpatient admission.       Social Determinants of Health affecting care:   None    Disposition:  The patient was admitted to the hospital under the care of Dr. Buenrostro.     Impression & Plan    CMS Diagnoses: None    Medical Decision Making:  Ayaan Herrera is a 89 year old male with a complex medical history including  hypertension, hyperlipidemia, atrial fibrillation anticoagulated on warfarin, mitral valve insufficiency, anemia, cardiomyopathy, prostate cancer, and is s/p cardiac pacemaker who presents for evaluation of left hip pain.  On exam, patient appears frail and elderly and is noted to have pain with left hip flexion.  The patient's vital signs have remained within normal limits throughout ED course.  Blood work was obtained for evaluation of generalized weakness which is notable for hemoglobin of 9.5, which is only mildly decreased compared to patient's baseline of approximately 10.  There is no evidence of leukocytosis, electrolyte abnormalities, or urinary tract infection.  INR is 3.03, likely therapeutic secondary to anticoagulation with warfarin.  A pelvis x-ray was obtained which showed a possible left sacral insufficiency fracture and subacute nondisplaced pubic rami fractures.  Given these findings, CT of the pelvis was ordered which showed evidence of a left psoas hematoma and healed right superior and inferior pubic rami fractures.  Due to the patient's decreased hemoglobin at 9.5, current anticoagulation status, new findings of psoas hematoma, and inability to walk secondary to pain, we thought the patient would be most appropriate for admission for monitoring of hemoglobin and ambulation assistance.  This was discussed with hospitalist, Dr. Buenrostro, who agreed to accept patient for inpatient admission.  These findings and admission were discussed with the patient as well who is in agreement with this and all questions were answered.    Diagnosis:    ICD-10-CM    1. Psoas hematoma, left, secondary to anticoagulant therapy, initial encounter  S30.1XXA                Brenda Temple PA-C  6/15/2023

## 2023-06-15 NOTE — LETTER
6/15/2023        RE: Ayaan Herrera  [address]  [facility] [address]             Erroneous encounter                Sincerely,        Justo Augustin MD

## 2023-06-15 NOTE — ED NOTES
Bed: ED11  Expected date:   Expected time:   Means of arrival:   Comments:  Jojo - 523 - 89 M melissa eta 1426

## 2023-06-15 NOTE — ED NOTES
[facility]  ED Nurse Handoff Report    ED Chief complaint: Hip Pain and Generalized Weakness      ED Diagnosis:   Final diagnoses:   Psoas hematoma, left, secondary to anticoagulant therapy, initial encounter       Code Status: hospitalist to address    Allergies:   Allergies   Allergen Reactions    Zithromax [Azithromycin] Diarrhea           Amoxicillin Diarrhea    Amoxicillin-Pot Clavulanate GI Disturbance       Patient Story: Patient presents to the ED from [facility] for left hip pain, and generalized weakness.  CT showed psoas hematoma. Patient is on coumadin   Focused Assessment:  Alert, oriented, no complaints of pain after tylenol administration.  Uses male pure wic.     Treatments and/or interventions provided: labs, imaging   Patient's response to treatments and/or interventions:   Labs Ordered and Resulted from Time of ED Arrival to Time of ED Departure   BASIC METABOLIC PANEL - Abnormal       Result Value    Sodium 137      Potassium 4.3      Chloride 98      Carbon Dioxide (CO2) 26      Anion Gap 13      Urea Nitrogen 24.0 (*)     Creatinine 0.75      Calcium 9.4      Glucose 112 (*)     GFR Estimate 86     ROUTINE UA WITH MICROSCOPIC REFLEX TO CULTURE - Abnormal    Color Urine Light Yellow      Appearance Urine Clear      Glucose Urine Negative      Bilirubin Urine Negative      Ketones Urine Negative      Specific Gravity Urine 1.013      Blood Urine Negative      pH Urine 6.5      Protein Albumin Urine Negative      Urobilinogen Urine Normal      Nitrite Urine Negative      Leukocyte Esterase Urine Negative      Mucus Urine Present (*)     RBC Urine 1      WBC Urine 3      Squamous Epithelials Urine <1      Hyaline Casts Urine 8 (*)    CBC WITH PLATELETS AND DIFFERENTIAL - Abnormal    WBC Count 10.9      RBC Count 3.55 (*)     Hemoglobin 9.5 (*)     Hematocrit 30.3 (*)     MCV 85      MCH 26.8      MCHC 31.4 (*)     RDW 16.7 (*)     Platelet Count 229      % Neutrophils 79      %  Lymphocytes 6      % Monocytes 12      % Eosinophils 2      % Basophils 0      % Immature Granulocytes 1      NRBCs per 100 WBC 0      Absolute Neutrophils 8.5 (*)     Absolute Lymphocytes 0.7 (*)     Absolute Monocytes 1.3      Absolute Eosinophils 0.2      Absolute Basophils 0.0      Absolute Immature Granulocytes 0.1      Absolute NRBCs 0.0     INR - Abnormal    INR 3.03 (*)          To be done/followed up on inpatient unit:      Does this patient have any cognitive concerns?:  none    Activity level - Baseline/Home:  Stand by assist   Activity Level - Current:   Unknown    Patient's Preferred language: English   Needed?: No    Isolation: None  Infection: Not Applicable  Patient tested for COVID 19 prior to admission: NO  Bariatric?: No    Vital Signs:   Vitals:    06/15/23 1430 06/15/23 1500 06/15/23 1530 06/15/23 1840   BP: 113/63 115/64 129/79 127/83   Pulse: 71 70 73 71   Resp:       Temp:       TempSrc:       SpO2: 94%   95%       Cardiac Rhythm:     Was the PSS-3 completed:   Yes  What interventions are required if any?               Family Comments:   OBS brochure/video discussed/provided to patient/family: N/A              Name of person given brochure if not patient:               Relationship to patient:     For the majority of the shift this patient's behavior was Green.   Behavioral interventions performed were .    ED NURSE PHONE NUMBER: *[phone]

## 2023-06-16 ENCOUNTER — APPOINTMENT (OUTPATIENT)
Dept: PHYSICAL THERAPY | Facility: CLINIC | Age: 88
DRG: 555 | End: 2023-06-16
Attending: INTERNAL MEDICINE
Payer: COMMERCIAL

## 2023-06-16 LAB
ANION GAP SERPL CALCULATED.3IONS-SCNC: 7 MMOL/L (ref 7–15)
BUN SERPL-MCNC: 21.1 MG/DL (ref 8–23)
CALCIUM SERPL-MCNC: 9 MG/DL (ref 8.2–9.6)
CHLORIDE SERPL-SCNC: 102 MMOL/L (ref 98–107)
CREAT SERPL-MCNC: 0.7 MG/DL (ref 0.67–1.17)
DEPRECATED HCO3 PLAS-SCNC: 28 MMOL/L (ref 22–29)
ERYTHROCYTE [DISTWIDTH] IN BLOOD BY AUTOMATED COUNT: 16.9 % (ref 10–15)
GFR SERPL CREATININE-BSD FRML MDRD: 88 ML/MIN/1.73M2
GLUCOSE SERPL-MCNC: 102 MG/DL (ref 70–99)
HCT VFR BLD AUTO: 27 % (ref 40–53)
HGB BLD-MCNC: 8.2 G/DL (ref 13.3–17.7)
HGB BLD-MCNC: 8.3 G/DL (ref 13.3–17.7)
HGB BLD-MCNC: 8.3 G/DL (ref 13.3–17.7)
INR PPP: 3.1 (ref 0.85–1.15)
MCH RBC QN AUTO: 26.3 PG (ref 26.5–33)
MCHC RBC AUTO-ENTMCNC: 30.7 G/DL (ref 31.5–36.5)
MCV RBC AUTO: 86 FL (ref 78–100)
PLATELET # BLD AUTO: 193 10E3/UL (ref 150–450)
POTASSIUM SERPL-SCNC: 4.1 MMOL/L (ref 3.4–5.3)
RBC # BLD AUTO: 3.15 10E6/UL (ref 4.4–5.9)
SODIUM SERPL-SCNC: 137 MMOL/L (ref 136–145)
WBC # BLD AUTO: 7.5 10E3/UL (ref 4–11)

## 2023-06-16 PROCEDURE — 36415 COLL VENOUS BLD VENIPUNCTURE: CPT | Performed by: HOSPITALIST

## 2023-06-16 PROCEDURE — 85027 COMPLETE CBC AUTOMATED: CPT | Performed by: INTERNAL MEDICINE

## 2023-06-16 PROCEDURE — 250N000013 HC RX MED GY IP 250 OP 250 PS 637: Performed by: INTERNAL MEDICINE

## 2023-06-16 PROCEDURE — 80048 BASIC METABOLIC PNL TOTAL CA: CPT | Performed by: INTERNAL MEDICINE

## 2023-06-16 PROCEDURE — 97161 PT EVAL LOW COMPLEX 20 MIN: CPT | Mod: GP | Performed by: PHYSICAL THERAPIST

## 2023-06-16 PROCEDURE — 97530 THERAPEUTIC ACTIVITIES: CPT | Mod: GP | Performed by: PHYSICAL THERAPIST

## 2023-06-16 PROCEDURE — 120N000001 HC R&B MED SURG/OB

## 2023-06-16 PROCEDURE — 85610 PROTHROMBIN TIME: CPT | Performed by: INTERNAL MEDICINE

## 2023-06-16 PROCEDURE — 36415 COLL VENOUS BLD VENIPUNCTURE: CPT | Performed by: INTERNAL MEDICINE

## 2023-06-16 PROCEDURE — 99232 SBSQ HOSP IP/OBS MODERATE 35: CPT | Performed by: HOSPITALIST

## 2023-06-16 PROCEDURE — 85018 HEMOGLOBIN: CPT | Performed by: HOSPITALIST

## 2023-06-16 RX ADMIN — ACETAMINOPHEN 1000 MG: 500 TABLET ORAL at 22:28

## 2023-06-16 RX ADMIN — DORZOLAMIDE HYDROCHLORIDE AND TIMOLOL MALEATE 1 DROP: 20; 5 SOLUTION/ DROPS OPHTHALMIC at 20:16

## 2023-06-16 RX ADMIN — DORZOLAMIDE HYDROCHLORIDE AND TIMOLOL MALEATE 1 DROP: 20; 5 SOLUTION/ DROPS OPHTHALMIC at 08:53

## 2023-06-16 RX ADMIN — NETARSUDIL 1 DROP: 0.2 SOLUTION/ DROPS OPHTHALMIC; TOPICAL at 22:38

## 2023-06-16 RX ADMIN — MICONAZOLE NITRATE: 2 POWDER TOPICAL at 08:53

## 2023-06-16 RX ADMIN — TAMSULOSIN HYDROCHLORIDE 0.4 MG: 0.4 CAPSULE ORAL at 20:14

## 2023-06-16 RX ADMIN — LATANOPROST 1 DROP: 50 SOLUTION/ DROPS OPHTHALMIC at 22:38

## 2023-06-16 RX ADMIN — BRIMONIDINE TARTRATE 1 DROP: 2 SOLUTION OPHTHALMIC at 21:18

## 2023-06-16 RX ADMIN — AMIODARONE HYDROCHLORIDE 200 MG: 200 TABLET ORAL at 08:22

## 2023-06-16 RX ADMIN — FUROSEMIDE 40 MG: 40 TABLET ORAL at 08:22

## 2023-06-16 RX ADMIN — TRAMADOL HYDROCHLORIDE 25 MG: 50 TABLET, COATED ORAL at 17:43

## 2023-06-16 RX ADMIN — BRIMONIDINE TARTRATE 1 DROP: 2 SOLUTION OPHTHALMIC at 08:53

## 2023-06-16 RX ADMIN — TRAMADOL HYDROCHLORIDE 25 MG: 50 TABLET, COATED ORAL at 08:22

## 2023-06-16 RX ADMIN — MICONAZOLE NITRATE: 2 POWDER TOPICAL at 21:18

## 2023-06-16 ASSESSMENT — ACTIVITIES OF DAILY LIVING (ADL)
ADLS_ACUITY_SCORE: 57
ADLS_ACUITY_SCORE: 47
ADLS_ACUITY_SCORE: 57

## 2023-06-16 NOTE — UTILIZATION REVIEW
Admission Status; Secondary Review Determination       Under the authority of the Utilization Management Committee, the utilization review process indicated a secondary review on the above patient. The review outcome is based on review of the medical records, discussions with staff, and applying clinical experience noted on the date of the review.     (x) Inpatient Status Appropriate - This patient's medical care is consistent with medical management for inpatient care and reasonable inpatient medical practice.     RATIONALE FOR DETERMINATION   89-year-old male patient's condition necessitates an extended inpatient hospital stay due to several complex medical issues that require close monitoring and management. His recent development of a left psoas muscle hematoma, likely secondary to anticoagulation therapy for chronic atrial fibrillation, indicates a need for careful adjustment of his anticoagulant regimen, close monitoring of his hemoglobin levels, and consultation with orthopedic services. This situation alone requires careful balancing between preventing further hematoma expansion and avoiding thromboembolic risks, which is best managed in an inpatient setting. In addition, the patient's recent history of acute on chronic hypoxic respiratory failure could potentially deteriorate with acute issues and demands close monitoring. Managing these conditions in an outpatient setting or under observation may increase the risk of adverse outcomes, including uncontrolled bleeding, thromboembolic events, or rapid deterioration of respiratory status, each of which could potentially lead to critical conditions such as shock, stroke, or respiratory failure. These complexities underline the need for a multi-disciplinary, inpatient approach for the best patient outcomes.  The expected length of stay at the time of admission was more than 2 nights because of the severity of illness, intensity of service provided, and risk  for adverse outcome. Inpatient admission is appropriate.         This document was produced using voice recognition software       The information on this document is developed by the utilization review team in order for the business office to ensure compliance. This only denotes the appropriateness of proper admission status and does not reflect the quality of care rendered.   The definitions of Inpatient Status and Observation Status used in making the determination above are those provided in the CMS Coverage Manual, Chapter 1 and Chapter 6, section 70.4.   Sincerely,   POWER PEREA MD   System Medical Director   Utilization Management   [facility].

## 2023-06-16 NOTE — PROVIDER NOTIFICATION
MD Notification    Notified Person: MD    Notified Person Name: Mohsin Salih    Notification Date/Time:06/15/23 @4531    Notification Interaction:"[employer]" messaging    Purpose of Notification:[address] Pt is requesting Trazodone for sleep, offered Melatonin but refused. Pt is constipated, refused senna. Do you want to order something else?       Orders Received:I am ok with trazodone 25 mg one time dose, system is down, couldn't put order in.      Comments:Order put in.

## 2023-06-16 NOTE — PLAN OF CARE
[id number]    A&Ox4. VSS on RA. Complaints of L Hip pain, denies interventions, T/R q2h. Up to commode Ax2 gait belt and walker. Inc- external catheter in place. Regular diet. Mepilex to sacrum and middle back. No BM this shift, refused senna. Scattered bruising and skin tears. +2 edema in bilateral lower extremities and feet. GREGORIO visualize under LLE dressing. Discharge plans pending.

## 2023-06-16 NOTE — PROGRESS NOTES
RECEIVING UNIT ED HANDOFF REVIEW    ED Nurse Handoff Report was reviewed by: Bijal Dang RN on Leanne 15, 2023 at 7:47 PM

## 2023-06-16 NOTE — PROGRESS NOTES
[facility]    Medicine Progress Note - Hospitalist Service    Date of Admission:  6/15/2023    Assessment & Plan   Ayaan Herrera is a 89 year old male with PMHx of hypertension, hyperlipidemia, chronic afib on anticoagulation with warfarin, hx of SSS s/p ppm, cardiomyopathy, HFrEF, prostate cancer, BPH, glaucoma, insomnia and multinodular goiter.     He was recently admitted to [facility] from 6/6/23-6/9/23 for acute on suspected chronic hypoxic respiratory failure thought to be related to pneumonia and generalized weakness. Was started on abx. Intermittently required O2 during that stay.     Discharged to TCU. Had been rehabbing well at TCU. Seen by medical staff on 6/14 with plans to cont current treatments. On 6/15 AM, patient was noted to be ambulating. That afternoon, he needed assist of 2 getting up from the toilet and reported L sided hip pain. Staff noted he seemed generally weak. No recent injury. Workup in ED showed a L psoas muscle hematoma. Was admitted on 6/15/2023 for ongoing management.     L psoas hematoma  Generalized weakness  * Appears to have occurred spontaneously in the context of chronic anticoagulation use. No recent falls/injury.   -- Monitor hgb every 6 hours.  -- Hold warfarin, if s/sx of ongoing bleeding or further drop in hgb will consider vitamin K.  -- Orthopedic surgery consulted, appreciate their assistance.  -- PT consulted.  -- Care transitions consulted.  -- Likely discharge back to TCU when confident that hemoglobin is stable and no intervention is needed.    Acute blood loss anemia  Chronic normocytic anemia  * Hgb had been 10-11 during last stay. Noted to have low B12 and recommended starting replacement.   * Hgb 9.5 in the ER on 6/15/23, down to 8.3 on 6/16/23.  -- Monitor hgb every 6 hours.     Chronic atrial fibrillation  HFrEF, EF 40 to 45%  Hypertension   Hyperlipidemia   SSS s/p pacemaker placement  Moderate aortic valve stenosis  * Echo done 6/7/23  showed EF 40-45%, mild global hypokinesia, mod AV stenosis.   * Chronic and stable on amiodarone, Lasix, warfarin.   * EKG showed a V-paced rhythm this stay. INR mildly supratherpeutic on admission at 3.03.  -- Hold warfarin and allow INR to drift down. If s/sx of ongoing bleeding or further drop in hgb will need to consider vitamin K.  -- Continue PTA amiodarone and Lasix.     Hx of recent hypoxic respiratory failure dt suspected CAP  Possible chronic respiratory failure, possible sleep disordered breathing  * With recent hospital stay as above. Needing 2L NC HS at time of discharge, uses 0-3L NC prn during daytime. Discharged on 3d of cefuroxime and doxycycline (to complete 7d course of treatment). Has since completed course of abx. Respiratory status has remained stable.  -- Continue O2 as needed during day and 2L HS.     Incidental right exophytic renal mass on CT  in 2/2022  Benign prostatic hypertrophy   Prostate cancer   * CT scan last stay showed an unchanged exophytic right renal mass.   * Cont Flomax.  * OP urology followup.      Chronic diarrhea  * No issues noted during recent stay.      Chronic right shoulder pain  * Chronic and stable on Tylenol and tramadol.     Glaucoma  * Chronic and stable on home eye drop.       Diet: Combination Diet Regular Diet Adult    DVT Prophylaxis: Pneumatic Compression Devices  Chirinos Catheter: Not present  Lines: None     Cardiac Monitoring: None  Code Status: Full Code      Clinically Significant Risk Factors Present on Admission               # Drug Induced Coagulation Defect: home medication list includes an anticoagulant medication    # Hypertension: Noted on problem list               Disposition Plan      Expected Discharge Date: 06/19/2023      Destination: inpatient rehabilitation facility            Willi Messina MD  Hospitalist Service  [facility]  Securely message with [employer] (more info)  Text page via [employer] Paging/Directory    ______________________________________________________________________    Interval History   Ayaan Herrera was seen today. He feels better overall. No pain at rest, but gets left hip pain with activity. Denies fevers, chest pain, shortness of breath, nausea, abdominal pain. His ex-wife Omar was in the room with him today, discussed plan of care.    Physical Exam   Vital Signs: Temp: 97.7  F (36.5  C) Temp src: Oral BP: 114/46 Pulse: 77   Resp: 18 SpO2: 98 % O2 Device: Nasal cannula Oxygen Delivery: 2 LPM  Weight: 0 lbs 0 oz    Constitutional: awake, alert, cooperative, no apparent distress, laying in the hospital bed  Respiratory: no increased work of breathing, clear to auscultation bilaterally, no crackles or wheezing  Cardiovascular: regular rate and rhythm, normal S1 and S2, no murmur noted  GI: normal bowel sounds, soft, non-distended, non-tender  Skin: warm, dry  Musculoskeletal: lower extremity pitting edema present  Neurologic: awake, alert, answers questions appropriately, moves all extremities    Medical Decision Making       35 MINUTES SPENT BY ME on the date of service doing chart review, history, exam, documentation & further activities per the note.      Data     I have personally reviewed the following data over the past 24 hrs:    7.5  \   8.3 (L)   / 193     137 102 21.1 /  102 (H)   4.1 28 0.70 \       INR:  3.10 (H) PTT:  N/A   D-dimer:  N/A Fibrinogen:  N/A       Imaging results reviewed over the past 24 hrs:   Recent Results (from the past 24 hour(s))   XR Pelvis w Hip Left 1 View    Narrative    XR PELVIS AND HIP LEFT 1 VIEW 6/15/2023 4:23 PM     HISTORY: Evaluation of left>right hip pain    COMPARISON: None.       Impression    IMPRESSION:  Subtle sclerosis left sacral hollow which can be seen with an acute to  subacute nondisplaced left sacral insufficiency fracture.     Bones are demineralized.     Subacute nondisplaced fractures of the right inferior and superior  pubic rami are  suspected.     Nothing to suggest a dislocation of the left hip. Extensive  atherosclerotic vascular calcifications.     NOTE: ABNORMAL REPORT    THE DICTATION ABOVE DESCRIBES AN ABNORMAL REPORT FOR WHICH FOLLOW-UP  IS NEEDED.    COREEN GLEASON MD         SYSTEM ID:  [id number]   CT Pelvis Bone wo Contrast    Narrative    EXAM: CT PELVIS BONE WO CONTRAST  LOCATION: [facility]  DATE: 6/15/2023    INDICATION: abn XR, hip pain  COMPARISON: CT from 02/28/2022  TECHNIQUE: CT scan of the pelvis was performed without IV contrast. Multiplanar reformats were obtained. Dose reduction techniques were used.  CONTRAST: None.    FINDINGS:    BONES AND JOINTS:  No acute fracture is identified. Remote healed right superior and inferior pubic ramus fractures. Moderate bilateral hip and sacroiliac joint degenerative changes. Severe degenerative changes of the lower lumbar spine. Osteopenia. No significant joint   effusion.    SOFT TISSUES:  There is enlargement of the left iliopsoas muscle with adjacent fat stranding, most pronounced proximally in the left psoas muscle with increased attenuation; this is compatible with a hematoma. Mild diffuse body wall edema and presacral edema. Diffuse   muscle atrophy as expected for patient age. Extensive atherosclerosis.      Impression    IMPRESSION:  1.  There is enlargement of the left psoas muscle with increased attenuation compatible with a hematoma.  2.  No acute fracture or malalignment.  3.  Remote healed right superior and inferior pubic ramus fractures.  4.  Degenerative changes throughout the pelvis and lower lumbar spine.  5.  Other ancillary findings as described above.

## 2023-06-16 NOTE — PROGRESS NOTES
"   06/16/23 1100   Appointment Info   Signing Clinician's Name / Credentials (PT) Bhupinder Perez DPT   Living Environment   People in Home facility resident   Current Living Arrangements assisted living   Home Accessibility no concerns   Transportation Anticipated health plan transportation   Living Environment Comments Pt has been at [facility]. Pt reports he does live alone in a house with stairs.   Self-Care   Usual Activity Tolerance moderate   Current Activity Tolerance fair   Regular Exercise No   Equipment Currently Used at Home cane, straight;walker, rolling;wheelchair, manual;walker, standard   Fall history within last six months no   Activity/Exercise/Self-Care Comment Reports that he typically uses a 2WW vs SEC for mobility. Gets assistance at [facility] for meals, cleaning, and showers. Reports sometimes assistance w/ dressing himself. Reports that he typically can walk to the bathroom independently w/ a FWW. Pt reports he has not been able to ambulate in the TCU.   General Information   Onset of Illness/Injury or Date of Surgery 06/16/23   Referring Physician Ariella Buenrostro, DO   Patient/Family Therapy Goals Statement (PT) \"To get better\"   Pertinent History of Current Problem (include personal factors and/or comorbidities that impact the POC) Per Chart: Pt is a 89 yo male presenting with a L psoas hematoma. Was at TCU prior to current hospital admission.   Existing Precautions/Restrictions fall   Weight-Bearing Status - LLE full weight-bearing   Weight-Bearing Status - RLE full weight-bearing   Cognition   Orientation Status (Cognition) oriented x 4   Pain Assessment   Patient Currently in Pain Yes, see Vital Sign flowsheet  (L thigh pain)   Integumentary/Edema   Integumentary/Edema Comments hemosiderin deposits on BLEs   Posture    Posture Forward head position;Protracted shoulders   Range of Motion (ROM)   Range of Motion ROM is WFL   Strength (Manual Muscle Testing)   Strength (Manual Muscle Testing) " Able to perform R SLR;Deficits observed during functional mobility   Strength Comments LLE Hip Flexion: 2/5; RLE Hip Flexion: 3+/5   Bed Mobility   Comment, (Bed Mobility) Supine>sit w/ mod A x 1   Transfers   Comment, (Transfers) Sit>stand w/ FWW and min A x 1   Gait/Stairs (Locomotion)   [place] Level (Gait) contact guard   Assistive Device (Gait) walker, front-wheeled   Distance in Feet 5'   Distance in Feet (Gait) 10'   Balance   Balance Comments Good static sitting balance; pt unsteady with ambulation w/ FWW   Sensory Examination   Sensory Perception patient reports no sensory changes   Clinical Impression   Criteria for Skilled Therapeutic Intervention Yes, treatment indicated   PT Diagnosis (PT) Impaired gait   Influenced by the following impairments Decreased activity tolerance; decreased balance; decreased strength   Functional limitations due to impairments Impaired functional mobility   Clinical Presentation (PT Evaluation Complexity) Stable/Uncomplicated   Clinical Presentation Rationale Clinical judgement   Clinical Decision Making (Complexity) low complexity   Planned Therapy Interventions (PT) balance training;bed mobility training;gait training;patient/family education;strengthening;transfer training;progressive activity/exercise   Risk & Benefits of therapy have been explained evaluation/treatment results reviewed;care plan/treatment goals reviewed;risks/benefits reviewed;current/potential barriers reviewed;participants voiced agreement with care plan;participants included;patient   PT Total Evaluation Time   PT Eval, Low Complexity Minutes (39255) 10   Physical Therapy Goals   PT Frequency 3x/week   PT Predicted Duration/Target Date for Goal Attainment 06/21/23   PT Goals Bed Mobility;Transfers;Gait   PT: Bed Mobility Supine to/from sit;Supervision/stand-by assist   PT: Transfers Sit to/from stand;Assistive device;Supervision/stand-by assist   PT: Gait 100 feet;Rolling  walker;Supervision/stand-by assist   Therapeutic Activity   Therapeutic Activities: dynamic activities to improve functional performance Minutes (13359) 24   Symptoms Noted During/After Treatment Fatigue;Increased pain   Treatment Detail/Skilled Intervention Greeted pt supine in bed, agreed to PT. VSS on RA throughout session. Pt reporting of L thigh pain with all mobility. Pt performed supine>sit w/ mod A x 1, needing assist to safely lift RLE off of bed. Pt unable to volitionally move RLE off of bed. Once in sitting, pt able to scoot self to EOB and sit unsupported without LOB. Pt performed sit>stand x 4 w/ FWW and min A x 1, needing assist to stand fully upright. Verbal cues for hand placement. Pt unsteady in standing, needing intermittent assist to maintain balance. On third stand, pt able to perform 10 standing marches bilaterally w/ FWW and CGA. Pt ambulated ~10' w/ FWW and min A x 1 for safety. Shuffled, unsteady gait noted, unable to ambulate further safely. Pt returned to supine w/ mod A x 1, needing assist to safely lift BLEs back into bed. Total assist to boost up in bed. Pt ended session supine in bed, with all needs met and call light within reach.   Gait Training   Treatment Detail/Skilled Intervention Gait initiated, see TA for details   PT Discharge Planning   PT Plan Bed mobility; repeat sit>stands; gait w/ FWW and WC follow   PT Discharge Recommendation (DC Rec) Transitional Care Facility   PT Rationale for DC Rec Pt is below baseline. Pt currently requiring assist with all functional mobility. Pt presents with deficits in activity tolerance, balance, and strength. Due to these deficits, pt is a high falls risk and unable to ambulate >10' without assist at this time. Pt would benefit from continued skilled PT services via TCU to address deficits and improve IND with safety and functional mobility.   PT Brief overview of current status Supine>sit w/ mod A x 1; sit>stand w/ FWW and min A x 1; gait w/  FWW and min A x 1   Total Session Time   Timed Code Treatment Minutes 24   Total Session Time (sum of timed and untimed services) 34

## 2023-06-16 NOTE — PLAN OF CARE
Date&time:06/15/23 7077-9559  Summary:  Admitted with increased L hip pain, decreased appetite  and generalized weakness. Came from TCU.   Primary Diagnosis: L psoas hematoma, h/o HTN, A-Fib, mitral valve insufficiency, cardiomyopathy, prostate cancer  Orientation: A/OX4  Aggression Stop Light: green  Mobility: AX1 GB/Walker  Pain Management: Tylenol  Diet: Regular  Bowel/Bladder: Ext. Catheter, continent  Abnormal Lab/Assessments: Hgb-9.5, INR-3.1, RDW-16.7  Drain/Device/Wound: R PIV SL  Consults:   D/C Day/Goals/Place: TBD  Shift Note:   Received from ED at 2030 with L hip pain, decreased appetite and generalized weakness. 2 RN skin assessment done. Skin tear on LLE, 2+edema BLE, scattered bruises on both hands, legs and face. Redness with skin peeling at mid upper back. Mepilex applied. Preventive mepilex at bottom. Dressing changed at LLE. Taking pills whole with apple sauce. Pt is constipated, doesn't know when was his last BM, according to him, 5-6 days. Refused gerald Leung hospitalist for other options. Pt requested for [facility].

## 2023-06-16 NOTE — PROGRESS NOTES
Date&time:06/15/23 4598-2587  Summary:  Admitted with increased L hip pain, decreased appetite  and generalized weakness. Came from TCU.   Primary Diagnosis: L psoas hematoma, h/o HTN, A-Fib, mitral valve insufficiency, cardiomyopathy, prostate cancer  Orientation: A/OX4  Aggression Stop Light: green  Mobility: AX2 GB/Walker  Pain Management: Tramadol given for pain. Have Tylenol PRN  Diet: Regular  Bowel/Bladder: Ext. Catheter, continent external male catheter in use  Abnormal Lab/Assessments: Hgb-9.5, 8.3, 8.3 INR-3.10, RDW-16.9  Drain/Device/Wound: R PIV SL  Consults: Orthopedic, PT/SW  D/C Day/Goals/Place: TBD  Shift Note:     Assumed cares from 4885-8669. Pt is A&Ox 4, VSS on RA. Pt was bladder scanned  for 584, 481 and straight cath x2. Bilateral edema on LE. Timed Hgb for 1612 and 2200. Scattered busies presents face, upper and lower extremities. Mepilex change on coccyx. Tolerating oral intake without difficulity. BM X2 during the shift. Pt is able to make his needs know, bed alarm on for safety and call light within reach.    /59 (BP Location: Left arm)   Pulse 76   Temp 98.1  F (36.7  C) (Oral)   Resp 18   SpO2 91%

## 2023-06-16 NOTE — PROGRESS NOTES
CLINICAL NUTRITION SERVICES  -  ASSESSMENT NOTE    Recommendations Ordered by Registered Dietitian (RD):   Ordered Ensure Enlive vanilla at 2pm   Malnutrition:   % Weight Loss:  Weight loss does not meet criteria for malnutrition  % Intake:  <75% for >/= 3 months (moderate malnutrition)  Subcutaneous Fat Loss:  Orbital region Mild depletion and Upper arm region Moderate depletion  Muscle Loss:  Temporal region Moderate depletion and Clavicle bone region Moderate depletion, upper arm Moderate  Fluid Retention:  2+ BLE edema    Malnutrition Diagnosis: Severe malnutrition  In Context of:  Acute illness or injury  Chronic illness or disease     REASON FOR ASSESSMENT  Ayaan Herrera is a 89 year old male seen by Registered Dietitian for Admission Nutrition Risk Screen for answering yes to recently losing wt without trying (14-23 lbs) and eating poorly because of a decrease in appetite.    PMH of HTN, HLD, chronic afib, SSS, cardiomyopathy, HFrEF, prostate cancer, BPH, glaucoma.     Patient was recently admitted to [facility] from 6/6/23-6/9/23 for acute on suspected chronic hypoxic respiratory failure. Presents with generalized weakness and hip pain.    NUTRITION HISTORY  No previous RD notes on nutrition history.    Spoke with patient and previous wife at bedside. Previous wife said ever since his illness started around a year ago he has been eating less just from feeling sick and not having an appetite. He still eats almost all of his meals but if he doesn't like it he won't eat it, he has become more picky. No nausea, vomiting, just decreased appetites from feeling unwell in the past year. Suspect patient has been consuming <75% of intakes the past few months after discussion.    CURRENT NUTRITION ORDERS  Diet Order: Regular     Current Intake/Tolerance:  Has only ordered 1 meal, breakfast today per [facility]  1 recorded intake of 100% today 6/16    NUTRITION FOCUSED PHYSICAL ASSESSMENT FOR DIAGNOSING  "MALNUTRITION)  Yes    ANTHROPOMETRICS  Height: 5'8\"  Weight: 73.4 kg  BMI: 24.62  Weight Status:  Normal BMI  IBW: 70 kg  % IBW: 105%  Weight History: 9.8% wt loss in ~1 year. wt's seem to be stable around 72 kg recently.   Wt Readings from Last 10 Encounters:   06/15/23 73.4 kg (161 lb 14.4 oz)   06/14/23 72.1 kg (159 lb)   06/11/23 71 kg (156 lb 9.6 oz)   05/09/22 72.6 kg (160 lb)   03/31/22 81.4 kg (179 lb 8 oz)   03/31/22 81.4 kg (179 lb 8 oz)   03/28/22 80.4 kg (177 lb 4.8 oz)   03/21/22 82.1 kg (181 lb)   03/14/22 80.5 kg (177 lb 6.4 oz)   03/09/22 78.8 kg (173 lb 12.8 oz)   Per care everywhere:  72.1 kg (159 lb) 11/30/2022    LABS  Labs reviewed    MEDICATIONS  Lasix    ASSESSED NUTRITION NEEDS PER APPROVED PRACTICE GUIDELINES:  Dosing Weight 73 kg - wt upon admission  Estimated Energy Needs: 6270-7013 kcals (25-30 Kcal/Kg)  Justification: increased needs - possible chronic resp failure  Estimated Protein Needs: 88+ grams protein (1.2+ g pro/Kg)  Justification: preservation of lean body mass  Estimated Fluid Needs: 1 mL/kcal or per provider pending fluid status    NUTRITION DIAGNOSIS:  Inadequate oral intake related to poor appetite as evidenced by pt/previous wife note of poor appetite for the past year and evident muscle/subcutaneous fat loss.    NUTRITION INTERVENTIONS  Recommendations / Nutrition Prescription  Ordered Ensure vanilla at 2pm    Implementation  Nutrition education: Per Provider order if indicated   Medical Food Supplement - ordered    Nutrition Goals  Patient to consume >75% of nutritionally adequate meals and 1 supplement per day    MONITORING AND EVALUATION:  Progress towards goals will be monitored and evaluated per protocol and Practice Guidelines    Ariella Zhu  Clinical Dietitian - [facility]          "

## 2023-06-17 LAB
ANION GAP SERPL CALCULATED.3IONS-SCNC: 8 MMOL/L (ref 7–15)
BUN SERPL-MCNC: 19.9 MG/DL (ref 8–23)
CALCIUM SERPL-MCNC: 8.9 MG/DL (ref 8.2–9.6)
CHLORIDE SERPL-SCNC: 99 MMOL/L (ref 98–107)
CREAT SERPL-MCNC: 0.7 MG/DL (ref 0.67–1.17)
DEPRECATED HCO3 PLAS-SCNC: 27 MMOL/L (ref 22–29)
ERYTHROCYTE [DISTWIDTH] IN BLOOD BY AUTOMATED COUNT: 17 % (ref 10–15)
GFR SERPL CREATININE-BSD FRML MDRD: 88 ML/MIN/1.73M2
GLUCOSE SERPL-MCNC: 100 MG/DL (ref 70–99)
HCT VFR BLD AUTO: 27.1 % (ref 40–53)
HGB BLD-MCNC: 8.4 G/DL (ref 13.3–17.7)
HGB BLD-MCNC: 8.8 G/DL (ref 13.3–17.7)
INR PPP: 2.53 (ref 0.85–1.15)
MCH RBC QN AUTO: 26.7 PG (ref 26.5–33)
MCHC RBC AUTO-ENTMCNC: 31 G/DL (ref 31.5–36.5)
MCV RBC AUTO: 86 FL (ref 78–100)
PLATELET # BLD AUTO: 220 10E3/UL (ref 150–450)
POTASSIUM SERPL-SCNC: 4 MMOL/L (ref 3.4–5.3)
RBC # BLD AUTO: 3.15 10E6/UL (ref 4.4–5.9)
SODIUM SERPL-SCNC: 134 MMOL/L (ref 136–145)
WBC # BLD AUTO: 8.8 10E3/UL (ref 4–11)

## 2023-06-17 PROCEDURE — 85018 HEMOGLOBIN: CPT | Performed by: STUDENT IN AN ORGANIZED HEALTH CARE EDUCATION/TRAINING PROGRAM

## 2023-06-17 PROCEDURE — 36415 COLL VENOUS BLD VENIPUNCTURE: CPT | Performed by: STUDENT IN AN ORGANIZED HEALTH CARE EDUCATION/TRAINING PROGRAM

## 2023-06-17 PROCEDURE — 85027 COMPLETE CBC AUTOMATED: CPT | Performed by: HOSPITALIST

## 2023-06-17 PROCEDURE — 80048 BASIC METABOLIC PNL TOTAL CA: CPT | Performed by: HOSPITALIST

## 2023-06-17 PROCEDURE — 99232 SBSQ HOSP IP/OBS MODERATE 35: CPT | Performed by: STUDENT IN AN ORGANIZED HEALTH CARE EDUCATION/TRAINING PROGRAM

## 2023-06-17 PROCEDURE — 250N000013 HC RX MED GY IP 250 OP 250 PS 637: Performed by: INTERNAL MEDICINE

## 2023-06-17 PROCEDURE — 120N000001 HC R&B MED SURG/OB

## 2023-06-17 PROCEDURE — 85610 PROTHROMBIN TIME: CPT | Performed by: INTERNAL MEDICINE

## 2023-06-17 PROCEDURE — 36415 COLL VENOUS BLD VENIPUNCTURE: CPT | Performed by: INTERNAL MEDICINE

## 2023-06-17 RX ADMIN — NETARSUDIL 1 DROP: 0.2 SOLUTION/ DROPS OPHTHALMIC; TOPICAL at 22:18

## 2023-06-17 RX ADMIN — ACETAMINOPHEN 1000 MG: 500 TABLET ORAL at 22:18

## 2023-06-17 RX ADMIN — TAMSULOSIN HYDROCHLORIDE 0.4 MG: 0.4 CAPSULE ORAL at 20:17

## 2023-06-17 RX ADMIN — DORZOLAMIDE HYDROCHLORIDE AND TIMOLOL MALEATE 1 DROP: 20; 5 SOLUTION/ DROPS OPHTHALMIC at 08:01

## 2023-06-17 RX ADMIN — LATANOPROST 1 DROP: 50 SOLUTION/ DROPS OPHTHALMIC at 22:18

## 2023-06-17 RX ADMIN — BRIMONIDINE TARTRATE 1 DROP: 2 SOLUTION OPHTHALMIC at 08:16

## 2023-06-17 RX ADMIN — MICONAZOLE NITRATE: 2 POWDER TOPICAL at 08:16

## 2023-06-17 RX ADMIN — AMIODARONE HYDROCHLORIDE 200 MG: 200 TABLET ORAL at 08:17

## 2023-06-17 RX ADMIN — BRIMONIDINE TARTRATE 1 DROP: 2 SOLUTION OPHTHALMIC at 21:42

## 2023-06-17 RX ADMIN — FUROSEMIDE 40 MG: 40 TABLET ORAL at 08:17

## 2023-06-17 RX ADMIN — TRAMADOL HYDROCHLORIDE 25 MG: 50 TABLET, COATED ORAL at 20:24

## 2023-06-17 RX ADMIN — DORZOLAMIDE HYDROCHLORIDE AND TIMOLOL MALEATE 1 DROP: 20; 5 SOLUTION/ DROPS OPHTHALMIC at 20:17

## 2023-06-17 ASSESSMENT — ACTIVITIES OF DAILY LIVING (ADL)
ADLS_ACUITY_SCORE: 53
ADLS_ACUITY_SCORE: 52
ADLS_ACUITY_SCORE: 53
ADLS_ACUITY_SCORE: 52
ADLS_ACUITY_SCORE: 53
ADLS_ACUITY_SCORE: 52
ADLS_ACUITY_SCORE: 57

## 2023-06-17 NOTE — PROGRESS NOTES
Notified provider about indwelling eagle catheter discussed removal or continued need.    Did provider choose to remove indwelling eagle catheter? No    Provider's eagle indication for keeping indwelling eagle catheter: Retention.    Is there an order for indwelling eagle catheter? Yes    *If there is a plan to keep eagle catheter in place at discharge daily notification with provider is not necessary, but please add a notation in the treatment team sticky note that the patient will be discharging with the catheter.

## 2023-06-17 NOTE — PROGRESS NOTES
Date&time:06/17/23 0700-1930:  Summary:  Admitted with increased L hip pain, decreased appetite  and generalized weakness. Came from TCU.   Primary Diagnosis: L psoas hematoma, h/o HTN, A-Fib, mitral valve insufficiency, cardiomyopathy, prostate cancer  Orientation: A/OX4  Aggression Stop Light: green  Mobility: AX2 GB, pivot to BS.  Pain Management: Denies. PRN Tylenol & Tramadol available.  Diet: Regular with room service.  Bowel/Bladder: Continent of bowel.   Abnormal Lab/Assessments: Hgb- 8.3, 8.3, 8.2, 8.4  INR-2.53, RDW-17.0  Drain/Device/Wound: R PIV SL  Consults: Orthopedic, PT/SW  D/C Day/Goals/Place: TBD  Shift Note:     Assumed cares from [id number]. Pt is A&Ox 4, VSS on RA. Chirinos catheter in place and draining adequately. Hgb every 12 hours, next Hgb at 1800. Scattered bruises presents face, upper and lower extremities. Mepilex in place on coccyx.  BM X1 during the shift. Tolerating meals without any problems. Calls appropriately. Bed alarm on for safety and call light within reach.    /59 (BP Location: Left arm)   Pulse 72   Temp 97.9  F (36.6  C) (Oral)   Resp 16   SpO2 96%

## 2023-06-17 NOTE — PROGRESS NOTES
Hospitalist service cross-cover note:     Paged regarding urinary retention with bladder scan of 744 cc, third time requiring straight cath so will place indwelling Chirinos catheter.    Buster Lopez MD   Hospitalist  [phone]

## 2023-06-17 NOTE — PROGRESS NOTES
[facility]    Medicine Progress Note - Hospitalist Service    Date of Admission:  6/15/2023    Assessment & Plan   Ayaan Herrera is a 89 year old male with PMHx of hypertension, hyperlipidemia, chronic afib on anticoagulation with warfarin, hx of SSS s/p ppm, cardiomyopathy, HFrEF, prostate cancer, BPH, glaucoma, insomnia and multinodular goiter.     He was recently admitted to [facility] from 6/6/23-6/9/23 for acute on suspected chronic hypoxic respiratory failure thought to be related to pneumonia and generalized weakness. Was started on abx. Intermittently required O2 during that stay.     Discharged to TCU. Had been rehabbing well at TCU. Seen by medical staff on 6/14 with plans to cont current treatments. On 6/15 AM, patient was noted to be ambulating. That afternoon, he needed assist of 2 getting up from the toilet and reported L sided hip pain. Staff noted he seemed generally weak. No recent injury. Workup in ED showed a L psoas muscle hematoma. Was admitted on 6/15/2023 for ongoing management.     L psoas hematoma  Generalized weakness  * Appears to have occurred spontaneously in the context of chronic anticoagulation use. No recent falls/injury.     - Since initial drop after admission hb mostly stable at mid 8s. Will continue q12h checks  - continue to hold warfarin until we are sure of stability, ortho also consulted and yet to see  - PT following with recommendations back to TCU    Acute blood loss anemia  Chronic normocytic anemia  * Hgb had been 10-11 during last stay. Noted to have low B12 and recommended starting replacement.   * Hgb 9.5 in the ER on 6/15/23, down to 8.3 on 6/16/23.  - stable last 24 hours  - monitor q12h check     Chronic atrial fibrillation  HFrEF, EF 40 to 45%  Hypertension   Hyperlipidemia   SSS s/p pacemaker placement  Moderate aortic valve stenosis  * Echo done 6/7/23 showed EF 40-45%, mild global hypokinesia, mod AV stenosis.   * Chronic and stable on  amiodarone, Lasix, warfarin.   * EKG showed a V-paced rhythm this stay. INR mildly supratherpeutic on admission at 3.03.  -- Hold warfarin and allow INR to drift down. Will need plan to resume at some point.  -- Continue PTA amiodarone and Lasix.     Hx of recent hypoxic respiratory failure dt suspected CAP  Possible chronic respiratory failure, possible sleep disordered breathing  * With recent hospital stay as above. Needing 2L NC HS at time of discharge, uses 0-3L NC prn during daytime. Discharged on 3d of cefuroxime and doxycycline (to complete 7d course of treatment). Has since completed course of abx. Respiratory status has remained stable.  -- Continue O2 as needed during day and 2L HS.     Incidental right exophytic renal mass on CT  in 2/2022  Benign prostatic hypertrophy   Prostate cancer   * CT scan last stay showed an unchanged exophytic right renal mass.   * Cont Flomax.  * OP urology followup.      Chronic diarrhea  * No issues noted during recent stay.      Chronic right shoulder pain  * Chronic and stable on Tylenol and tramadol.     Glaucoma  * Chronic and stable on home eye drop.       Diet: Combination Diet Regular Diet Adult  Snacks/Supplements Adult: Ensure Enlive; Between Meals  Room Service    DVT Prophylaxis: Pneumatic Compression Devices  Chirinos Catheter: PRESENT, indication: Retention  Lines: None     Cardiac Monitoring: None  Code Status: Full Code      Clinically Significant Risk Factors               # Coagulation Defect: INR = 2.53 (Ref range: 0.85 - 1.15) and/or PTT = N/A, will monitor for bleeding    # Hypertension: Noted on problem list         # Severe Malnutrition: based on nutrition assessment, PRESENT ON ADMISSION        Disposition Plan      Expected Discharge Date: 06/19/2023      Destination: inpatient rehabilitation facility            Jazmine Ernst DO  Hospitalist Service  [facility]  Securely message with [employer] (more info)  Text page via [employer]  Sying/y   ______________________________________________________________________    Interval History   Assumed care today. He is pleasant. He reports ongoing pain and weakness but it is not changed since coming here. No pain when just sitting or lying only with activity and movement. No respiratory issues. No dizziness or feeling lightheaded    Physical Exam   Vital Signs: Temp: 97.8  F (36.6  C) Temp src: Oral BP: 125/61 Pulse: 76   Resp: 16 SpO2: 94 % O2 Device: None (Room air)    Weight: 0 lbs 0 oz    Constitutional: awake, alert, cooperative, no apparent distress, laying in the hospital bed  Respiratory: no increased work of breathing, clear to auscultation bilaterally, no crackles or wheezing  Cardiovascular: regular rate and rhythm, normal S1 and S2, no murmur noted  GI: normal bowel sounds, soft, non-distended, non-tender  Skin: warm, dry  Musculoskeletal: lower extremity pitting edema present  Neurologic: awake, alert, answers questions appropriately, moves all extremities    Medical Decision Making       45 MINUTES SPENT BY ME on the date of service doing chart review, history, exam, documentation & further activities per the note.      Data     I have personally reviewed the following data over the past 24 hrs:    8.8  \   8.4 (L)   / 220     134 (L) 99 19.9 /  100 (H)   4.0 27 0.70 \       INR:  2.53 (H) PTT:  N/A   D-dimer:  N/A Fibrinogen:  N/A       Imaging results reviewed over the past 24 hrs:   No results found for this or any previous visit (from the past 24 hour(s)).

## 2023-06-17 NOTE — CARE PLAN
A&Ox4. VSS on room air. Regular diet. Up to commode Ax2 gait belt and walker. Difficulty voiding. Indwelling catheter placed this morning after 3rd consecutive bladder scan had 744cc retained. 600cc Output after eagle placed. Mepilex to sacrum and back. Right PIV saline locked. Bilateral Lower extremity edema.

## 2023-06-17 NOTE — PLAN OF CARE
Goal Outcome Evaluation:    1930 - 2330    Pt is A&Ox 4, Admitted with increased L hip pain, VSS on RA. Pt was bladder scanned on previous shift and straight cathed x2.  Bladder scanned at 2230 for 264.  AX2 w/GB/Walker.   External male catheter in place.  Bilateral edema on LE.  Timed Hgb lab q 8 hours.  Scattered bruises on  face, upper and lower extremities. Mepilex changed on coccyx. Tolerating oral intake without difficulity.  No BM during shift. R PIV SLPt is able to make his needs known, bed alarm on for safety and call light within reach.    Consults: Orthopedic, PT/OT, Social work

## 2023-06-18 LAB
ALBUMIN UR-MCNC: NEGATIVE MG/DL
APPEARANCE UR: CLEAR
BILIRUB UR QL STRIP: NEGATIVE
CAOX CRY #/AREA URNS HPF: ABNORMAL /HPF
COLOR UR AUTO: ABNORMAL
GLUCOSE UR STRIP-MCNC: NEGATIVE MG/DL
HGB BLD-MCNC: 7.8 G/DL (ref 13.3–17.7)
HGB BLD-MCNC: 8.5 G/DL (ref 13.3–17.7)
HGB UR QL STRIP: ABNORMAL
HYALINE CASTS: 4 /LPF
INR PPP: 2.29 (ref 0.85–1.15)
KETONES UR STRIP-MCNC: NEGATIVE MG/DL
LEUKOCYTE ESTERASE UR QL STRIP: NEGATIVE
MUCOUS THREADS #/AREA URNS LPF: PRESENT /LPF
NITRATE UR QL: NEGATIVE
PH UR STRIP: 6.5 [PH] (ref 5–7)
RBC URINE: 130 /HPF
SP GR UR STRIP: 1.01 (ref 1–1.03)
UROBILINOGEN UR STRIP-MCNC: NORMAL MG/DL
WBC URINE: <1 /HPF

## 2023-06-18 PROCEDURE — 99232 SBSQ HOSP IP/OBS MODERATE 35: CPT | Performed by: STUDENT IN AN ORGANIZED HEALTH CARE EDUCATION/TRAINING PROGRAM

## 2023-06-18 PROCEDURE — 250N000013 HC RX MED GY IP 250 OP 250 PS 637: Performed by: INTERNAL MEDICINE

## 2023-06-18 PROCEDURE — 85018 HEMOGLOBIN: CPT | Performed by: STUDENT IN AN ORGANIZED HEALTH CARE EDUCATION/TRAINING PROGRAM

## 2023-06-18 PROCEDURE — 85610 PROTHROMBIN TIME: CPT | Performed by: INTERNAL MEDICINE

## 2023-06-18 PROCEDURE — 81001 URINALYSIS AUTO W/SCOPE: CPT | Performed by: STUDENT IN AN ORGANIZED HEALTH CARE EDUCATION/TRAINING PROGRAM

## 2023-06-18 PROCEDURE — 36415 COLL VENOUS BLD VENIPUNCTURE: CPT | Performed by: INTERNAL MEDICINE

## 2023-06-18 PROCEDURE — 36415 COLL VENOUS BLD VENIPUNCTURE: CPT | Performed by: STUDENT IN AN ORGANIZED HEALTH CARE EDUCATION/TRAINING PROGRAM

## 2023-06-18 PROCEDURE — 120N000001 HC R&B MED SURG/OB

## 2023-06-18 RX ADMIN — DORZOLAMIDE HYDROCHLORIDE AND TIMOLOL MALEATE 1 DROP: 20; 5 SOLUTION/ DROPS OPHTHALMIC at 08:14

## 2023-06-18 RX ADMIN — NETARSUDIL 1 DROP: 0.2 SOLUTION/ DROPS OPHTHALMIC; TOPICAL at 23:09

## 2023-06-18 RX ADMIN — FUROSEMIDE 40 MG: 40 TABLET ORAL at 08:11

## 2023-06-18 RX ADMIN — TRAMADOL HYDROCHLORIDE 25 MG: 50 TABLET, COATED ORAL at 22:21

## 2023-06-18 RX ADMIN — DORZOLAMIDE HYDROCHLORIDE AND TIMOLOL MALEATE 1 DROP: 20; 5 SOLUTION/ DROPS OPHTHALMIC at 20:30

## 2023-06-18 RX ADMIN — LATANOPROST 1 DROP: 50 SOLUTION/ DROPS OPHTHALMIC at 23:05

## 2023-06-18 RX ADMIN — BRIMONIDINE TARTRATE 1 DROP: 2 SOLUTION OPHTHALMIC at 10:46

## 2023-06-18 RX ADMIN — TAMSULOSIN HYDROCHLORIDE 0.4 MG: 0.4 CAPSULE ORAL at 20:30

## 2023-06-18 RX ADMIN — AMIODARONE HYDROCHLORIDE 200 MG: 200 TABLET ORAL at 08:11

## 2023-06-18 RX ADMIN — MICONAZOLE NITRATE: 2 POWDER TOPICAL at 08:14

## 2023-06-18 RX ADMIN — BRIMONIDINE TARTRATE 1 DROP: 2 SOLUTION OPHTHALMIC at 21:50

## 2023-06-18 RX ADMIN — MICONAZOLE NITRATE: 2 POWDER TOPICAL at 20:30

## 2023-06-18 ASSESSMENT — ACTIVITIES OF DAILY LIVING (ADL)
ADLS_ACUITY_SCORE: 53
ADLS_ACUITY_SCORE: 53
ADLS_ACUITY_SCORE: 51
ADLS_ACUITY_SCORE: 53
ADLS_ACUITY_SCORE: 53
ADLS_ACUITY_SCORE: 55
ADLS_ACUITY_SCORE: 53
ADLS_ACUITY_SCORE: 55
ADLS_ACUITY_SCORE: 53
ADLS_ACUITY_SCORE: 53

## 2023-06-18 NOTE — PROGRESS NOTES
Notified provider about indwelling eagle catheter discussed removal or continued need.    Did provider choose to remove indwelling eagle catheter? No    Provider's eagle indication for keeping indwelling eagle catheter: Retention.    Is there an order for indwelling eagle catheter?: Yes    *If there is a plan to keep eagle catheter in place at discharge daily notification with provider is not necessary, but please add a notation in the treatment team sticky note that the patient will be discharging with the catheter.

## 2023-06-18 NOTE — PLAN OF CARE
Goal Outcome Evaluation:       Date&time:06/18/23 0700-1930:  Summary:  Admitted with increased L hip pain, decreased appetite  and generalized weakness. Came from TCU.   Primary Diagnosis: L psoas hematoma, h/o HTN, A-Fib, mitral valve insufficiency, cardiomyopathy, prostate cancer  Orientation: A/OX4, forgetful.   Aggression Stop Light: green  Mobility: AX2 GB with otto Raphael  Pain Management: Denies. PRN Tylenol & Tramadol available.  Diet: Regular with room service.  Bowel/Bladder: Continent of bowel.   Abnormal Lab/Assessments: Hgb- 7.8   Drain/Device/Wound: R PIV SL  , mepi to coccyx and middle back changed.   Consults: Orthopedic, PT/SW  D/C Day/Goals/Place: TBD  Shift Note:     0700-1930:    A/Ox4, forgetful at times. VSS on RA. Denies pain this shift, no PRN pain med needed. Up A2 with otto Raphael to BSC. On reg/ room service with fair appetite. Continent of bowel, 1 large BM this shift. Chirinos catheter in place and draining adequately.  Scattered bruises presents face, upper and lower extremities. Mepilex in place on coccyx and middle back changed.  BM X1 during the shift. Calls appropriately. Discharge to TCU pending.

## 2023-06-18 NOTE — PROGRESS NOTES
Called consult line to inquire about Ortho Surgery consult placed 6/15 as pt has not been seen by Ortho yet. Per consult services, the wrong provider was paged for this consult. The correct provider was paged for a routine consult.

## 2023-06-18 NOTE — PROGRESS NOTES
[facility]    Medicine Progress Note - Hospitalist Service    Date of Admission:  6/15/2023    Assessment & Plan   Ayaan Herrera is a 89 year old male with PMHx of hypertension, hyperlipidemia, chronic afib on anticoagulation with warfarin, hx of SSS s/p ppm, cardiomyopathy, HFrEF, prostate cancer, BPH, glaucoma, insomnia and multinodular goiter.     He was recently admitted to [facility] from 6/6/23-6/9/23 for acute on suspected chronic hypoxic respiratory failure thought to be related to pneumonia and generalized weakness. Was started on abx. Intermittently required O2 during that stay.     Discharged to TCU. Had been rehabbing well at TCU. Seen by medical staff on 6/14 with plans to cont current treatments. On 6/15 AM, patient was noted to be ambulating. That afternoon, he needed assist of 2 getting up from the toilet and reported L sided hip pain. Staff noted he seemed generally weak. No recent injury. Workup in ED showed a L psoas muscle hematoma. Was admitted on 6/15/2023 for ongoing management.     L psoas hematoma  Generalized weakness  * Appears to have occurred spontaneously in the context of chronic anticoagulation use. No recent falls/injury.     - most recent check this morning Hb at 7.8 mostly stable last 24 hours with possible slow drift down  - continue to hold warfarin no plans to give vitamin K at this point, let INR trend down  - monitor Hb q12h  - PT following with recommendations back to TCU, patient very weak now with pain in his L hip on flexion    Acute blood loss anemia  Chronic normocytic anemia  * Hgb had been 10-11 during last stay. Noted to have low B12 and recommended starting replacement.   * Hgb 9.5 in the ER on 6/15/23, down to 8.3 on 6/16/23.  - see above     Chronic atrial fibrillation  HFrEF, EF 40 to 45%  Hypertension   Hyperlipidemia   SSS s/p pacemaker placement  Moderate aortic valve stenosis  * Echo done 6/7/23 showed EF 40-45%, mild global hypokinesia,  mod AV stenosis.   * Chronic and stable on amiodarone, Lasix, warfarin.   * EKG showed a V-paced rhythm this stay. INR mildly supratherpeutic on admission at 3.03.  -- Holding warfarin and allow INR to drift down.   -- Continue PTA amiodarone and Lasix.     Hx of recent hypoxic respiratory failure dt suspected CAP  Possible chronic respiratory failure, possible sleep disordered breathing  * With recent hospital stay as above. Needing 2L NC HS at time of discharge, uses 0-3L NC prn during daytime. Discharged on 3d of cefuroxime and doxycycline (to complete 7d course of treatment). Has since completed course of abx. Respiratory status has remained stable.  -- Continue O2 as needed during day and 2L HS.     Incidental right exophytic renal mass on CT  in 2/2022  Benign prostatic hypertrophy   Prostate cancer   Urinary retention  * CT scan last admission showed an unchanged exophytic right renal mass. OP urology follow up planned as patient follows regularly for prostate cancer suvellience  * Cont Flomax.  - eagle place for retention 06/16  - if patient is able to get OOB and moving will plan for voiding trial tomorrow     Chronic diarrhea  * No issues noted during recent stay.      Chronic right shoulder pain  * Chronic and stable on Tylenol and tramadol.     Glaucoma  * Chronic and stable on home eye drop.    Incidental pulmonary nodule  Seen on imaging 06/06. Should have 3 month follow up imaging    AAA  3.5cm and unchanged from previous  - OP surveillance    T11 compression fracture  Presented to hospital 02/2022 for falls. Found to have acute T11 compression fracture and discharged to rehab.  - denies pain but recent imaging does describe worsening sequale        Diet: Combination Diet Regular Diet Adult  Snacks/Supplements Adult: Ensure Enlive; Between Meals  Room Service    DVT Prophylaxis: Pneumatic Compression Devices  Eagle Catheter: PRESENT, indication: Retention  Lines: None     Cardiac Monitoring:  None  Code Status: Full Code      Clinically Significant Risk Factors               # Coagulation Defect: INR = 2.29 (Ref range: 0.85 - 1.15) and/or PTT = N/A, will monitor for bleeding    # Hypertension: Noted on problem list         # Severe Malnutrition: based on nutrition assessment, PRESENT ON ADMISSION        Disposition Plan      Expected Discharge Date: 06/19/2023      Destination: inpatient rehabilitation facility            Jazmine Ernst DO  Hospitalist Service  [facility]  Securely message with [employer] (more info)  Text page via [employer] Paging/Directory   ______________________________________________________________________    Interval History   Patient reports he did not get out of bed much yesterday. It is too painful. He tried to flex his leg for me with limited ability. No breathing issues. He reports urinary frequency despite the eagle.     Physical Exam   Vital Signs: Temp: 97.8  F (36.6  C) Temp src: Oral BP: (!) 141/79 Pulse: 74   Resp: 16 SpO2: 95 % O2 Device: None (Room air)    Weight: 0 lbs 0 oz    Constitutional: awake, alert, cooperative, no apparent distress, laying in the hospital bed  Respiratory: no increased work of breathing, clear to auscultation bilaterally, no crackles or wheezing  Cardiovascular: regular rate and rhythm, normal S1 and S2, no murmur noted  GI: normal bowel sounds, soft, non-distended, non-tender  Skin: warm, dry  Musculoskeletal: unable to flex L leg more than minimally off the bed due to pain strength 5/5 but stops  Neurologic: awake, alert, answers questions appropriately, moves all extremities    Medical Decision Making       45 MINUTES SPENT BY ME on the date of service doing chart review, history, exam, documentation & further activities per the note.      Data     I have personally reviewed the following data over the past 24 hrs:    N/A  \   7.8 (L)   / N/A     N/A N/A N/A /  N/A   N/A N/A N/A \       INR:  2.29 (H) PTT:  N/A    D-dimer:  N/A Fibrinogen:  N/A       Imaging results reviewed over the past 24 hrs:   No results found for this or any previous visit (from the past 24 hour(s)).

## 2023-06-18 NOTE — PLAN OF CARE
Goal Outcome Evaluation:         Orientation: A&Ox4     Vitals/Tele: vss on ra     IV Access/drains: PIV SL     Diet: reg     Mobility:otto steady     GI/: continent of bowel, eagle for retention     Wound/Skin: mepi on coccyx     Discharge Plan: pending       See Flow sheets for assessment

## 2023-06-19 LAB
ANION GAP SERPL CALCULATED.3IONS-SCNC: 8 MMOL/L (ref 7–15)
BUN SERPL-MCNC: 15.9 MG/DL (ref 8–23)
CALCIUM SERPL-MCNC: 8.9 MG/DL (ref 8.2–9.6)
CHLORIDE SERPL-SCNC: 99 MMOL/L (ref 98–107)
CREAT SERPL-MCNC: 0.64 MG/DL (ref 0.67–1.17)
DEPRECATED HCO3 PLAS-SCNC: 27 MMOL/L (ref 22–29)
GFR SERPL CREATININE-BSD FRML MDRD: 90 ML/MIN/1.73M2
GLUCOSE SERPL-MCNC: 100 MG/DL (ref 70–99)
HGB BLD-MCNC: 8.6 G/DL (ref 13.3–17.7)
INR PPP: 1.68 (ref 0.85–1.15)
POTASSIUM SERPL-SCNC: 4.4 MMOL/L (ref 3.4–5.3)
SODIUM SERPL-SCNC: 134 MMOL/L (ref 136–145)

## 2023-06-19 PROCEDURE — 80048 BASIC METABOLIC PNL TOTAL CA: CPT | Performed by: STUDENT IN AN ORGANIZED HEALTH CARE EDUCATION/TRAINING PROGRAM

## 2023-06-19 PROCEDURE — 250N000013 HC RX MED GY IP 250 OP 250 PS 637: Performed by: INTERNAL MEDICINE

## 2023-06-19 PROCEDURE — 85018 HEMOGLOBIN: CPT | Performed by: STUDENT IN AN ORGANIZED HEALTH CARE EDUCATION/TRAINING PROGRAM

## 2023-06-19 PROCEDURE — 120N000001 HC R&B MED SURG/OB

## 2023-06-19 PROCEDURE — 36415 COLL VENOUS BLD VENIPUNCTURE: CPT | Performed by: STUDENT IN AN ORGANIZED HEALTH CARE EDUCATION/TRAINING PROGRAM

## 2023-06-19 PROCEDURE — 85610 PROTHROMBIN TIME: CPT | Performed by: INTERNAL MEDICINE

## 2023-06-19 PROCEDURE — 250N000013 HC RX MED GY IP 250 OP 250 PS 637: Performed by: STUDENT IN AN ORGANIZED HEALTH CARE EDUCATION/TRAINING PROGRAM

## 2023-06-19 PROCEDURE — 99232 SBSQ HOSP IP/OBS MODERATE 35: CPT | Performed by: STUDENT IN AN ORGANIZED HEALTH CARE EDUCATION/TRAINING PROGRAM

## 2023-06-19 RX ORDER — TAMSULOSIN HYDROCHLORIDE 0.4 MG/1
0.8 CAPSULE ORAL EVERY EVENING
Status: DISCONTINUED | OUTPATIENT
Start: 2023-06-19 | End: 2023-06-20 | Stop reason: HOSPADM

## 2023-06-19 RX ADMIN — MICONAZOLE NITRATE: 2 POWDER TOPICAL at 08:43

## 2023-06-19 RX ADMIN — LATANOPROST 1 DROP: 50 SOLUTION/ DROPS OPHTHALMIC at 21:23

## 2023-06-19 RX ADMIN — DORZOLAMIDE HYDROCHLORIDE AND TIMOLOL MALEATE 1 DROP: 20; 5 SOLUTION/ DROPS OPHTHALMIC at 08:43

## 2023-06-19 RX ADMIN — AMIODARONE HYDROCHLORIDE 200 MG: 200 TABLET ORAL at 08:11

## 2023-06-19 RX ADMIN — DORZOLAMIDE HYDROCHLORIDE AND TIMOLOL MALEATE 1 DROP: 20; 5 SOLUTION/ DROPS OPHTHALMIC at 19:58

## 2023-06-19 RX ADMIN — MICONAZOLE NITRATE: 2 POWDER TOPICAL at 19:57

## 2023-06-19 RX ADMIN — BRIMONIDINE TARTRATE 1 DROP: 2 SOLUTION OPHTHALMIC at 08:41

## 2023-06-19 RX ADMIN — BRIMONIDINE TARTRATE 1 DROP: 2 SOLUTION OPHTHALMIC at 19:57

## 2023-06-19 RX ADMIN — TAMSULOSIN HYDROCHLORIDE 0.8 MG: 0.4 CAPSULE ORAL at 19:54

## 2023-06-19 RX ADMIN — FUROSEMIDE 40 MG: 40 TABLET ORAL at 08:11

## 2023-06-19 RX ADMIN — NETARSUDIL 1 DROP: 0.2 SOLUTION/ DROPS OPHTHALMIC; TOPICAL at 21:23

## 2023-06-19 ASSESSMENT — ACTIVITIES OF DAILY LIVING (ADL)
ADLS_ACUITY_SCORE: 49
ADLS_ACUITY_SCORE: 51
ADLS_ACUITY_SCORE: 49
ADLS_ACUITY_SCORE: 51
ADLS_ACUITY_SCORE: 49
ADLS_ACUITY_SCORE: 51
ADLS_ACUITY_SCORE: 51
ADLS_ACUITY_SCORE: 49

## 2023-06-19 NOTE — CONSULTS
[facility]    Orthopedic Consultation    Ayaan Herrera MRN# [id number]   Age: 89 year old YOB: 1932     Date of Admission: 6/15/2023    Reason for consult: Left psoas hematoma        Requesting provider: Ariella Buenrostro        Level of consult: One-time consult to assist in determining a diagnosis, recommend an appropriate treatment plan and place orders           Assessment and Plan:   Assessment:   Left psoas hematoma       Plan:   Conservative management  WBAT Left LE with walker  Continue to trend hemoglobin   Closely monitor INR and hemoglobin when coumadin is resumed.     Follow-up with primary care provider            Chief Complaint:   Left groin pain.          History of Present Illness:   Ayaan Herrera is a 89 year old male with chronic afib on anticoagulation with warfarin.     Patient had been rehabbing well at TCU following hospital admission for resp failure.  On 6/15 AM, patient was noted to be ambulating and later that afternoon, he needed assist of 2 getting up from the toilet and reported left sided hip pain.  No recent injury. Workup in ED showed a left psoas muscle hematoma. Was admitted on 6/15/2023 for ongoing management.  Presenting INR >3.   Since admission, patient's pain and hemoglobin gradually improving. Coumadin held.           Past Medical History:     Past Medical History:   Diagnosis Date     Atrial fibrillation (H)      BPH (benign prostatic hyperplasia)      Dyslipidemia      History of basal cell carcinoma      HTN (hypertension)      Nonsenile cataract      NSVT (nonsustained ventricular tachycardia) (H)      POAG (primary open-angle glaucoma)     Advanced      Prostate cancer (H)      Psoriasis      Sinus node dysfunction (H)              Past Surgical History:     Past Surgical History:   Procedure Laterality Date     CATARACT IOL, RT/LT  1985/1996    RE/LE     EP PACEMAKER  2007    [employer]     GLAUCOMA SURGERY  01/01/1996    LE      GLAUCOMA SURGERY  1997    Bleb revision LE     LASER SURGERY OF EYE  10/14/04 & 05    SLT RE     PUNCTAL CLOSURE, PLUGS  2000    BE     TURP  2003             Social History:     Social History     Tobacco Use     Smoking status: Former     Packs/day: 0.20     Years: 25.00     Pack years: 5.00     Types: Cigarettes     Start date: 1955     Quit date: 1980     Years since quittin.4     Smokeless tobacco: Former   Vaping Use     Vaping status: Not on file   Substance Use Topics     Alcohol use: Yes     Comment: occasionally             Family History:     Family History   Problem Relation Age of Onset     Glaucoma Father      Hypertension Other      C.A.D. Other      Macular Degeneration Mother              Immunizations:     VACCINE/DOSE   Diptheria   DPT   DTAP   HBIG   Hepatitis A   Hepatitis B   HIB   Influenza   Measles   Meningococcal   MMR   Mumps   Pneumococcal   Polio   Rubella   Small Pox   TDAP   Varicella   Zoster             Allergies:     Allergies   Allergen Reactions     Zithromax [Azithromycin] Diarrhea            Amoxicillin Diarrhea     Amoxicillin-Pot Clavulanate GI Disturbance             Medications:     Current Facility-Administered Medications   Medication     acetaminophen (TYLENOL) tablet 1,000 mg     acetaminophen (TYLENOL) tablet 1,000 mg     amiodarone (PACERONE) tablet 200 mg     brimonidine (ALPHAGAN) 0.2 % ophthalmic solution 1 drop     dorzolamide-timolol (COSOPT) ophthalmic solution 1 drop     furosemide (LASIX) tablet 40 mg     hypromellose-dextran (ARTIFICAL TEARS) 0.1-0.3 % ophthalmic solution 1 drop     ketotifen (ZADITOR) 0.025 % ophthalmic solution 1 drop     latanoprost (XALATAN) 0.005 % ophthalmic solution 1 drop     lidocaine (LMX4) cream     lidocaine 1 % 0.1-1 mL     melatonin tablet 1 mg     miconazole (MICATIN) 2 % powder     naloxone (NARCAN) injection 0.2 mg    Or     naloxone (NARCAN) injection 0.4 mg    Or     naloxone  (NARCAN) injection 0.2 mg    Or     naloxone (NARCAN) injection 0.4 mg     netarsudil (RHOPRESSA) 0.02 % ophthalmic solution 1 drop     ondansetron (ZOFRAN ODT) ODT tab 4 mg    Or     ondansetron (ZOFRAN) injection 4 mg     senna-docusate (SENOKOT-S/PERICOLACE) 8.6-50 MG per tablet 1 tablet    Or     senna-docusate (SENOKOT-S/PERICOLACE) 8.6-50 MG per tablet 2 tablet     sodium chloride (PF) 0.9% PF flush 3 mL     sodium chloride (PF) 0.9% PF flush 3 mL     tamsulosin (FLOMAX) capsule 0.8 mg     traMADol (ULTRAM) half-tab 25 mg             Review of Systems:   ROS:  10 point ROS neg other than the symptoms noted above in the HPI.            Physical Exam:   All vitals have been reviewed  Patient Vitals for the past 24 hrs:   BP Temp Temp src Pulse Resp SpO2   06/19/23 0700 129/68 98.2  F (36.8  C) Oral 70 16 92 %   06/18/23 2306 104/56 97.8  F (36.6  C) Oral 72 16 94 %   06/18/23 1541 135/78 98.7  F (37.1  C) Oral 77 16 95 %       Intake/Output Summary (Last 24 hours) at 6/19/2023 1449  Last data filed at 6/19/2023 1000  Gross per 24 hour   Intake 480 ml   Output 1150 ml   Net -670 ml         Physical Exam   Temp: 98.2  F (36.8  C) Temp src: Oral BP: 129/68 Pulse: 70   Resp: 16 SpO2: 92 % O2 Device: None (Room air)    Vital Signs with Ranges  Temp:  [97.8  F (36.6  C)-98.7  F (37.1  C)] 98.2  F (36.8  C)  Pulse:  [70-77] 70  Resp:  [16] 16  BP: (104-135)/(56-78) 129/68  SpO2:  [92 %-95 %] 92 %  0 lbs 0 oz    Constitutional: Alert, appropriate, following commands.  HEENT: Head atraumatic normocephalic. Pupils equal round and reactive to light.  Respiratory: Unlabored breathing no audible wheeze  Cardiovascular: Regular rate and rhythm per pulses  GI: Abdomen non-distended.  Lymph/Hematologic: No lymphadenopathy in areas examined  Genitourinary: No eagle  Skin: No rashes, no cyanosis, no edema.  Musculoskeletal:   On physical exam of bilateral lower extremities, patient's legs are resting in a neutral position.  No  skin abrasions or ecchymosis seen at the hip.  Denies pain with hip rotation bilaterally.  Pain on the left with active hip flexion.  Straight leg raise negative for radiculopathy but does increase groin pain.  Patient is able to dorsi and plantarflex both ankles with equal resistance.  Able to flex bilateral knees.  Full sensation to light touch on the left versus right lower extremities.  Distal pulses are intact and equal bilaterally. Unable to hold left leg in full extension due to pain  Neurologic: normal without focal findings, mental status, speech normal  Neuropsychiatric: stable             Data:   All laboratory data reviewed  Results for orders placed or performed during the hospital encounter of 06/15/23   XR Pelvis w Hip Left 1 View     Status: None    Narrative    XR PELVIS AND HIP LEFT 1 VIEW 6/15/2023 4:23 PM     HISTORY: Evaluation of left>right hip pain    COMPARISON: None.       Impression    IMPRESSION:  Subtle sclerosis left sacral hollow which can be seen with an acute to  subacute nondisplaced left sacral insufficiency fracture.     Bones are demineralized.     Subacute nondisplaced fractures of the right inferior and superior  pubic rami are suspected.     Nothing to suggest a dislocation of the left hip. Extensive  atherosclerotic vascular calcifications.     NOTE: ABNORMAL REPORT    THE DICTATION ABOVE DESCRIBES AN ABNORMAL REPORT FOR WHICH FOLLOW-UP  IS NEEDED.    COREEN GLEASON MD         SYSTEM ID:  [id number]   CT Pelvis Bone wo Contrast     Status: None    Narrative    EXAM: CT PELVIS BONE WO CONTRAST  LOCATION: [facility]  DATE: 6/15/2023    INDICATION: abn XR, hip pain  COMPARISON: CT from 02/28/2022  TECHNIQUE: CT scan of the pelvis was performed without IV contrast. Multiplanar reformats were obtained. Dose reduction techniques were used.  CONTRAST: None.    FINDINGS:    BONES AND JOINTS:  No acute fracture is identified. Remote healed right superior  and inferior pubic ramus fractures. Moderate bilateral hip and sacroiliac joint degenerative changes. Severe degenerative changes of the lower lumbar spine. Osteopenia. No significant joint   effusion.    SOFT TISSUES:  There is enlargement of the left iliopsoas muscle with adjacent fat stranding, most pronounced proximally in the left psoas muscle with increased attenuation; this is compatible with a hematoma. Mild diffuse body wall edema and presacral edema. Diffuse   muscle atrophy as expected for patient age. Extensive atherosclerosis.      Impression    IMPRESSION:  1.  There is enlargement of the left psoas muscle with increased attenuation compatible with a hematoma.  2.  No acute fracture or malalignment.  3.  Remote healed right superior and inferior pubic ramus fractures.  4.  Degenerative changes throughout the pelvis and lower lumbar spine.  5.  Other ancillary findings as described above.     Basic metabolic panel     Status: Abnormal   Result Value Ref Range    Sodium 137 136 - 145 mmol/L    Potassium 4.3 3.4 - 5.3 mmol/L    Chloride 98 98 - 107 mmol/L    Carbon Dioxide (CO2) 26 22 - 29 mmol/L    Anion Gap 13 7 - 15 mmol/L    Urea Nitrogen 24.0 (H) 8.0 - 23.0 mg/dL    Creatinine 0.75 0.67 - 1.17 mg/dL    Calcium 9.4 8.2 - 9.6 mg/dL    Glucose 112 (H) 70 - 99 mg/dL    GFR Estimate 86 >60 mL/min/1.73m2   UA with Microscopic reflex to Culture     Status: Abnormal    Specimen: Urine, Midstream   Result Value Ref Range    Color Urine Light Yellow Colorless, Straw, Light Yellow, Yellow    Appearance Urine Clear Clear    Glucose Urine Negative Negative mg/dL    Bilirubin Urine Negative Negative    Ketones Urine Negative Negative mg/dL    Specific Gravity Urine 1.013 1.003 - 1.035    Blood Urine Negative Negative    pH Urine 6.5 5.0 - 7.0    Protein Albumin Urine Negative Negative mg/dL    Urobilinogen Urine Normal Normal, 2.0 mg/dL    Nitrite Urine Negative Negative    Leukocyte Esterase Urine Negative  Negative    Mucus Urine Present (A) None Seen /LPF    RBC Urine 1 <=2 /HPF    WBC Urine 3 <=5 /HPF    Squamous Epithelials Urine <1 <=1 /HPF    Hyaline Casts Urine 8 (H) <=2 /LPF    Narrative    Urine Culture not indicated   CBC with platelets and differential     Status: Abnormal   Result Value Ref Range    WBC Count 10.9 4.0 - 11.0 10e3/uL    RBC Count 3.55 (L) 4.40 - 5.90 10e6/uL    Hemoglobin 9.5 (L) 13.3 - 17.7 g/dL    Hematocrit 30.3 (L) 40.0 - 53.0 %    MCV 85 78 - 100 fL    MCH 26.8 26.5 - 33.0 pg    MCHC 31.4 (L) 31.5 - 36.5 g/dL    RDW 16.7 (H) 10.0 - 15.0 %    Platelet Count 229 150 - 450 10e3/uL    % Neutrophils 79 %    % Lymphocytes 6 %    % Monocytes 12 %    % Eosinophils 2 %    % Basophils 0 %    % Immature Granulocytes 1 %    NRBCs per 100 WBC 0 <1 /100    Absolute Neutrophils 8.5 (H) 1.6 - 8.3 10e3/uL    Absolute Lymphocytes 0.7 (L) 0.8 - 5.3 10e3/uL    Absolute Monocytes 1.3 0.0 - 1.3 10e3/uL    Absolute Eosinophils 0.2 0.0 - 0.7 10e3/uL    Absolute Basophils 0.0 0.0 - 0.2 10e3/uL    Absolute Immature Granulocytes 0.1 <=0.4 10e3/uL    Absolute NRBCs 0.0 10e3/uL   INR     Status: Abnormal   Result Value Ref Range    INR 3.03 (H) 0.85 - 1.15   Hemoglobin     Status: Abnormal   Result Value Ref Range    Hemoglobin 8.3 (L) 13.3 - 17.7 g/dL   Basic metabolic panel     Status: Abnormal   Result Value Ref Range    Sodium 137 136 - 145 mmol/L    Potassium 4.1 3.4 - 5.3 mmol/L    Chloride 102 98 - 107 mmol/L    Carbon Dioxide (CO2) 28 22 - 29 mmol/L    Anion Gap 7 7 - 15 mmol/L    Urea Nitrogen 21.1 8.0 - 23.0 mg/dL    Creatinine 0.70 0.67 - 1.17 mg/dL    Calcium 9.0 8.2 - 9.6 mg/dL    Glucose 102 (H) 70 - 99 mg/dL    GFR Estimate 88 >60 mL/min/1.73m2   CBC with platelets     Status: Abnormal   Result Value Ref Range    WBC Count 7.5 4.0 - 11.0 10e3/uL    RBC Count 3.15 (L) 4.40 - 5.90 10e6/uL    Hemoglobin 8.3 (L) 13.3 - 17.7 g/dL    Hematocrit 27.0 (L) 40.0 - 53.0 %    MCV 86 78 - 100 fL    MCH 26.3 (L)  26.5 - 33.0 pg    MCHC 30.7 (L) 31.5 - 36.5 g/dL    RDW 16.9 (H) 10.0 - 15.0 %    Platelet Count 193 150 - 450 10e3/uL   INR     Status: Abnormal   Result Value Ref Range    INR 3.10 (H) 0.85 - 1.15   Hemoglobin     Status: Abnormal   Result Value Ref Range    Hemoglobin 8.2 (L) 13.3 - 17.7 g/dL   INR     Status: Abnormal   Result Value Ref Range    INR 2.53 (H) 0.85 - 1.15   CBC with platelets     Status: Abnormal   Result Value Ref Range    WBC Count 8.8 4.0 - 11.0 10e3/uL    RBC Count 3.15 (L) 4.40 - 5.90 10e6/uL    Hemoglobin 8.4 (L) 13.3 - 17.7 g/dL    Hematocrit 27.1 (L) 40.0 - 53.0 %    MCV 86 78 - 100 fL    MCH 26.7 26.5 - 33.0 pg    MCHC 31.0 (L) 31.5 - 36.5 g/dL    RDW 17.0 (H) 10.0 - 15.0 %    Platelet Count 220 150 - 450 10e3/uL   Basic metabolic panel     Status: Abnormal   Result Value Ref Range    Sodium 134 (L) 136 - 145 mmol/L    Potassium 4.0 3.4 - 5.3 mmol/L    Chloride 99 98 - 107 mmol/L    Carbon Dioxide (CO2) 27 22 - 29 mmol/L    Anion Gap 8 7 - 15 mmol/L    Urea Nitrogen 19.9 8.0 - 23.0 mg/dL    Creatinine 0.70 0.67 - 1.17 mg/dL    Calcium 8.9 8.2 - 9.6 mg/dL    Glucose 100 (H) 70 - 99 mg/dL    GFR Estimate 88 >60 mL/min/1.73m2   Hemoglobin     Status: Abnormal   Result Value Ref Range    Hemoglobin 8.8 (L) 13.3 - 17.7 g/dL   INR     Status: Abnormal   Result Value Ref Range    INR 2.29 (H) 0.85 - 1.15   Hemoglobin     Status: Abnormal   Result Value Ref Range    Hemoglobin 7.8 (L) 13.3 - 17.7 g/dL   Hemoglobin     Status: Abnormal   Result Value Ref Range    Hemoglobin 8.5 (L) 13.3 - 17.7 g/dL   UA Macroscopic with reflex to Microscopic and Culture     Status: Abnormal    Specimen: Urine, Chirinos Catheter   Result Value Ref Range    Color Urine Light Yellow Colorless, Straw, Light Yellow, Yellow    Appearance Urine Clear Clear    Glucose Urine Negative Negative mg/dL    Bilirubin Urine Negative Negative    Ketones Urine Negative Negative mg/dL    Specific Gravity Urine 1.011 1.003 - 1.035     Blood Urine Large (A) Negative    pH Urine 6.5 5.0 - 7.0    Protein Albumin Urine Negative Negative mg/dL    Urobilinogen Urine Normal Normal, 2.0 mg/dL    Nitrite Urine Negative Negative    Leukocyte Esterase Urine Negative Negative    Mucus Urine Present (A) None Seen /LPF    Calcium Oxalate Crystals Urine Few (A) None Seen /HPF    RBC Urine 130 (H) <=2 /HPF    WBC Urine <1 <=5 /HPF    Hyaline Casts Urine 4 (H) <=2 /LPF    Narrative    Urine Culture not indicated   INR     Status: Abnormal   Result Value Ref Range    INR 1.68 (H) 0.85 - 1.15   Hemoglobin     Status: Abnormal   Result Value Ref Range    Hemoglobin 8.6 (L) 13.3 - 17.7 g/dL   Basic metabolic panel     Status: Abnormal   Result Value Ref Range    Sodium 134 (L) 136 - 145 mmol/L    Potassium 4.4 3.4 - 5.3 mmol/L    Chloride 99 98 - 107 mmol/L    Carbon Dioxide (CO2) 27 22 - 29 mmol/L    Anion Gap 8 7 - 15 mmol/L    Urea Nitrogen 15.9 8.0 - 23.0 mg/dL    Creatinine 0.64 (L) 0.67 - 1.17 mg/dL    Calcium 8.9 8.2 - 9.6 mg/dL    Glucose 100 (H) 70 - 99 mg/dL    GFR Estimate 90 >60 mL/min/1.73m2   EKG 12-lead, tracing only     Status: None   Result Value Ref Range    Systolic Blood Pressure  mmHg    Diastolic Blood Pressure  mmHg    Ventricular Rate 72 BPM    Atrial Rate 78 BPM    MA Interval  ms    QRS Duration 190 ms     ms    QTc 521 ms    P Axis  degrees    R AXIS -58 degrees    T Axis 106 degrees    Interpretation ECG       Ventricular-paced rhythm  Abnormal ECG  When compared with ECG of 06-JUN-2023 13:56,  No significant change was found  Confirmed by GENERATED REPORT, COMPUTER ([id number]),  Ryan Gómez ([id number]) on 6/15/2023 11:34:11 PM     CBC with platelets differential     Status: Abnormal    Narrative    The following orders were created for panel order CBC with platelets differential.  Procedure                               Abnormality         Status                     ---------                               -----------         ------                      CBC with platelets and d...[[id number]]  Abnormal            Final result                 Please view results for these tests on the individual orders.          Attestation:  I have reviewed today's vital signs, notes, medications, labs and imaging with Dr. MARYJANE Perez.  Amount of time performed on this consult: 55 minutes.    Janis Mills PA-C

## 2023-06-19 NOTE — CONSULTS
Care Management Initial Consult    General Information  Assessment completed with: Patient,    Type of CM/SW Visit: Initial Assessment    Primary Care Provider verified and updated as needed: No (Patient does not have a PCP)   Readmission within the last 30 days: current reason for admission unrelated to previous admission      Reason for Consult: discharge planning  Advance Care Planning: Advance Care Planning Reviewed: present on chart          Communication Assessment  Patient's communication style: spoken language (English or Bilingual)    Hearing Difficulty or Deaf: no   Wear Glasses or Blind: yes    Cognitive  Cognitive/Neuro/Behavioral: WDL  Level of Consciousness: alert  Arousal Level: opens eyes spontaneously  Orientation: oriented x 4  Mood/Behavior: calm, cooperative  Best Language: 0 - No aphasia  Speech: clear    Living Environment:   People in home: alone     Current living Arrangements: residential facility  Name of Facility: [facility]   Able to return to prior arrangements: yes       Family/Social Support:  Care provided by: other (see comments) (By Agency)  Provides care for: no one, unable/limited ability to care for self  Marital Status: Single  Other (specify) (Friend Omar and Son Andrey)          Description of Support System: Supportive    Support Assessment: Adequate family and caregiver support    Current Resources:   Patient receiving home care services:       Community Resources:    Equipment currently used at home: walker, standard  Supplies currently used at home:      Employment/Financial:  Employment Status:          Financial Concerns:             Does the patient's insurance plan have a 3 day qualifying hospital stay waiver?  Yes   Will the waiver be used for post-acute placement? Yes    Lifestyle & Psychosocial Needs:  Social Determinants of Health     Tobacco Use: Medium Risk (6/14/2023)    Patient History      Smoking Tobacco Use: Former      Smokeless Tobacco Use: Former       Passive Exposure: Not on file   Alcohol Use: Not on file   Financial Resource Strain: Not on file   Food Insecurity: Not on file   Transportation Needs: Not on file   Physical Activity: Not on file   Stress: Not on file   Social Connections: Not on file   Intimate Partner Violence: Not on file   Depression: Not at risk (5/9/2022)    PHQ-2      PHQ-2 Score: 0   Housing Stability: Not on file       Functional Status:  Prior to admission patient needed assistance:              Mental Health Status:          Chemical Dependency Status:                Values/Beliefs:  Spiritual, Cultural Beliefs, [employer] Practices, Values that affect care:                 Additional Information:  Writer received consult for discharge planning. Patient is a 89 year old male with PMHx of hypertension, hyperlipidemia, chronic afib on anticoagulation with warfarin, hx of SSS s/p ppm, cardiomyopathy, HFrEF, prostate cancer, BPH, glaucoma, insomnia and multinodular goiter. He was recently admitted to [facility] from 6/6/23-6/9/23 for acute on suspected chronic hypoxic respiratory failure thought to be related to pneumonia and generalized weakness. Was started on abx. Intermittently required O2 during that stay. Discharged to TCU. Had been rehabbing well at TCU. Seen by medical staff on 6/14 with plans to cont current treatments. On 6/15 AM, patient was noted to be ambulating. That afternoon, he needed assist of 2 getting up from the toilet and reported L sided hip pain. Staff noted he seemed generally weak. No recent injury. Workup in ED showed a L psoas muscle hematoma. Was admitted on 6/15/2023 for ongoing management    Patient was spoken to at bedside with SABINO Justice present. Patient was in agreement with plan to go back to [facility]. Patient was notified that discharge would likely be tomorrow for him. Patient did not have a PCP that he regularly sees. Patient was in agreement with using transportation services to [facility] via wheelchair  cee. Patient feels as though his family is supportive and he is able to contact them with his needs. He originally was living by himself but had to go to a TCU. A phone call was made to Omar, who was notified of the discharge plan being tomorrow and she was in agreement. Son Kain was called and  was left notifying him of discharge tomorrow as well.      Kvng Richardson [facility]  Care [facility]

## 2023-06-19 NOTE — PROGRESS NOTES
Pt A&O x4, forgetful, Up x Anusha steady x2. VSS.RA. Denied pain. LLE dressing intact. Poor appetite. Eagle removed in the morning, pt failed the voiding trial, eagle replace in the afternoon. BM x1. Discharge to TCU pending.

## 2023-06-19 NOTE — PLAN OF CARE
Goal Outcome Evaluation:  Alert and oriented x4-forgetful. Assist of 2 w/ otto steady. PIV SL. VSS on RA. Continent-2 bowel movements. Chirinos in place. Pain managed with Tramadol x1. Discharge to TCU pending.

## 2023-06-19 NOTE — PROVIDER NOTIFICATION
MD Notification    Notified Person: MD    Notified Person Name: Jazmine Ernst    Notification Date/Time: 6/19/23 2:20 pm    Notification Interaction: Vocera    Purpose of Notification: Chirinos removed in the morning. Pt was up in the toilet x2, didn't void. Scan 420 ml. Do you want to just straight cath him or put the Chirinos back?  Orders Received: Replace the Chirinos.    Comments:

## 2023-06-19 NOTE — PROGRESS NOTES
[facility]    Medicine Progress Note - Hospitalist Service    Date of Admission:  6/15/2023    Assessment & Plan   Ayaan Herrera is a 89 year old male with PMHx of hypertension, hyperlipidemia, chronic afib on anticoagulation with warfarin, hx of SSS s/p ppm, cardiomyopathy, HFrEF, prostate cancer, BPH, glaucoma, insomnia and multinodular goiter.     He was recently admitted to [facility] from 6/6/23-6/9/23 for acute on suspected chronic hypoxic respiratory failure thought to be related to pneumonia and generalized weakness. Was started on abx. Intermittently required O2 during that stay.     Discharged to TCU. Had been rehabbing well at TCU. Seen by medical staff on 6/14 with plans to cont current treatments. On 6/15 AM, patient was noted to be ambulating. That afternoon, he needed assist of 2 getting up from the toilet and reported L sided hip pain. Staff noted he seemed generally weak. No recent injury. Workup in ED showed a L psoas muscle hematoma. Was admitted on 6/15/2023 for ongoing management.     L psoas hematoma  Generalized weakness  * Appears to have occurred spontaneously in the context of chronic anticoagulation use. No recent falls/injury.     - Hb stable ~8 since admission, daily monitoring  - continue to hold warfarin let INR trend down  - PT following with recommendations back to TCU, patient very weak now with pain in his L hip on flexion. This will take sometime to completely recover.    Acute blood loss anemia  Chronic normocytic anemia  * Hgb had been 10-11 during last stay. Noted to have low B12 and recommended starting replacement.   * Hgb 9.5 in the ER on 6/15/23, down to 8.3 on 6/16/23.  - see above     Chronic atrial fibrillation  HFrEF, EF 40 to 45%  Hypertension   Hyperlipidemia   SSS s/p pacemaker placement  Moderate aortic valve stenosis  * Echo done 6/7/23 showed EF 40-45%, mild global hypokinesia, mod AV stenosis.   * Chronic and stable on amiodarone, Lasix,  warfarin.   * EKG showed a V-paced rhythm this stay. INR mildly supratherpeutic on admission at 3.03.  -- Holding warfarin and allow INR to drift down.   -- Continue PTA amiodarone and Lasix.     Hx of recent hypoxic respiratory failure dt suspected CAP  Possible chronic respiratory failure, possible sleep disordered breathing  * With recent hospital stay as above. Needing 2L NC HS at time of discharge, uses 0-3L NC prn during daytime. Discharged on 3d of cefuroxime and doxycycline (to complete 7d course of treatment). Has since completed course of abx. Respiratory status has remained stable.  -- Continue O2 as needed during day and 2L HS.     Incidental right exophytic renal mass on CT  in 2/2022  Benign prostatic hypertrophy   Prostate cancer   Urinary retention  * CT scan last admission showed an unchanged exophytic right renal mass. OP urology follow up planned as patient follows regularly for prostate cancer suvellience  * Cont Flomax.  - eagle place for retention 06/16  - plan for voiding trial today     Chronic diarrhea  * No issues noted during recent stay.      Chronic right shoulder pain  * Chronic and stable on Tylenol and tramadol.     Glaucoma  * Chronic and stable on home eye drop.    Incidental pulmonary nodule  Seen on imaging 06/06. Should have 3 month follow up imaging    AAA  3.5cm and unchanged from previous  - OP surveillance    T11 compression fracture  Presented to hospital 02/2022 for falls. Found to have acute T11 compression fracture and discharged to rehab.  - denies pain but recent imaging does describe worsening sequale of the compression       Diet: Combination Diet Regular Diet Adult  Snacks/Supplements Adult: Ensure Enlive; Between Meals  Room Service    DVT Prophylaxis: Pneumatic Compression Devices  Eagle Catheter: PRESENT, indication: Retention  Lines: None     Cardiac Monitoring: None  Code Status: Full Code      Clinically Significant Risk Factors               # Coagulation  Defect: INR = 1.68 (Ref range: 0.85 - 1.15) and/or PTT = N/A, will monitor for bleeding    # Hypertension: Noted on problem list         # Severe Malnutrition: based on nutrition assessment, PRESENT ON ADMISSION        Disposition Plan      Expected Discharge Date: 06/20/2023      Destination: inpatient rehabilitation facility            Jazmine Ernst DO  Hospitalist Service  [facility]  Securely message with [employer] (more info)  Text page via [employer] Paging/Directory   ______________________________________________________________________    Interval History   Patient a bit sick of being in the hospital. Denies pain unless he is moving his leg. Wants to get out of bed. No breathing issues. Updated on plan to get eagle out today and follow    Physical Exam   Vital Signs: Temp: 98.2  F (36.8  C) Temp src: Oral BP: 129/68 Pulse: 70   Resp: 16 SpO2: 92 % O2 Device: None (Room air)    Weight: 0 lbs 0 oz    Constitutional: awake, alert, cooperative, no apparent distress, laying in the hospital bed  Respiratory: no increased work of breathing, clear to auscultation bilaterally, no crackles or wheezing  Cardiovascular: regular rate and rhythm, normal S1 and S2, no murmur noted  GI: normal bowel sounds, soft, non-distended, non-tender  Skin: warm, dry  Musculoskeletal: unable to flex L leg more than minimally off the bed due to pain strength 5/5 but stops just as his leg comes up  Neurologic: awake, alert, answers questions appropriately, moves all extremities  : eagle in place with clear yellow urine    Medical Decision Making       45 MINUTES SPENT BY ME on the date of service doing chart review, history, exam, documentation & further activities per the note.      Data     I have personally reviewed the following data over the past 24 hrs:    N/A  \   8.6 (L)   / N/A     134 (L) 99 15.9 /  100 (H)   4.4 27 0.64 (L) \       INR:  1.68 (H) PTT:  N/A   D-dimer:  N/A Fibrinogen:  N/A       Imaging  results reviewed over the past 24 hrs:   No results found for this or any previous visit (from the past 24 hour(s)).

## 2023-06-20 ENCOUNTER — LAB REQUISITION (OUTPATIENT)
Dept: LAB | Facility: CLINIC | Age: 88
End: 2023-06-20
Payer: COMMERCIAL

## 2023-06-20 VITALS
DIASTOLIC BLOOD PRESSURE: 69 MMHG | TEMPERATURE: 98 F | RESPIRATION RATE: 18 BRPM | OXYGEN SATURATION: 95 % | HEART RATE: 72 BPM | SYSTOLIC BLOOD PRESSURE: 121 MMHG

## 2023-06-20 DIAGNOSIS — I48.20 CHRONIC ATRIAL FIBRILLATION, UNSPECIFIED (H): ICD-10-CM

## 2023-06-20 LAB
ERYTHROCYTE [DISTWIDTH] IN BLOOD BY AUTOMATED COUNT: 17.3 % (ref 10–15)
HCT VFR BLD AUTO: 26.8 % (ref 40–53)
HGB BLD-MCNC: 8.5 G/DL (ref 13.3–17.7)
INR PPP: 1.49 (ref 0.85–1.15)
MCH RBC QN AUTO: 27.2 PG (ref 26.5–33)
MCHC RBC AUTO-ENTMCNC: 31.7 G/DL (ref 31.5–36.5)
MCV RBC AUTO: 86 FL (ref 78–100)
PLATELET # BLD AUTO: 276 10E3/UL (ref 150–450)
RBC # BLD AUTO: 3.12 10E6/UL (ref 4.4–5.9)
WBC # BLD AUTO: 11.4 10E3/UL (ref 4–11)

## 2023-06-20 PROCEDURE — 36415 COLL VENOUS BLD VENIPUNCTURE: CPT | Performed by: STUDENT IN AN ORGANIZED HEALTH CARE EDUCATION/TRAINING PROGRAM

## 2023-06-20 PROCEDURE — 250N000013 HC RX MED GY IP 250 OP 250 PS 637: Performed by: INTERNAL MEDICINE

## 2023-06-20 PROCEDURE — 99239 HOSP IP/OBS DSCHRG MGMT >30: CPT | Performed by: STUDENT IN AN ORGANIZED HEALTH CARE EDUCATION/TRAINING PROGRAM

## 2023-06-20 PROCEDURE — 85027 COMPLETE CBC AUTOMATED: CPT | Performed by: STUDENT IN AN ORGANIZED HEALTH CARE EDUCATION/TRAINING PROGRAM

## 2023-06-20 PROCEDURE — 85610 PROTHROMBIN TIME: CPT | Performed by: INTERNAL MEDICINE

## 2023-06-20 RX ORDER — TRAMADOL HYDROCHLORIDE 50 MG/1
25 TABLET ORAL EVERY 6 HOURS PRN
Qty: 10 TABLET | Refills: 0 | Status: SHIPPED | OUTPATIENT
Start: 2023-06-20 | End: 2023-07-10

## 2023-06-20 RX ADMIN — ACETAMINOPHEN 1000 MG: 500 TABLET ORAL at 12:31

## 2023-06-20 RX ADMIN — ACETAMINOPHEN 1000 MG: 500 TABLET ORAL at 03:12

## 2023-06-20 RX ADMIN — MICONAZOLE NITRATE: 2 POWDER TOPICAL at 08:17

## 2023-06-20 RX ADMIN — DORZOLAMIDE HYDROCHLORIDE AND TIMOLOL MALEATE 1 DROP: 20; 5 SOLUTION/ DROPS OPHTHALMIC at 08:17

## 2023-06-20 RX ADMIN — Medication 1 MG: at 03:19

## 2023-06-20 RX ADMIN — AMIODARONE HYDROCHLORIDE 200 MG: 200 TABLET ORAL at 08:16

## 2023-06-20 RX ADMIN — BRIMONIDINE TARTRATE 1 DROP: 2 SOLUTION OPHTHALMIC at 08:17

## 2023-06-20 RX ADMIN — FUROSEMIDE 40 MG: 40 TABLET ORAL at 08:17

## 2023-06-20 ASSESSMENT — ACTIVITIES OF DAILY LIVING (ADL)
ADLS_ACUITY_SCORE: 49

## 2023-06-20 NOTE — PROGRESS NOTES
Care Management Discharge Note    Discharge Date: 06/20/2023       Discharge Disposition: Transitional Care    Discharge Services: Transportation Services, Other (see comment) (TCU)    Discharge DME: None    Discharge Transportation: health plan transportation    Private pay costs discussed: transportation costs    Does the patient's insurance plan have a 3 day qualifying hospital stay waiver?  No    PAS Confirmation Code:    Patient/family educated on Medicare website which has current facility and service quality ratings: no    Education Provided on the Discharge Plan:    Persons Notified of Discharge Plans: Patient   Patient/Family in Agreement with the Plan: yes    Handoff Referral Completed: Yes    Additional Information:  Patient will be discharging back to [facility] today via Wheelchair between [id number]. Orders faxed. Georgette at [facility] update on discharge time. No PAS Needed as patient came from that facility.     EDUARDO Nguyen

## 2023-06-20 NOTE — PLAN OF CARE
Date&time:06/19/23 night shift [id number]  Summary:  Admitted with increased L hip pain, decreased appetite  and generalized weakness. Was admitted on 6/15 came from TCU. Workup in ED showed a left psoas muscle hematoma   h/o HTN, A-Fib, mitral valve insufficiency, cardiomyopathy, prostate cancer  Orientation: A/OX4, forgetful and confused at times.   Aggression Stop Light: green  Mobility: AX2 GB with otto Steady  Pain Management: pt Denies. PRN Tylenol & Tramadol available.  Diet: Regular with room service.  Bowel/Bladder: Incontinent of bowel. Small BM multiple times this shift. Urethral Catheter in placed.   Abnormal Lab/Assessments: Hgb- 8.6  Drain/Device/Wound: R PIV SL  , mepi to coccyx and foam dressing to middle back   Consults: Orthopedic, PT/SW  D/C Day/Goals/Place:  possible discharge to TCU today 6/20  Pt has mitts on  due to pt keep pulling his catheter lines out of secured device at midnight. At 0305, discontinued  due pt agreed to not pull lines.

## 2023-06-20 NOTE — DISCHARGE SUMMARY
[facility]  Hospitalist Discharge Summary      Date of Admission:  6/15/2023  Date of Discharge:  6/20/2023  Discharging Provider: Jazmine Ernst DO  Discharge Service: Hospitalist Service    Discharge Diagnoses   See below    Clinically Significant Risk Factors     # Severe Malnutrition: based on nutrition assessment      Follow-ups Needed After Discharge   Follow-up Appointments     Follow Up and recommended labs and tests      Follow up with your Nursing home physician.  The following labs/tests are   recommended: BMP and CBC in 2-3 days.   You will need a discussion with your outpatient physician regarding when   and if it is safe to resume warfarin.               Discharge Disposition   Discharged to home  Condition at discharge: Stable    Hospital Course   Ayaan Herrear is a 89 year old male with PMHx of hypertension, hyperlipidemia, chronic afib on anticoagulation with warfarin, hx of SSS s/p ppm, cardiomyopathy, HFrEF, prostate cancer, BPH, glaucoma, insomnia and multinodular goiter.      He was recently admitted to [facility] from 6/6/23-6/9/23 for acute on suspected chronic hypoxic respiratory failure thought to be related to pneumonia and generalized weakness. Was started on abx. Intermittently required O2 during that stay.      Discharged to TCU. Had been rehabbing well at TCU. Seen by medical staff on 6/14 with plans to cont current treatments. On 6/15 AM, patient was noted to be ambulating. That afternoon, he needed assist of 2 getting up from the toilet and reported L sided hip pain. Staff noted he seemed generally weak. No recent injury. Workup in ED showed a L psoas muscle hematoma. Was admitted on 6/15/2023 for ongoing management. While in the hospital his warfarin was held and Hb drifted down to mid 8 range. It was stable for several days at this level. Patient remained in pain with movement of his leg but will discharge back to TCU to continue ongoing rehab. He developed  new urinary retention and will discharge with eagle catheter and TOV at TCU. He will need a plan for when/if to resume warfarin with his outpatient provider. All this discussed with patient and his friend Omar at his request.    Detailed problems below     L psoas hematoma  Generalized weakness  * Appears to have occurred spontaneously in the context of chronic anticoagulation use. No recent falls/injury.      - Hb stable ~8 since admission, daily monitoring  - continue to hold warfarin let INR trend down  - PT following with recommendations back to TCU, patient very weak now with pain in his L hip on flexion. This will take sometime to completely recover.     Acute blood loss anemia  Chronic normocytic anemia  * Hgb had been 10-11 during last stay. Noted to have low B12 and recommended starting replacement.   * Hgb 9.5 in the ER on 6/15/23, down to 8.3 on 6/16/23.  - see above    Leukocytosis  Noted on day of discharge to have WBC of 11.4. Previously normal. No signs of infection. Ua obtained the day prior without infection. No hypoxia. No skin rashes. Patient had eagle replaced the day prior and may be stress related.  - recommend repeat CBC in a few days and close monitoring for infection     Chronic atrial fibrillation  HFrEF, EF 40 to 45%  Hypertension   Hyperlipidemia   SSS s/p pacemaker placement  Moderate aortic valve stenosis  * Echo done 6/7/23 showed EF 40-45%, mild global hypokinesia, mod AV stenosis.   * Chronic and stable on amiodarone, Lasix, warfarin.   * EKG showed a V-paced rhythm this stay. INR mildly supratherpeutic on admission at 3.03.  -- Holding warfarin and allow INR to drift down.   -- Continue PTA amiodarone and Lasix.     Hx of recent hypoxic respiratory failure dt suspected CAP  Possible chronic respiratory failure, possible sleep disordered breathing  * With recent hospital stay as above. Needing 2L NC HS at time of discharge, uses 0-3L NC prn during daytime. Discharged on 3d of  cefuroxime and doxycycline (to complete 7d course of treatment). Has since completed course of abx. Respiratory status has remained stable.  -- Continue O2 at night     Incidental right exophytic renal mass on CT  in 2/2022  Benign prostatic hypertrophy   Prostate cancer   Urinary retention  * CT scan last admission showed an unchanged exophytic right renal mass. OP urology follow up planned as patient follows regularly for prostate cancer suvellience  * Cont Flomax.  - eagle place for retention 06/16, failed voiding trial 06/19  - replaced eagle 06/19 and will discharge with eagle     Chronic diarrhea  * No issues noted during recent stay.      Chronic right shoulder pain  * Chronic and stable on Tylenol and tramadol.     Glaucoma  * Chronic and stable on home eye drop.     Incidental pulmonary nodule  Seen on imaging 06/06. Should have 3 month follow up imaging     AAA  3.5cm and unchanged from previous  - OP surveillance     T11 compression fracture  Presented to hospital 02/2022 for falls. Found to have acute T11 compression fracture and discharged to rehab.  - denies pain but recent imaging does describe worsening sequale of the compression          Consultations This Hospital Stay   ORTHOPEDIC SURGERY IP CONSULT  PHYSICAL THERAPY ADULT IP CONSULT  CARE MANAGEMENT / SOCIAL WORK IP CONSULT  PHYSICAL THERAPY ADULT IP CONSULT  OCCUPATIONAL THERAPY ADULT IP CONSULT    Code Status   Full Code    Time Spent on this Encounter   IJazmine DO, personally saw the patient today and spent greater than 30 minutes discharging this patient.       Jazmine Ernst DO  [place] [address] ONCOLOGY  [facility] [address]  [facility] [address]  Phone: [phone]  ______________________________________________________________________    Physical Exam   Vital Signs: Temp: 97.9  F (36.6  C) Temp src: Oral BP: 104/57 Pulse: 72   Resp: 18 SpO2: 97 % O2 Device: None (Room air)    Weight: 0 lbs 0 oz        Primary Care Physician   Physician No Ref-Primary    Discharge Orders      General info for SNF    Length of Stay Estimate: Short Term Care: Estimated # of Days <30  Condition at Discharge: Stable  Level of care:skilled   Rehabilitation Potential: Fair  Admission H&P remains valid and up-to-date: Yes  Recent Chemotherapy: N/A  Use Nursing Home Standing Orders: Yes     Mantoux instructions    Give two-step Mantoux (PPD) Per Facility Policy Yes     Follow Up and recommended labs and tests    Follow up with your Nursing home physician.  The following labs/tests are recommended: BMP and CBC in 2-3 days.   You will need a discussion with your outpatient physician regarding when and if it is safe to resume warfarin.     Reason for your hospital stay    You presented for sudden pain and weakness and were found to have a spontaneously hematoma or blood collection in your back near your kidneys. Likely due to your warfarin and elevated INR. This was held on admission and your Hb remained stable. You unfortunately are still very weak and need to rehab more. You also were found to have urinary retention that required a eagle     Intake and output    Every shift     Eagle catheter    To straight gravity drainage. Change catheter every 2 weeks and PRN for leaking or decreased urine output with signs of bladder distention. DO NOT change catheter without a specific Provider order IF diagnosis of benign prostatic hypertrophy (BPH), neurogenic bladder, or other urological conditions     Activity - Up with assistive device     Activity - Up with nursing assistance     Physical Therapy Adult Consult    Evaluate and treat as clinically indicated.    Reason:  chronic weakness with new significant pain and weakness due to L leg hematoma     Occupational Therapy Adult Consult    Evaluate and treat as clinically indicated.    Reason:  increase stamina with iADLs     Fall precautions     Diet    Follow this diet upon discharge: Orders  Placed This Encounter      Snacks/Supplements Adult: Ensure Enlive; Between Meals      Room Service      Combination Diet Regular Diet Adult       Significant Results and Procedures   Results for orders placed or performed during the hospital encounter of 06/15/23   XR Pelvis w Hip Left 1 View    Narrative    XR PELVIS AND HIP LEFT 1 VIEW 6/15/2023 4:23 PM     HISTORY: Evaluation of left>right hip pain    COMPARISON: None.       Impression    IMPRESSION:  Subtle sclerosis left sacral hollow which can be seen with an acute to  subacute nondisplaced left sacral insufficiency fracture.     Bones are demineralized.     Subacute nondisplaced fractures of the right inferior and superior  pubic rami are suspected.     Nothing to suggest a dislocation of the left hip. Extensive  atherosclerotic vascular calcifications.     NOTE: ABNORMAL REPORT    THE DICTATION ABOVE DESCRIBES AN ABNORMAL REPORT FOR WHICH FOLLOW-UP  IS NEEDED.    COREEN GLEASON MD         SYSTEM ID:  [id number]   CT Pelvis Bone wo Contrast    Narrative    EXAM: CT PELVIS BONE WO CONTRAST  LOCATION: [facility]  DATE: 6/15/2023    INDICATION: abn XR, hip pain  COMPARISON: CT from 02/28/2022  TECHNIQUE: CT scan of the pelvis was performed without IV contrast. Multiplanar reformats were obtained. Dose reduction techniques were used.  CONTRAST: None.    FINDINGS:    BONES AND JOINTS:  No acute fracture is identified. Remote healed right superior and inferior pubic ramus fractures. Moderate bilateral hip and sacroiliac joint degenerative changes. Severe degenerative changes of the lower lumbar spine. Osteopenia. No significant joint   effusion.    SOFT TISSUES:  There is enlargement of the left iliopsoas muscle with adjacent fat stranding, most pronounced proximally in the left psoas muscle with increased attenuation; this is compatible with a hematoma. Mild diffuse body wall edema and presacral edema. Diffuse   muscle atrophy as  expected for patient age. Extensive atherosclerosis.      Impression    IMPRESSION:  1.  There is enlargement of the left psoas muscle with increased attenuation compatible with a hematoma.  2.  No acute fracture or malalignment.  3.  Remote healed right superior and inferior pubic ramus fractures.  4.  Degenerative changes throughout the pelvis and lower lumbar spine.  5.  Other ancillary findings as described above.           Discharge Medications   Current Discharge Medication List      CONTINUE these medications which have CHANGED    Details   traMADol (ULTRAM) 50 MG tablet Take 0.5 tablets (25 mg) by mouth every 6 hours as needed for severe pain  Qty: 10 tablet, Refills: 0    Associated Diagnoses: Closed wedge compression fracture of T11 vertebra, initial encounter (H)         CONTINUE these medications which have NOT CHANGED    Details   !! acetaminophen (TYLENOL) 500 MG tablet Take 1,000 mg by mouth 2 times daily as needed for mild pain      !! acetaminophen (TYLENOL) 500 MG tablet Take 1,000 mg by mouth nightly as needed for mild pain      alendronate (FOSAMAX) 70 MG tablet Take 1 tablet (70 mg) by mouth every 7 days TAKE ON EMPTY STOMACH, REMAIN UPRIGHT FOR ONE HOUR AFTER TAKING  Qty: 4 tablet, Refills: 11    Associated Diagnoses: Senile osteoporosis      amiodarone (PACERONE) 200 MG tablet Take 1 tablet (200 mg) by mouth daily      bisacodyl (DULCOLAX) 10 MG suppository Place 10 mg rectally daily as needed for constipation      brimonidine (ALPHAGAN) 0.2 % ophthalmic solution Place 1 drop into both eyes 2 times daily      calcium carbonate (TUMS) 500 MG chewable tablet Take 1 chew tab by mouth 4 times daily as needed for heartburn      cyanocobalamin (CYANOCOBALAMIN) 1000 MCG tablet Take 1 tablet (1,000 mcg) by mouth daily    Associated Diagnoses: Vitamin B12 deficiency (non anemic)      diclofenac (VOLTAREN) 1 % topical gel Apply 2 g topically 2 times daily To both shoulders.  And twice daily as needed.       dimethicone-zinc oxide (DONTE PROTECT) external cream Apply topically daily Apply to upper inner thighs, groin, and scrotum.      dorzolamide-timolol (COSOPT) 2-0.5 % ophthalmic solution INSTILL 1 DROP IN BOTH EYES TWICE A DAY  Qty: 10 mL, Refills: 0    Comments: PLEASE SEND US A NEW ORDER  Associated Diagnoses: Acute maxillary sinusitis      ferrous sulfate (FE TABS) 325 (65 Fe) MG EC tablet Take 325 mg by mouth Take 1 tablet by mouth twice weekly on Monday and Friday at bedtime.      !! furosemide (LASIX) 20 MG tablet TAKE 1 TAB BY MOUTH ONCE DAILY  Qty: 30 tablet, Refills: 0    Comments: PLEASE AUTHORIZE REFILL. NO REFILLS REMAIN. THANK YOU!  Associated Diagnoses: Bilateral leg edema      !! furosemide (LASIX) 40 MG tablet Take 40 mg by mouth Take 1 tab by mouth on Tues, Thurs, Sat, and Sun.      hypromellose (ARTIFICIAL TEARS) 0.5 % SOLN ophthalmic solution Place 1 drop into both eyes 4 times daily as needed for dry eyes      ketotifen (ZADITOR) 0.025 % ophthalmic solution Place 1 drop into both eyes as needed      latanoprost (XALATAN) 0.005 % ophthalmic solution INSTILL 1 DROP INTO BOTH EYES AT BEDTIME  Qty: 2.5 mL, Refills: 5    Comments: PLEASE, AUTHORIZE REFILLS. THANKS!  Associated Diagnoses: Open-angle glaucoma of both eyes, unspecified glaucoma stage, unspecified open-angle glaucoma type      melatonin 5 MG tablet Take 5 mg by mouth nightly as needed      netarsudil (RHOPRESSA) 0.02 % ophthalmic solution Place 1 drop into both eyes At Bedtime      nystatin (MYCOSTATIN) 528362 UNIT/GM external powder Apply topically 2 times daily Apply to abdominal folds      polyethylene glycol (MIRALAX) 17 g packet Take 8.5 g by mouth daily      potassium chloride ER (K-TAB/KLOR-CON) 10 MEQ CR tablet TAKE 1 TAB BY MOUTH DAILY WITH EACH FUROSEMIDE DOSE.  Qty: 30 tablet, Refills: 5    Comments: PLEASE AUTHORIZE REFILL. NO REFILLS REMAIN. THANK YOU!  Associated Diagnoses: Bilateral leg edema      senna-docusate  (SENOKOT-S/PERICOLACE) 8.6-50 MG tablet Take 1 tablet by mouth daily as needed for constipation      simethicone (MYLICON) 80 MG chewable tablet Take 80 mg by mouth daily as needed for flatulence or cramping      tamsulosin (FLOMAX) 0.4 MG capsule TAKE 1 CAP BY MOUTH ONCE DAILY  Qty: 30 capsule, Refills: 5    Comments: PLEASE AUTHORIZE REFILL. NO REFILLS REMAIN. THANK YOU!  Associated Diagnoses: BPH with obstruction/lower urinary tract symptoms      Vitamin D, Cholecalciferol, 1000 UNITS TABS Take 1 tablet by mouth daily        !! - Potential duplicate medications found. Please discuss with provider.      STOP taking these medications       warfarin ANTICOAGULANT (COUMADIN) 1 MG tablet Comments:   Reason for Stopping:         warfarin ANTICOAGULANT (COUMADIN) 2.5 MG tablet Comments:   Reason for Stopping:         warfarin ANTICOAGULANT (COUMADIN) 3 MG tablet Comments:   Reason for Stopping:             Allergies   Allergies   Allergen Reactions     Zithromax [Azithromycin] Diarrhea            Amoxicillin Diarrhea     Amoxicillin-Pot Clavulanate GI Disturbance

## 2023-06-20 NOTE — PLAN OF CARE
Pleasantly confused. C/O neck pain managed with prn tylenol. Up with assist of 2 with GB/Walker, is slow to move. Chirinos in due to retention. Has few scabs on LEs, applied mepilex. Has reddened spot on mid back, applied mepilex. Had large BM. Appetite is good. Planning to discharge to TCU via w/c ride between [id number].

## 2023-06-20 NOTE — PROGRESS NOTES
Discharge Note    Patient discharged to TCU via private vehicle  and EMS/BLS accompanied by no family/friend .  IV: Discontinued  Prescriptions N/A.   Belongings reviewed and sent with patient.   Home medications returned to patient: NA  Equipment sent with: patient.   patient verbalizes understanding of discharge instructions. AVS given to discharge facility.

## 2023-06-21 ENCOUNTER — PATIENT OUTREACH (OUTPATIENT)
Dept: CARE COORDINATION | Facility: CLINIC | Age: 88
End: 2023-06-21

## 2023-06-21 ENCOUNTER — TRANSITIONAL CARE UNIT VISIT (OUTPATIENT)
Dept: GERIATRICS | Facility: CLINIC | Age: 88
End: 2023-06-21
Payer: COMMERCIAL

## 2023-06-21 VITALS
DIASTOLIC BLOOD PRESSURE: 65 MMHG | TEMPERATURE: 97.5 F | RESPIRATION RATE: 16 BRPM | HEART RATE: 70 BPM | SYSTOLIC BLOOD PRESSURE: 109 MMHG | WEIGHT: 161.1 LBS | BODY MASS INDEX: 24.41 KG/M2 | OXYGEN SATURATION: 93 % | HEIGHT: 68 IN

## 2023-06-21 DIAGNOSIS — I42.8 OTHER CARDIOMYOPATHY (H): ICD-10-CM

## 2023-06-21 DIAGNOSIS — R41.89 COGNITIVE IMPAIRMENT: ICD-10-CM

## 2023-06-21 DIAGNOSIS — N40.1 BENIGN PROSTATIC HYPERPLASIA WITH LOWER URINARY TRACT SYMPTOMS, SYMPTOM DETAILS UNSPECIFIED: ICD-10-CM

## 2023-06-21 DIAGNOSIS — I50.20 HEART FAILURE WITH REDUCED EJECTION FRACTION, NYHA CLASS I (H): ICD-10-CM

## 2023-06-21 DIAGNOSIS — D72.829 LEUKOCYTOSIS, UNSPECIFIED TYPE: ICD-10-CM

## 2023-06-21 DIAGNOSIS — C61 PROSTATE CANCER (H): ICD-10-CM

## 2023-06-21 DIAGNOSIS — R53.81 PHYSICAL DECONDITIONING: ICD-10-CM

## 2023-06-21 DIAGNOSIS — R33.9 URINARY RETENTION: ICD-10-CM

## 2023-06-21 DIAGNOSIS — I10 ESSENTIAL HYPERTENSION: ICD-10-CM

## 2023-06-21 DIAGNOSIS — S30.1XXD HEMATOMA OF LEFT PSOAS REGION TO ANTICOAGULANT THERAPY, SUBSEQUENT ENCOUNTER: Primary | ICD-10-CM

## 2023-06-21 DIAGNOSIS — D62 ANEMIA DUE TO BLOOD LOSS, ACUTE: ICD-10-CM

## 2023-06-21 DIAGNOSIS — K59.03 DRUG-INDUCED CONSTIPATION: ICD-10-CM

## 2023-06-21 DIAGNOSIS — I48.20 CHRONIC ATRIAL FIBRILLATION (H): ICD-10-CM

## 2023-06-21 PROCEDURE — 99309 SBSQ NF CARE MODERATE MDM 30: CPT | Performed by: NURSE PRACTITIONER

## 2023-06-21 NOTE — LETTER
"    6/21/2023        RE: Ayaan Herrera  [address]  [facility] [address]        [facility] GERIATRICS    Chief Complaint   Patient presents with     RECHECK     HPI:  Ayaan Herrera is a 89 year old  (10/31/1932), who is being seen today for an episodic care visit at: [facility]) [25]. Today's concern is:   rehospitalized 6/15/23-6/20/23 d/t left hip pain, found to have left psoas muscle hematoma: coumadin held ongoing,   Anemia: Hgb trended down to ~8, no transfusion  Leukocytosis: WBC 11.4K no signs of infection, follow in TCU  Urinary retention  BPH and prostate cancer: continue flomax, eagle in place  Chronic atrial fib, HFrEF EF 40-45%, mild global hypokinesia, HTN, moderate AS, SSS s/p PPM  Continue to hold lasix, continue PTA lasix and amiodarone  On exam today: on exam today patient states pain in left hip is rated 2/10, states pain has improved, denies fever, chills, cough, congestion, CP, palpitations, N/V/D or constipation states he had a BM today, eagle is intact with clear yellow returns    Allergies, and PMH/PSH reviewed in EPIC today.  REVIEW OF SYSTEMS:  10 point ROS of systems including Constitutional, Eyes, Respiratory, Cardiovascular, Gastroenterology, Genitourinary, Integumentary, Musculoskeletal, Psychiatric were all negative except for pertinent positives noted in my HPI.    Objective:   /65   Pulse 70   Temp 97.5  F (36.4  C)   Resp 16   Ht 1.727 m (5' 8\")   Wt 73.1 kg (161 lb 1.6 oz)   SpO2 93%   BMI 24.50 kg/m    GENERAL APPEARANCE:  Alert, in no distress  ENT:  Mouth and posterior oropharynx normal, moist mucous membranes, [place]  EYES:  EOM, conjunctivae, lids, pupils and irises normal, PERRL  RESP:  respiratory effort and palpation of chest normal, lungs clear to auscultation , no respiratory distress  CV:  Palpation and auscultation of heart done , regular rate and rhythm, no murmur, rub, or gallop, peripheral edema trace+ in LE " bilaterally  ABDOMEN:  normal bowel sounds, soft, nontender, no hepatosplenomegaly or other masses  M/S:   patient sitting up in w/c  SKIN:  Inspection of skin and subcutaneous tissue baseline  NEURO:   speech wnl  PSYCH:  affect and mood normal    Recent labs in [facility] reviewed by me today.  and   Most Recent 3 CBC's:Recent Labs   Lab Test 06/20/23  0708 06/19/23  0639 06/18/23  1723 06/17/23  1833 06/17/23  0606 06/16/23  2033 06/16/23  0659   WBC 11.4*  --   --   --  8.8  --  7.5   HGB 8.5* 8.6* 8.5*   < > 8.4*   < > 8.3*   MCV 86  --   --   --  86  --  86     --   --   --  220  --  193    < > = values in this interval not displayed.     Most Recent 3 BMP's:Recent Labs   Lab Test 06/19/23  0639 06/17/23  0606 06/16/23  0659   * 134* 137   POTASSIUM 4.4 4.0 4.1   CHLORIDE 99 99 102   CO2 27 27 28   BUN 15.9 19.9 21.1   CR 0.64* 0.70 0.70   ANIONGAP 8 8 7   BRENDAN 8.9 8.9 9.0   * 100* 102*       Assessment/Plan:  (S30.1XXD) Hematoma of left psoas region to anticoagulant therapy, subsequent encounter  (primary encounter diagnosis)  (R53.81) Physical deconditioning  Comment: acute/ongoing   Plan: PT and OT, hold coumadin, tramadol 25mg q 6 hours prn, change tylenol to 1000mg TID and qhs prn      (D62) Anemia due to blood loss, acute  Comment: acute/ongoing  Plan: Hgb 8.2 at discharge, follow Hgb    (D72.829) Leukocytosis, unspecified type  Comment: acute/ongoing  Plan: WBC elevated 11.4K, no signs of infection  Repeat CBC on Monday    (N40.1) Benign prostatic hyperplasia with lower urinary tract symptoms, symptom details unspecified  (R33.9) Urinary retention  (C61) Prostate cancer (H)  Comment: acute/ongoing  Plan: continue flomax 0.4mg QD, eagle in place will attempt TOV in one week    (I10) Essential hypertension  (I48.20) Chronic atrial fibrillation (H)  (I42.8) Other cardiomyopathy (H)  (I50.20) Heart failure with reduced ejection fraction, NYHA class I (H)  Comment: acute/ongoing  EF 40-45% with  global hypokinesia, moderate AS  Plan: daily weights, BMP follow, continue lasix 20mg QD and addisional Lasix 40mg Tues, Thurs, sat, Sun, KCL 10meq QD, amiodarone 200mg QD  Continue to hold coumadin    (K59.03) Drug-induced constipation  Comment: acute/ongoing  Plan: continue senna s 1 PO QD prn, miralax 8.5gm QD    (R41.89) Cognitive impairment  Comment: acute/ongoing  Plan: OT eval and Tx    MED REC REQUIRED  Post Medication Reconciliation Status: discharge medications reconciled and changed, per note/orders      Orders:  BMP and CBC on monday  Tylenol 1000mg TID scheduled and qhs prn    Electronically signed by: Tonya Lynn Haase, APRN CNP             Sincerely,        Tonya Lynn Haase, APRN CNP

## 2023-06-21 NOTE — PROGRESS NOTES
"[facility] GERIATRICS    Chief Complaint   Patient presents with     RECHECK     HPI:  Ayaan Herrera is a 89 year old  (10/31/1932), who is being seen today for an episodic care visit at: [facility] [25]. Today's concern is:   rehospitalized 6/15/23-6/20/23 d/t left hip pain, found to have left psoas muscle hematoma: coumadin held ongoing,   Anemia: Hgb trended down to ~8, no transfusion  Leukocytosis: WBC 11.4K no signs of infection, follow in TCU  Urinary retention  BPH and prostate cancer: continue flomax, eagle in place  Chronic atrial fib, HFrEF EF 40-45%, mild global hypokinesia, HTN, moderate AS, SSS s/p PPM  Continue to hold lasix, continue PTA lasix and amiodarone  On exam today: on exam today patient states pain in left hip is rated 2/10, states pain has improved, denies fever, chills, cough, congestion, CP, palpitations, N/V/D or constipation states he had a BM today, eagle is intact with clear yellow returns    Allergies, and PMH/PSH reviewed in EPIC today.  REVIEW OF SYSTEMS:  10 point ROS of systems including Constitutional, Eyes, Respiratory, Cardiovascular, Gastroenterology, Genitourinary, Integumentary, Musculoskeletal, Psychiatric were all negative except for pertinent positives noted in my HPI.    Objective:   /65   Pulse 70   Temp 97.5  F (36.4  C)   Resp 16   Ht 1.727 m (5' 8\")   Wt 73.1 kg (161 lb 1.6 oz)   SpO2 93%   BMI 24.50 kg/m    GENERAL APPEARANCE:  Alert, in no distress  ENT:  Mouth and posterior oropharynx normal, moist mucous membranes, [place]  EYES:  EOM, conjunctivae, lids, pupils and irises normal, PERRL  RESP:  respiratory effort and palpation of chest normal, lungs clear to auscultation , no respiratory distress  CV:  Palpation and auscultation of heart done , regular rate and rhythm, no murmur, rub, or gallop, peripheral edema trace+ in LE bilaterally  ABDOMEN:  normal bowel sounds, soft, nontender, no hepatosplenomegaly or other masses  M/S:   " patient sitting up in w/c  SKIN:  Inspection of skin and subcutaneous tissue baseline  NEURO:   speech wnl  PSYCH:  affect and mood normal    [facility] in [facility] reviewed by me today.  and   Most Recent 3 CBC's:Recent Labs   Lab Test 06/20/23  0708 06/19/23  0639 06/18/23  1723 06/17/23  1833 06/17/23  0606 06/16/23  2033 06/16/23  0659   WBC 11.4*  --   --   --  8.8  --  7.5   HGB 8.5* 8.6* 8.5*   < > 8.4*   < > 8.3*   MCV 86  --   --   --  86  --  86     --   --   --  220  --  193    < > = values in this interval not displayed.     Most Recent 3 BMP's:Recent Labs   Lab Test 06/19/23  0639 06/17/23  0606 06/16/23  0659   * 134* 137   POTASSIUM 4.4 4.0 4.1   CHLORIDE 99 99 102   CO2 27 27 28   BUN 15.9 19.9 21.1   CR 0.64* 0.70 0.70   ANIONGAP 8 8 7   BRENDAN 8.9 8.9 9.0   * 100* 102*       Assessment/Plan:  (S30.1XXD) Hematoma of left psoas region to anticoagulant therapy, subsequent encounter  (primary encounter diagnosis)  (R53.81) Physical deconditioning  Comment: acute/ongoing   Plan: PT and OT, hold coumadin, tramadol 25mg q 6 hours prn, change tylenol to 1000mg TID and qhs prn      (D62) Anemia due to blood loss, acute  Comment: acute/ongoing  Plan: Hgb 8.2 at discharge, follow Hgb    (D72.829) Leukocytosis, unspecified type  Comment: acute/ongoing  Plan: WBC elevated 11.4K, no signs of infection  Repeat CBC on Monday    (N40.1) Benign prostatic hyperplasia with lower urinary tract symptoms, symptom details unspecified  (R33.9) Urinary retention  (C61) Prostate cancer (H)  Comment: acute/ongoing  Plan: continue flomax 0.4mg QD, eagle in place will attempt TOV in one week    (I10) Essential hypertension  (I48.20) Chronic atrial fibrillation (H)  (I42.8) Other cardiomyopathy (H)  (I50.20) Heart failure with reduced ejection fraction, NYHA class I (H)  Comment: acute/ongoing  EF 40-45% with global hypokinesia, moderate AS  Plan: daily weights, BMP follow, continue lasix 20mg QD and addisional  Lasix 40mg Tues, Thurs, sat, Sun, KCL 10meq QD, amiodarone 200mg QD  Continue to hold coumadin    (K59.03) Drug-induced constipation  Comment: acute/ongoing  Plan: continue senna s 1 PO QD prn, miralax 8.5gm QD    (R41.89) Cognitive impairment  Comment: acute/ongoing  Plan: OT eval and Tx    MED REC REQUIRED  Post Medication Reconciliation Status: discharge medications reconciled and changed, per note/orders      Orders:  BMP and CBC on monday  Tylenol 1000mg TID scheduled and qhs prn    Electronically signed by: Tonya Lynn Haase, APRN CNP

## 2023-06-21 NOTE — PROGRESS NOTES
[facility]cted [facility]    Background: Transitional Care Management program identified per system criteria and reviewed by [facility] Resource [facility] team for possible outreach.    Assessment: Upon chart review, CCRC Team member will not proceed with patient outreach related to this episode of Transitional Care Management program due to reason below:    Non-[facility] TCU: CCRC team member noted patient discharged to TCU/ARU/LTACH. Patient is not established with a [facility] currently supported by Primary Care-Care Coordination therefore handoff to Primary Care-Care Coordination is not appropriate at this time.    Plan: Transitional Care Management episode addressed appropriately per reason noted above.      Tee Briseno  [facility] [place], [facility]    *Connected Care Resource Team does NOT follow patient ongoing. Referrals are identified based on internal discharge reports and the outreach is to ensure patient has an understanding of their discharge instructions.   Telephone Encounter by Sujey Granger RN, BSN at 07/11/18 10:38 AM     Author:  Sujey Granger RN, BSN Service:  (none) Author Type:  Registered Nurse     Filed:  07/11/18 10:40 AM Encounter Date:  7/11/2018 Status:  Signed     :  Sujey Granger RN, BSN (Registered Nurse)            Had PP visit 6/20/17.   Has annual scheduled 8/6/18.   Refilled through appointment.[KM1.1M]       Revision History        User Key Date/Time User Provider Type Action    > KM1.1 07/11/18 10:40 AM Sujey Granger RN, BSN Registered Nurse Sign    M - Manual

## 2023-06-26 ENCOUNTER — TRANSITIONAL CARE UNIT VISIT (OUTPATIENT)
Dept: GERIATRICS | Facility: CLINIC | Age: 88
End: 2023-06-26
Payer: COMMERCIAL

## 2023-06-26 VITALS
WEIGHT: 156.1 LBS | RESPIRATION RATE: 16 BRPM | DIASTOLIC BLOOD PRESSURE: 58 MMHG | TEMPERATURE: 97.6 F | OXYGEN SATURATION: 98 % | BODY MASS INDEX: 23.66 KG/M2 | HEART RATE: 78 BPM | SYSTOLIC BLOOD PRESSURE: 94 MMHG | HEIGHT: 68 IN

## 2023-06-26 DIAGNOSIS — I10 ESSENTIAL HYPERTENSION: ICD-10-CM

## 2023-06-26 DIAGNOSIS — I50.20 HEART FAILURE WITH REDUCED EJECTION FRACTION, NYHA CLASS I (H): ICD-10-CM

## 2023-06-26 DIAGNOSIS — R53.81 PHYSICAL DECONDITIONING: ICD-10-CM

## 2023-06-26 DIAGNOSIS — R33.9 URINARY RETENTION: ICD-10-CM

## 2023-06-26 DIAGNOSIS — K59.03 DRUG-INDUCED CONSTIPATION: ICD-10-CM

## 2023-06-26 DIAGNOSIS — N40.1 BENIGN PROSTATIC HYPERPLASIA WITH LOWER URINARY TRACT SYMPTOMS, SYMPTOM DETAILS UNSPECIFIED: ICD-10-CM

## 2023-06-26 DIAGNOSIS — R41.89 COGNITIVE IMPAIRMENT: ICD-10-CM

## 2023-06-26 DIAGNOSIS — D62 ANEMIA DUE TO BLOOD LOSS, ACUTE: ICD-10-CM

## 2023-06-26 DIAGNOSIS — C61 PROSTATE CANCER (H): ICD-10-CM

## 2023-06-26 DIAGNOSIS — I48.20 CHRONIC ATRIAL FIBRILLATION (H): ICD-10-CM

## 2023-06-26 DIAGNOSIS — S30.1XXD HEMATOMA OF LEFT PSOAS REGION TO ANTICOAGULANT THERAPY, SUBSEQUENT ENCOUNTER: Primary | ICD-10-CM

## 2023-06-26 DIAGNOSIS — I42.8 OTHER CARDIOMYOPATHY (H): ICD-10-CM

## 2023-06-26 DIAGNOSIS — D72.829 LEUKOCYTOSIS, UNSPECIFIED TYPE: ICD-10-CM

## 2023-06-26 PROCEDURE — 99309 SBSQ NF CARE MODERATE MDM 30: CPT | Performed by: NURSE PRACTITIONER

## 2023-06-26 NOTE — LETTER
"    6/26/2023        RE: Ayaan Herrera  [address]  [facility] [address]        [facility] GERIATRICS    Chief Complaint   Patient presents with     Nursing Home Acute     HPI:  Ayaan Herrera is a 89 year old  (10/31/1932), who is being seen today for an episodic care visit at: [facility]) [25]. Today's concern is:   Hematoma left psoas muscle: patient denies pain or discomfort at time of exam, In therapy patient is walking up to 62 feet using a RW with CGA  Anemia: see labs  Leukocytosis: see labs  BPH/urinary retention: eagle in place, will attempt TOV in AM  Constipation: patient states he is having a BM every 3rd day and is used to having a BM every day, denies abdominal discomfort.   HTN/atrial fib/CHF: /80, 111/66, 108/68 with HR 70 range, denies CP, palpitations, SOB, admission weight was 158lbs and current weight is 156lbs.     Allergies, and PMH/PSH reviewed in [facility] today.  REVIEW OF SYSTEMS:  10 point ROS of systems including Constitutional, Eyes, Respiratory, Cardiovascular, Gastroenterology, Genitourinary, Integumentary, Musculoskeletal, Psychiatric were all negative except for pertinent positives noted in my HPI.    Objective:   BP 94/58   Pulse 78   Temp 97.6  F (36.4  C)   Resp 16   Ht 1.727 m (5' 8\")   Wt 70.8 kg (156 lb 1.6 oz)   SpO2 98%   BMI 23.73 kg/m    GENERAL APPEARANCE:  Alert, in no distress  ENT:  Mouth and posterior oropharynx normal, moist mucous membranes, [place]  EYES:  EOM, conjunctivae, lids, pupils and irises normal, PERRL  RESP:  respiratory effort and palpation of chest normal, lungs clear to auscultation , no respiratory distress  CV:  Palpation and auscultation of heart done , regular rate and rhythm, no murmur, rub, or gallop, peripheral edema trace+ in LE bilaterally  ABDOMEN:  normal bowel sounds, soft, nontender, no hepatosplenomegaly or other masses  M/S:   patient sitting up in w/c  SKIN:  Inspection of skin and subcutaneous tissue " baseline  NEURO:   speech wnl  PSYCH:  affect and mood normal    Recent labs in [facility] reviewed by me today.  and   Most Recent 3 CBC's:Recent Labs   Lab Test 06/20/23  0708 06/19/23  0639 06/18/23  1723 06/17/23  1833 06/17/23  0606 06/16/23  2033 06/16/23  0659   WBC 11.4*  --   --   --  8.8  --  7.5   HGB 8.5* 8.6* 8.5*   < > 8.4*   < > 8.3*   MCV 86  --   --   --  86  --  86     --   --   --  220  --  193    < > = values in this interval not displayed.     Most Recent 3 BMP's:Recent Labs   Lab Test 06/19/23  0639 06/17/23  0606 06/16/23  0659   * 134* 137   POTASSIUM 4.4 4.0 4.1   CHLORIDE 99 99 102   CO2 27 27 28   BUN 15.9 19.9 21.1   CR 0.64* 0.70 0.70   ANIONGAP 8 8 7   BRENDAN 8.9 8.9 9.0   * 100* 102*       Assessment/Plan:  (S30.1XXD) Hematoma of left psoas region to anticoagulant therapy, subsequent encounter  (primary encounter diagnosis)  (R53.81) Physical deconditioning  Comment: acute/ongoing, no change  Plan: PT and OT, hold coumadin, tramadol 25mg q 6 hours prn, change tylenol to 1000mg TID and qhs prn        (D62) Anemia due to blood loss, acute  Comment: acute/ongoing, no change  Plan: Hgb 8.2 at discharge, follow Hgb     (D72.829) Leukocytosis, unspecified type  Comment: acute/ongoing, no change  Plan: WBC elevated 11.4K, no signs of infection  CBC pending for today     (N40.1) Benign prostatic hyperplasia with lower urinary tract symptoms, symptom details unspecified  (R33.9) Urinary retention  (C61) Prostate cancer (H)  Comment: acute/ongoing, no change  Plan: continue flomax 0.4mg QD, eagle in place will attempt TOV in AM,      (I10) Essential hypertension  (I48.20) Chronic atrial fibrillation (H)  (I42.8) Other cardiomyopathy (H)  (I50.20) Heart failure with reduced ejection fraction, NYHA class I (H)  Comment: acute/ongoing, no change  EF 40-45% with global hypokinesia, moderate AS  Plan: daily weights, BMP follow, continue lasix 20mg QD and additional Lasix 40mg Tues, Thurs,  sat, Sun, KCL 10meq QD, amiodarone 200mg QD  Continue to hold coumadin     (K59.03) Drug-induced constipation  Comment: acute/ongoing  Plan: increase  senna s to 1 PO BID scheduled and 1 PO BID prn, increase miralax to 17gm QD     (R41.89) Cognitive impairment  Comment: acute/ongoing, no change  Plan: OT eval and Tx    MED REC REQUIRED  Post Medication Reconciliation Status: medication reconcilation previously completed during another office visit      Orders:  Increase senna s to 1 PO BID scheduled and 1 PO BID prn  Increase miralax to 17gm QD  DC eagle catheter in AM, PVR q shift, straight cath for PVR >350cc      Electronically signed by: Tonya Lynn Haase, APRN CNP           Sincerely,        Tonya Lynn Haase, APRN CNP

## 2023-06-26 NOTE — PROGRESS NOTES
"[facility] GERIATRICS    Chief Complaint   Patient presents with     Nursing Home Acute     HPI:  Ayaan Herrera is a 89 year old  (10/31/1932), who is being seen today for an episodic care visit at: [facility]) [25]. Today's concern is:   Hematoma left psoas muscle: patient denies pain or discomfort at time of exam, In therapy patient is walking up to 62 feet using a RW with CGA  Anemia: see labs  Leukocytosis: see labs  BPH/urinary retention: eagle in place, will attempt TOV in AM  Constipation: patient states he is having a BM every 3rd day and is used to having a BM every day, denies abdominal discomfort.   HTN/atrial fib/CHF: /80, 111/66, 108/68 with HR 70 range, denies CP, palpitations, SOB, admission weight was 158lbs and current weight is 156lbs.     Allergies, and PMH/PSH reviewed in [facility] today.  REVIEW OF SYSTEMS:  10 point ROS of systems including Constitutional, Eyes, Respiratory, Cardiovascular, Gastroenterology, Genitourinary, Integumentary, Musculoskeletal, Psychiatric were all negative except for pertinent positives noted in my HPI.    Objective:   BP 94/58   Pulse 78   Temp 97.6  F (36.4  C)   Resp 16   Ht 1.727 m (5' 8\")   Wt 70.8 kg (156 lb 1.6 oz)   SpO2 98%   BMI 23.73 kg/m    GENERAL APPEARANCE:  Alert, in no distress  ENT:  Mouth and posterior oropharynx normal, moist mucous membranes, [place]  EYES:  EOM, conjunctivae, lids, pupils and irises normal, PERRL  RESP:  respiratory effort and palpation of chest normal, lungs clear to auscultation , no respiratory distress  CV:  Palpation and auscultation of heart done , regular rate and rhythm, no murmur, rub, or gallop, peripheral edema trace+ in LE bilaterally  ABDOMEN:  normal bowel sounds, soft, nontender, no hepatosplenomegaly or other masses  M/S:   patient sitting up in w/c  SKIN:  Inspection of skin and subcutaneous tissue baseline  NEURO:   speech wnl  PSYCH:  affect and mood normal    Recent labs in [facility] " reviewed by me today.  and   Most Recent 3 CBC's:Recent Labs   Lab Test 06/20/23  0708 06/19/23  0639 06/18/23  1723 06/17/23  1833 06/17/23  0606 06/16/23  2033 06/16/23  0659   WBC 11.4*  --   --   --  8.8  --  7.5   HGB 8.5* 8.6* 8.5*   < > 8.4*   < > 8.3*   MCV 86  --   --   --  86  --  86     --   --   --  220  --  193    < > = values in this interval not displayed.     Most Recent 3 BMP's:Recent Labs   Lab Test 06/19/23  0639 06/17/23  0606 06/16/23  0659   * 134* 137   POTASSIUM 4.4 4.0 4.1   CHLORIDE 99 99 102   CO2 27 27 28   BUN 15.9 19.9 21.1   CR 0.64* 0.70 0.70   ANIONGAP 8 8 7   BRENDAN 8.9 8.9 9.0   * 100* 102*       Assessment/Plan:  (S30.1XXD) Hematoma of left psoas region to anticoagulant therapy, subsequent encounter  (primary encounter diagnosis)  (R53.81) Physical deconditioning  Comment: acute/ongoing, no change  Plan: PT and OT, hold coumadin, tramadol 25mg q 6 hours prn, change tylenol to 1000mg TID and qhs prn        (D62) Anemia due to blood loss, acute  Comment: acute/ongoing, no change  Plan: Hgb 8.2 at discharge, follow Hgb     (D72.829) Leukocytosis, unspecified type  Comment: acute/ongoing, no change  Plan: WBC elevated 11.4K, no signs of infection  CBC pending for today     (N40.1) Benign prostatic hyperplasia with lower urinary tract symptoms, symptom details unspecified  (R33.9) Urinary retention  (C61) Prostate cancer (H)  Comment: acute/ongoing, no change  Plan: continue flomax 0.4mg QD, eagle in place will attempt TOV in AM,      (I10) Essential hypertension  (I48.20) Chronic atrial fibrillation (H)  (I42.8) Other cardiomyopathy (H)  (I50.20) Heart failure with reduced ejection fraction, NYHA class I (H)  Comment: acute/ongoing, no change  EF 40-45% with global hypokinesia, moderate AS  Plan: daily weights, BMP follow, continue lasix 20mg QD and additional Lasix 40mg Tues, Thurs, sat, Sun, KCL 10meq QD, amiodarone 200mg QD  Continue to hold coumadin     (K59.03)  Drug-induced constipation  Comment: acute/ongoing  Plan: increase  senna s to 1 PO BID scheduled and 1 PO BID prn, increase miralax to 17gm QD     (R41.89) Cognitive impairment  Comment: acute/ongoing, no change  Plan: OT eval and Tx    MED REC REQUIRED  Post Medication Reconciliation Status: medication reconcilation previously completed during another office visit      Orders:  Increase senna s to 1 PO BID scheduled and 1 PO BID prn  Increase miralax to 17gm QD  DC eagle catheter in AM, PVR q shift, straight cath for PVR >350cc      Electronically signed by: Tonya Lynn Haase, APRN CNP

## 2023-06-26 NOTE — PLAN OF CARE
Physical Therapy Discharge Summary    Reason for therapy discharge:    Discharged to transitional care facility.    Progress towards therapy goal(s). See goals on Care Plan in [facility] electronic health record for goal details.  Goals partially met.  Barriers to achieving goals:   discharge from facility.    Therapy recommendation(s):    Continued therapy is recommended.  Rationale/Recommendations:  Pt below baseline mobility. Has been staying at [facility] since September. Has been able to ambulate to bathroom independently w/ FWW. Currently a heavy A x1 w/ mobility and close WC follow for gait. Anticipate will need TCU at [facility] to improve upon functional mobility and independence.  PT Brief overview of current status: Min A for bed mobility, Min to mod for transfers w/ FWW, Min to mod for ambulation w/ FWW      Recommendation above provided by last treating therapist.

## 2023-06-29 ENCOUNTER — TRANSITIONAL CARE UNIT VISIT (OUTPATIENT)
Dept: GERIATRICS | Facility: CLINIC | Age: 88
End: 2023-06-29
Payer: COMMERCIAL

## 2023-06-29 VITALS
DIASTOLIC BLOOD PRESSURE: 65 MMHG | HEART RATE: 69 BPM | OXYGEN SATURATION: 94 % | WEIGHT: 161.2 LBS | BODY MASS INDEX: 24.43 KG/M2 | TEMPERATURE: 98 F | HEIGHT: 68 IN | SYSTOLIC BLOOD PRESSURE: 109 MMHG | RESPIRATION RATE: 16 BRPM

## 2023-06-29 DIAGNOSIS — R33.9 URINARY RETENTION: ICD-10-CM

## 2023-06-29 DIAGNOSIS — D62 ANEMIA DUE TO BLOOD LOSS, ACUTE: ICD-10-CM

## 2023-06-29 DIAGNOSIS — S30.1XXD HEMATOMA OF LEFT PSOAS REGION TO ANTICOAGULANT THERAPY, SUBSEQUENT ENCOUNTER: Primary | ICD-10-CM

## 2023-06-29 DIAGNOSIS — I48.20 CHRONIC ATRIAL FIBRILLATION (H): ICD-10-CM

## 2023-06-29 DIAGNOSIS — I10 ESSENTIAL HYPERTENSION: ICD-10-CM

## 2023-06-29 DIAGNOSIS — R41.89 COGNITIVE IMPAIRMENT: ICD-10-CM

## 2023-06-29 DIAGNOSIS — I42.8 OTHER CARDIOMYOPATHY (H): ICD-10-CM

## 2023-06-29 DIAGNOSIS — C61 PROSTATE CANCER (H): ICD-10-CM

## 2023-06-29 DIAGNOSIS — D72.829 LEUKOCYTOSIS, UNSPECIFIED TYPE: ICD-10-CM

## 2023-06-29 DIAGNOSIS — R53.81 PHYSICAL DECONDITIONING: ICD-10-CM

## 2023-06-29 DIAGNOSIS — I50.20 HEART FAILURE WITH REDUCED EJECTION FRACTION, NYHA CLASS I (H): ICD-10-CM

## 2023-06-29 DIAGNOSIS — K59.03 DRUG-INDUCED CONSTIPATION: ICD-10-CM

## 2023-06-29 DIAGNOSIS — N40.1 BENIGN PROSTATIC HYPERPLASIA WITH LOWER URINARY TRACT SYMPTOMS, SYMPTOM DETAILS UNSPECIFIED: ICD-10-CM

## 2023-06-29 PROCEDURE — 99309 SBSQ NF CARE MODERATE MDM 30: CPT | Performed by: NURSE PRACTITIONER

## 2023-06-29 NOTE — PROGRESS NOTES
"[facility] GERIATRICS    Chief Complaint   Patient presents with     RECHECK     HPI:  Ayaan Herrera is a 89 year old  (10/31/1932), who is being seen today for an episodic care visit at: [facility] ([facility]) [25]. Today's concern is:   Hematoma left psoas muscle: patient states he has some pain in left hip area, pain has improved, In therapy patient is walking up to 140 feet using a RW with SBA  Anemia: Hgb 9.6 on 6/26/23  Leukocytosis: resolved WBC 7.3K on 6/26/23  BPH/urinary retention: eagle is out, continue PVR q shift, education on urinary retention, encouraged patient to straight cath if PVR > 350cc  Constipation: LBM 6/26/23  HTN/atrial fib/CHF: /65, 100/64, 108/68 with HR 70 range, denies CP, palpitations, SOB, admission weight was 158lbs and current weight is 161 lbs.     Allergies, and PMH/PSH reviewed in [facility] today.  REVIEW OF SYSTEMS:  10 point ROS of systems including Constitutional, Eyes, Respiratory, Cardiovascular, Gastroenterology, Genitourinary, Integumentary, Musculoskeletal, Psychiatric were all negative except for pertinent positives noted in my HPI.    Objective:   /65   Pulse 69   Temp 98  F (36.7  C)   Resp 16   Ht 1.727 m (5' 8\")   Wt 73.1 kg (161 lb 3.2 oz)   SpO2 94%   BMI 24.51 kg/m    GENERAL APPEARANCE:  Alert, in no distress  ENT:  Mouth and posterior oropharynx normal, moist mucous membranes, [place]  EYES:  EOM, conjunctivae, lids, pupils and irises normal, PERRL  RESP:  respiratory effort and palpation of chest normal, lungs clear to auscultation , no respiratory distress  CV:  Palpation and auscultation of heart done , regular rate and rhythm, no murmur, rub, or gallop, peripheral edema trace+ in LE bilaterally  ABDOMEN:  normal bowel sounds, soft, nontender, no hepatosplenomegaly or other masses  M/S:   patient sitting up in w/c  SKIN:  Inspection of skin and subcutaneous tissue baseline  NEURO:   speech wnl  PSYCH:  affect and mood normal    Recent " labs in EPIC reviewed by me today.  and   Most Recent 3 CBC's:Recent Labs   Lab Test 06/20/23  0708 06/19/23  0639 06/18/23  1723 06/17/23  1833 06/17/23  0606 06/16/23  2033 06/16/23  0659   WBC 11.4*  --   --   --  8.8  --  7.5   HGB 8.5* 8.6* 8.5*   < > 8.4*   < > 8.3*   MCV 86  --   --   --  86  --  86     --   --   --  220  --  193    < > = values in this interval not displayed.     Most Recent 3 BMP's:Recent Labs   Lab Test 06/19/23  0639 06/17/23  0606 06/16/23  0659   * 134* 137   POTASSIUM 4.4 4.0 4.1   CHLORIDE 99 99 102   CO2 27 27 28   BUN 15.9 19.9 21.1   CR 0.64* 0.70 0.70   ANIONGAP 8 8 7   BRENDAN 8.9 8.9 9.0   * 100* 102*       Assessment/Plan:  (S30.1XXD) Hematoma of left psoas region to anticoagulant therapy, subsequent encounter  (primary encounter diagnosis)  (R53.81) Physical deconditioning  Comment: acute/ongoing, no change  Plan: PT and OT, hold coumadin, tramadol 25mg q 6 hours prn, change tylenol to 1000mg TID and qhs prn        (D62) Anemia due to blood loss, acute  Comment: acute/ongoing, no change  Plan: Hgb 9.6 on 6/26/23, follow Hgb     (D72.829) Leukocytosis, unspecified type  Comment: resolved  Plan: WBC 7.3K on 6/26/23, no signs of infection     (N40.1) Benign prostatic hyperplasia with lower urinary tract symptoms, symptom details unspecified  (R33.9) Urinary retention  (C61) Prostate cancer (H)  Comment: acute/ongoing,  Plan: continue flomax 0.4mg QD, eagle discontinued, PVR q shift straight cath for PVR > 350cc     (I10) Essential hypertension  (I48.20) Chronic atrial fibrillation (H)  (I42.8) Other cardiomyopathy (H)  (I50.20) Heart failure with reduced ejection fraction, NYHA class I (H)  Comment: acute/ongoing, no change  EF 40-45% with global hypokinesia, moderate AS  Plan: daily weights, BMP follow, continue lasix 20mg QD and additional Lasix 40mg Tues, Thurs, sat, Sun, KCL 10meq QD, amiodarone 200mg QD  Continue to hold coumadin     (K59.03) Drug-induced  constipation  Comment: acute/ongoing, no change  Plan: continue senna s to 1 PO BID scheduled and 1 PO BID prn, increase miralax to 17gm QD     (R41.89) Cognitive impairment  Comment: acute/ongoing, no change  Plan: OT eval and Tx       MED REC REQUIRED  Post Medication Reconciliation Status: medication reconcilation previously completed during another office visit      Orders:  No new orders    Electronically signed by: Tonya Lynn Haase, APRN CNP

## 2023-06-29 NOTE — LETTER
"    6/29/2023        RE: Ayaan Herrera  [address]  [place] [address]        [facility] GERIATRICS    Chief Complaint   Patient presents with     RECHECK     HPI:  Ayaan Herrera is a 89 year old  (10/31/1932), who is being seen today for an episodic care visit at: [facility]) [25]. Today's concern is:   Hematoma left psoas muscle: patient states he has some pain in left hip area, pain has improved, In therapy patient is walking up to 140 feet using a RW with SBA  Anemia: Hgb 9.6 on 6/26/23  Leukocytosis: resolved WBC 7.3K on 6/26/23  BPH/urinary retention: eagle is out, continue PVR q shift, education on urinary retention, encouraged patient to straight cath if PVR > 350cc  Constipation: LBM 6/26/23  HTN/atrial fib/CHF: /65, 100/64, 108/68 with HR 70 range, denies CP, palpitations, SOB, admission weight was 158lbs and current weight is 161 lbs.     Allergies, and PMH/PSH reviewed in EPIC today.  REVIEW OF SYSTEMS:  10 point ROS of systems including Constitutional, Eyes, Respiratory, Cardiovascular, Gastroenterology, Genitourinary, Integumentary, Musculoskeletal, Psychiatric were all negative except for pertinent positives noted in my HPI.    Objective:   /65   Pulse 69   Temp 98  F (36.7  C)   Resp 16   Ht 1.727 m (5' 8\")   Wt 73.1 kg (161 lb 3.2 oz)   SpO2 94%   BMI 24.51 kg/m    GENERAL APPEARANCE:  Alert, in no distress  ENT:  Mouth and posterior oropharynx normal, moist mucous membranes, [place]  EYES:  EOM, conjunctivae, lids, pupils and irises normal, PERRL  RESP:  respiratory effort and palpation of chest normal, lungs clear to auscultation , no respiratory distress  CV:  Palpation and auscultation of heart done , regular rate and rhythm, no murmur, rub, or gallop, peripheral edema trace+ in LE bilaterally  ABDOMEN:  normal bowel sounds, soft, nontender, no hepatosplenomegaly or other masses  M/S:   patient sitting up in w/c  SKIN:  Inspection of skin and " subcutaneous tissue baseline  NEURO:   speech wnl  PSYCH:  affect and mood normal    Recent labs in [facility] reviewed by me today.  and   Most Recent 3 CBC's:Recent Labs   Lab Test 06/20/23  0708 06/19/23  0639 06/18/23  1723 06/17/23  1833 06/17/23  0606 06/16/23  2033 06/16/23  0659   WBC 11.4*  --   --   --  8.8  --  7.5   HGB 8.5* 8.6* 8.5*   < > 8.4*   < > 8.3*   MCV 86  --   --   --  86  --  86     --   --   --  220  --  193    < > = values in this interval not displayed.     Most Recent 3 BMP's:Recent Labs   Lab Test 06/19/23  0639 06/17/23  0606 06/16/23  0659   * 134* 137   POTASSIUM 4.4 4.0 4.1   CHLORIDE 99 99 102   CO2 27 27 28   BUN 15.9 19.9 21.1   CR 0.64* 0.70 0.70   ANIONGAP 8 8 7   BRENDAN 8.9 8.9 9.0   * 100* 102*       Assessment/Plan:  (S30.1XXD) Hematoma of left psoas region to anticoagulant therapy, subsequent encounter  (primary encounter diagnosis)  (R53.81) Physical deconditioning  Comment: acute/ongoing, no change  Plan: PT and OT, hold coumadin, tramadol 25mg q 6 hours prn, change tylenol to 1000mg TID and qhs prn        (D62) Anemia due to blood loss, acute  Comment: acute/ongoing, no change  Plan: Hgb 9.6 on 6/26/23, follow Hgb     (D72.829) Leukocytosis, unspecified type  Comment: resolved  Plan: WBC 7.3K on 6/26/23, no signs of infection     (N40.1) Benign prostatic hyperplasia with lower urinary tract symptoms, symptom details unspecified  (R33.9) Urinary retention  (C61) Prostate cancer (H)  Comment: acute/ongoing,  Plan: continue flomax 0.4mg QD, eagle discontinued, PVR q shift straight cath for PVR > 350cc     (I10) Essential hypertension  (I48.20) Chronic atrial fibrillation (H)  (I42.8) Other cardiomyopathy (H)  (I50.20) Heart failure with reduced ejection fraction, NYHA class I (H)  Comment: acute/ongoing, no change  EF 40-45% with global hypokinesia, moderate AS  Plan: daily weights, BMP follow, continue lasix 20mg QD and additional Lasix 40mg Tues, Thurs, sat,  Sun, KCL 10meq QD, amiodarone 200mg QD  Continue to hold coumadin     (K59.03) Drug-induced constipation  Comment: acute/ongoing, no change  Plan: continue senna s to 1 PO BID scheduled and 1 PO BID prn, increase miralax to 17gm QD     (R41.89) Cognitive impairment  Comment: acute/ongoing, no change  Plan: OT eval and Tx       MED REC REQUIRED  Post Medication Reconciliation Status: medication reconcilation previously completed during another office visit      Orders:  No new orders    Electronically signed by: Tonya Lynn Haase, APRN CNP            Sincerely,        Tonya Lynn Haase, APRN CNP

## 2023-07-05 ENCOUNTER — TRANSITIONAL CARE UNIT VISIT (OUTPATIENT)
Dept: GERIATRICS | Facility: CLINIC | Age: 88
End: 2023-07-05
Payer: COMMERCIAL

## 2023-07-05 VITALS
WEIGHT: 159 LBS | TEMPERATURE: 97.4 F | SYSTOLIC BLOOD PRESSURE: 125 MMHG | DIASTOLIC BLOOD PRESSURE: 77 MMHG | HEART RATE: 72 BPM | BODY MASS INDEX: 24.1 KG/M2 | HEIGHT: 68 IN | OXYGEN SATURATION: 91 % | RESPIRATION RATE: 16 BRPM

## 2023-07-05 DIAGNOSIS — I50.20 HEART FAILURE WITH REDUCED EJECTION FRACTION, NYHA CLASS I (H): ICD-10-CM

## 2023-07-05 DIAGNOSIS — D62 ANEMIA DUE TO BLOOD LOSS, ACUTE: ICD-10-CM

## 2023-07-05 DIAGNOSIS — N40.1 BENIGN PROSTATIC HYPERPLASIA WITH LOWER URINARY TRACT SYMPTOMS, SYMPTOM DETAILS UNSPECIFIED: ICD-10-CM

## 2023-07-05 DIAGNOSIS — I48.20 CHRONIC ATRIAL FIBRILLATION (H): ICD-10-CM

## 2023-07-05 DIAGNOSIS — S30.1XXD HEMATOMA OF LEFT PSOAS REGION TO ANTICOAGULANT THERAPY, SUBSEQUENT ENCOUNTER: Primary | ICD-10-CM

## 2023-07-05 DIAGNOSIS — R41.89 COGNITIVE IMPAIRMENT: ICD-10-CM

## 2023-07-05 DIAGNOSIS — I42.8 OTHER CARDIOMYOPATHY (H): ICD-10-CM

## 2023-07-05 DIAGNOSIS — R53.81 PHYSICAL DECONDITIONING: ICD-10-CM

## 2023-07-05 DIAGNOSIS — I10 ESSENTIAL HYPERTENSION: ICD-10-CM

## 2023-07-05 DIAGNOSIS — D72.829 LEUKOCYTOSIS, UNSPECIFIED TYPE: ICD-10-CM

## 2023-07-05 DIAGNOSIS — R33.9 URINARY RETENTION: ICD-10-CM

## 2023-07-05 DIAGNOSIS — C61 PROSTATE CANCER (H): ICD-10-CM

## 2023-07-05 DIAGNOSIS — K59.03 DRUG-INDUCED CONSTIPATION: ICD-10-CM

## 2023-07-05 PROCEDURE — 99309 SBSQ NF CARE MODERATE MDM 30: CPT | Performed by: NURSE PRACTITIONER

## 2023-07-05 NOTE — LETTER
7/5/2023        RE: Ayaan Herrera  [address]  [place] [address]        [facility] GERIATRIC SERVICES    Chief Complaint   Patient presents with     CODY       [facility] Medical Record Number:  [id number]  [facility] [[facility]    HPI:    Ayaan Herrera is a 89 year old  (10/31/1932), who is being seen today for an episodic care visit.  HPI information obtained from: facility chart records, facility staff, patient report and [facility] chart review.Today's concern is:  Hematoma left psoas muscle: patient sitting up in w/c, denies pain or discomfort in left leg, In therapy patient is walking 100-140 feet using a RW with SBA  Anemia: Hgb 9.6 on 6/26/23  Leukocytosis: resolved WBC 7.3K on 6/26/23  BPH/urinary retention: eagle is out, continue PVR q shift, education on urinary retention, encouraged patient to straight cath if PVR > 350cc  Constipation: states bowels are working  HTN/atrial fib/CHF: /77, 110/67, 112/68 with HR 70 range, denies CP, palpitations, SOB, admission weight was 158lbs and current weight is 159 lbs.     ALLERGIES: Zithromax [azithromycin], Amoxicillin, and Amoxicillin-pot clavulanate  Past Medical, Surgical, Family and Social History reviewed and updated in [facility].    Current Outpatient Medications   Medication Sig Dispense Refill     acetaminophen (TYLENOL) 500 MG tablet Take 1,000 mg by mouth 3 times daily       acetaminophen (TYLENOL) 500 MG tablet Take 1,000 mg by mouth nightly as needed for mild pain       alendronate (FOSAMAX) 70 MG tablet Take 1 tablet (70 mg) by mouth every 7 days TAKE ON EMPTY STOMACH, REMAIN UPRIGHT FOR ONE HOUR AFTER TAKING 4 tablet 11     amiodarone (PACERONE) 200 MG tablet Take 1 tablet (200 mg) by mouth daily       bisacodyl (DULCOLAX) 10 MG suppository Place 10 mg rectally daily as needed for constipation       brimonidine (ALPHAGAN) 0.2 % ophthalmic solution Place 1 drop  into both eyes 2 times daily       calcium carbonate (TUMS) 500 MG chewable tablet Take 1 chew tab by mouth 4 times daily as needed for heartburn       cyanocobalamin (CYANOCOBALAMIN) 1000 MCG tablet Take 1 tablet (1,000 mcg) by mouth daily       diclofenac (VOLTAREN) 1 % topical gel Apply 2 g topically 2 times daily To both shoulders.  And twice daily as needed.       dimethicone-zinc oxide (DONTE PROTECT) external cream Apply topically daily Apply to upper inner thighs, groin, and scrotum.       dorzolamide-timolol (COSOPT) 2-0.5 % ophthalmic solution INSTILL 1 DROP IN BOTH EYES TWICE A DAY 10 mL 0     ferrous sulfate (FE TABS) 325 (65 Fe) MG EC tablet Take 325 mg by mouth Take 1 tablet by mouth twice weekly on Monday and Friday at bedtime.       furosemide (LASIX) 20 MG tablet TAKE 1 TAB BY MOUTH ONCE DAILY (Patient taking differently: Take 20 mg by mouth On Mondays and Wednesdays) 30 tablet 0     furosemide (LASIX) 40 MG tablet Take 40 mg by mouth Take 1 tab by mouth on Tues, Thurs, Sat, and Sun.       hypromellose (ARTIFICIAL TEARS) 0.5 % SOLN ophthalmic solution Place 1 drop into both eyes 4 times daily as needed for dry eyes       ketotifen (ZADITOR) 0.025 % ophthalmic solution Place 1 drop into both eyes as needed       latanoprost (XALATAN) 0.005 % ophthalmic solution INSTILL 1 DROP INTO BOTH EYES AT BEDTIME 2.5 mL 5     melatonin 5 MG tablet Take 5 mg by mouth nightly as needed       netarsudil (RHOPRESSA) 0.02 % ophthalmic solution Place 1 drop into both eyes At Bedtime       nystatin (MYCOSTATIN) 495048 UNIT/GM external powder Apply topically 2 times daily Apply to abdominal folds       polyethylene glycol (MIRALAX) 17 g packet Take 17 g by mouth daily       potassium chloride ER (K-TAB/KLOR-CON) 10 MEQ CR tablet TAKE 1 TAB BY MOUTH DAILY WITH EACH FUROSEMIDE DOSE. 30 tablet 5     senna-docusate (SENOKOT-S/PERICOLACE) 8.6-50 MG tablet Take 1 tablet by mouth 2 times daily       simethicone (MYLICON) 80 MG  "chewable tablet Take 80 mg by mouth daily as needed for flatulence or cramping       tamsulosin (FLOMAX) 0.4 MG capsule TAKE 1 CAP BY MOUTH ONCE DAILY 30 capsule 5     traMADol (ULTRAM) 50 MG tablet Take 0.5 tablets (25 mg) by mouth every 6 hours as needed for severe pain 10 tablet 0     Vitamin D, Cholecalciferol, 1000 UNITS TABS Take 1 tablet by mouth daily        Medications reviewed:  Medications reconciled to facility chart and changes were made to reflect current medications as identified as above med list. Below are the changes that were made:   Medications stopped since last [facility]IC medication reconciliation:   There are no discontinued medications.    Medications started since last [facility] medication reconciliation:  No orders of the defined types were placed in this encounter.        REVIEW OF SYSTEMS:  10 point ROS of systems including Constitutional, Eyes, Respiratory, Cardiovascular, Gastroenterology, Genitourinary, Integumentary, Musculoskeletal, Psychiatric were all negative except for pertinent positives noted in my HPI.    Physical Exam:  /77   Pulse 72   Temp 97.4  F (36.3  C)   Resp 16   Ht 1.727 m (5' 8\")   Wt 72.1 kg (159 lb)   SpO2 91%   BMI 24.18 kg/m    GENERAL APPEARANCE:  Alert, in no distress  ENT:  Mouth and posterior oropharynx normal, moist mucous membranes, [place]  EYES:  EOM, conjunctivae, lids, pupils and irises normal, PERRL  RESP:  respiratory effort and palpation of chest normal, lungs clear to auscultation , no respiratory distress  CV:  Palpation and auscultation of heart done , regular rate and rhythm, no murmur, rub, or gallop, peripheral edema trace+ in LE bilaterally  ABDOMEN:  normal bowel sounds, soft, nontender, no hepatosplenomegaly or other masses  M/S:   patient sitting up in w/c  SKIN:  Inspection of skin and subcutaneous tissue baseline  NEURO:   speech wnl  PSYCH:  affect and mood normal    Recent Labs:     CBC RESULTS:   Recent Labs   Lab Test 06/20/23  0708 " 06/19/23  0639 06/17/23  1833 06/17/23  0606   WBC 11.4*  --   --  8.8   RBC 3.12*  --   --  3.15*   HGB 8.5* 8.6*   < > 8.4*   HCT 26.8*  --   --  27.1*   MCV 86  --   --  86   MCH 27.2  --   --  26.7   MCHC 31.7  --   --  31.0*   RDW 17.3*  --   --  17.0*     --   --  220    < > = values in this interval not displayed.       Last Basic Metabolic Panel:  Recent Labs   Lab Test 06/19/23  0639 06/17/23  0606   * 134*   POTASSIUM 4.4 4.0   CHLORIDE 99 99   BRENDAN 8.9 8.9   CO2 27 27   BUN 15.9 19.9   CR 0.64* 0.70   * 100*       Liver Function Studies -   Recent Labs   Lab Test 06/06/23  1346 02/28/22  0105   PROTTOTAL 7.1 6.7*   ALBUMIN 4.4 3.4   BILITOTAL 0.6 0.7   ALKPHOS 63 65   AST 21 30   ALT 14 30       TSH   Date Value Ref Range Status   06/06/2023 2.39 0.30 - 4.20 uIU/mL Final   05/09/2022 3.11 0.40 - 4.00 mU/L Final   08/07/2017 1.30 0.30 - 5.00 uIU/mL Final   08/01/2016 1.53 0.30 - 5.00 uIU/mL Final   ]    No results found for: A1C      Assessment/Plan:  (S30.1XXD) Hematoma of left psoas region to anticoagulant therapy, subsequent encounter  (primary encounter diagnosis)  (R53.81) Physical deconditioning  Comment: acute/ongoing, no change  Plan: PT and OT, hold coumadin, tramadol 25mg q 6 hours prn, change tylenol to 1000mg TID and qhs prn        (D62) Anemia due to blood loss, acute  Comment: acute/ongoing, no change  Plan: Hgb 9.6 on 6/26/23, follow Hgb     (D72.829) Leukocytosis, unspecified type  Comment: resolved  Plan: WBC 7.3K on 6/26/23, no signs of infection     (N40.1) Benign prostatic hyperplasia with lower urinary tract symptoms, symptom details unspecified  (R33.9) Urinary retention  (C61) Prostate cancer (H)  Comment: acute/ongoing, no change  Plan: continue flomax 0.4mg QD, continue  PVR q shift straight cath for PVR > 350cc     (I10) Essential hypertension  (I48.20) Chronic atrial fibrillation (H)  (I42.8) Other cardiomyopathy (H)  (I50.20) Heart failure with reduced  ejection fraction, NYHA class I (H)  Comment: acute/ongoing, no change  EF 40-45% with global hypokinesia, moderate AS  Plan: daily weights, BMP follow, continue lasix 20mg QD and additional Lasix 40mg Tues, Thurs, sat, Sun, KCL 10meq QD, amiodarone 200mg QD  Continue to hold coumadin     (K59.03) Drug-induced constipation  Comment: acute/ongoing, no change  Plan: continue senna s to 1 PO BID scheduled and 1 PO BID prn, increase miralax to 17gm QD     (R41.89) Cognitive impairment  Comment: acute/ongoing, no change  Plan: OT eval and Tx       Orders:  No new orders today    Electronically signed by  Tonya Lynn Haase, APRN CNP                        Sincerely,        Tonya Lynn Haase, APRN CNP

## 2023-07-05 NOTE — PROGRESS NOTES
[facility] GERIATRIC SERVICES    Chief Complaint   Patient presents with     CODY       [facility] Medical Record Number:  [id number]  Place of Service where encounter took place:  [facility] ([facility]) [25]    HPI:    Ayaan Herrera is a 89 year old  (10/31/1932), who is being seen today for an episodic care visit.  HPI information obtained from: facility chart records, facility staff, patient report and [facility] chart review.Today's concern is:  Hematoma left psoas muscle: patient sitting up in w/c, denies pain or discomfort in left leg, In therapy patient is walking 100-140 feet using a RW with SBA  Anemia: Hgb 9.6 on 6/26/23  Leukocytosis: resolved WBC 7.3K on 6/26/23  BPH/urinary retention: eagle is out, continue PVR q shift, education on urinary retention, encouraged patient to straight cath if PVR > 350cc  Constipation: states bowels are working  HTN/atrial fib/CHF: /77, 110/67, 112/68 with HR 70 range, denies CP, palpitations, SOB, admission weight was 158lbs and current weight is 159 lbs.     ALLERGIES: Zithromax [azithromycin], Amoxicillin, and Amoxicillin-pot clavulanate  Past Medical, Surgical, Family and Social History reviewed and updated in [facility].    Current Outpatient Medications   Medication Sig Dispense Refill     acetaminophen (TYLENOL) 500 MG tablet Take 1,000 mg by mouth 3 times daily       acetaminophen (TYLENOL) 500 MG tablet Take 1,000 mg by mouth nightly as needed for mild pain       alendronate (FOSAMAX) 70 MG tablet Take 1 tablet (70 mg) by mouth every 7 days TAKE ON EMPTY STOMACH, REMAIN UPRIGHT FOR ONE HOUR AFTER TAKING 4 tablet 11     amiodarone (PACERONE) 200 MG tablet Take 1 tablet (200 mg) by mouth daily       bisacodyl (DULCOLAX) 10 MG suppository Place 10 mg rectally daily as needed for constipation       brimonidine (ALPHAGAN) 0.2 % ophthalmic solution Place 1 drop into both eyes 2 times daily       calcium carbonate (TUMS) 500 MG chewable tablet  Take 1 chew tab by mouth 4 times daily as needed for heartburn       cyanocobalamin (CYANOCOBALAMIN) 1000 MCG tablet Take 1 tablet (1,000 mcg) by mouth daily       diclofenac (VOLTAREN) 1 % topical gel Apply 2 g topically 2 times daily To both shoulders.  And twice daily as needed.       dimethicone-zinc oxide (DONTE PROTECT) external cream Apply topically daily Apply to upper inner thighs, groin, and scrotum.       dorzolamide-timolol (COSOPT) 2-0.5 % ophthalmic solution INSTILL 1 DROP IN BOTH EYES TWICE A DAY 10 mL 0     ferrous sulfate (FE TABS) 325 (65 Fe) MG EC tablet Take 325 mg by mouth Take 1 tablet by mouth twice weekly on Monday and Friday at bedtime.       furosemide (LASIX) 20 MG tablet TAKE 1 TAB BY MOUTH ONCE DAILY (Patient taking differently: Take 20 mg by mouth On Mondays and Wednesdays) 30 tablet 0     furosemide (LASIX) 40 MG tablet Take 40 mg by mouth Take 1 tab by mouth on Tues, Thurs, Sat, and Sun.       hypromellose (ARTIFICIAL TEARS) 0.5 % SOLN ophthalmic solution Place 1 drop into both eyes 4 times daily as needed for dry eyes       ketotifen (ZADITOR) 0.025 % ophthalmic solution Place 1 drop into both eyes as needed       latanoprost (XALATAN) 0.005 % ophthalmic solution INSTILL 1 DROP INTO BOTH EYES AT BEDTIME 2.5 mL 5     melatonin 5 MG tablet Take 5 mg by mouth nightly as needed       netarsudil (RHOPRESSA) 0.02 % ophthalmic solution Place 1 drop into both eyes At Bedtime       nystatin (MYCOSTATIN) 839699 UNIT/GM external powder Apply topically 2 times daily Apply to abdominal folds       polyethylene glycol (MIRALAX) 17 g packet Take 17 g by mouth daily       potassium chloride ER (K-TAB/KLOR-CON) 10 MEQ CR tablet TAKE 1 TAB BY MOUTH DAILY WITH EACH FUROSEMIDE DOSE. 30 tablet 5     senna-docusate (SENOKOT-S/PERICOLACE) 8.6-50 MG tablet Take 1 tablet by mouth 2 times daily       simethicone (MYLICON) 80 MG chewable tablet Take 80 mg by mouth daily as needed for flatulence or cramping    "    tamsulosin (FLOMAX) 0.4 MG capsule TAKE 1 CAP BY MOUTH ONCE DAILY 30 capsule 5     traMADol (ULTRAM) 50 MG tablet Take 0.5 tablets (25 mg) by mouth every 6 hours as needed for severe pain 10 tablet 0     Vitamin D, Cholecalciferol, 1000 UNITS TABS Take 1 tablet by mouth daily        Medications reviewed:  Medications reconciled to facility chart and changes were made to reflect current medications as identified as above med list. Below are the changes that were made:   Medications stopped since last EPIC medication reconciliation:   There are no discontinued medications.    Medications started since last [facility] medication reconciliation:  No orders of the defined types were placed in this encounter.        REVIEW OF SYSTEMS:  10 point ROS of systems including Constitutional, Eyes, Respiratory, Cardiovascular, Gastroenterology, Genitourinary, Integumentary, Musculoskeletal, Psychiatric were all negative except for pertinent positives noted in my HPI.    Physical Exam:  /77   Pulse 72   Temp 97.4  F (36.3  C)   Resp 16   Ht 1.727 m (5' 8\")   Wt 72.1 kg (159 lb)   SpO2 91%   BMI 24.18 kg/m    GENERAL APPEARANCE:  Alert, in no distress  ENT:  Mouth and posterior oropharynx normal, moist mucous membranes, [place]  EYES:  EOM, conjunctivae, lids, pupils and irises normal, PERRL  RESP:  respiratory effort and palpation of chest normal, lungs clear to auscultation , no respiratory distress  CV:  Palpation and auscultation of heart done , regular rate and rhythm, no murmur, rub, or gallop, peripheral edema trace+ in LE bilaterally  ABDOMEN:  normal bowel sounds, soft, nontender, no hepatosplenomegaly or other masses  M/S:   patient sitting up in w/c  SKIN:  Inspection of skin and subcutaneous tissue baseline  NEURO:   speech wnl  PSYCH:  affect and mood normal    Recent Labs:     CBC RESULTS:   Recent Labs   Lab Test 06/20/23  0708 06/19/23  0639 06/17/23  1833 06/17/23  0606   WBC 11.4*  --   --  8.8   RBC 3.12*  " --   --  3.15*   HGB 8.5* 8.6*   < > 8.4*   HCT 26.8*  --   --  27.1*   MCV 86  --   --  86   MCH 27.2  --   --  26.7   MCHC 31.7  --   --  31.0*   RDW 17.3*  --   --  17.0*     --   --  220    < > = values in this interval not displayed.       Last Basic Metabolic Panel:  Recent Labs   Lab Test 06/19/23  0639 06/17/23  0606   * 134*   POTASSIUM 4.4 4.0   CHLORIDE 99 99   BRENDAN 8.9 8.9   CO2 27 27   BUN 15.9 19.9   CR 0.64* 0.70   * 100*       Liver Function Studies -   Recent Labs   Lab Test 06/06/23  1346 02/28/22  0105   PROTTOTAL 7.1 6.7*   ALBUMIN 4.4 3.4   BILITOTAL 0.6 0.7   ALKPHOS 63 65   AST 21 30   ALT 14 30       TSH   Date Value Ref Range Status   06/06/2023 2.39 0.30 - 4.20 uIU/mL Final   05/09/2022 3.11 0.40 - 4.00 mU/L Final   08/07/2017 1.30 0.30 - 5.00 uIU/mL Final   08/01/2016 1.53 0.30 - 5.00 uIU/mL Final   ]    No results found for: A1C      Assessment/Plan:  (S30.1XXD) Hematoma of left psoas region to anticoagulant therapy, subsequent encounter  (primary encounter diagnosis)  (R53.81) Physical deconditioning  Comment: acute/ongoing, no change  Plan: PT and OT, hold coumadin, tramadol 25mg q 6 hours prn, change tylenol to 1000mg TID and qhs prn        (D62) Anemia due to blood loss, acute  Comment: acute/ongoing, no change  Plan: Hgb 9.6 on 6/26/23, follow Hgb     (D72.829) Leukocytosis, unspecified type  Comment: resolved  Plan: WBC 7.3K on 6/26/23, no signs of infection     (N40.1) Benign prostatic hyperplasia with lower urinary tract symptoms, symptom details unspecified  (R33.9) Urinary retention  (C61) Prostate cancer (H)  Comment: acute/ongoing, no change  Plan: continue flomax 0.4mg QD, continue  PVR q shift straight cath for PVR > 350cc     (I10) Essential hypertension  (I48.20) Chronic atrial fibrillation (H)  (I42.8) Other cardiomyopathy (H)  (I50.20) Heart failure with reduced ejection fraction, NYHA class I (H)  Comment: acute/ongoing, no change  EF 40-45% with  global hypokinesia, moderate AS  Plan: daily weights, BMP follow, continue lasix 20mg QD and additional Lasix 40mg Tues, Thurs, sat, Sun, KCL 10meq QD, amiodarone 200mg QD  Continue to hold coumadin     (K59.03) Drug-induced constipation  Comment: acute/ongoing, no change  Plan: continue senna s to 1 PO BID scheduled and 1 PO BID prn, increase miralax to 17gm QD     (R41.89) Cognitive impairment  Comment: acute/ongoing, no change  Plan: OT eval and Tx       Orders:  No new orders today    Electronically signed by  Tonya Lynn Haase, APRN CNP

## 2023-07-08 ENCOUNTER — TELEPHONE (OUTPATIENT)
Dept: GERIATRICS | Facility: CLINIC | Age: 88
End: 2023-07-08
Payer: COMMERCIAL

## 2023-07-08 DIAGNOSIS — N39.0 URINARY TRACT INFECTION WITHOUT HEMATURIA, SITE UNSPECIFIED: Primary | ICD-10-CM

## 2023-07-08 RX ORDER — NITROFURANTOIN 25; 75 MG/1; MG/1
100 CAPSULE ORAL 2 TIMES DAILY
Qty: 10 CAPSULE | Refills: 0 | Status: SHIPPED | OUTPATIENT
Start: 2023-07-08 | End: 2023-07-13

## 2023-07-08 NOTE — TELEPHONE ENCOUNTER
[place] GERIATRIC SERVICES TELEPHONE ENCOUNTER    Chief Complaint   Patient presents with     UTI       Ayaan Herrera is a 89 year old  (10/31/1932),Nurse called today to report: Per Registered Nurse UC results back which reveal >100K E coli. Will treat per sensitivity chart.     ASSESSMENT/PLAN      Macrobid 100mg BID x 5 days    Monitor urinary status    Electronically signed by:   NUNO Dimas CNP

## 2023-07-10 ENCOUNTER — DISCHARGE SUMMARY NURSING HOME (OUTPATIENT)
Dept: GERIATRICS | Facility: CLINIC | Age: 88
End: 2023-07-10
Payer: COMMERCIAL

## 2023-07-10 VITALS
OXYGEN SATURATION: 95 % | HEIGHT: 68 IN | RESPIRATION RATE: 16 BRPM | DIASTOLIC BLOOD PRESSURE: 74 MMHG | HEART RATE: 75 BPM | BODY MASS INDEX: 23.75 KG/M2 | TEMPERATURE: 97.4 F | SYSTOLIC BLOOD PRESSURE: 119 MMHG | WEIGHT: 156.7 LBS

## 2023-07-10 DIAGNOSIS — R53.81 PHYSICAL DECONDITIONING: ICD-10-CM

## 2023-07-10 DIAGNOSIS — I42.8 OTHER CARDIOMYOPATHY (H): ICD-10-CM

## 2023-07-10 DIAGNOSIS — I48.20 CHRONIC ATRIAL FIBRILLATION (H): ICD-10-CM

## 2023-07-10 DIAGNOSIS — D72.829 LEUKOCYTOSIS, UNSPECIFIED TYPE: ICD-10-CM

## 2023-07-10 DIAGNOSIS — N39.0 URINARY TRACT INFECTION WITHOUT HEMATURIA, SITE UNSPECIFIED: Primary | ICD-10-CM

## 2023-07-10 DIAGNOSIS — I10 ESSENTIAL HYPERTENSION: ICD-10-CM

## 2023-07-10 DIAGNOSIS — S30.1XXD HEMATOMA OF LEFT PSOAS REGION TO ANTICOAGULANT THERAPY, SUBSEQUENT ENCOUNTER: ICD-10-CM

## 2023-07-10 DIAGNOSIS — N40.1 BENIGN PROSTATIC HYPERPLASIA WITH LOWER URINARY TRACT SYMPTOMS, SYMPTOM DETAILS UNSPECIFIED: ICD-10-CM

## 2023-07-10 DIAGNOSIS — C61 PROSTATE CANCER (H): ICD-10-CM

## 2023-07-10 DIAGNOSIS — I50.20 HEART FAILURE WITH REDUCED EJECTION FRACTION, NYHA CLASS I (H): ICD-10-CM

## 2023-07-10 DIAGNOSIS — R41.89 COGNITIVE IMPAIRMENT: ICD-10-CM

## 2023-07-10 DIAGNOSIS — K59.03 DRUG-INDUCED CONSTIPATION: ICD-10-CM

## 2023-07-10 DIAGNOSIS — D62 ANEMIA DUE TO BLOOD LOSS, ACUTE: ICD-10-CM

## 2023-07-10 DIAGNOSIS — R33.9 URINARY RETENTION: ICD-10-CM

## 2023-07-10 PROCEDURE — 99316 NF DSCHRG MGMT 30 MIN+: CPT | Performed by: NURSE PRACTITIONER

## 2023-07-10 NOTE — LETTER
7/10/2023        RE: Ayaan Herrera  [address]  [place] [address]        [facility] GERIATRICS DISCHARGE SUMMARY  PATIENT'S NAME: Ayaan Herrera  YOB: 1932  MEDICAL RECORD NUMBER:  [id number]  Place of Service where encounter took place:  [facility]) [25]    PRIMARY CARE PROVIDER AND CLINIC RESPONSIBLE AFTER TRANSFER:   Physician No Ref-Primary, No address on file    FMG Provider     Transferring providers: Tonya Lynn Haase, APRN CNP, Dr. Justo Augustin MD  Recent Hospitalization/ED:  [facility] stay 6/15/23 to 6/20/23.  Date of SNF Admission: June 20, 2023  Date of SNF (anticipated) Discharge: July 10, 2023  Discharged to: previous independent home  Cognitive Scores: BIMS: 13/15 and Short blessed: 2/28  Physical Function: Ambulating 150 ft with RW  DME: [facility]    CODE STATUS/ADVANCE DIRECTIVES DISCUSSION:  Prior   ALLERGIES: Zithromax [azithromycin], Amoxicillin, and Amoxicillin-pot clavulanate    NURSING FACILITY COURSE   Medication Changes/Rationale:     See assessment and plan    Summary of nursing facility stay:   Patient progressed in therapy to walking > 150 feet using a RW with SBA, patient assist with ALD's  Will DC to [facility] with home PT, OT and RN [facility]    Discharge Medications:  MED REC REQUIRED  Post Medication Reconciliation Status: discharge medications reconciled and changed, per note/orders     ]  Current Outpatient Medications   Medication Sig Dispense Refill     acetaminophen (TYLENOL) 500 MG tablet Take 1,000 mg by mouth 3 times daily       acetaminophen (TYLENOL) 500 MG tablet Take 1,000 mg by mouth nightly as needed for mild pain       alendronate (FOSAMAX) 70 MG tablet Take 1 tablet (70 mg) by mouth every 7 days TAKE ON EMPTY STOMACH, REMAIN UPRIGHT FOR ONE HOUR AFTER TAKING 4 tablet 11     amiodarone (PACERONE) 200 MG tablet Take 1 tablet (200 mg) by mouth daily       bisacodyl  (DULCOLAX) 10 MG suppository Place 10 mg rectally daily as needed for constipation       brimonidine (ALPHAGAN) 0.2 % ophthalmic solution Place 1 drop into both eyes 2 times daily       cyanocobalamin (CYANOCOBALAMIN) 1000 MCG tablet Take 1 tablet (1,000 mcg) by mouth daily       diclofenac (VOLTAREN) 1 % topical gel Apply 2 g topically 2 times daily To both shoulders.  And twice daily as needed.       dimethicone-zinc oxide (DONTE PROTECT) external cream Apply topically daily Apply to upper inner thighs, groin, and scrotum.       dorzolamide-timolol (COSOPT) 2-0.5 % ophthalmic solution INSTILL 1 DROP IN BOTH EYES TWICE A DAY 10 mL 0     ferrous sulfate (FE TABS) 325 (65 Fe) MG EC tablet Take 325 mg by mouth Take 1 tablet by mouth twice weekly on Monday and Friday at bedtime.       furosemide (LASIX) 20 MG tablet TAKE 1 TAB BY MOUTH ONCE DAILY (Patient taking differently: Take 20 mg by mouth On Mondays and Wednesdays) 30 tablet 0     furosemide (LASIX) 40 MG tablet Take 40 mg by mouth Take 1 tab by mouth on Tues, Thurs, Sat, and Sun.       hypromellose (ARTIFICIAL TEARS) 0.5 % SOLN ophthalmic solution Place 1 drop into both eyes 4 times daily as needed for dry eyes       latanoprost (XALATAN) 0.005 % ophthalmic solution INSTILL 1 DROP INTO BOTH EYES AT BEDTIME 2.5 mL 5     melatonin 5 MG tablet Take 5 mg by mouth nightly as needed       netarsudil (RHOPRESSA) 0.02 % ophthalmic solution Place 1 drop into both eyes At Bedtime       nitroFURantoin macrocrystal-monohydrate (MACROBID) 100 MG capsule Take 1 capsule (100 mg) by mouth 2 times daily for 5 days 10 capsule 0     nystatin (MYCOSTATIN) 794587 UNIT/GM external powder Apply topically 2 times daily Apply to abdominal folds       polyethylene glycol (MIRALAX) 17 g packet Take 17 g by mouth daily       potassium chloride ER (K-TAB/KLOR-CON) 10 MEQ CR tablet TAKE 1 TAB BY MOUTH DAILY WITH EACH FUROSEMIDE DOSE. 30 tablet 5     senna-docusate (SENOKOT-S/PERICOLACE)  "8.6-50 MG tablet Take 1 tablet by mouth 2 times daily       tamsulosin (FLOMAX) 0.4 MG capsule TAKE 1 CAP BY MOUTH ONCE DAILY 30 capsule 5     Vitamin D, Cholecalciferol, 1000 UNITS TABS Take 1 tablet by mouth daily         =  MED REC REQUIRED  Post Medication Reconciliation Status: discharge medications reconciled and changed, per note/orders     Controlled medications:   not applicable/none     Past Medical History:   Past Medical History:   Diagnosis Date     Atrial fibrillation (H)      BPH (benign prostatic hyperplasia)      Dyslipidemia      History of basal cell carcinoma      HTN (hypertension)      Nonsenile cataract      NSVT (nonsustained ventricular tachycardia) (H)      POAG (primary open-angle glaucoma)     Advanced      Prostate cancer (H)      Psoriasis      Sinus node dysfunction (H)      Physical Exam:   Vitals: /74   Pulse 75   Temp 97.4  F (36.3  C)   Resp 16   Ht 1.727 m (5' 8\")   Wt 71.1 kg (156 lb 11.2 oz)   SpO2 95%   BMI 23.83 kg/m    BMI: Body mass index is 23.83 kg/m .  GENERAL APPEARANCE:  Alert, in no distress  ENT:  Mouth and posterior oropharynx normal, moist mucous membranes, [place]  EYES:  EOM, conjunctivae, lids, pupils and irises normal, PERRL  RESP:  respiratory effort and palpation of chest normal, lungs clear to auscultation , no respiratory distress  CV:  Palpation and auscultation of heart done , regular rate and rhythm, no murmur, rub, or gallop, peripheral edema trace+ in LE bilateralluy  ABDOMEN:  normal bowel sounds, soft, nontender, no hepatosplenomegaly or other masses  M/S:   patient sitting up in w/c  SKIN:  Inspection of skin and subcutaneous tissue baseline  NEURO:   speech wnl  PSYCH:  affect and mood normal     SNF labs: Recent labs in [facility] reviewed by me today.  and   Most Recent 3 CBC's:  Recent Labs   Lab Test 06/20/23  0708 06/19/23  0639 06/18/23  1723 06/17/23  1833 06/17/23  0606 06/16/23  2033 06/16/23  0659   WBC 11.4*  --   --   --  8.8  --  7.5 "   HGB 8.5* 8.6* 8.5*   < > 8.4*   < > 8.3*   MCV 86  --   --   --  86  --  86     --   --   --  220  --  193    < > = values in this interval not displayed.     Most Recent 3 BMP's:  Recent Labs   Lab Test 06/19/23  0639 06/17/23  0606 06/16/23  0659   * 134* 137   POTASSIUM 4.4 4.0 4.1   CHLORIDE 99 99 102   CO2 27 27 28   BUN 15.9 19.9 21.1   CR 0.64* 0.70 0.70   ANIONGAP 8 8 7   BRENDAN 8.9 8.9 9.0   * 100* 102*     Assessment/Plan:  (S30.1XXD) Hematoma of left psoas region to anticoagulant therapy, subsequent encounter  (primary encounter diagnosis)  (R53.81) Physical deconditioning  Comment: improved  Plan[facility], OT and RN through [facility], hold coumadin  DC tramadol patient not taking  Tylenol 1000mg TID prn        (D62) Anemia due to blood loss, acute  Comment: acute/ongoing,  Plan: Hgb 9.6 on 6/26/23, f/u with PCP     (D72.829) Leukocytosis, unspecified type  Comment: resolved  Plan: WBC 7.3K on 6/26/23, no signs of infection     (N40.1) Benign prostatic hyperplasia with lower urinary tract symptoms, symptom details unspecified  (R33.9) Urinary retention  (C61) Prostate cancer (H)  Comment: acute/ongoing,   Plan: continue flomax 0.4mg QD, patient refusing PVR    (N39.0) UTI  Acute/ongoing  Macrobid 100mg BID for 5 day will continue through 7/13/23  F/u with PCP as OP     (I10) Essential hypertension  (I48.20) Chronic atrial fibrillation (H)  (I42.8) Other cardiomyopathy (H)  (I50.20) Heart failure with reduced ejection fraction, NYHA class I (H)  Comment: ongoing  EF 40-45% with global hypokinesia, moderate AS  Plan: daily weights, BMP follow, continue lasix 20mg QD and additional Lasix 40mg Tues, Thurs, sat, Sun, KCL 10meq QD, amiodarone 200mg QD  Continue to hold coumadin     (K59.03) Drug-induced constipation  Comment: ongoing  Plan: continue senna s to 1 PO BID scheduled and 1 PO BID prn, increase miralax to 17gm QD     (R41.89) Cognitive  impairment  Comment: ongoing  Plan: DC to [facility] with home PT, OT and RN through [facility]          DISCHARGE PLAN:    Follow up labs: No labs orders/due    Medical Follow Up:      Follow up with primary care provider in 1 weeks    [facility] scheduled appointments:     Future Appointments   Date Time Provider Department Center   No future appts      Discharge Services: Home Care:  Occupational Therapy, Physical Therapy and Registered Nurse    Discharge Instructions Verbalized to Patient at Discharge:     None    TOTAL DISCHARGE TIME:   Greater than 30 minutes  Electronically signed by:  Tonya Lynn Haase, APRN CNP                 Sincerely,        Tonya Lynn Haase, APRN CNP

## 2023-07-10 NOTE — PROGRESS NOTES
[facility] GERIATRICS DISCHARGE SUMMARY  PATIENT'S NAME: Ayaan Herrera  YOB: 1932  MEDICAL RECORD NUMBER:  [id number]  Place of Service where encounter took place:  [facility]) [25]    PRIMARY CARE PROVIDER AND CLINIC RESPONSIBLE AFTER TRANSFER:   Physician No Ref-Primary, No address on file    FMG Provider     Transferring providers: Tonya Lynn Haase, NUNO DISLA, Dr. Justo Augustin MD  Recent Hospitalization/ED:  [facility] stay 6/15/23 to 6/20/23.  Date of SNF Admission: June 20, 2023  Date of SNF (anticipated) Discharge: July 10, 2023  Discharged to: previous independent home  Cognitive Scores: BIMS: 13/15 and Short blessed: 2/28  Physical Function: Ambulating 150 ft with RW  DME: Surjit    CODE STATUS/ADVANCE DIRECTIVES DISCUSSION:  Prior   ALLERGIES: Zithromax [azithromycin], Amoxicillin, and Amoxicillin-pot clavulanate    NURSING FACILITY COURSE   Medication Changes/Rationale:     See assessment and plan    Summary of nursing facility stay:   Patient progressed in therapy to walking > 150 feet using a RW with SBA, patient assist with ALD's  Will DC to [facility] with home PT, OT and RN through [facility]    Discharge Medications:  MED REC REQUIRED  Post Medication Reconciliation Status: discharge medications reconciled and changed, per note/orders     ]  Current Outpatient Medications   Medication Sig Dispense Refill     acetaminophen (TYLENOL) 500 MG tablet Take 1,000 mg by mouth 3 times daily       acetaminophen (TYLENOL) 500 MG tablet Take 1,000 mg by mouth nightly as needed for mild pain       alendronate (FOSAMAX) 70 MG tablet Take 1 tablet (70 mg) by mouth every 7 days TAKE ON EMPTY STOMACH, REMAIN UPRIGHT FOR ONE HOUR AFTER TAKING 4 tablet 11     amiodarone (PACERONE) 200 MG tablet Take 1 tablet (200 mg) by mouth daily       bisacodyl (DULCOLAX) 10 MG suppository Place 10 mg rectally daily as needed for constipation        brimonidine (ALPHAGAN) 0.2 % ophthalmic solution Place 1 drop into both eyes 2 times daily       cyanocobalamin (CYANOCOBALAMIN) 1000 MCG tablet Take 1 tablet (1,000 mcg) by mouth daily       diclofenac (VOLTAREN) 1 % topical gel Apply 2 g topically 2 times daily To both shoulders.  And twice daily as needed.       dimethicone-zinc oxide (DONTE PROTECT) external cream Apply topically daily Apply to upper inner thighs, groin, and scrotum.       dorzolamide-timolol (COSOPT) 2-0.5 % ophthalmic solution INSTILL 1 DROP IN BOTH EYES TWICE A DAY 10 mL 0     ferrous sulfate (FE TABS) 325 (65 Fe) MG EC tablet Take 325 mg by mouth Take 1 tablet by mouth twice weekly on Monday and Friday at bedtime.       furosemide (LASIX) 20 MG tablet TAKE 1 TAB BY MOUTH ONCE DAILY (Patient taking differently: Take 20 mg by mouth On Mondays and Wednesdays) 30 tablet 0     furosemide (LASIX) 40 MG tablet Take 40 mg by mouth Take 1 tab by mouth on Tues, Thurs, Sat, and Sun.       hypromellose (ARTIFICIAL TEARS) 0.5 % SOLN ophthalmic solution Place 1 drop into both eyes 4 times daily as needed for dry eyes       latanoprost (XALATAN) 0.005 % ophthalmic solution INSTILL 1 DROP INTO BOTH EYES AT BEDTIME 2.5 mL 5     melatonin 5 MG tablet Take 5 mg by mouth nightly as needed       netarsudil (RHOPRESSA) 0.02 % ophthalmic solution Place 1 drop into both eyes At Bedtime       nitroFURantoin macrocrystal-monohydrate (MACROBID) 100 MG capsule Take 1 capsule (100 mg) by mouth 2 times daily for 5 days 10 capsule 0     nystatin (MYCOSTATIN) 964973 UNIT/GM external powder Apply topically 2 times daily Apply to abdominal folds       polyethylene glycol (MIRALAX) 17 g packet Take 17 g by mouth daily       potassium chloride ER (K-TAB/KLOR-CON) 10 MEQ CR tablet TAKE 1 TAB BY MOUTH DAILY WITH EACH FUROSEMIDE DOSE. 30 tablet 5     senna-docusate (SENOKOT-S/PERICOLACE) 8.6-50 MG tablet Take 1 tablet by mouth 2 times daily       tamsulosin (FLOMAX) 0.4 MG capsule  "TAKE 1 CAP BY MOUTH ONCE DAILY 30 capsule 5     Vitamin D, Cholecalciferol, 1000 UNITS TABS Take 1 tablet by mouth daily         =  MED REC REQUIRED  Post Medication Reconciliation Status: discharge medications reconciled and changed, per note/orders     Controlled medications:   not applicable/none     Past Medical History:   Past Medical History:   Diagnosis Date     Atrial fibrillation (H)      BPH (benign prostatic hyperplasia)      Dyslipidemia      History of basal cell carcinoma      HTN (hypertension)      Nonsenile cataract      NSVT (nonsustained ventricular tachycardia) (H)      POAG (primary open-angle glaucoma)     Advanced      Prostate cancer (H)      Psoriasis      Sinus node dysfunction (H)      Physical Exam:   Vitals: /74   Pulse 75   Temp 97.4  F (36.3  C)   Resp 16   Ht 1.727 m (5' 8\")   Wt 71.1 kg (156 lb 11.2 oz)   SpO2 95%   BMI 23.83 kg/m    BMI: Body mass index is 23.83 kg/m .  GENERAL APPEARANCE:  Alert, in no distress  ENT:  Mouth and posterior oropharynx normal, moist mucous membranes, [place]  EYES:  EOM, conjunctivae, lids, pupils and irises normal, PERRL  RESP:  respiratory effort and palpation of chest normal, lungs clear to auscultation , no respiratory distress  CV:  Palpation and auscultation of heart done , regular rate and rhythm, no murmur, rub, or gallop, peripheral edema trace+ in LE bilateralluy  ABDOMEN:  normal bowel sounds, soft, nontender, no hepatosplenomegaly or other masses  M/S:   patient sitting up in w/c  SKIN:  Inspection of skin and subcutaneous tissue baseline  NEURO:   speech wnl  PSYCH:  affect and mood normal     SNF labs: Recent labs in [facility] reviewed by me today.  and   Most Recent 3 CBC's:  Recent Labs   Lab Test 06/20/23  0708 06/19/23  0639 06/18/23  1723 06/17/23  1833 06/17/23  0606 06/16/23  2033 06/16/23  0659   WBC 11.4*  --   --   --  8.8  --  7.5   HGB 8.5* 8.6* 8.5*   < > 8.4*   < > 8.3*   MCV 86  --   --   --  86  --  86     --   " --   --  220  --  193    < > = values in this interval not displayed.     Most Recent 3 BMP's:  Recent Labs   Lab Test 06/19/23  0639 06/17/23  0606 06/16/23  0659   * 134* 137   POTASSIUM 4.4 4.0 4.1   CHLORIDE 99 99 102   CO2 27 27 28   BUN 15.9 19.9 21.1   CR 0.64* 0.70 0.70   ANIONGAP 8 8 7   BRENDAN 8.9 8.9 9.0   * 100* 102*     Assessment/Plan:  (S30.1XXD) Hematoma of left psoas region to anticoagulant therapy, subsequent encounter  (primary encounter diagnosis)  (R53.81) Physical deconditioning  Comment: improved  Plan: DC to [facility] with home PT, OT and RN through [facility], hold coumadin  DC tramadol patient not taking  Tylenol 1000mg TID prn        (D62) Anemia due to blood loss, acute  Comment: acute/ongoing,  Plan: Hgb 9.6 on 6/26/23, f/u with PCP     (D72.829) Leukocytosis, unspecified type  Comment: resolved  Plan: WBC 7.3K on 6/26/23, no signs of infection     (N40.1) Benign prostatic hyperplasia with lower urinary tract symptoms, symptom details unspecified  (R33.9) Urinary retention  (C61) Prostate cancer (H)  Comment: acute/ongoing,   Plan: continue flomax 0.4mg QD, patient refusing PVR    (N39.0) UTI  Acute/ongoing  Macrobid 100mg BID for 5 day will continue through 7/13/23  F/u with PCP as OP     (I10) Essential hypertension  (I48.20) Chronic atrial fibrillation (H)  (I42.8) Other cardiomyopathy (H)  (I50.20) Heart failure with reduced ejection fraction, NYHA class I (H)  Comment: ongoing  EF 40-45% with global hypokinesia, moderate AS  Plan: daily weights, BMP follow, continue lasix 20mg QD and additional Lasix 40mg Tues, Thurs, sat, Sun, KCL 10meq QD, amiodarone 200mg QD  Continue to hold coumadin     (K59.03) Drug-induced constipation  Comment: ongoing  Plan: continue senna s to 1 PO BID scheduled and 1 PO BID prn, increase miralax to 17gm QD     (R41.89) Cognitive impairment  Comment: ongoing  Plan: DC to [facility] with home PT, OT and RN through  [facility]          DISCHARGE PLAN:    Follow up labs: No labs orders/due    Medical Follow Up:      Follow up with primary care provider in 1 weeks    Current [place] scheduled appointments:     Future Appointments   Date Time Provider Department Center   No future appts      Discharge Services: Home Care:  Occupational Therapy, Physical Therapy and Registered Nurse    Discharge Instructions Verbalized to Patient at Discharge:     None    TOTAL DISCHARGE TIME:   Greater than 30 minutes  Electronically signed by:  Tonya Lynn Haase, APRN CNP

## 2023-07-12 ENCOUNTER — LAB REQUISITION (OUTPATIENT)
Dept: LAB | Facility: CLINIC | Age: 88
End: 2023-07-12
Payer: COMMERCIAL

## 2023-07-12 DIAGNOSIS — I48.91 UNSPECIFIED ATRIAL FIBRILLATION (H): ICD-10-CM

## 2023-07-12 DIAGNOSIS — D64.9 ANEMIA, UNSPECIFIED: ICD-10-CM

## 2023-07-13 LAB
ANION GAP SERPL CALCULATED.3IONS-SCNC: 8 MMOL/L (ref 7–15)
BUN SERPL-MCNC: 21 MG/DL (ref 8–23)
CALCIUM SERPL-MCNC: 9.2 MG/DL (ref 8.2–9.6)
CHLORIDE SERPL-SCNC: 100 MMOL/L (ref 98–107)
CREAT SERPL-MCNC: 0.74 MG/DL (ref 0.67–1.17)
DEPRECATED HCO3 PLAS-SCNC: 28 MMOL/L (ref 22–29)
ERYTHROCYTE [DISTWIDTH] IN BLOOD BY AUTOMATED COUNT: 18.6 % (ref 10–15)
GFR SERPL CREATININE-BSD FRML MDRD: 86 ML/MIN/1.73M2
GLUCOSE SERPL-MCNC: 89 MG/DL (ref 70–99)
HCT VFR BLD AUTO: 38.8 % (ref 40–53)
HGB BLD-MCNC: 11.3 G/DL (ref 13.3–17.7)
INR PPP: 1.09 (ref 0.85–1.15)
MCH RBC QN AUTO: 26.8 PG (ref 26.5–33)
MCHC RBC AUTO-ENTMCNC: 29.1 G/DL (ref 31.5–36.5)
MCV RBC AUTO: 92 FL (ref 78–100)
PLATELET # BLD AUTO: 235 10E3/UL (ref 150–450)
POTASSIUM SERPL-SCNC: 4.2 MMOL/L (ref 3.4–5.3)
RBC # BLD AUTO: 4.21 10E6/UL (ref 4.4–5.9)
SODIUM SERPL-SCNC: 136 MMOL/L (ref 136–145)
WBC # BLD AUTO: 6 10E3/UL (ref 4–11)

## 2023-07-13 PROCEDURE — 36415 COLL VENOUS BLD VENIPUNCTURE: CPT | Mod: ORL | Performed by: NURSE PRACTITIONER

## 2023-07-13 PROCEDURE — 85027 COMPLETE CBC AUTOMATED: CPT | Mod: ORL | Performed by: NURSE PRACTITIONER

## 2023-07-13 PROCEDURE — P9604 ONE-WAY ALLOW PRORATED TRIP: HCPCS | Mod: ORL | Performed by: NURSE PRACTITIONER

## 2023-07-13 PROCEDURE — 80048 BASIC METABOLIC PNL TOTAL CA: CPT | Mod: ORL | Performed by: NURSE PRACTITIONER

## 2023-07-13 PROCEDURE — 85610 PROTHROMBIN TIME: CPT | Mod: ORL | Performed by: NURSE PRACTITIONER

## 2023-07-19 ENCOUNTER — LAB REQUISITION (OUTPATIENT)
Dept: LAB | Facility: CLINIC | Age: 88
End: 2023-07-19
Payer: COMMERCIAL

## 2023-07-19 DIAGNOSIS — D64.9 ANEMIA, UNSPECIFIED: ICD-10-CM

## 2023-07-19 DIAGNOSIS — I48.91 UNSPECIFIED ATRIAL FIBRILLATION (H): ICD-10-CM

## 2023-07-20 LAB
ERYTHROCYTE [DISTWIDTH] IN BLOOD BY AUTOMATED COUNT: 17.8 % (ref 10–15)
HCT VFR BLD AUTO: 36.5 % (ref 40–53)
HGB BLD-MCNC: 10.9 G/DL (ref 13.3–17.7)
INR PPP: 1.35 (ref 0.85–1.15)
MCH RBC QN AUTO: 27.3 PG (ref 26.5–33)
MCHC RBC AUTO-ENTMCNC: 29.9 G/DL (ref 31.5–36.5)
MCV RBC AUTO: 91 FL (ref 78–100)
PLATELET # BLD AUTO: 193 10E3/UL (ref 150–450)
RBC # BLD AUTO: 4 10E6/UL (ref 4.4–5.9)
WBC # BLD AUTO: 4.8 10E3/UL (ref 4–11)

## 2023-07-20 PROCEDURE — P9604 ONE-WAY ALLOW PRORATED TRIP: HCPCS | Mod: ORL | Performed by: NURSE PRACTITIONER

## 2023-07-20 PROCEDURE — 85027 COMPLETE CBC AUTOMATED: CPT | Mod: ORL | Performed by: NURSE PRACTITIONER

## 2023-07-20 PROCEDURE — 85610 PROTHROMBIN TIME: CPT | Mod: ORL | Performed by: NURSE PRACTITIONER

## 2023-07-20 PROCEDURE — 36415 COLL VENOUS BLD VENIPUNCTURE: CPT | Mod: ORL | Performed by: NURSE PRACTITIONER

## 2023-07-23 ENCOUNTER — LAB REQUISITION (OUTPATIENT)
Dept: LAB | Facility: CLINIC | Age: 88
End: 2023-07-23
Payer: COMMERCIAL

## 2023-07-23 DIAGNOSIS — D64.9 ANEMIA, UNSPECIFIED: ICD-10-CM

## 2023-07-26 ENCOUNTER — LAB REQUISITION (OUTPATIENT)
Dept: LAB | Facility: CLINIC | Age: 88
End: 2023-07-26
Payer: COMMERCIAL

## 2023-07-26 DIAGNOSIS — D64.9 ANEMIA, UNSPECIFIED: ICD-10-CM

## 2023-07-26 DIAGNOSIS — I48.20 CHRONIC ATRIAL FIBRILLATION, UNSPECIFIED (H): ICD-10-CM

## 2023-07-27 LAB
ERYTHROCYTE [DISTWIDTH] IN BLOOD BY AUTOMATED COUNT: 17.5 % (ref 10–15)
HCT VFR BLD AUTO: 38.2 % (ref 40–53)
HGB BLD-MCNC: 11.5 G/DL (ref 13.3–17.7)
INR PPP: 2.04 (ref 0.85–1.15)
MCH RBC QN AUTO: 26.9 PG (ref 26.5–33)
MCHC RBC AUTO-ENTMCNC: 30.1 G/DL (ref 31.5–36.5)
MCV RBC AUTO: 89 FL (ref 78–100)
PLATELET # BLD AUTO: 228 10E3/UL (ref 150–450)
RBC # BLD AUTO: 4.28 10E6/UL (ref 4.4–5.9)
WBC # BLD AUTO: 6.5 10E3/UL (ref 4–11)

## 2023-07-27 PROCEDURE — 85027 COMPLETE CBC AUTOMATED: CPT | Mod: ORL | Performed by: NURSE PRACTITIONER

## 2023-07-27 PROCEDURE — P9604 ONE-WAY ALLOW PRORATED TRIP: HCPCS | Mod: ORL | Performed by: NURSE PRACTITIONER

## 2023-07-27 PROCEDURE — 85610 PROTHROMBIN TIME: CPT | Mod: ORL | Performed by: NURSE PRACTITIONER

## 2023-07-27 PROCEDURE — 36415 COLL VENOUS BLD VENIPUNCTURE: CPT | Mod: ORL | Performed by: NURSE PRACTITIONER

## 2023-08-02 ENCOUNTER — LAB REQUISITION (OUTPATIENT)
Dept: LAB | Facility: CLINIC | Age: 88
End: 2023-08-02
Payer: COMMERCIAL

## 2023-08-02 DIAGNOSIS — I48.20 CHRONIC ATRIAL FIBRILLATION, UNSPECIFIED (H): ICD-10-CM

## 2023-08-03 LAB — INR PPP: 3.92 (ref 0.85–1.15)

## 2023-08-03 PROCEDURE — 36415 COLL VENOUS BLD VENIPUNCTURE: CPT | Mod: ORL | Performed by: NURSE PRACTITIONER

## 2023-08-03 PROCEDURE — P9604 ONE-WAY ALLOW PRORATED TRIP: HCPCS | Mod: ORL | Performed by: NURSE PRACTITIONER

## 2023-08-03 PROCEDURE — 85610 PROTHROMBIN TIME: CPT | Mod: ORL | Performed by: NURSE PRACTITIONER

## 2023-08-09 ENCOUNTER — LAB REQUISITION (OUTPATIENT)
Dept: LAB | Facility: CLINIC | Age: 88
End: 2023-08-09
Payer: COMMERCIAL

## 2023-08-09 DIAGNOSIS — I48.20 CHRONIC ATRIAL FIBRILLATION, UNSPECIFIED (H): ICD-10-CM

## 2023-08-11 ENCOUNTER — LAB REQUISITION (OUTPATIENT)
Dept: LAB | Facility: CLINIC | Age: 88
End: 2023-08-11
Payer: COMMERCIAL

## 2023-08-11 DIAGNOSIS — I48.20 CHRONIC ATRIAL FIBRILLATION, UNSPECIFIED (H): ICD-10-CM

## 2023-08-11 LAB — INR PPP: 4.19 (ref 0.85–1.15)

## 2023-08-11 PROCEDURE — 36415 COLL VENOUS BLD VENIPUNCTURE: CPT | Mod: ORL | Performed by: NURSE PRACTITIONER

## 2023-08-11 PROCEDURE — P9603 ONE-WAY ALLOW PRORATED MILES: HCPCS | Mod: ORL | Performed by: NURSE PRACTITIONER

## 2023-08-11 PROCEDURE — 85610 PROTHROMBIN TIME: CPT | Mod: ORL | Performed by: NURSE PRACTITIONER

## 2023-08-16 ENCOUNTER — LAB REQUISITION (OUTPATIENT)
Dept: LAB | Facility: CLINIC | Age: 88
End: 2023-08-16
Payer: COMMERCIAL

## 2023-08-16 DIAGNOSIS — I48.20 CHRONIC ATRIAL FIBRILLATION, UNSPECIFIED (H): ICD-10-CM

## 2023-08-17 LAB — INR PPP: 2.17 (ref 0.85–1.15)

## 2023-08-17 PROCEDURE — 36415 COLL VENOUS BLD VENIPUNCTURE: CPT | Mod: ORL | Performed by: NURSE PRACTITIONER

## 2023-08-17 PROCEDURE — P9604 ONE-WAY ALLOW PRORATED TRIP: HCPCS | Mod: ORL | Performed by: NURSE PRACTITIONER

## 2023-08-17 PROCEDURE — 85610 PROTHROMBIN TIME: CPT | Mod: ORL | Performed by: NURSE PRACTITIONER

## 2023-08-23 ENCOUNTER — LAB REQUISITION (OUTPATIENT)
Dept: LAB | Facility: CLINIC | Age: 88
End: 2023-08-23
Payer: COMMERCIAL

## 2023-08-23 DIAGNOSIS — I48.20 CHRONIC ATRIAL FIBRILLATION, UNSPECIFIED (H): ICD-10-CM

## 2023-08-24 LAB — INR PPP: 2.35 (ref 0.85–1.15)

## 2023-08-24 PROCEDURE — 36415 COLL VENOUS BLD VENIPUNCTURE: CPT | Mod: ORL | Performed by: NURSE PRACTITIONER

## 2023-08-24 PROCEDURE — 85610 PROTHROMBIN TIME: CPT | Mod: ORL | Performed by: NURSE PRACTITIONER

## 2023-08-24 PROCEDURE — P9604 ONE-WAY ALLOW PRORATED TRIP: HCPCS | Mod: ORL | Performed by: NURSE PRACTITIONER

## 2023-08-30 ENCOUNTER — LAB REQUISITION (OUTPATIENT)
Dept: LAB | Facility: CLINIC | Age: 88
End: 2023-08-30
Payer: COMMERCIAL

## 2023-08-30 DIAGNOSIS — I48.20 CHRONIC ATRIAL FIBRILLATION, UNSPECIFIED (H): ICD-10-CM

## 2023-08-31 LAB — INR PPP: 2.4 (ref 0.85–1.15)

## 2023-08-31 PROCEDURE — P9604 ONE-WAY ALLOW PRORATED TRIP: HCPCS | Mod: ORL | Performed by: NURSE PRACTITIONER

## 2023-08-31 PROCEDURE — 85610 PROTHROMBIN TIME: CPT | Mod: ORL | Performed by: NURSE PRACTITIONER

## 2023-08-31 PROCEDURE — 36415 COLL VENOUS BLD VENIPUNCTURE: CPT | Mod: ORL | Performed by: NURSE PRACTITIONER

## 2023-09-13 ENCOUNTER — LAB REQUISITION (OUTPATIENT)
Dept: LAB | Facility: CLINIC | Age: 88
End: 2023-09-13
Payer: COMMERCIAL

## 2023-09-13 DIAGNOSIS — I48.20 CHRONIC ATRIAL FIBRILLATION, UNSPECIFIED (H): ICD-10-CM

## 2023-09-14 LAB — INR PPP: 2.5 (ref 0.85–1.15)

## 2023-09-14 PROCEDURE — 36415 COLL VENOUS BLD VENIPUNCTURE: CPT | Mod: ORL | Performed by: FAMILY MEDICINE

## 2023-09-14 PROCEDURE — 85610 PROTHROMBIN TIME: CPT | Mod: ORL | Performed by: FAMILY MEDICINE

## 2023-09-14 PROCEDURE — P9604 ONE-WAY ALLOW PRORATED TRIP: HCPCS | Mod: ORL | Performed by: FAMILY MEDICINE

## 2023-09-27 ENCOUNTER — LAB REQUISITION (OUTPATIENT)
Dept: LAB | Facility: CLINIC | Age: 88
End: 2023-09-27
Payer: COMMERCIAL

## 2023-09-27 DIAGNOSIS — I48.20 CHRONIC ATRIAL FIBRILLATION, UNSPECIFIED (H): ICD-10-CM

## 2023-09-28 LAB — INR PPP: 2.66 (ref 0.85–1.15)

## 2023-09-28 PROCEDURE — 85610 PROTHROMBIN TIME: CPT | Mod: ORL | Performed by: NURSE PRACTITIONER

## 2023-09-28 PROCEDURE — 36415 COLL VENOUS BLD VENIPUNCTURE: CPT | Mod: ORL | Performed by: NURSE PRACTITIONER

## 2023-09-28 PROCEDURE — P9603 ONE-WAY ALLOW PRORATED MILES: HCPCS | Mod: ORL | Performed by: NURSE PRACTITIONER

## 2023-10-10 ENCOUNTER — PATIENT OUTREACH (OUTPATIENT)
Dept: CARE COORDINATION | Facility: CLINIC | Age: 88
End: 2023-10-10
Payer: COMMERCIAL

## 2023-10-10 NOTE — PROGRESS NOTES
Clinical Product Navigator RN reviewed chart; patient on payer product coverage.  Review results:   CPN Initial Information Gathering  Referral Source: Pro-Active Outreach    Patient identified via Epic report as at risk patient with no PCP. Per chart review, patient was discharged from TCU to [facility] and is being followed by the care team at that facility, therefore not a candidate for CPN outreach.    Kailyn Mendez RN CC  Casual Clinical Product Navigator Coverage

## 2023-10-18 ENCOUNTER — LAB REQUISITION (OUTPATIENT)
Dept: LAB | Facility: CLINIC | Age: 88
End: 2023-10-18
Payer: COMMERCIAL

## 2023-10-18 DIAGNOSIS — I48.20 CHRONIC ATRIAL FIBRILLATION, UNSPECIFIED (H): ICD-10-CM

## 2023-10-19 LAB — INR PPP: 1.86 (ref 0.85–1.15)

## 2023-10-19 PROCEDURE — 85610 PROTHROMBIN TIME: CPT | Mod: ORL | Performed by: NURSE PRACTITIONER

## 2023-10-19 PROCEDURE — P9604 ONE-WAY ALLOW PRORATED TRIP: HCPCS | Mod: ORL | Performed by: NURSE PRACTITIONER

## 2023-10-19 PROCEDURE — 36415 COLL VENOUS BLD VENIPUNCTURE: CPT | Mod: ORL | Performed by: NURSE PRACTITIONER

## 2023-10-25 ENCOUNTER — LAB REQUISITION (OUTPATIENT)
Dept: LAB | Facility: CLINIC | Age: 88
End: 2023-10-25
Payer: COMMERCIAL

## 2023-10-25 DIAGNOSIS — I48.20 CHRONIC ATRIAL FIBRILLATION, UNSPECIFIED (H): ICD-10-CM

## 2023-10-26 LAB — INR PPP: 2.54 (ref 0.85–1.15)

## 2023-10-26 PROCEDURE — 36415 COLL VENOUS BLD VENIPUNCTURE: CPT | Mod: ORL | Performed by: NURSE PRACTITIONER

## 2023-10-26 PROCEDURE — P9604 ONE-WAY ALLOW PRORATED TRIP: HCPCS | Mod: ORL | Performed by: NURSE PRACTITIONER

## 2023-10-26 PROCEDURE — 85610 PROTHROMBIN TIME: CPT | Mod: ORL | Performed by: NURSE PRACTITIONER

## 2023-11-01 ENCOUNTER — LAB REQUISITION (OUTPATIENT)
Dept: LAB | Facility: CLINIC | Age: 88
End: 2023-11-01
Payer: COMMERCIAL

## 2023-11-01 DIAGNOSIS — I48.20 CHRONIC ATRIAL FIBRILLATION, UNSPECIFIED (H): ICD-10-CM

## 2023-11-02 LAB — INR PPP: 3.34 (ref 0.85–1.15)

## 2023-11-02 PROCEDURE — 36415 COLL VENOUS BLD VENIPUNCTURE: CPT | Mod: ORL | Performed by: NURSE PRACTITIONER

## 2023-11-02 PROCEDURE — P9604 ONE-WAY ALLOW PRORATED TRIP: HCPCS | Mod: ORL | Performed by: NURSE PRACTITIONER

## 2023-11-02 PROCEDURE — 85610 PROTHROMBIN TIME: CPT | Mod: ORL | Performed by: NURSE PRACTITIONER

## 2023-11-08 ENCOUNTER — LAB REQUISITION (OUTPATIENT)
Dept: LAB | Facility: CLINIC | Age: 88
End: 2023-11-08
Payer: COMMERCIAL

## 2023-11-08 DIAGNOSIS — I48.20 CHRONIC ATRIAL FIBRILLATION, UNSPECIFIED (H): ICD-10-CM

## 2023-11-09 LAB — INR PPP: 2.86 (ref 0.85–1.15)

## 2023-11-09 PROCEDURE — P9604 ONE-WAY ALLOW PRORATED TRIP: HCPCS | Mod: ORL | Performed by: NURSE PRACTITIONER

## 2023-11-09 PROCEDURE — 36415 COLL VENOUS BLD VENIPUNCTURE: CPT | Mod: ORL | Performed by: NURSE PRACTITIONER

## 2023-11-09 PROCEDURE — 85610 PROTHROMBIN TIME: CPT | Mod: ORL | Performed by: NURSE PRACTITIONER

## 2023-11-15 ENCOUNTER — LAB REQUISITION (OUTPATIENT)
Dept: LAB | Facility: CLINIC | Age: 88
End: 2023-11-15
Payer: COMMERCIAL

## 2023-11-15 DIAGNOSIS — I48.20 CHRONIC ATRIAL FIBRILLATION, UNSPECIFIED (H): ICD-10-CM

## 2023-11-15 DIAGNOSIS — E03.9 HYPOTHYROIDISM, UNSPECIFIED: ICD-10-CM

## 2023-11-15 DIAGNOSIS — D64.9 ANEMIA, UNSPECIFIED: ICD-10-CM

## 2023-11-15 DIAGNOSIS — I10 ESSENTIAL (PRIMARY) HYPERTENSION: ICD-10-CM

## 2023-11-15 DIAGNOSIS — E53.8 DEFICIENCY OF OTHER SPECIFIED B GROUP VITAMINS: ICD-10-CM

## 2023-11-16 LAB
ANION GAP SERPL CALCULATED.3IONS-SCNC: 10 MMOL/L (ref 7–15)
BUN SERPL-MCNC: 25.1 MG/DL (ref 8–23)
CALCIUM SERPL-MCNC: 9.3 MG/DL (ref 8.2–9.6)
CHLORIDE SERPL-SCNC: 104 MMOL/L (ref 98–107)
CREAT SERPL-MCNC: 0.81 MG/DL (ref 0.67–1.17)
DEPRECATED HCO3 PLAS-SCNC: 26 MMOL/L (ref 22–29)
EGFRCR SERPLBLD CKD-EPI 2021: 83 ML/MIN/1.73M2
ERYTHROCYTE [DISTWIDTH] IN BLOOD BY AUTOMATED COUNT: 16.5 % (ref 10–15)
GLUCOSE SERPL-MCNC: 98 MG/DL (ref 70–99)
HCT VFR BLD AUTO: 35.2 % (ref 40–53)
HGB BLD-MCNC: 10.6 G/DL (ref 13.3–17.7)
INR PPP: 2.8 (ref 0.85–1.15)
MCH RBC QN AUTO: 30 PG (ref 26.5–33)
MCHC RBC AUTO-ENTMCNC: 30.1 G/DL (ref 31.5–36.5)
MCV RBC AUTO: 100 FL (ref 78–100)
PLATELET # BLD AUTO: 180 10E3/UL (ref 150–450)
POTASSIUM SERPL-SCNC: 4.5 MMOL/L (ref 3.4–5.3)
RBC # BLD AUTO: 3.53 10E6/UL (ref 4.4–5.9)
SODIUM SERPL-SCNC: 140 MMOL/L (ref 135–145)
TSH SERPL DL<=0.005 MIU/L-ACNC: 2.93 UIU/ML (ref 0.3–4.2)
VIT B12 SERPL-MCNC: 824 PG/ML (ref 232–1245)
WBC # BLD AUTO: 6.4 10E3/UL (ref 4–11)

## 2023-11-16 PROCEDURE — 82607 VITAMIN B-12: CPT | Mod: ORL

## 2023-11-16 PROCEDURE — P9604 ONE-WAY ALLOW PRORATED TRIP: HCPCS | Mod: ORL

## 2023-11-16 PROCEDURE — 85027 COMPLETE CBC AUTOMATED: CPT | Mod: ORL

## 2023-11-16 PROCEDURE — 36415 COLL VENOUS BLD VENIPUNCTURE: CPT | Mod: ORL

## 2023-11-16 PROCEDURE — 80048 BASIC METABOLIC PNL TOTAL CA: CPT | Mod: ORL

## 2023-11-16 PROCEDURE — 85610 PROTHROMBIN TIME: CPT | Mod: ORL

## 2023-11-16 PROCEDURE — 84443 ASSAY THYROID STIM HORMONE: CPT | Mod: ORL

## 2023-11-19 ENCOUNTER — LAB REQUISITION (OUTPATIENT)
Dept: LAB | Facility: CLINIC | Age: 88
End: 2023-11-19
Payer: COMMERCIAL

## 2023-11-19 DIAGNOSIS — I48.20 CHRONIC ATRIAL FIBRILLATION, UNSPECIFIED (H): ICD-10-CM

## 2023-11-21 LAB — INR PPP: 2.59 (ref 0.85–1.15)

## 2023-11-21 PROCEDURE — P9604 ONE-WAY ALLOW PRORATED TRIP: HCPCS | Mod: ORL

## 2023-11-21 PROCEDURE — 85610 PROTHROMBIN TIME: CPT | Mod: ORL

## 2023-11-21 PROCEDURE — 36415 COLL VENOUS BLD VENIPUNCTURE: CPT | Mod: ORL

## 2023-11-29 ENCOUNTER — LAB REQUISITION (OUTPATIENT)
Dept: LAB | Facility: CLINIC | Age: 88
End: 2023-11-29
Payer: COMMERCIAL

## 2023-11-29 DIAGNOSIS — I48.20 CHRONIC ATRIAL FIBRILLATION, UNSPECIFIED (H): ICD-10-CM

## 2023-11-30 LAB — INR PPP: 2.38 (ref 0.85–1.15)

## 2023-11-30 PROCEDURE — 85610 PROTHROMBIN TIME: CPT | Mod: ORL

## 2023-11-30 PROCEDURE — P9603 ONE-WAY ALLOW PRORATED MILES: HCPCS | Mod: ORL

## 2023-11-30 PROCEDURE — 36415 COLL VENOUS BLD VENIPUNCTURE: CPT | Mod: ORL

## 2023-12-06 ENCOUNTER — LAB REQUISITION (OUTPATIENT)
Dept: LAB | Facility: CLINIC | Age: 88
End: 2023-12-06
Payer: COMMERCIAL

## 2023-12-06 DIAGNOSIS — I48.20 CHRONIC ATRIAL FIBRILLATION, UNSPECIFIED (H): ICD-10-CM

## 2023-12-08 LAB — INR PPP: 2.36 (ref 0.85–1.15)

## 2023-12-08 PROCEDURE — 36415 COLL VENOUS BLD VENIPUNCTURE: CPT | Mod: ORL

## 2023-12-08 PROCEDURE — 85610 PROTHROMBIN TIME: CPT | Mod: ORL

## 2023-12-08 PROCEDURE — P9604 ONE-WAY ALLOW PRORATED TRIP: HCPCS | Mod: ORL

## 2023-12-20 ENCOUNTER — LAB REQUISITION (OUTPATIENT)
Dept: LAB | Facility: CLINIC | Age: 88
End: 2023-12-20
Payer: COMMERCIAL

## 2023-12-20 DIAGNOSIS — I48.20 CHRONIC ATRIAL FIBRILLATION, UNSPECIFIED (H): ICD-10-CM

## 2023-12-21 LAB
ALT SERPL W P-5'-P-CCNC: 11 U/L (ref 0–70)
AST SERPL W P-5'-P-CCNC: 24 U/L (ref 0–45)
INR PPP: 2 (ref 0.85–1.15)
T4 SERPL-MCNC: 6.4 UG/DL (ref 4.5–11.7)
TSH SERPL DL<=0.005 MIU/L-ACNC: 2.94 UIU/ML (ref 0.3–4.2)

## 2023-12-21 PROCEDURE — 84443 ASSAY THYROID STIM HORMONE: CPT | Mod: ORL

## 2023-12-21 PROCEDURE — 84450 TRANSFERASE (AST) (SGOT): CPT | Mod: ORL

## 2023-12-21 PROCEDURE — 84436 ASSAY OF TOTAL THYROXINE: CPT | Mod: ORL

## 2023-12-21 PROCEDURE — 84460 ALANINE AMINO (ALT) (SGPT): CPT | Mod: ORL

## 2023-12-21 PROCEDURE — 36415 COLL VENOUS BLD VENIPUNCTURE: CPT | Mod: ORL

## 2023-12-21 PROCEDURE — 85610 PROTHROMBIN TIME: CPT | Mod: ORL

## 2023-12-21 PROCEDURE — P9603 ONE-WAY ALLOW PRORATED MILES: HCPCS | Mod: ORL

## 2023-12-27 ENCOUNTER — LAB REQUISITION (OUTPATIENT)
Dept: LAB | Facility: CLINIC | Age: 88
End: 2023-12-27
Payer: COMMERCIAL

## 2023-12-27 DIAGNOSIS — I48.20 CHRONIC ATRIAL FIBRILLATION, UNSPECIFIED (H): ICD-10-CM

## 2023-12-28 LAB — INR PPP: 2.44 (ref 0.85–1.15)

## 2023-12-28 PROCEDURE — 36415 COLL VENOUS BLD VENIPUNCTURE: CPT | Mod: ORL

## 2023-12-28 PROCEDURE — P9604 ONE-WAY ALLOW PRORATED TRIP: HCPCS | Mod: ORL

## 2023-12-28 PROCEDURE — 85610 PROTHROMBIN TIME: CPT | Mod: ORL

## 2024-01-06 ENCOUNTER — HEALTH MAINTENANCE LETTER (OUTPATIENT)
Age: 89
End: 2024-01-06

## 2024-01-10 ENCOUNTER — LAB REQUISITION (OUTPATIENT)
Dept: LAB | Facility: CLINIC | Age: 89
End: 2024-01-10
Payer: COMMERCIAL

## 2024-01-10 DIAGNOSIS — I48.20 CHRONIC ATRIAL FIBRILLATION, UNSPECIFIED (H): ICD-10-CM

## 2024-01-11 LAB — INR PPP: 3.34 (ref 0.85–1.15)

## 2024-01-11 PROCEDURE — 85610 PROTHROMBIN TIME: CPT | Mod: ORL

## 2024-01-11 PROCEDURE — P9604 ONE-WAY ALLOW PRORATED TRIP: HCPCS | Mod: ORL

## 2024-01-11 PROCEDURE — 36415 COLL VENOUS BLD VENIPUNCTURE: CPT | Mod: ORL

## 2024-01-17 ENCOUNTER — LAB REQUISITION (OUTPATIENT)
Dept: LAB | Facility: CLINIC | Age: 89
End: 2024-01-17
Payer: COMMERCIAL

## 2024-01-17 DIAGNOSIS — I48.20 CHRONIC ATRIAL FIBRILLATION, UNSPECIFIED (H): ICD-10-CM

## 2024-01-18 LAB — INR PPP: 2.66 (ref 0.85–1.15)

## 2024-01-18 PROCEDURE — P9604 ONE-WAY ALLOW PRORATED TRIP: HCPCS | Mod: ORL

## 2024-01-18 PROCEDURE — 36415 COLL VENOUS BLD VENIPUNCTURE: CPT | Mod: ORL

## 2024-01-18 PROCEDURE — 85610 PROTHROMBIN TIME: CPT | Mod: ORL

## 2024-01-24 ENCOUNTER — LAB REQUISITION (OUTPATIENT)
Dept: LAB | Facility: CLINIC | Age: 89
End: 2024-01-24
Payer: COMMERCIAL

## 2024-01-24 DIAGNOSIS — D62 ACUTE POSTHEMORRHAGIC ANEMIA: ICD-10-CM

## 2024-01-24 DIAGNOSIS — I48.20 CHRONIC ATRIAL FIBRILLATION, UNSPECIFIED (H): ICD-10-CM

## 2024-01-24 DIAGNOSIS — I10 ESSENTIAL (PRIMARY) HYPERTENSION: ICD-10-CM

## 2024-01-25 DIAGNOSIS — M81.0 SENILE OSTEOPOROSIS: ICD-10-CM

## 2024-01-25 LAB
BASOPHILS # BLD AUTO: 0 10E3/UL (ref 0–0.2)
BASOPHILS NFR BLD AUTO: 1 %
EOSINOPHIL # BLD AUTO: 0.2 10E3/UL (ref 0–0.7)
EOSINOPHIL NFR BLD AUTO: 3 %
ERYTHROCYTE [DISTWIDTH] IN BLOOD BY AUTOMATED COUNT: 14 % (ref 10–15)
HCT VFR BLD AUTO: 38.2 % (ref 40–53)
HGB BLD-MCNC: 12.1 G/DL (ref 13.3–17.7)
IMM GRANULOCYTES # BLD: 0 10E3/UL
IMM GRANULOCYTES NFR BLD: 1 %
INR PPP: 2.49 (ref 0.85–1.15)
LYMPHOCYTES # BLD AUTO: 0.6 10E3/UL (ref 0.8–5.3)
LYMPHOCYTES NFR BLD AUTO: 9 %
MCH RBC QN AUTO: 31.7 PG (ref 26.5–33)
MCHC RBC AUTO-ENTMCNC: 31.7 G/DL (ref 31.5–36.5)
MCV RBC AUTO: 100 FL (ref 78–100)
MONOCYTES # BLD AUTO: 0.8 10E3/UL (ref 0–1.3)
MONOCYTES NFR BLD AUTO: 13 %
NEUTROPHILS # BLD AUTO: 4.5 10E3/UL (ref 1.6–8.3)
NEUTROPHILS NFR BLD AUTO: 73 %
NRBC # BLD AUTO: 0 10E3/UL
NRBC BLD AUTO-RTO: 0 /100
PLATELET # BLD AUTO: 183 10E3/UL (ref 150–450)
RBC # BLD AUTO: 3.82 10E6/UL (ref 4.4–5.9)
WBC # BLD AUTO: 6.1 10E3/UL (ref 4–11)

## 2024-01-25 PROCEDURE — 36415 COLL VENOUS BLD VENIPUNCTURE: CPT | Mod: ORL

## 2024-01-25 PROCEDURE — 85610 PROTHROMBIN TIME: CPT | Mod: ORL

## 2024-01-25 PROCEDURE — 85025 COMPLETE CBC W/AUTO DIFF WBC: CPT | Mod: ORL

## 2024-01-25 PROCEDURE — P9603 ONE-WAY ALLOW PRORATED MILES: HCPCS | Mod: ORL

## 2024-01-25 PROCEDURE — 80048 BASIC METABOLIC PNL TOTAL CA: CPT | Mod: ORL

## 2024-01-26 LAB
ANION GAP SERPL CALCULATED.3IONS-SCNC: 9 MMOL/L (ref 7–15)
BUN SERPL-MCNC: 24.9 MG/DL (ref 8–23)
CALCIUM SERPL-MCNC: 9.6 MG/DL (ref 8.2–9.6)
CHLORIDE SERPL-SCNC: 101 MMOL/L (ref 98–107)
CREAT SERPL-MCNC: 0.8 MG/DL (ref 0.67–1.17)
DEPRECATED HCO3 PLAS-SCNC: 28 MMOL/L (ref 22–29)
EGFRCR SERPLBLD CKD-EPI 2021: 84 ML/MIN/1.73M2
GLUCOSE SERPL-MCNC: 85 MG/DL (ref 70–99)
POTASSIUM SERPL-SCNC: 4.7 MMOL/L (ref 3.4–5.3)
SODIUM SERPL-SCNC: 138 MMOL/L (ref 135–145)

## 2024-01-26 RX ORDER — ALENDRONATE SODIUM 70 MG/1
TABLET ORAL
Qty: 4 TABLET | Refills: 11 | OUTPATIENT
Start: 2024-01-26

## 2024-01-26 NOTE — TELEPHONE ENCOUNTER
Patient is currently in a transitional care unit full-time, he is receiving care under the direction of the in-house care service at [facility].  Direct future refills of all chronic medicines to the care team at this facility.

## 2024-01-31 ENCOUNTER — LAB REQUISITION (OUTPATIENT)
Dept: LAB | Facility: CLINIC | Age: 89
End: 2024-01-31
Payer: COMMERCIAL

## 2024-01-31 DIAGNOSIS — I48.20 CHRONIC ATRIAL FIBRILLATION, UNSPECIFIED (H): ICD-10-CM

## 2024-02-01 LAB — INR PPP: 2.05 (ref 0.85–1.15)

## 2024-02-01 PROCEDURE — P9604 ONE-WAY ALLOW PRORATED TRIP: HCPCS | Mod: ORL

## 2024-02-01 PROCEDURE — 85610 PROTHROMBIN TIME: CPT | Mod: ORL

## 2024-02-01 PROCEDURE — 36415 COLL VENOUS BLD VENIPUNCTURE: CPT | Mod: ORL

## 2024-02-14 ENCOUNTER — LAB REQUISITION (OUTPATIENT)
Dept: LAB | Facility: CLINIC | Age: 89
End: 2024-02-14
Payer: COMMERCIAL

## 2024-02-14 DIAGNOSIS — I48.20 CHRONIC ATRIAL FIBRILLATION, UNSPECIFIED (H): ICD-10-CM

## 2024-02-15 LAB — INR PPP: 2.3 (ref 0.85–1.15)

## 2024-02-15 PROCEDURE — 36415 COLL VENOUS BLD VENIPUNCTURE: CPT | Mod: ORL

## 2024-02-15 PROCEDURE — 85610 PROTHROMBIN TIME: CPT | Mod: ORL

## 2024-02-15 PROCEDURE — P9604 ONE-WAY ALLOW PRORATED TRIP: HCPCS | Mod: ORL

## 2024-03-06 ENCOUNTER — LAB REQUISITION (OUTPATIENT)
Dept: LAB | Facility: CLINIC | Age: 89
End: 2024-03-06
Payer: COMMERCIAL

## 2024-03-06 DIAGNOSIS — I48.20 CHRONIC ATRIAL FIBRILLATION, UNSPECIFIED (H): ICD-10-CM

## 2024-03-07 LAB — INR PPP: 2.34 (ref 0.85–1.15)

## 2024-03-07 PROCEDURE — P9604 ONE-WAY ALLOW PRORATED TRIP: HCPCS | Mod: ORL

## 2024-03-07 PROCEDURE — 85610 PROTHROMBIN TIME: CPT | Mod: ORL

## 2024-03-07 PROCEDURE — 36415 COLL VENOUS BLD VENIPUNCTURE: CPT | Mod: ORL

## 2024-03-27 ENCOUNTER — LAB REQUISITION (OUTPATIENT)
Dept: LAB | Facility: CLINIC | Age: 89
End: 2024-03-27
Payer: COMMERCIAL

## 2024-03-27 DIAGNOSIS — I48.20 CHRONIC ATRIAL FIBRILLATION, UNSPECIFIED (H): ICD-10-CM

## 2024-03-28 LAB — INR PPP: 2.24 (ref 0.85–1.15)

## 2024-03-28 PROCEDURE — 85610 PROTHROMBIN TIME: CPT | Mod: ORL

## 2024-03-28 PROCEDURE — 36415 COLL VENOUS BLD VENIPUNCTURE: CPT | Mod: ORL

## 2024-03-28 PROCEDURE — P9604 ONE-WAY ALLOW PRORATED TRIP: HCPCS | Mod: ORL

## 2024-04-24 ENCOUNTER — LAB REQUISITION (OUTPATIENT)
Dept: LAB | Facility: CLINIC | Age: 89
End: 2024-04-24
Payer: COMMERCIAL

## 2024-04-24 DIAGNOSIS — I48.20 CHRONIC ATRIAL FIBRILLATION, UNSPECIFIED (H): ICD-10-CM

## 2024-04-25 LAB — INR PPP: 2.29 (ref 0.85–1.15)

## 2024-04-25 PROCEDURE — 36415 COLL VENOUS BLD VENIPUNCTURE: CPT | Mod: ORL

## 2024-04-25 PROCEDURE — P9604 ONE-WAY ALLOW PRORATED TRIP: HCPCS | Mod: ORL

## 2024-04-25 PROCEDURE — 85610 PROTHROMBIN TIME: CPT | Mod: ORL

## 2024-05-22 ENCOUNTER — LAB REQUISITION (OUTPATIENT)
Dept: LAB | Facility: CLINIC | Age: 89
End: 2024-05-22
Payer: COMMERCIAL

## 2024-05-22 DIAGNOSIS — I48.20 CHRONIC ATRIAL FIBRILLATION, UNSPECIFIED (H): ICD-10-CM

## 2024-05-23 LAB — INR PPP: 2.73 (ref 0.85–1.15)

## 2024-05-23 PROCEDURE — P9604 ONE-WAY ALLOW PRORATED TRIP: HCPCS | Mod: ORL

## 2024-05-23 PROCEDURE — 85610 PROTHROMBIN TIME: CPT | Mod: ORL

## 2024-05-23 PROCEDURE — 36415 COLL VENOUS BLD VENIPUNCTURE: CPT | Mod: ORL

## 2024-06-18 ENCOUNTER — LAB REQUISITION (OUTPATIENT)
Dept: LAB | Facility: CLINIC | Age: 89
End: 2024-06-18
Payer: COMMERCIAL

## 2024-06-18 DIAGNOSIS — I48.20 CHRONIC ATRIAL FIBRILLATION, UNSPECIFIED (H): ICD-10-CM

## 2024-06-20 LAB — INR PPP: 2.76 (ref 0.85–1.15)

## 2024-06-20 PROCEDURE — 36415 COLL VENOUS BLD VENIPUNCTURE: CPT | Mod: ORL

## 2024-06-20 PROCEDURE — P9603 ONE-WAY ALLOW PRORATED MILES: HCPCS | Mod: ORL

## 2024-06-20 PROCEDURE — 85610 PROTHROMBIN TIME: CPT | Mod: ORL

## 2024-06-26 ENCOUNTER — LAB REQUISITION (OUTPATIENT)
Dept: LAB | Facility: CLINIC | Age: 89
End: 2024-06-26
Payer: COMMERCIAL

## 2024-06-26 DIAGNOSIS — I10 ESSENTIAL (PRIMARY) HYPERTENSION: ICD-10-CM

## 2024-06-26 DIAGNOSIS — I48.20 CHRONIC ATRIAL FIBRILLATION, UNSPECIFIED (H): ICD-10-CM

## 2024-06-27 LAB
ANION GAP SERPL CALCULATED.3IONS-SCNC: 10 MMOL/L (ref 7–15)
BUN SERPL-MCNC: 22 MG/DL (ref 8–23)
CALCIUM SERPL-MCNC: 9.3 MG/DL (ref 8.2–9.6)
CHLORIDE SERPL-SCNC: 103 MMOL/L (ref 98–107)
CREAT SERPL-MCNC: 0.8 MG/DL (ref 0.67–1.17)
DEPRECATED HCO3 PLAS-SCNC: 26 MMOL/L (ref 22–29)
EGFRCR SERPLBLD CKD-EPI 2021: 84 ML/MIN/1.73M2
ERYTHROCYTE [DISTWIDTH] IN BLOOD BY AUTOMATED COUNT: 13.8 % (ref 10–15)
GLUCOSE SERPL-MCNC: 115 MG/DL (ref 70–99)
HCT VFR BLD AUTO: 36.2 % (ref 40–53)
HGB BLD-MCNC: 11.2 G/DL (ref 13.3–17.7)
MCH RBC QN AUTO: 30.8 PG (ref 26.5–33)
MCHC RBC AUTO-ENTMCNC: 30.9 G/DL (ref 31.5–36.5)
MCV RBC AUTO: 100 FL (ref 78–100)
PLATELET # BLD AUTO: 216 10E3/UL (ref 150–450)
POTASSIUM SERPL-SCNC: 4.2 MMOL/L (ref 3.4–5.3)
RBC # BLD AUTO: 3.64 10E6/UL (ref 4.4–5.9)
SODIUM SERPL-SCNC: 139 MMOL/L (ref 135–145)
WBC # BLD AUTO: 7.3 10E3/UL (ref 4–11)

## 2024-06-27 PROCEDURE — 80048 BASIC METABOLIC PNL TOTAL CA: CPT | Mod: ORL

## 2024-06-27 PROCEDURE — 85027 COMPLETE CBC AUTOMATED: CPT | Mod: ORL

## 2024-06-27 PROCEDURE — 36415 COLL VENOUS BLD VENIPUNCTURE: CPT | Mod: ORL

## 2024-06-27 PROCEDURE — P9604 ONE-WAY ALLOW PRORATED TRIP: HCPCS | Mod: ORL

## 2024-06-28 ENCOUNTER — LAB REQUISITION (OUTPATIENT)
Dept: LAB | Facility: CLINIC | Age: 89
End: 2024-06-28
Payer: COMMERCIAL

## 2024-06-28 DIAGNOSIS — D64.9 ANEMIA, UNSPECIFIED: ICD-10-CM

## 2024-06-28 DIAGNOSIS — I10 ESSENTIAL (PRIMARY) HYPERTENSION: ICD-10-CM

## 2024-06-28 DIAGNOSIS — E53.8 DEFICIENCY OF OTHER SPECIFIED B GROUP VITAMINS: ICD-10-CM

## 2024-07-01 ENCOUNTER — HOSPITAL ENCOUNTER (EMERGENCY)
Facility: CLINIC | Age: 89
Discharge: HOME | End: 2024-07-02
Attending: EMERGENCY MEDICINE | Admitting: EMERGENCY MEDICINE
Payer: COMMERCIAL

## 2024-07-01 VITALS
HEIGHT: 73 IN | WEIGHT: 165 LBS | BODY MASS INDEX: 21.87 KG/M2 | SYSTOLIC BLOOD PRESSURE: 140 MMHG | RESPIRATION RATE: 20 BRPM | TEMPERATURE: 98.5 F | OXYGEN SATURATION: 93 % | HEART RATE: 70 BPM | DIASTOLIC BLOOD PRESSURE: 84 MMHG

## 2024-07-01 DIAGNOSIS — T14.8XXA CHRONIC WOUND: ICD-10-CM

## 2024-07-01 LAB
ALBUMIN SERPL BCG-MCNC: 4.2 G/DL (ref 3.5–5.2)
ALP SERPL-CCNC: 82 U/L (ref 40–150)
ALT SERPL W P-5'-P-CCNC: 13 U/L (ref 0–70)
ANION GAP SERPL CALCULATED.3IONS-SCNC: 9 MMOL/L (ref 7–15)
AST SERPL W P-5'-P-CCNC: 24 U/L (ref 0–45)
BASOPHILS # BLD AUTO: 0.1 10E3/UL (ref 0–0.2)
BASOPHILS NFR BLD AUTO: 1 %
BILIRUB SERPL-MCNC: 0.3 MG/DL
BUN SERPL-MCNC: 27.1 MG/DL (ref 8–23)
CALCIUM SERPL-MCNC: 9.6 MG/DL (ref 8.2–9.6)
CHLORIDE SERPL-SCNC: 101 MMOL/L (ref 98–107)
CREAT SERPL-MCNC: 0.85 MG/DL (ref 0.67–1.17)
DEPRECATED HCO3 PLAS-SCNC: 28 MMOL/L (ref 22–29)
EGFRCR SERPLBLD CKD-EPI 2021: 82 ML/MIN/1.73M2
EOSINOPHIL # BLD AUTO: 0.2 10E3/UL (ref 0–0.7)
EOSINOPHIL NFR BLD AUTO: 2 %
ERYTHROCYTE [DISTWIDTH] IN BLOOD BY AUTOMATED COUNT: 13.5 % (ref 10–15)
GLUCOSE SERPL-MCNC: 96 MG/DL (ref 70–99)
HCT VFR BLD AUTO: 36.7 % (ref 40–53)
HGB BLD-MCNC: 11.7 G/DL (ref 13.3–17.7)
IMM GRANULOCYTES # BLD: 0.1 10E3/UL
IMM GRANULOCYTES NFR BLD: 1 %
LACTATE SERPL-SCNC: 0.7 MMOL/L (ref 0.7–2)
LYMPHOCYTES # BLD AUTO: 0.5 10E3/UL (ref 0.8–5.3)
LYMPHOCYTES NFR BLD AUTO: 6 %
MCH RBC QN AUTO: 31.3 PG (ref 26.5–33)
MCHC RBC AUTO-ENTMCNC: 31.9 G/DL (ref 31.5–36.5)
MCV RBC AUTO: 98 FL (ref 78–100)
MONOCYTES # BLD AUTO: 1 10E3/UL (ref 0–1.3)
MONOCYTES NFR BLD AUTO: 12 %
NEUTROPHILS # BLD AUTO: 6.5 10E3/UL (ref 1.6–8.3)
NEUTROPHILS NFR BLD AUTO: 79 %
NRBC # BLD AUTO: 0 10E3/UL
NRBC BLD AUTO-RTO: 0 /100
PLATELET # BLD AUTO: 233 10E3/UL (ref 150–450)
POTASSIUM SERPL-SCNC: 4.7 MMOL/L (ref 3.4–5.3)
PROT SERPL-MCNC: 7.3 G/DL (ref 6.4–8.3)
RBC # BLD AUTO: 3.74 10E6/UL (ref 4.4–5.9)
SODIUM SERPL-SCNC: 138 MMOL/L (ref 135–145)
WBC # BLD AUTO: 8.2 10E3/UL (ref 4–11)

## 2024-07-01 PROCEDURE — 85025 COMPLETE CBC W/AUTO DIFF WBC: CPT | Performed by: EMERGENCY MEDICINE

## 2024-07-01 PROCEDURE — 80053 COMPREHEN METABOLIC PANEL: CPT | Performed by: EMERGENCY MEDICINE

## 2024-07-01 PROCEDURE — 83605 ASSAY OF LACTIC ACID: CPT | Performed by: EMERGENCY MEDICINE

## 2024-07-01 PROCEDURE — 99283 EMERGENCY DEPT VISIT LOW MDM: CPT

## 2024-07-01 PROCEDURE — 36415 COLL VENOUS BLD VENIPUNCTURE: CPT | Performed by: EMERGENCY MEDICINE

## 2024-07-01 PROCEDURE — 82040 ASSAY OF SERUM ALBUMIN: CPT | Performed by: EMERGENCY MEDICINE

## 2024-07-01 ASSESSMENT — ACTIVITIES OF DAILY LIVING (ADL)
ADLS_ACUITY_SCORE: 37
ADLS_ACUITY_SCORE: 39
ADLS_ACUITY_SCORE: 37

## 2024-07-01 ASSESSMENT — COLUMBIA-SUICIDE SEVERITY RATING SCALE - C-SSRS
2. HAVE YOU ACTUALLY HAD ANY THOUGHTS OF KILLING YOURSELF IN THE PAST MONTH?: NO
6. HAVE YOU EVER DONE ANYTHING, STARTED TO DO ANYTHING, OR PREPARED TO DO ANYTHING TO END YOUR LIFE?: NO
1. IN THE PAST MONTH, HAVE YOU WISHED YOU WERE DEAD OR WISHED YOU COULD GO TO SLEEP AND NOT WAKE UP?: NO

## 2024-07-01 NOTE — ED TRIAGE NOTES
Patient arrived via EMS from his care facility with complaints of worsening wound infections at multiple locations per his wound care team. Wound nurse indicated that the dressing changes have not been done properly and this has caused worsening infections.      Triage Assessment (Adult)       Row Name 07/01/24 1558          Triage Assessment    Airway WDL WDL        Respiratory WDL    Respiratory WDL WDL        Skin Circulation/Temperature WDL    Skin Circulation/Temperature WDL WDL        Cardiac WDL    Cardiac WDL WDL        Peripheral/Neurovascular WDL    Peripheral Neurovascular WDL WDL        Cognitive/Neuro/Behavioral WDL    Cognitive/Neuro/Behavioral WDL WDL

## 2024-07-02 ENCOUNTER — TELEPHONE (OUTPATIENT)
Dept: WOUND CARE | Facility: CLINIC | Age: 89
End: 2024-07-02
Payer: COMMERCIAL

## 2024-07-02 NOTE — ED PROVIDER NOTES
Emergency Department Note      History of Present Illness     Chief Complaint   Wound Infection      HPI   Ayaan Herrera is a 89 year old male with a past medical history of atrial fibrillation on warfarin, hypertension, prostate cancer who presents to the emergency department today for evaluation of wound infection.  The patient resides at a nursing facility and has a wound nurse to do his dressing changes.  The patient reports that 2 weeks ago he sustained injuries to his lower extremities.  He believes that this is from his wheelchair or a metal railing on his bed.  He reports that he was sent here today due to concern that his wounds were not healing and that they might be infected.  Patient denies fever, chills.    Independent Historian   None    Review of External Notes   None     Past Medical History     Medical History and Problem List   Past Medical History:   Diagnosis Date    Atrial fibrillation (H)     BPH (benign prostatic hyperplasia)     Dyslipidemia     History of basal cell carcinoma     HTN (hypertension)     Nonsenile cataract     NSVT (nonsustained ventricular tachycardia) (H)     POAG (primary open-angle glaucoma)     Prostate cancer (H)     Psoriasis     Sinus node dysfunction (H)        Medications   acetaminophen (TYLENOL) 500 MG tablet  acetaminophen (TYLENOL) 500 MG tablet  alendronate (FOSAMAX) 70 MG tablet  amiodarone (PACERONE) 200 MG tablet  bisacodyl (DULCOLAX) 10 MG suppository  brimonidine (ALPHAGAN) 0.2 % ophthalmic solution  cyanocobalamin (CYANOCOBALAMIN) 1000 MCG tablet  diclofenac (VOLTAREN) 1 % topical gel  dimethicone-zinc oxide (DONTE PROTECT) external cream  dorzolamide-timolol (COSOPT) 2-0.5 % ophthalmic solution  ferrous sulfate (FE TABS) 325 (65 Fe) MG EC tablet  furosemide (LASIX) 20 MG tablet  furosemide (LASIX) 40 MG tablet  hypromellose (ARTIFICIAL TEARS) 0.5 % SOLN ophthalmic solution  latanoprost (XALATAN) 0.005 % ophthalmic solution  melatonin 5 MG  "tablet  netarsudil (RHOPRESSA) 0.02 % ophthalmic solution  nystatin (MYCOSTATIN) 417581 UNIT/GM external powder  polyethylene glycol (MIRALAX) 17 g packet  potassium chloride ER (K-TAB/KLOR-CON) 10 MEQ CR tablet  senna-docusate (SENOKOT-S/PERICOLACE) 8.6-50 MG tablet  tamsulosin (FLOMAX) 0.4 MG capsule  Vitamin D, Cholecalciferol, 1000 UNITS TABS        Surgical History   Past Surgical History:   Procedure Laterality Date    CATARACT IOL, RT/LT  1985/1996    RE/LE    EP PACEMAKER  2007    [employer]    GLAUCOMA SURGERY  01/01/1996    LE    GLAUCOMA SURGERY  01/01/1997    Bleb revision LE    LASER SURGERY OF EYE  10/14/04 & 2/17/05    SLT RE    PUNCTAL CLOSURE, PLUGS  01/01/2000    BE    TURP  01/01/2003       Physical Exam     Patient Vitals for the past 24 hrs:   BP Temp Temp src Pulse Resp SpO2 Height Weight   07/01/24 2302 -- -- -- -- -- 93 % -- --   07/01/24 2300 (!) 140/84 -- -- 70 -- -- -- --   07/01/24 2230 (!) 145/90 -- -- 70 -- 94 % -- --   07/01/24 2229 -- -- -- -- -- 94 % -- --   07/01/24 2225 139/83 -- -- 74 20 -- -- --   07/01/24 1609 132/75 98.5  F (36.9  C) Temporal 75 18 91 % 1.854 m (6' 1\") 74.8 kg (165 lb)     Physical Exam  General: Awake, alert, non-toxic.  Head:  Scalp is atraumatic.   Eyes:  Conjunctiva normal, PERRL  ENT:  The external nose and ears are normal.     Oropharynx clear, uvula midline.  Neck:  Normal range of motion without rigidity.  CV:  Regular rate and rhythm    No pathologic murmur, rubs, or gallops.  Resp:  Breath sounds are clear bilaterally    Non-labored, no retractions or accessory muscle use  Abdomen: Abdomen is soft, no distension, no tenderness, no masses. No CVA tenderness.  MS:  No lower extremity edema/swelling. No midline cervical, thoracic, or lumbar tenderness.  Extremities without joint swelling or redness.  Skin:  See below.  Chronic appearing wounds on bilateral lower extremities.  Eschar present on right wound.  DP pulses palpable with Doppler " bilaterally.  Neuro:  Alert and oriented.  GCS 15 Moves all extremities normal.  No facial asymmetry. Gait normal.  Psych: Awake. Alert. Normal affect. Appropriate interactions.        Diagnostics     Lab Results   Labs Ordered and Resulted from Time of ED Arrival to Time of ED Departure   COMPREHENSIVE METABOLIC PANEL - Abnormal       Result Value    Sodium 138      Potassium 4.7      Carbon Dioxide (CO2) 28      Anion Gap 9      Urea Nitrogen 27.1 (*)     Creatinine 0.85      GFR Estimate 82      Calcium 9.6      Chloride 101      Glucose 96      Alkaline Phosphatase 82      AST 24      ALT 13      Protein Total 7.3      Albumin 4.2      Bilirubin Total 0.3     CBC WITH PLATELETS AND DIFFERENTIAL - Abnormal    WBC Count 8.2      RBC Count 3.74 (*)     Hemoglobin 11.7 (*)     Hematocrit 36.7 (*)     MCV 98      MCH 31.3      MCHC 31.9      RDW 13.5      Platelet Count 233      % Neutrophils 79      % Lymphocytes 6      % Monocytes 12      % Eosinophils 2      % Basophils 1      % Immature Granulocytes 1      NRBCs per 100 WBC 0      Absolute Neutrophils 6.5      Absolute Lymphocytes 0.5 (*)     Absolute Monocytes 1.0      Absolute Eosinophils 0.2      Absolute Basophils 0.1      Absolute Immature Granulocytes 0.1      Absolute NRBCs 0.0     LACTIC ACID WHOLE BLOOD - Normal    Lactic Acid 0.7       Independent Interpretation   None    ED Course      Medications Administered   Medications - No data to display    Procedures   Procedures     Discussion of Management   None    ED Course   ED Course as of 07/02/24 0050   Mon Jul 01, 2024 2300 I obtained history and examined the patient as noted above.        Optional/Additional Documentation  None    Medical Decision Making / Diagnosis     CMS Diagnoses: None    MIPS       None    [facility]   Ayaan Herrera is a 89 year old male who presented to the emergency department today for evaluation of bilateral leg wounds that have been present for 2 weeks.  He was sent here from  his facility due to concern of infection.  See HPI for further details.  On exam the patient is afebrile and hemodynamically stable.  Leg wounds appear chronic.  DP pulses are palpable with the Doppler.  There is no erythema, warmth, induration, purulent drainage to suggest underlying infection.  Lactic acid negligible.  I doubt sepsis.  He has no leukocytosis here.  Hemoglobin appears at baseline.  At this time I do not suspect an underlying infection.  It does appear that these are chronic wounds that need further follow-up.  A referral was sent to the wound care clinic for further management. There is no clinical evidence of DVT, limb ischemia, fracture or compartment syndrome. These findings were discussed with the patient. He is discharged in stable condition. All questions answered.     Disposition   The patient was discharged.     Diagnosis     ICD-10-CM    1. Chronic wound  T14.8XXA Wound Care Referral           GUSTAVO Webb Alexandra, PA-C  07/02/24 0055

## 2024-07-02 NOTE — ED NOTES
Pt assisted from commode to bed with 2 assist, legs weak and unsteady. Patient denies pain. Wounds to both legs and feet.

## 2024-07-02 NOTE — TELEPHONE ENCOUNTER
Consult received via Workqueue from     Ashly Gardiner PA-C    for wound of bilateral lower legs.    Does the patient have an open and draining wound? Yes    Please schedule with Lindsey Blackwell PA-C, Dilma Maciel NP, Juanito Handley M.D., Cachorro Weber, NIGHAT, or Florentin Moreno M.D. at [facility] [place] for next available appointment.    **For all providers, Natalia Mora PA-C, Dr. Garcia, iDlma Maciel NP or Dr. Moreno, please schedule a follow up 4 weeks after initial appointment.**  --If unable to schedule within 2-3 weeks then please place on cancellation list--    Is the patient able to make their own medical decisions? Yes    Can the patient be scheduled on a Wednesday (Tiffanie) or Thursday (Janell)? Yes    Is patient a HUSSAIN lift? PLEASE INQUIRE WHEN MAKING THE APPOINTMENT AND PUT IN APPOINTMENT NOTES    Routing to  Wound Healing Scheduling.

## 2024-07-02 NOTE — ED PROVIDER NOTES
ED APC SUPERVISION NOTE:   I evaluated this patient in conjunction with Ashly Gardiner PA-C. I have participated in the care of the patient and personally performed key elements of the history, exam, and medical decision making.      HPI:   Ayaan Herrera is a 89 year old male with a history of malignant neoplasm of prostate, hyperlipidemia, malignant basal cell neoplasm of skin, hypertension, and a closed wedge compression fracture of T11 vertebra presents to the ED via EMS for evaluation of a wound infection. The patient reports that nurses at his care facility are concerned of worsening wounds bilaterally on his lower extremities that have been present the past two weeks. Adds that his wound nurse states that the dressing changes have not been done properly. Endorses being otherwise asymptomatic.          Independent Historian:   None    Review of External Notes:   None      EXAM:   General: Alert, No distress. Nontoxic appearance  Head: No signs of trauma.   Mouth/Throat: Oropharynx moist.   Eyes: Conjunctivae are normal. Pupils are equal..   Neck: Normal range of motion.    CV: Appears well perfused.  Resp:No respiratory distress.   MSK: Normal range of motion. No obvious deformity.   Neuro: The patient is alert and interactive. MAC. Speech normal. GCS 15  Skin: Chronic wounds over the dorsum of both feet left greater than right.  Stasis changes of bilateral lower legs  Psych: normal mood and affect. behavior is normal.     Labs Ordered and Resulted from Time of ED Arrival to Time of ED Departure   COMPREHENSIVE METABOLIC PANEL - Abnormal       Result Value    Sodium 138      Potassium 4.7      Carbon Dioxide (CO2) 28      Anion Gap 9      Urea Nitrogen 27.1 (*)     Creatinine 0.85      GFR Estimate 82      Calcium 9.6      Chloride 101      Glucose 96      Alkaline Phosphatase 82      AST 24      ALT 13      Protein Total 7.3      Albumin 4.2      Bilirubin Total 0.3     CBC WITH PLATELETS AND  DIFFERENTIAL - Abnormal    WBC Count 8.2      RBC Count 3.74 (*)     Hemoglobin 11.7 (*)     Hematocrit 36.7 (*)     MCV 98      MCH 31.3      MCHC 31.9      RDW 13.5      Platelet Count 233      % Neutrophils 79      % Lymphocytes 6      % Monocytes 12      % Eosinophils 2      % Basophils 1      % Immature Granulocytes 1      NRBCs per 100 WBC 0      Absolute Neutrophils 6.5      Absolute Lymphocytes 0.5 (*)     Absolute Monocytes 1.0      Absolute Eosinophils 0.2      Absolute Basophils 0.1      Absolute Immature Granulocytes 0.1      Absolute NRBCs 0.0     LACTIC ACID WHOLE BLOOD - Normal    Lactic Acid 0.7           Independent Interpretation (X-rays, CTs, rhythm strip):  None    Consultations/Discussion of Management or Tests:  None     Social Determinants of Health affecting care:   None     MEDICAL DECISION MAKING/ASSESSMENT AND PLAN:   This patient is presenting to the ED with chronic wounds of both feet.  Laboratory studies reveal no significant systemic illness.  The wounds were redressed and he will follow-up with the appropriate wound care facility as soon as possible.  No indication for inpatient admission.  The patient lives in a care facility.     DIAGNOSIS:     ICD-10-CM    1. Chronic wound  T14.8XXA Wound Care Referral               DISPOSITION: Discharged     Scribe Disclosure:  Shannon GREEN, am serving as a scribe at 11:09 PM on 7/1/2024 to document services personally performed by Jesus España MD based on my observations and the provider's statements to me.   7/1/2024  [facility] EMERGENCY DEPT     Jesus España MD  07/02/24 0125

## 2024-07-02 NOTE — DISCHARGE INSTRUCTIONS
Referral was sent to the therapy wound clinic.  They will call you to set up an appointment.  If you develop fever, chills please return to the emergency department.

## 2024-07-05 LAB
ANION GAP SERPL CALCULATED.3IONS-SCNC: 11 MMOL/L (ref 7–15)
BUN SERPL-MCNC: 25.4 MG/DL (ref 8–23)
CALCIUM SERPL-MCNC: 9.4 MG/DL (ref 8.2–9.6)
CHLORIDE SERPL-SCNC: 103 MMOL/L (ref 98–107)
CREAT SERPL-MCNC: 0.76 MG/DL (ref 0.67–1.17)
DEPRECATED HCO3 PLAS-SCNC: 27 MMOL/L (ref 22–29)
EGFRCR SERPLBLD CKD-EPI 2021: 85 ML/MIN/1.73M2
ERYTHROCYTE [DISTWIDTH] IN BLOOD BY AUTOMATED COUNT: 13.6 % (ref 10–15)
GLUCOSE SERPL-MCNC: 80 MG/DL (ref 70–99)
HCT VFR BLD AUTO: 36.7 % (ref 40–53)
HGB BLD-MCNC: 11.2 G/DL (ref 13.3–17.7)
MCH RBC QN AUTO: 30.2 PG (ref 26.5–33)
MCHC RBC AUTO-ENTMCNC: 30.5 G/DL (ref 31.5–36.5)
MCV RBC AUTO: 99 FL (ref 78–100)
PLATELET # BLD AUTO: 252 10E3/UL (ref 150–450)
POTASSIUM SERPL-SCNC: 4.4 MMOL/L (ref 3.4–5.3)
RBC # BLD AUTO: 3.71 10E6/UL (ref 4.4–5.9)
SODIUM SERPL-SCNC: 141 MMOL/L (ref 135–145)
VIT B12 SERPL-MCNC: 1940 PG/ML (ref 232–1245)
WBC # BLD AUTO: 8.5 10E3/UL (ref 4–11)

## 2024-07-05 PROCEDURE — 82607 VITAMIN B-12: CPT | Mod: ORL

## 2024-07-05 PROCEDURE — 80048 BASIC METABOLIC PNL TOTAL CA: CPT | Mod: ORL

## 2024-07-05 PROCEDURE — P9604 ONE-WAY ALLOW PRORATED TRIP: HCPCS | Mod: ORL

## 2024-07-05 PROCEDURE — 36415 COLL VENOUS BLD VENIPUNCTURE: CPT | Mod: ORL

## 2024-07-05 PROCEDURE — 85027 COMPLETE CBC AUTOMATED: CPT | Mod: ORL

## 2024-07-10 ENCOUNTER — LAB REQUISITION (OUTPATIENT)
Dept: LAB | Facility: CLINIC | Age: 89
End: 2024-07-10
Payer: COMMERCIAL

## 2024-07-10 DIAGNOSIS — I48.20 CHRONIC ATRIAL FIBRILLATION, UNSPECIFIED (H): ICD-10-CM

## 2024-07-11 LAB — INR PPP: 2.58 (ref 0.85–1.15)

## 2024-07-11 PROCEDURE — 85610 PROTHROMBIN TIME: CPT | Mod: ORL

## 2024-07-11 PROCEDURE — P9604 ONE-WAY ALLOW PRORATED TRIP: HCPCS | Mod: ORL

## 2024-07-11 PROCEDURE — 36415 COLL VENOUS BLD VENIPUNCTURE: CPT | Mod: ORL

## 2024-07-15 NOTE — MR AVS SNAPSHOT
After Visit Summary   7/3/2018    Ayaan Herrera    MRN: [id number]           Patient Information     Date Of Birth          10/31/1932        Visit Information        Provider Department      7/3/2018 9:30 AM Dean Rider MD [facility]        Today's Diagnoses     Routine general medical examination at a health care facility    -  1    Senile osteoporosis        Chronic atrial fibrillation (H)        Abdominal aortic aneurysm (AAA) without rupture (H)        Personal history of tobacco use, presenting hazards to health        Malignant neoplasm of prostate (H)        Cough        Essential hypertension        Mitral valve insufficiency, unspecified etiology        Long term current use of anticoagulant therapy          Care Instructions           I will let you know your lab results.          Call to set up the ultrasound of your abdominal aorta.                               Consider getting the shingles vaccine (Shingrix) at your pharmacy,or [facility]             Follow-ups after your visit        Additional Services     RADIOLOGY REFERRAL       Your provider has referred you to: N: [facility] Zeke [facility] [phone]   [email or web address]                               PLEASE DO AN US OF HIS AAA FOR FOLLOW UP.                  Please be aware that coverage of these services is subject to the terms and limitations of your health insurance plan.  Call member services at your health plan with any benefit or coverage questions.      Please bring the following to your appointment:    >>   Any x-rays, CTs or MRIs which have been performed.  Contact the facility where they were done to arrange for  prior to your scheduled appointment.    >>   List of current medications   >>   This referral request   >>   Any documents/labs given to you for this referral    Prior authorization is required for MRI/MRA, CT, Dexa Scans and Worker's Compensation cases.            Problem: Adult Inpatient Plan of Care  Goal: Plan of Care Review  Outcome: Progressing  Goal: Patient-Specific Goal (Individualized)  Outcome: Progressing  Goal: Absence of Hospital-Acquired Illness or Injury  Outcome: Progressing  Goal: Optimal Comfort and Wellbeing  Outcome: Progressing  Goal: Readiness for Transition of Care  Outcome: Progressing     Problem: Wound  Goal: Optimal Coping  Outcome: Progressing  Goal: Optimal Functional Ability  Outcome: Progressing  Goal: Absence of Infection Signs and Symptoms  Outcome: Progressing  Goal: Improved Oral Intake  Outcome: Progressing  Goal: Optimal Pain Control and Function  Outcome: Progressing  Goal: Skin Health and Integrity  Outcome: Progressing  Goal: Optimal Wound Healing  Outcome: Progressing     Problem: Skin Injury Risk Increased  Goal: Skin Health and Integrity  Outcome: Progressing     Problem: Fall Injury Risk  Goal: Absence of Fall and Fall-Related Injury  Outcome: Progressing     Problem: Infection  Goal: Absence of Infection Signs and Symptoms  Outcome: Progressing      "         Your next 10 appointments already scheduled     Sep 19, 2018 12:15 PM CDT   RETURN GLAUCOMA with Lissa Berry MD   [facility] Clinic ([facility])    [address]  [address]  [facility] [id number] [phone]              Who to contact     If you have questions or need follow up information about today's clinic visit or your schedule please contact [facility] directly at [phone].  Normal or non-critical lab and imaging results will be communicated to you by [employer], letter or phone within 4 business days after the clinic has received the results. If you do not hear from us within 7 days, please contact the clinic through "[employer]t or phone. If you have a critical or abnormal lab result, we will notify you by phone as soon as possible.  Submit refill requests through [employer] or call your pharmacy and they will forward the refill request to us. Please allow 3 business days for your refill to be completed.          Additional Information About Your Visit        [employer] Mobile Information     [employer] gives you secure access to your electronic health record. If you see a primary care provider, you can also send messages to your care team and make appointments. If you have questions, please call your primary care clinic.  If you do not have a primary care provider, please call [phone] and they will assist you.        Care EveryWhere ID     This is your Care EveryWhere ID. This could be used by other organizations to access your [place] medical records  [id number]        Your Vitals Were     Pulse Temperature Respirations Height Pulse Oximetry BMI (Body Mass Index)    83 97.8  F (36.6  C) 20 6' 1\" (1.854 m) 98% 23.62 kg/m2       Blood Pressure from Last 3 Encounters:   07/03/18 130/78   12/19/17 130/80   11/28/17 124/80    Weight from Last 3 Encounters:   07/03/18 179 lb (81.2 kg)   12/19/17 186 lb 8 oz (84.6 kg)   11/28/17 182 lb " (82.6 kg)              We Performed the Following     CBC with platelets and differential     Comprehensive metabolic panel (BMP + Alb, Alk Phos, ALT, AST, Total. Bili, TP)     PAF COMPLETED     RADIOLOGY REFERRAL     UA with Microscopic reflex to Culture        Primary Care Provider Office Phone # Fax #    Dean Rider [phone] [address] [facility] [address]        Equal Access to Services     [facility] REED : Hadii aad ku hadasho Soomaali, waaxda luqadaha, qaybta kaalmada adeegyada, waxay idiin hayaan adeeg kharash la'aan . So [facility] [phone].    ATENCIÓN: Si habla español, tiene a kwok disposición servicios gratuitos de asistencia lingüística. Llame al [phone].    We comply with applicable federal civil rights laws and [place] laws. We do not discriminate on the basis of race, color, national origin, age, disability, sex, sexual orientation, or gender identity.            Thank you!     Thank you for choosing [facility]  for your care. Our goal is always to provide you with excellent care. Hearing back from our patients is one way we can continue to improve our services. Please take a few minutes to complete the written survey that you may receive in the mail after your visit with us. Thank you!             Your Updated Medication List - Protect others around you: Learn how to safely use, store and throw away your medicines at www.disposemymeds.org.          This list is accurate as of 7/3/18 10:47 AM.  Always use your most recent med list.                   Brand Name Dispense Instructions for use Diagnosis    alendronate 70 MG tablet    FOSAMAX    12 tablet    Take 1 tablet (70 mg) by mouth with 8oz water every 7 days 30 minutes before breakfast and remain upright during this time.    Senile osteoporosis       brimonidine 0.15 % ophthalmic solution    ALPHAGAN P    1 Bottle    Place 1 drop into both eyes 2 times daily    Acute  maxillary sinusitis       COSOPT 2-0.5 % ophthalmic solution   Generic drug:  dorzolamide-timolol      Apply 1 Dose to eye 2 times daily Both eyes.    Acute maxillary sinusitis       hydrochlorothiazide 25 MG tablet    HYDRODIURIL    45 tablet    TAKE ONE-HALF TABLET BY MOUTH ONCE DAILY    Essential hypertension       latanoprost 0.005 % ophthalmic solution    XALATAN    3 Bottle    Place 1 drop into both eyes At Bedtime    Primary open-angle glaucoma(365.11)       tamsulosin 0.4 MG capsule    FLOMAX     TAKE ONE CAPSULE BY MOUTH ONCE A DAY        Vitamin D (Cholecalciferol) 1000 units Tabs           warfarin 5 MG tablet    COUMADIN     Take 5 mg by mouth        ZADITOR 0.025 % Soln ophthalmic solution   Generic drug:  ketotifen      Place 1 drop into both eyes as needed

## 2024-07-17 ENCOUNTER — LAB REQUISITION (OUTPATIENT)
Dept: LAB | Facility: CLINIC | Age: 89
End: 2024-07-17
Payer: COMMERCIAL

## 2024-07-17 DIAGNOSIS — I48.20 CHRONIC ATRIAL FIBRILLATION, UNSPECIFIED (H): ICD-10-CM

## 2024-07-18 LAB — INR PPP: 2.26 (ref 0.85–1.15)

## 2024-07-18 PROCEDURE — P9603 ONE-WAY ALLOW PRORATED MILES: HCPCS | Mod: ORL

## 2024-07-18 PROCEDURE — 36415 COLL VENOUS BLD VENIPUNCTURE: CPT | Mod: ORL

## 2024-07-18 PROCEDURE — 85610 PROTHROMBIN TIME: CPT | Mod: ORL

## 2024-07-23 ENCOUNTER — LAB REQUISITION (OUTPATIENT)
Dept: LAB | Facility: CLINIC | Age: 89
End: 2024-07-23
Payer: COMMERCIAL

## 2024-07-23 DIAGNOSIS — I48.20 CHRONIC ATRIAL FIBRILLATION, UNSPECIFIED (H): ICD-10-CM

## 2024-08-01 LAB — INR PPP: 2.45 (ref 0.85–1.15)

## 2024-08-01 PROCEDURE — 36415 COLL VENOUS BLD VENIPUNCTURE: CPT | Mod: ORL

## 2024-08-01 PROCEDURE — 85610 PROTHROMBIN TIME: CPT | Mod: ORL

## 2024-08-01 PROCEDURE — P9604 ONE-WAY ALLOW PRORATED TRIP: HCPCS | Mod: ORL

## 2024-08-06 ENCOUNTER — LAB REQUISITION (OUTPATIENT)
Dept: LAB | Facility: CLINIC | Age: 89
End: 2024-08-06
Payer: COMMERCIAL

## 2024-08-06 DIAGNOSIS — D64.9 ANEMIA, UNSPECIFIED: ICD-10-CM

## 2024-08-06 DIAGNOSIS — I48.20 CHRONIC ATRIAL FIBRILLATION, UNSPECIFIED (H): ICD-10-CM

## 2024-08-06 DIAGNOSIS — I11.0 HYPERTENSIVE HEART DISEASE WITH HEART FAILURE (H): ICD-10-CM

## 2024-08-08 LAB
ALBUMIN SERPL BCG-MCNC: 3.9 G/DL (ref 3.5–5.2)
ALP SERPL-CCNC: 75 U/L (ref 40–150)
ALT SERPL W P-5'-P-CCNC: 15 U/L (ref 0–70)
ANION GAP SERPL CALCULATED.3IONS-SCNC: 9 MMOL/L (ref 7–15)
AST SERPL W P-5'-P-CCNC: 25 U/L (ref 0–45)
BASOPHILS # BLD AUTO: 0 10E3/UL (ref 0–0.2)
BASOPHILS NFR BLD AUTO: 1 %
BILIRUB SERPL-MCNC: 0.3 MG/DL
BUN SERPL-MCNC: 26.4 MG/DL (ref 8–23)
CALCIUM SERPL-MCNC: 9.2 MG/DL (ref 8.8–10.4)
CHLORIDE SERPL-SCNC: 104 MMOL/L (ref 98–107)
CREAT SERPL-MCNC: 0.84 MG/DL (ref 0.67–1.17)
EGFRCR SERPLBLD CKD-EPI 2021: 82 ML/MIN/1.73M2
EOSINOPHIL # BLD AUTO: 0.2 10E3/UL (ref 0–0.7)
EOSINOPHIL NFR BLD AUTO: 3 %
ERYTHROCYTE [DISTWIDTH] IN BLOOD BY AUTOMATED COUNT: 13.9 % (ref 10–15)
GLUCOSE SERPL-MCNC: 87 MG/DL (ref 70–99)
HCO3 SERPL-SCNC: 27 MMOL/L (ref 22–29)
HCT VFR BLD AUTO: 35 % (ref 40–53)
HGB BLD-MCNC: 10.4 G/DL (ref 13.3–17.7)
IMM GRANULOCYTES # BLD: 0.1 10E3/UL
IMM GRANULOCYTES NFR BLD: 2 %
INR PPP: 3 (ref 0.85–1.15)
LYMPHOCYTES # BLD AUTO: 0.4 10E3/UL (ref 0.8–5.3)
LYMPHOCYTES NFR BLD AUTO: 7 %
MCH RBC QN AUTO: 28.9 PG (ref 26.5–33)
MCHC RBC AUTO-ENTMCNC: 29.7 G/DL (ref 31.5–36.5)
MCV RBC AUTO: 97 FL (ref 78–100)
MONOCYTES # BLD AUTO: 0.8 10E3/UL (ref 0–1.3)
MONOCYTES NFR BLD AUTO: 12 %
NEUTROPHILS # BLD AUTO: 4.8 10E3/UL (ref 1.6–8.3)
NEUTROPHILS NFR BLD AUTO: 75 %
NRBC # BLD AUTO: 0 10E3/UL
NRBC BLD AUTO-RTO: 0 /100
PLATELET # BLD AUTO: 240 10E3/UL (ref 150–450)
POTASSIUM SERPL-SCNC: 4.6 MMOL/L (ref 3.4–5.3)
PROT SERPL-MCNC: 6.9 G/DL (ref 6.4–8.3)
RBC # BLD AUTO: 3.6 10E6/UL (ref 4.4–5.9)
SODIUM SERPL-SCNC: 140 MMOL/L (ref 135–145)
WBC # BLD AUTO: 6.3 10E3/UL (ref 4–11)

## 2024-08-08 PROCEDURE — 85025 COMPLETE CBC W/AUTO DIFF WBC: CPT | Mod: ORL

## 2024-08-08 PROCEDURE — 85610 PROTHROMBIN TIME: CPT | Mod: ORL

## 2024-08-08 PROCEDURE — P9604 ONE-WAY ALLOW PRORATED TRIP: HCPCS | Mod: ORL

## 2024-08-08 PROCEDURE — 36415 COLL VENOUS BLD VENIPUNCTURE: CPT | Mod: ORL

## 2024-08-08 PROCEDURE — 80053 COMPREHEN METABOLIC PANEL: CPT | Mod: ORL

## 2024-08-12 ENCOUNTER — LAB REQUISITION (OUTPATIENT)
Dept: LAB | Facility: CLINIC | Age: 89
End: 2024-08-12
Payer: COMMERCIAL

## 2024-08-12 DIAGNOSIS — I48.20 CHRONIC ATRIAL FIBRILLATION, UNSPECIFIED (H): ICD-10-CM

## 2024-08-15 LAB — INR PPP: 2.71 (ref 0.85–1.15)

## 2024-08-15 PROCEDURE — 85610 PROTHROMBIN TIME: CPT | Mod: ORL

## 2024-08-15 PROCEDURE — P9604 ONE-WAY ALLOW PRORATED TRIP: HCPCS | Mod: ORL

## 2024-08-15 PROCEDURE — 36415 COLL VENOUS BLD VENIPUNCTURE: CPT | Mod: ORL

## 2024-08-26 ENCOUNTER — LAB REQUISITION (OUTPATIENT)
Dept: LAB | Facility: CLINIC | Age: 89
End: 2024-08-26
Payer: COMMERCIAL

## 2024-08-26 DIAGNOSIS — I48.20 CHRONIC ATRIAL FIBRILLATION, UNSPECIFIED (H): ICD-10-CM

## 2024-08-26 DIAGNOSIS — I10 ESSENTIAL (PRIMARY) HYPERTENSION: ICD-10-CM

## 2024-08-26 DIAGNOSIS — D64.9 ANEMIA, UNSPECIFIED: ICD-10-CM

## 2024-08-26 DIAGNOSIS — E03.9 HYPOTHYROIDISM, UNSPECIFIED: ICD-10-CM

## 2024-08-29 LAB
ANION GAP SERPL CALCULATED.3IONS-SCNC: 11 MMOL/L (ref 7–15)
BASOPHILS # BLD AUTO: 0 10E3/UL (ref 0–0.2)
BASOPHILS NFR BLD AUTO: 1 %
BUN SERPL-MCNC: 25 MG/DL (ref 8–23)
CALCIUM SERPL-MCNC: 9.2 MG/DL (ref 8.8–10.4)
CHLORIDE SERPL-SCNC: 103 MMOL/L (ref 98–107)
CREAT SERPL-MCNC: 0.76 MG/DL (ref 0.67–1.17)
EGFRCR SERPLBLD CKD-EPI 2021: 85 ML/MIN/1.73M2
EOSINOPHIL # BLD AUTO: 0.2 10E3/UL (ref 0–0.7)
EOSINOPHIL NFR BLD AUTO: 3 %
ERYTHROCYTE [DISTWIDTH] IN BLOOD BY AUTOMATED COUNT: 14.5 % (ref 10–15)
GLUCOSE SERPL-MCNC: 143 MG/DL (ref 70–99)
HCO3 SERPL-SCNC: 26 MMOL/L (ref 22–29)
HCT VFR BLD AUTO: 37.4 % (ref 40–53)
HGB BLD-MCNC: 11.1 G/DL (ref 13.3–17.7)
IMM GRANULOCYTES # BLD: 0.1 10E3/UL
IMM GRANULOCYTES NFR BLD: 1 %
INR PPP: 1.76 (ref 0.85–1.15)
LYMPHOCYTES # BLD AUTO: 0.4 10E3/UL (ref 0.8–5.3)
LYMPHOCYTES NFR BLD AUTO: 7 %
MCH RBC QN AUTO: 28.2 PG (ref 26.5–33)
MCHC RBC AUTO-ENTMCNC: 29.7 G/DL (ref 31.5–36.5)
MCV RBC AUTO: 95 FL (ref 78–100)
MONOCYTES # BLD AUTO: 0.7 10E3/UL (ref 0–1.3)
MONOCYTES NFR BLD AUTO: 10 %
NEUTROPHILS # BLD AUTO: 5.2 10E3/UL (ref 1.6–8.3)
NEUTROPHILS NFR BLD AUTO: 78 %
NRBC # BLD AUTO: 0 10E3/UL
NRBC BLD AUTO-RTO: 0 /100
PLATELET # BLD AUTO: 203 10E3/UL (ref 150–450)
POTASSIUM SERPL-SCNC: 4.4 MMOL/L (ref 3.4–5.3)
RBC # BLD AUTO: 3.93 10E6/UL (ref 4.4–5.9)
SODIUM SERPL-SCNC: 140 MMOL/L (ref 135–145)
TSH SERPL DL<=0.005 MIU/L-ACNC: 4.06 UIU/ML (ref 0.3–4.2)
WBC # BLD AUTO: 6.6 10E3/UL (ref 4–11)

## 2024-08-29 PROCEDURE — 85610 PROTHROMBIN TIME: CPT | Mod: ORL

## 2024-08-29 PROCEDURE — 85025 COMPLETE CBC W/AUTO DIFF WBC: CPT | Mod: ORL

## 2024-08-29 PROCEDURE — 80048 BASIC METABOLIC PNL TOTAL CA: CPT | Mod: ORL

## 2024-08-29 PROCEDURE — P9604 ONE-WAY ALLOW PRORATED TRIP: HCPCS | Mod: ORL

## 2024-08-29 PROCEDURE — 36415 COLL VENOUS BLD VENIPUNCTURE: CPT | Mod: ORL

## 2024-08-29 PROCEDURE — 84443 ASSAY THYROID STIM HORMONE: CPT | Mod: ORL

## 2024-09-03 ENCOUNTER — LAB REQUISITION (OUTPATIENT)
Dept: LAB | Facility: CLINIC | Age: 89
End: 2024-09-03
Payer: COMMERCIAL

## 2024-09-03 DIAGNOSIS — I48.20 CHRONIC ATRIAL FIBRILLATION, UNSPECIFIED (H): ICD-10-CM

## 2024-09-05 LAB — INR PPP: 1.9 (ref 0.85–1.15)

## 2024-09-05 PROCEDURE — 36415 COLL VENOUS BLD VENIPUNCTURE: CPT | Mod: ORL

## 2024-09-05 PROCEDURE — P9604 ONE-WAY ALLOW PRORATED TRIP: HCPCS | Mod: ORL

## 2024-09-05 PROCEDURE — 85610 PROTHROMBIN TIME: CPT | Mod: ORL

## 2024-09-05 PROCEDURE — 81001 URINALYSIS AUTO W/SCOPE: CPT | Mod: ORL

## 2024-09-06 ENCOUNTER — LAB REQUISITION (OUTPATIENT)
Dept: LAB | Facility: CLINIC | Age: 89
End: 2024-09-06
Payer: COMMERCIAL

## 2024-09-06 DIAGNOSIS — R30.0 DYSURIA: ICD-10-CM

## 2024-09-06 LAB
ALBUMIN UR-MCNC: 70 MG/DL
APPEARANCE UR: ABNORMAL
BILIRUB UR QL STRIP: NEGATIVE
COLOR UR AUTO: ABNORMAL
GLUCOSE UR STRIP-MCNC: NEGATIVE MG/DL
HGB UR QL STRIP: ABNORMAL
KETONES UR STRIP-MCNC: NEGATIVE MG/DL
LEUKOCYTE ESTERASE UR QL STRIP: ABNORMAL
NITRATE UR QL: NEGATIVE
PH UR STRIP: 6 [PH] (ref 5–7)
RBC URINE: >182 /HPF
SP GR UR STRIP: 1.02 (ref 1–1.03)
SQUAMOUS EPITHELIAL: 4 /HPF
UROBILINOGEN UR STRIP-MCNC: NORMAL MG/DL
WBC URINE: 0 /HPF

## 2024-09-09 ENCOUNTER — LAB REQUISITION (OUTPATIENT)
Dept: LAB | Facility: CLINIC | Age: 89
End: 2024-09-09
Payer: COMMERCIAL

## 2024-09-09 DIAGNOSIS — I48.20 CHRONIC ATRIAL FIBRILLATION, UNSPECIFIED (H): ICD-10-CM

## 2024-09-09 DIAGNOSIS — D64.9 ANEMIA, UNSPECIFIED: ICD-10-CM

## 2024-09-09 DIAGNOSIS — I10 ESSENTIAL (PRIMARY) HYPERTENSION: ICD-10-CM

## 2024-09-12 LAB
ANION GAP SERPL CALCULATED.3IONS-SCNC: 10 MMOL/L (ref 7–15)
BUN SERPL-MCNC: 26.6 MG/DL (ref 8–23)
CALCIUM SERPL-MCNC: 9.2 MG/DL (ref 8.8–10.4)
CHLORIDE SERPL-SCNC: 104 MMOL/L (ref 98–107)
CREAT SERPL-MCNC: 0.73 MG/DL (ref 0.67–1.17)
EGFRCR SERPLBLD CKD-EPI 2021: 86 ML/MIN/1.73M2
ERYTHROCYTE [DISTWIDTH] IN BLOOD BY AUTOMATED COUNT: 14.7 % (ref 10–15)
GLUCOSE SERPL-MCNC: 111 MG/DL (ref 70–99)
HCO3 SERPL-SCNC: 26 MMOL/L (ref 22–29)
HCT VFR BLD AUTO: 36.8 % (ref 40–53)
HGB BLD-MCNC: 10.8 G/DL (ref 13.3–17.7)
INR PPP: 2.45 (ref 0.85–1.15)
MCH RBC QN AUTO: 27.7 PG (ref 26.5–33)
MCHC RBC AUTO-ENTMCNC: 29.3 G/DL (ref 31.5–36.5)
MCV RBC AUTO: 94 FL (ref 78–100)
PLATELET # BLD AUTO: 256 10E3/UL (ref 150–450)
POTASSIUM SERPL-SCNC: 4.6 MMOL/L (ref 3.4–5.3)
RBC # BLD AUTO: 3.9 10E6/UL (ref 4.4–5.9)
SODIUM SERPL-SCNC: 140 MMOL/L (ref 135–145)
WBC # BLD AUTO: 8.4 10E3/UL (ref 4–11)

## 2024-09-12 PROCEDURE — 36415 COLL VENOUS BLD VENIPUNCTURE: CPT | Mod: ORL

## 2024-09-12 PROCEDURE — 85610 PROTHROMBIN TIME: CPT | Mod: ORL

## 2024-09-12 PROCEDURE — 80048 BASIC METABOLIC PNL TOTAL CA: CPT | Mod: ORL

## 2024-09-12 PROCEDURE — 85027 COMPLETE CBC AUTOMATED: CPT | Mod: ORL

## 2024-09-12 PROCEDURE — P9604 ONE-WAY ALLOW PRORATED TRIP: HCPCS | Mod: ORL

## 2024-09-17 ENCOUNTER — LAB REQUISITION (OUTPATIENT)
Dept: LAB | Facility: CLINIC | Age: 89
End: 2024-09-17
Payer: COMMERCIAL

## 2024-09-17 DIAGNOSIS — I48.20 CHRONIC ATRIAL FIBRILLATION, UNSPECIFIED (H): ICD-10-CM

## 2024-09-19 LAB — INR PPP: 3 (ref 0.85–1.15)

## 2024-09-19 PROCEDURE — 36415 COLL VENOUS BLD VENIPUNCTURE: CPT | Mod: ORL

## 2024-09-19 PROCEDURE — P9603 ONE-WAY ALLOW PRORATED MILES: HCPCS | Mod: ORL

## 2024-09-19 PROCEDURE — 85610 PROTHROMBIN TIME: CPT | Mod: ORL

## 2024-09-24 ENCOUNTER — LAB REQUISITION (OUTPATIENT)
Dept: LAB | Facility: CLINIC | Age: 89
End: 2024-09-24
Payer: COMMERCIAL

## 2024-09-24 DIAGNOSIS — I48.20 CHRONIC ATRIAL FIBRILLATION, UNSPECIFIED (H): ICD-10-CM

## 2024-09-26 LAB — INR PPP: 2.62 (ref 0.85–1.15)

## 2024-09-26 PROCEDURE — 36415 COLL VENOUS BLD VENIPUNCTURE: CPT | Mod: ORL

## 2024-09-26 PROCEDURE — 85610 PROTHROMBIN TIME: CPT | Mod: ORL

## 2024-09-26 PROCEDURE — P9604 ONE-WAY ALLOW PRORATED TRIP: HCPCS | Mod: ORL

## 2024-10-01 ENCOUNTER — LAB REQUISITION (OUTPATIENT)
Dept: LAB | Facility: CLINIC | Age: 89
End: 2024-10-01
Payer: COMMERCIAL

## 2024-10-01 DIAGNOSIS — I48.20 CHRONIC ATRIAL FIBRILLATION, UNSPECIFIED (H): ICD-10-CM

## 2024-10-03 LAB — INR PPP: 2.03 (ref 0.85–1.15)

## 2024-10-03 PROCEDURE — P9604 ONE-WAY ALLOW PRORATED TRIP: HCPCS | Mod: ORL

## 2024-10-03 PROCEDURE — 85610 PROTHROMBIN TIME: CPT | Mod: ORL

## 2024-10-03 PROCEDURE — 36415 COLL VENOUS BLD VENIPUNCTURE: CPT | Mod: ORL
